# Patient Record
Sex: FEMALE | Race: WHITE | NOT HISPANIC OR LATINO | Employment: FULL TIME | URBAN - METROPOLITAN AREA
[De-identification: names, ages, dates, MRNs, and addresses within clinical notes are randomized per-mention and may not be internally consistent; named-entity substitution may affect disease eponyms.]

---

## 2017-11-08 ENCOUNTER — ALLSCRIPTS OFFICE VISIT (OUTPATIENT)
Dept: OTHER | Facility: OTHER | Age: 52
End: 2017-11-08

## 2017-11-08 LAB — S PYO AG THROAT QL: NEGATIVE

## 2017-11-10 NOTE — PROGRESS NOTES
Assessment    1  Moderate persistent asthma (493 90) (J45 40)   2  Acute pharyngitis (462) (J02 9)   3  Genital herpes simplex (054 10) (A60 00)   4  Sleep apnea (780 57) (G47 30)   5  Never a smoker   6  BMI 36 0-36 9,adult (V85 36) (Z68 36)    Plan  Acute pharyngitis    · Azithromycin 250 MG Oral Tablet; TAKE 2 TABLETS ON DAY 1 THEN TAKE 1TABLET A DAY FOR 4 DAYS   · Follow Up if Not Better Evaluation and Treatment  Follow-up  Status: Complete  Done:08Nov2017   · Drink plenty of fluids ; Status:Complete;   Done: 37IVJ4494   · Gargle with warm salt water for 5 minutes every 4 hours ; Status:Complete;   Done:08Nov2017   · Irrigate your nose twice a day ; Status:Complete;   Done: 98NPD4349   · Rapid StrepA- POC; Source:Throat; Status:Complete;   Done: 35FEK5015 04:30PM  BMI 36 0-36 9,adult    · Begin a limited exercise program ; Status:Complete;   Done: 51NPR7121    Discussion/Summary  Discussion Summary:   Strep negative  Will treated with Zithromax and advised on supportive care  Advised recheck for worsening symptoms, difficulty breathing, SOB, or wheezing  will request records be sent to our office and schedule CPE  Due for pap and mammogram, states she has typically has breast US d/t cysts  Medication SE Review and Pt Understands Tx: Possible side effects of new medications were reviewed with the patient/guardian today  The treatment plan was reviewed with the patient/guardian  The patient/guardian understands and agrees with the treatment plan      Chief Complaint  Chief Complaint Free Text Note Form: pt c/o sore throat, cough, sneezing, and on and off again fevers for about 3 days  af/rma      History of Present Illness  HPI: C/o severe sore throat for the past 2 days  It hurts to swallow  Has had a low grade fever, productive cough, and her asthma has been acting up  Uses Advair daily and used Ventolin twice today  has mild sinus congestion, runny nose, and itchy ears  of seasonal allergies, does not take OTC antihistamine d/t excessive fatigue  moved to the area, plans on transferring care to our office  Review of Systems  Complete-Female:  Constitutional: fever-- and-- feeling poorly, but-- no chills  ENT: sore throat, but-- as noted in HPI  Respiratory: shortness of breath,-- cough-- and-- wheezing  Neurological: headache  Past Medical History  Active Problems And Past Medical History Reviewed: The active problems and past medical history were reviewed and updated today  Surgical History  1  History of Wrist Surgery  Surgical History Reviewed: The surgical history was reviewed and updated today  Family History  Mother    1  Family history of osteoporosis (V17 81) (Z82 62)   2  Family history of thyroid disease (V18 19) (Z83 49)  Father    3  Family history of Alzheimer's disease (V17 2) (Z82 0)   4  Family history of hypertension (V17 49) (Z82 49)  Family History Reviewed: The family history was reviewed and updated today  Social History     · Never a smoker  Social History Reviewed: The social history was reviewed and is unchanged  Current Meds   1  Acyclovir 200 MG Oral Capsule; take 1 capsule daily; Therapy: 57HSA0552 to Recorded   2  Advair Diskus 500-50 MCG/DOSE Inhalation Aerosol Powder Breath Activated; INHALE 1 PUFF TWICE DAILY; Therapy: 40XAS2636 to Recorded   3  Ventolin  (90 Base) MCG/ACT Inhalation Aerosol Solution; INHALE 1 TO 2 PUFFS EVERY 6 HOURS AS NEEDED; Therapy: 07LLN1294 to Recorded    Allergies  1  No Known Drug Allergies    Vitals  Vital Signs    Recorded: 98CLN5514 04:10PM   Temperature 98 4 F   Heart Rate 78   Respiration 18   Systolic 080   Diastolic 76   Height 5 ft 4 in   Weight 212 lb    BMI Calculated 36 39   BSA Calculated 2 01       Physical Exam   Constitutional  General appearance: No acute distress, well appearing and well nourished  Eyes  Conjunctiva and lids: No swelling, erythema or discharge     Ears, Nose, Mouth, and Throat Otoscopic examination: Tympanic membranes translucent with normal light reflex  Canals patent without erythema  Nasal mucosa, septum, and turbinates: Normal without edema or erythema  Oropharynx: Abnormal   The posterior pharynx was erythematous, but-- did not have an exudate  There was 2+ enlargement and erythema of both tonsils no exudate  Pulmonary  Respiratory effort: No increased work of breathing or signs of respiratory distress  Auscultation of lungs: Clear to auscultation  Cardiovascular  Auscultation of heart: Normal rate and rhythm, normal S1 and S2, without murmurs  Examination of extremities for edema and/or varicosities: Normal    Lymphatic  Palpation of lymph nodes in neck: Abnormal   bilateral anterior cervical node enlargement  Skin  Skin and subcutaneous tissue: Normal without rashes or lesions  Psychiatric  Mood and affect: Normal          Results/Data  Rapid StrepA- POC [id number] 04:30PM Helyn Pinch     Test Name Result Flag Reference   Rapid Strep Negative       PHQ-2 Adult Depression Screening 60BZU9456 04:09PM User, Ahs     Test Name Result Flag Reference   PHQ-2 Adult Depression Score 0       Over the last two weeks, how often have you been bothered by any of the following problems? Little interest or pleasure in doing things: Not at all - 0 Feeling down, depressed, or hopeless: Not at all - 0   PHQ-2 Adult Depression Screening Negative           Message  Return to work or school:   Manjit Starks is under my professional care  She was seen in my office on 11/8/17       Jeanne Lundborg, [address] [place] [address]  Attending Note  Collaborating Physician Note: Collaborating Physician: I agree with the Advanced Practitioner note        Signatures   Electronically signed by : Glenn Rodriguez; Nov 8 2017  4:39PM EST                       (Author)    Electronically signed by : Stephany Longoria DO; Nov 8 2017  5:13PM EST                       (Review)

## 2017-12-13 ENCOUNTER — GENERIC CONVERSION - ENCOUNTER (OUTPATIENT)
Dept: OTHER | Facility: OTHER | Age: 52
End: 2017-12-13

## 2018-01-13 VITALS
SYSTOLIC BLOOD PRESSURE: 134 MMHG | RESPIRATION RATE: 18 BRPM | HEART RATE: 78 BPM | WEIGHT: 212 LBS | HEIGHT: 64 IN | DIASTOLIC BLOOD PRESSURE: 76 MMHG | TEMPERATURE: 98.4 F | BODY MASS INDEX: 36.19 KG/M2

## 2018-01-14 NOTE — MISCELLANEOUS
Message  Return to work or school:   Hossein Burkett is under my professional care  She was seen in my office on 11/8/17        Neetu Novak, [address]        Signatures   Electronically signed by : Eloina Ricketts; Nov 8 2017  4:39PM EST                       (Author)    Electronically signed by : Aileen Gerber DO; Nov 8 2017  5:13PM EST                       (Review)

## 2018-01-15 ENCOUNTER — ALLSCRIPTS OFFICE VISIT (OUTPATIENT)
Dept: OTHER | Facility: OTHER | Age: 53
End: 2018-01-15

## 2018-01-15 DIAGNOSIS — Z12.31 ENCOUNTER FOR SCREENING MAMMOGRAM FOR MALIGNANT NEOPLASM OF BREAST: ICD-10-CM

## 2018-01-23 VITALS
DIASTOLIC BLOOD PRESSURE: 82 MMHG | SYSTOLIC BLOOD PRESSURE: 122 MMHG | RESPIRATION RATE: 16 BRPM | HEART RATE: 84 BPM | TEMPERATURE: 99 F | HEIGHT: 64 IN | BODY MASS INDEX: 37.39 KG/M2 | WEIGHT: 219 LBS
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
## 2018-01-23 NOTE — MISCELLANEOUS
Message  Return to work or school:   Raphael Velazquez is under my professional care  She was seen in my office on 1/15/18  Excuse from work today               Signatures   Electronically signed by : Mauri Brice DO; Gerald 15 2018 11:59PM EST                       (Author)

## 2018-02-13 ENCOUNTER — OFFICE VISIT (OUTPATIENT)
Dept: FAMILY MEDICINE CLINIC | Facility: CLINIC | Age: 53
End: 2018-02-13
Payer: COMMERCIAL

## 2018-02-13 VITALS
HEIGHT: 64 IN | TEMPERATURE: 99.7 F | BODY MASS INDEX: 37.73 KG/M2 | WEIGHT: 221 LBS | HEART RATE: 88 BPM | DIASTOLIC BLOOD PRESSURE: 84 MMHG | SYSTOLIC BLOOD PRESSURE: 124 MMHG | RESPIRATION RATE: 20 BRPM

## 2018-02-13 DIAGNOSIS — J11.1 INFLUENZA: Primary | ICD-10-CM

## 2018-02-13 PROBLEM — M23.92 INTERNAL DERANGEMENT OF LEFT KNEE: Status: ACTIVE | Noted: 2018-01-15

## 2018-02-13 PROBLEM — G47.30 SLEEP APNEA: Status: ACTIVE | Noted: 2017-11-08

## 2018-02-13 PROBLEM — A60.00 GENITAL HERPES SIMPLEX: Status: ACTIVE | Noted: 2017-11-08

## 2018-02-13 PROBLEM — J45.40 MODERATE PERSISTENT ASTHMA: Status: ACTIVE | Noted: 2017-11-08

## 2018-02-13 PROCEDURE — 99213 OFFICE O/P EST LOW 20 MIN: CPT | Performed by: NURSE PRACTITIONER

## 2018-02-13 RX ORDER — IBUPROFEN 600 MG/1
TABLET ORAL
COMMUNITY
Start: 2018-01-23 | End: 2018-07-09 | Stop reason: HOSPADM

## 2018-02-13 RX ORDER — ACYCLOVIR 200 MG/1
200 CAPSULE ORAL AS NEEDED
COMMUNITY
Start: 2018-01-15 | End: 2019-04-16 | Stop reason: SDUPTHER

## 2018-02-13 RX ORDER — OSELTAMIVIR PHOSPHATE 75 MG/1
75 CAPSULE ORAL EVERY 12 HOURS SCHEDULED
Qty: 10 CAPSULE | Refills: 0 | Status: SHIPPED | OUTPATIENT
Start: 2018-02-13 | End: 2018-02-18

## 2018-02-13 RX ORDER — ALBUTEROL SULFATE 90 UG/1
2 AEROSOL, METERED RESPIRATORY (INHALATION) EVERY 4 HOURS PRN
COMMUNITY
Start: 2017-11-08 | End: 2022-02-09

## 2018-02-13 NOTE — PATIENT INSTRUCTIONS
Influenza   WHAT YOU NEED TO KNOW:   Influenza (the flu) is an infection caused by the influenza virus  The flu is easily spread when an infected person coughs, sneezes, or has close contact with others  You may be able to spread the flu to others for 1 week or longer after signs or symptoms appear  DISCHARGE INSTRUCTIONS:   Call 911 for any of the following:   · You have trouble breathing, and your lips look purple or blue  · You have a seizure  Return to the emergency department if:   · You are dizzy, or you are urinating less or not at all  · You have a headache with a stiff neck, and you feel tired or confused  · You have new pain or pressure in your chest     · Your symptoms, such as shortness of breath, vomiting, or diarrhea, get worse  · Your symptoms, such as fever and coughing, seem to get better, but then get worse  Contact your healthcare provider if:   · You have new muscle pain or weakness  · You have questions or concerns about your condition or care  Medicines: You may need any of the following:  · Acetaminophen  decreases pain and fever  It is available without a doctor's order  Ask how much to take and how often to take it  Follow directions  Acetaminophen can cause liver damage if not taken correctly  · NSAIDs , such as ibuprofen, help decrease swelling, pain, and fever  This medicine is available with or without a doctor's order  NSAIDs can cause stomach bleeding or kidney problems in certain people  If you take blood thinner medicine, always ask your healthcare provider if NSAIDs are safe for you  Always read the medicine label and follow directions  · Antivirals  help fight a viral infection  · Take your medicine as directed  Contact your healthcare provider if you think your medicine is not helping or if you have side effects  Tell him or her if you are allergic to any medicine  Keep a list of the medicines, vitamins, and herbs you take   Include the amounts, and when and why you take them  Bring the list or the pill bottles to follow-up visits  Carry your medicine list with you in case of an emergency  Rest  as much as you can to help you recover  Drink liquids as directed  to help prevent dehydration  Ask how much liquid to drink each day and which liquids are best for you  Prevent the spread of influenza:   · Wash your hands often  Use soap and water  Wash your hands after you use the bathroom, change a child's diapers, or sneeze  Wash your hands before you prepare or eat food  Use gel hand cleanser when soap and water are not available  Do not touch your eyes, nose, or mouth unless you have washed your hands first            · Cover your mouth when you sneeze or cough  Cough into a tissue or the bend of your arm  · Clean shared items with a germ-killing   Clean table surfaces, doorknobs, and light switches  Do not share towels, silverware, and dishes with people who are sick  Wash bed sheets, towels, silverware, and dishes with soap and water  · Wear a mask  over your mouth and nose if you are sick or are near anyone who is sick  · Stay away from others  if you are sick  · Influenza vaccine  helps prevent influenza (flu)  Everyone older than 6 months should get a yearly influenza vaccine  Get the vaccine as soon as it is available, usually in September or October each year  Follow up with your healthcare provider as directed:  Write down your questions so you remember to ask them during your visits  © 2017 [employer] Information is for End User's use only and may not be sold, redistributed or otherwise used for commercial purposes  All illustrations and images included in CareNotes® are the copyrighted property of [employer]  or [address]  The above information is an  only  It is not intended as medical advice for individual conditions or treatments   Talk to your doctor, nurse or pharmacist before following any medical regimen to see if it is safe and effective for you

## 2018-02-13 NOTE — PROGRESS NOTES
Assessment/Plan:       Diagnoses and all orders for this visit:    Influenza  -     oseltamivir (TAMIFLU) 75 mg capsule; Take 1 capsule (75 mg total) by mouth every 12 (twelve) hours for 5 days    BMI 38 0-38 9,adult    Other orders  -     acyclovir (ZOVIRAX) 200 mg capsule;   -     fluticasone-salmeterol (ADVAIR DISKUS) 500-50 mcg/dose; Inhale 1 puff 2 (two) times a day  -     ibuprofen (MOTRIN) 600 mg tablet;   -     albuterol (VENTOLIN HFA) 90 mcg/act inhaler; Inhale          Patient Instructions     Influenza   WHAT YOU NEED TO KNOW:   Influenza (the flu) is an infection caused by the influenza virus  The flu is easily spread when an infected person coughs, sneezes, or has close contact with others  You may be able to spread the flu to others for 1 week or longer after signs or symptoms appear  DISCHARGE INSTRUCTIONS:   Call 911 for any of the following:   · You have trouble breathing, and your lips look purple or blue  · You have a seizure  Return to the emergency department if:   · You are dizzy, or you are urinating less or not at all  · You have a headache with a stiff neck, and you feel tired or confused  · You have new pain or pressure in your chest     · Your symptoms, such as shortness of breath, vomiting, or diarrhea, get worse  · Your symptoms, such as fever and coughing, seem to get better, but then get worse  Contact your healthcare provider if:   · You have new muscle pain or weakness  · You have questions or concerns about your condition or care  Medicines: You may need any of the following:  · Acetaminophen  decreases pain and fever  It is available without a doctor's order  Ask how much to take and how often to take it  Follow directions  Acetaminophen can cause liver damage if not taken correctly  · NSAIDs , such as ibuprofen, help decrease swelling, pain, and fever  This medicine is available with or without a doctor's order   NSAIDs can cause stomach bleeding or kidney problems in certain people  If you take blood thinner medicine, always ask your healthcare provider if NSAIDs are safe for you  Always read the medicine label and follow directions  · Antivirals  help fight a viral infection  · Take your medicine as directed  Contact your healthcare provider if you think your medicine is not helping or if you have side effects  Tell him or her if you are allergic to any medicine  Keep a list of the medicines, vitamins, and herbs you take  Include the amounts, and when and why you take them  Bring the list or the pill bottles to follow-up visits  Carry your medicine list with you in case of an emergency  Rest  as much as you can to help you recover  Drink liquids as directed  to help prevent dehydration  Ask how much liquid to drink each day and which liquids are best for you  Prevent the spread of influenza:   · Wash your hands often  Use soap and water  Wash your hands after you use the bathroom, change a child's diapers, or sneeze  Wash your hands before you prepare or eat food  Use gel hand cleanser when soap and water are not available  Do not touch your eyes, nose, or mouth unless you have washed your hands first            · Cover your mouth when you sneeze or cough  Cough into a tissue or the bend of your arm  · Clean shared items with a germ-killing   Clean table surfaces, doorknobs, and light switches  Do not share towels, silverware, and dishes with people who are sick  Wash bed sheets, towels, silverware, and dishes with soap and water  · Wear a mask  over your mouth and nose if you are sick or are near anyone who is sick  · Stay away from others  if you are sick  · Influenza vaccine  helps prevent influenza (flu)  Everyone older than 6 months should get a yearly influenza vaccine  Get the vaccine as soon as it is available, usually in September or October each year    Follow up with your healthcare provider as directed:  Write down your questions so you remember to ask them during your visits  © 2017 [employer] Information is for End User's use only and may not be sold, redistributed or otherwise used for commercial purposes  All illustrations and images included in CareNotes® are the copyrighted property of [employer]  or [employer]  The above information is an  only  It is not intended as medical advice for individual conditions or treatments  Talk to your doctor, nurse or pharmacist before following any medical regimen to see if it is safe and effective for you  Return if symptoms worsen or fail to improve  Subjective:      Patient ID: Carlotta Sood is a 47 y o  female  Chief Complaint   Patient presents with    Cough     S/S started yesterday     Sore Throat    Fever       Yesterday she developed sudden onset of body aches, runny nose, sore throat, dry cough, and a headache  She had a fever of 102 last night  She taking is taking Ibuprofen OTC which helps with the fever  She did have a flu shot this season  The following portions of the patient's history were reviewed and updated as appropriate: allergies, current medications, past family history, past medical history, past social history, past surgical history and problem list     Review of Systems   Constitutional: Positive for chills, fatigue and fever  HENT: Positive for congestion and sore throat  Negative for ear pain, postnasal drip, rhinorrhea and sinus pressure  Respiratory: Positive for cough  Negative for shortness of breath and wheezing  Cardiovascular: Negative for chest pain  Gastrointestinal: Positive for nausea  Negative for abdominal pain, diarrhea and vomiting  Musculoskeletal: Positive for arthralgias  Skin: Negative for rash  Neurological: Positive for headaches           Current Outpatient Prescriptions   Medication Sig Dispense Refill    acyclovir (ZOVIRAX) 200 mg capsule  albuterol (VENTOLIN HFA) 90 mcg/act inhaler Inhale      fluticasone-salmeterol (ADVAIR DISKUS) 500-50 mcg/dose Inhale 1 puff 2 (two) times a day      ibuprofen (MOTRIN) 600 mg tablet       oseltamivir (TAMIFLU) 75 mg capsule Take 1 capsule (75 mg total) by mouth every 12 (twelve) hours for 5 days 10 capsule 0     No current facility-administered medications for this visit  Objective:    /84   Pulse 88   Temp 99 7 °F (37 6 °C)   Resp 20   Ht 5' 3 5" (1 613 m)   Wt 100 kg (221 lb)   LMP 01/28/2018   BMI 38 53 kg/m²        Physical Exam   Constitutional: She appears well-developed and well-nourished  HENT:   Right Ear: Tympanic membrane, external ear and ear canal normal    Left Ear: Tympanic membrane, external ear and ear canal normal    Nose: Mucosal edema and rhinorrhea (clear) present  Mouth/Throat: Oropharynx is clear and moist and mucous membranes are normal    Eyes: Conjunctivae are normal    Cardiovascular: Normal rate, regular rhythm and normal heart sounds  Pulmonary/Chest: Effort normal and breath sounds normal    Abdominal: Bowel sounds are normal  She exhibits no distension  There is no splenomegaly or hepatomegaly  There is no tenderness  Lymphadenopathy:        Right cervical: No superficial cervical adenopathy present  Left cervical: No superficial cervical adenopathy present  Skin: No rash noted  Psychiatric: She has a normal mood and affect                Elenore Model

## 2018-07-06 ENCOUNTER — HOSPITAL ENCOUNTER (INPATIENT)
Facility: HOSPITAL | Age: 53
LOS: 3 days | Discharge: HOME | DRG: 392 | End: 2018-07-09
Attending: EMERGENCY MEDICINE | Admitting: FAMILY MEDICINE
Payer: COMMERCIAL

## 2018-07-06 ENCOUNTER — APPOINTMENT (EMERGENCY)
Dept: RADIOLOGY | Facility: HOSPITAL | Age: 53
DRG: 392 | End: 2018-07-06
Payer: COMMERCIAL

## 2018-07-06 DIAGNOSIS — K57.92 DIVERTICULITIS: Primary | ICD-10-CM

## 2018-07-06 DIAGNOSIS — N70.11 HYDROSALPINX: ICD-10-CM

## 2018-07-06 DIAGNOSIS — K57.20 DIVERTICULITIS OF LARGE INTESTINE WITH ABSCESS WITHOUT BLEEDING: ICD-10-CM

## 2018-07-06 PROBLEM — R19.7 DIARRHEA: Status: ACTIVE | Noted: 2018-07-06

## 2018-07-06 PROBLEM — K57.32 DIVERTICULITIS OF SIGMOID COLON: Status: ACTIVE | Noted: 2018-07-06

## 2018-07-06 PROBLEM — J45.909 ASTHMA: Status: ACTIVE | Noted: 2017-11-08

## 2018-07-06 PROBLEM — A60.00 GENITAL HERPES SIMPLEX: Status: RESOLVED | Noted: 2017-11-08 | Resolved: 2018-07-06

## 2018-07-06 LAB
ALBUMIN SERPL BCP-MCNC: 3.3 G/DL (ref 3.5–5)
ALP SERPL-CCNC: 89 U/L (ref 46–116)
ALT SERPL W P-5'-P-CCNC: 27 U/L (ref 12–78)
ANION GAP SERPL CALCULATED.3IONS-SCNC: 10 MMOL/L (ref 4–13)
AST SERPL W P-5'-P-CCNC: 9 U/L (ref 5–45)
BACTERIA UR QL AUTO: NORMAL /HPF
BASOPHILS # BLD AUTO: 0.02 THOUSANDS/ΜL (ref 0–0.1)
BASOPHILS NFR BLD AUTO: 0 % (ref 0–1)
BILIRUB SERPL-MCNC: 0.3 MG/DL (ref 0.2–1)
BILIRUB UR QL STRIP: NEGATIVE
BUN SERPL-MCNC: 12 MG/DL (ref 5–25)
CALCIUM SERPL-MCNC: 9 MG/DL (ref 8.3–10.1)
CHLORIDE SERPL-SCNC: 102 MMOL/L (ref 100–108)
CLARITY UR: CLEAR
CO2 SERPL-SCNC: 24 MMOL/L (ref 21–32)
COLOR UR: YELLOW
CREAT SERPL-MCNC: 0.89 MG/DL (ref 0.6–1.3)
EOSINOPHIL # BLD AUTO: 0.02 THOUSAND/ΜL (ref 0–0.61)
EOSINOPHIL NFR BLD AUTO: 0 % (ref 0–6)
ERYTHROCYTE [DISTWIDTH] IN BLOOD BY AUTOMATED COUNT: 12.6 % (ref 11.6–15.1)
EXT PREG TEST URINE: NORMAL
GFR SERPL CREATININE-BSD FRML MDRD: 75 ML/MIN/1.73SQ M
GLUCOSE SERPL-MCNC: 117 MG/DL (ref 65–140)
GLUCOSE UR STRIP-MCNC: NEGATIVE MG/DL
HCT VFR BLD AUTO: 41.8 % (ref 34.8–46.1)
HGB BLD-MCNC: 13.8 G/DL (ref 11.5–15.4)
HGB UR QL STRIP.AUTO: ABNORMAL
HOLD SPECIMEN: NORMAL
IMM GRANULOCYTES # BLD AUTO: 0.03 THOUSAND/UL (ref 0–0.2)
IMM GRANULOCYTES NFR BLD AUTO: 0 % (ref 0–2)
KETONES UR STRIP-MCNC: NEGATIVE MG/DL
LEUKOCYTE ESTERASE UR QL STRIP: NEGATIVE
LIPASE SERPL-CCNC: 122 U/L (ref 73–393)
LYMPHOCYTES # BLD AUTO: 1.68 THOUSANDS/ΜL (ref 0.6–4.47)
LYMPHOCYTES NFR BLD AUTO: 15 % (ref 14–44)
MCH RBC QN AUTO: 29.3 PG (ref 26.8–34.3)
MCHC RBC AUTO-ENTMCNC: 33 G/DL (ref 31.4–37.4)
MCV RBC AUTO: 89 FL (ref 82–98)
MONOCYTES # BLD AUTO: 0.58 THOUSAND/ΜL (ref 0.17–1.22)
MONOCYTES NFR BLD AUTO: 5 % (ref 4–12)
NEUTROPHILS # BLD AUTO: 8.56 THOUSANDS/ΜL (ref 1.85–7.62)
NEUTS SEG NFR BLD AUTO: 80 % (ref 43–75)
NITRITE UR QL STRIP: NEGATIVE
NON-SQ EPI CELLS URNS QL MICRO: NORMAL /HPF
NRBC BLD AUTO-RTO: 0 /100 WBCS
PH UR STRIP.AUTO: 7 [PH] (ref 5–9)
PLATELET # BLD AUTO: 318 THOUSANDS/UL (ref 149–390)
PLATELET # BLD AUTO: 335 THOUSANDS/UL (ref 149–390)
PMV BLD AUTO: 8.9 FL (ref 8.9–12.7)
PMV BLD AUTO: 9.8 FL (ref 8.9–12.7)
POTASSIUM SERPL-SCNC: 4 MMOL/L (ref 3.5–5.3)
PROT SERPL-MCNC: 8.1 G/DL (ref 6.4–8.2)
PROT UR STRIP-MCNC: NEGATIVE MG/DL
RBC # BLD AUTO: 4.71 MILLION/UL (ref 3.81–5.12)
RBC #/AREA URNS AUTO: NORMAL /HPF
SODIUM SERPL-SCNC: 136 MMOL/L (ref 136–145)
SP GR UR STRIP.AUTO: <=1.005 (ref 1–1.03)
UROBILINOGEN UR QL STRIP.AUTO: 0.2 E.U./DL
WBC # BLD AUTO: 10.89 THOUSAND/UL (ref 4.31–10.16)
WBC #/AREA URNS AUTO: NORMAL /HPF

## 2018-07-06 PROCEDURE — 99222 1ST HOSP IP/OBS MODERATE 55: CPT | Performed by: FAMILY MEDICINE

## 2018-07-06 PROCEDURE — 96361 HYDRATE IV INFUSION ADD-ON: CPT

## 2018-07-06 PROCEDURE — 87081 CULTURE SCREEN ONLY: CPT | Performed by: NURSE PRACTITIONER

## 2018-07-06 PROCEDURE — 83690 ASSAY OF LIPASE: CPT | Performed by: EMERGENCY MEDICINE

## 2018-07-06 PROCEDURE — 80053 COMPREHEN METABOLIC PANEL: CPT | Performed by: EMERGENCY MEDICINE

## 2018-07-06 PROCEDURE — 81001 URINALYSIS AUTO W/SCOPE: CPT | Performed by: EMERGENCY MEDICINE

## 2018-07-06 PROCEDURE — 81025 URINE PREGNANCY TEST: CPT | Performed by: EMERGENCY MEDICINE

## 2018-07-06 PROCEDURE — 99254 IP/OBS CNSLTJ NEW/EST MOD 60: CPT | Performed by: INTERNAL MEDICINE

## 2018-07-06 PROCEDURE — 36415 COLL VENOUS BLD VENIPUNCTURE: CPT | Performed by: EMERGENCY MEDICINE

## 2018-07-06 PROCEDURE — C9113 INJ PANTOPRAZOLE SODIUM, VIA: HCPCS | Performed by: NURSE PRACTITIONER

## 2018-07-06 PROCEDURE — 96375 TX/PRO/DX INJ NEW DRUG ADDON: CPT

## 2018-07-06 PROCEDURE — 99285 EMERGENCY DEPT VISIT HI MDM: CPT

## 2018-07-06 PROCEDURE — 85025 COMPLETE CBC W/AUTO DIFF WBC: CPT | Performed by: EMERGENCY MEDICINE

## 2018-07-06 PROCEDURE — 74177 CT ABD & PELVIS W/CONTRAST: CPT

## 2018-07-06 PROCEDURE — 96374 THER/PROPH/DIAG INJ IV PUSH: CPT

## 2018-07-06 PROCEDURE — 85049 AUTOMATED PLATELET COUNT: CPT | Performed by: NURSE PRACTITIONER

## 2018-07-06 RX ORDER — MORPHINE SULFATE 4 MG/ML
4 INJECTION, SOLUTION INTRAMUSCULAR; INTRAVENOUS ONCE
Status: COMPLETED | OUTPATIENT
Start: 2018-07-06 | End: 2018-07-06

## 2018-07-06 RX ORDER — SODIUM CHLORIDE 9 MG/ML
75 INJECTION, SOLUTION INTRAVENOUS CONTINUOUS
Status: DISCONTINUED | OUTPATIENT
Start: 2018-07-06 | End: 2018-07-09 | Stop reason: HOSPADM

## 2018-07-06 RX ORDER — ALBUTEROL SULFATE 90 UG/1
2 AEROSOL, METERED RESPIRATORY (INHALATION) EVERY 4 HOURS PRN
Status: DISCONTINUED | OUTPATIENT
Start: 2018-07-06 | End: 2018-07-09 | Stop reason: HOSPADM

## 2018-07-06 RX ORDER — PANTOPRAZOLE SODIUM 40 MG/1
40 INJECTION, POWDER, FOR SOLUTION INTRAVENOUS
Status: DISCONTINUED | OUTPATIENT
Start: 2018-07-06 | End: 2018-07-09 | Stop reason: HOSPADM

## 2018-07-06 RX ORDER — DIPHENOXYLATE HYDROCHLORIDE AND ATROPINE SULFATE 2.5; .025 MG/1; MG/1
1 TABLET ORAL DAILY
COMMUNITY

## 2018-07-06 RX ORDER — FLUTICASONE FUROATE AND VILANTEROL 100; 25 UG/1; UG/1
1 POWDER RESPIRATORY (INHALATION)
Status: DISCONTINUED | OUTPATIENT
Start: 2018-07-06 | End: 2018-07-09 | Stop reason: HOSPADM

## 2018-07-06 RX ORDER — MORPHINE SULFATE 2 MG/ML
1 INJECTION, SOLUTION INTRAMUSCULAR; INTRAVENOUS EVERY 4 HOURS PRN
Status: DISCONTINUED | OUTPATIENT
Start: 2018-07-06 | End: 2018-07-09 | Stop reason: HOSPADM

## 2018-07-06 RX ORDER — LEVOFLOXACIN 5 MG/ML
750 INJECTION, SOLUTION INTRAVENOUS EVERY 24 HOURS
Status: DISCONTINUED | OUTPATIENT
Start: 2018-07-07 | End: 2018-07-09 | Stop reason: HOSPADM

## 2018-07-06 RX ORDER — MORPHINE SULFATE 2 MG/ML
2 INJECTION, SOLUTION INTRAMUSCULAR; INTRAVENOUS EVERY 4 HOURS PRN
Status: DISCONTINUED | OUTPATIENT
Start: 2018-07-06 | End: 2018-07-09 | Stop reason: HOSPADM

## 2018-07-06 RX ORDER — ONDANSETRON 2 MG/ML
4 INJECTION INTRAMUSCULAR; INTRAVENOUS EVERY 6 HOURS PRN
Status: DISCONTINUED | OUTPATIENT
Start: 2018-07-06 | End: 2018-07-09 | Stop reason: HOSPADM

## 2018-07-06 RX ORDER — ACETAMINOPHEN 325 MG/1
650 TABLET ORAL EVERY 6 HOURS PRN
Status: DISCONTINUED | OUTPATIENT
Start: 2018-07-06 | End: 2018-07-09 | Stop reason: HOSPADM

## 2018-07-06 RX ORDER — ONDANSETRON 2 MG/ML
4 INJECTION INTRAMUSCULAR; INTRAVENOUS ONCE
Status: COMPLETED | OUTPATIENT
Start: 2018-07-06 | End: 2018-07-06

## 2018-07-06 RX ORDER — LEVOFLOXACIN 5 MG/ML
750 INJECTION, SOLUTION INTRAVENOUS ONCE
Status: COMPLETED | OUTPATIENT
Start: 2018-07-06 | End: 2018-07-06

## 2018-07-06 RX ADMIN — MORPHINE SULFATE 4 MG: 4 INJECTION INTRAVENOUS at 08:00

## 2018-07-06 RX ADMIN — SODIUM CHLORIDE 1000 ML: 0.9 INJECTION, SOLUTION INTRAVENOUS at 09:53

## 2018-07-06 RX ADMIN — SODIUM CHLORIDE 125 ML/HR: 0.9 INJECTION, SOLUTION INTRAVENOUS at 11:58

## 2018-07-06 RX ADMIN — METRONIDAZOLE 500 MG: 500 INJECTION, SOLUTION INTRAVENOUS at 07:51

## 2018-07-06 RX ADMIN — LEVOFLOXACIN 750 MG: 5 INJECTION, SOLUTION INTRAVENOUS at 08:25

## 2018-07-06 RX ADMIN — MORPHINE SULFATE 1 MG: 2 INJECTION, SOLUTION INTRAMUSCULAR; INTRAVENOUS at 12:12

## 2018-07-06 RX ADMIN — MORPHINE SULFATE 2 MG: 2 INJECTION, SOLUTION INTRAMUSCULAR; INTRAVENOUS at 22:05

## 2018-07-06 RX ADMIN — SODIUM CHLORIDE 125 ML/HR: 0.9 INJECTION, SOLUTION INTRAVENOUS at 22:03

## 2018-07-06 RX ADMIN — SODIUM CHLORIDE 1000 ML: 0.9 INJECTION, SOLUTION INTRAVENOUS at 05:17

## 2018-07-06 RX ADMIN — IOHEXOL 100 ML: 350 INJECTION, SOLUTION INTRAVENOUS at 06:57

## 2018-07-06 RX ADMIN — ENOXAPARIN SODIUM 40 MG: 100 INJECTION SUBCUTANEOUS at 10:01

## 2018-07-06 RX ADMIN — PANTOPRAZOLE SODIUM 40 MG: 40 INJECTION, POWDER, FOR SOLUTION INTRAVENOUS at 12:19

## 2018-07-06 RX ADMIN — ONDANSETRON 4 MG: 2 INJECTION INTRAMUSCULAR; INTRAVENOUS at 05:16

## 2018-07-06 RX ADMIN — MORPHINE SULFATE 4 MG: 4 INJECTION INTRAVENOUS at 05:25

## 2018-07-06 RX ADMIN — METRONIDAZOLE 500 MG: 500 INJECTION, SOLUTION INTRAVENOUS at 23:04

## 2018-07-06 RX ADMIN — FLUTICASONE FUROATE AND VILANTEROL TRIFENATATE 1 PUFF: 100; 25 POWDER RESPIRATORY (INHALATION) at 10:10

## 2018-07-06 RX ADMIN — METRONIDAZOLE 500 MG: 500 INJECTION, SOLUTION INTRAVENOUS at 16:27

## 2018-07-06 RX ADMIN — MORPHINE SULFATE 2 MG: 2 INJECTION, SOLUTION INTRAMUSCULAR; INTRAVENOUS at 16:22

## 2018-07-06 NOTE — PROGRESS NOTES
Vascular and Interventional Radiology brief note  Received consult for possible drainage  Imaging reviewed  Intramural abscess along the mid sigmoid colon, not amenable to drainage at this time  Can consider repeat CT scan in several days to evaluate for interval change  However, given the location and the size of the uterus, this may always be difficult if not impossible to access  Consider MRI for evaluation uterus and possible uterine artery embolization

## 2018-07-06 NOTE — PLAN OF CARE
Problem: DISCHARGE PLANNING - CARE MANAGEMENT  Goal: Discharge to post-acute care or home with appropriate resources  INTERVENTIONS:  - Conduct assessment to determine patient/family and health care team treatment goals, and need for post-acute services based on payer coverage, community resources, and patient preferences, and barriers to discharge  - Address psychosocial, clinical, and financial barriers to discharge as identified in assessment in conjunction with the patient/family and health care team  - Arrange appropriate level of post-acute services according to patient's   needs and preference and payer coverage in collaboration with the physician and health care team  - Communicate with and update the patient/family, physician, and health care team regarding progress on the discharge plan  - Arrange appropriate transportation to post-acute venues  Return to home independently  Outcome: Adequate for Discharge

## 2018-07-06 NOTE — ASSESSMENT & PLAN NOTE
As evidenced by left sided andominal pain  CTScan of the abdomen and pelvis affecting mid-sigmoid colon with probability of intramural abscess  Levaquin and Flagyl started in the ED  WBC slightly elevated 10 89 with 80%neutrophils  Morphine for pain control    · Cotinue IV Levaquin and Flagyl  · IV hydration  · NPO  · Antiemetics, PPI, Morphine for pain control  · IR consulted, no drainage necessary  · GI consult  · CBC and BMP in am

## 2018-07-06 NOTE — CONSULTS
Consultation - [facility]  Nicolas Lopez 47 y o  female MRN: [id number]  Unit/Bed#: [address] [place] [address] Encounter: [id number]        Inpatient consult to gastroenterology  Consult performed by: Mari Broussard ordered by: Fernando Ellington        Reason for Consult / Principal Problem: Diverticulitis of sigmoid colon with abscess    HPI: Nicolas Lopez is a 47y o  year old obese female with history of asthma and uterine fibroids who presented to the emergency room early this morning with complaint of 5 days of worsening left lower quadrant pain which is nonradiating and worsened with palpation  White count is found mildly elevated at 11 with a mild left shift, and her CT scan shows evidence of diverticulitis in the mid sigmoid colon with likely development of an intramural abscess anteriorly, though without evidence of shruthi perforation or obstruction  There is also a very large bulky myomatous uterus; according to note in the chart, Dr Mag Moore with interventional radiology reviewed the imaging and it was felt that the intramural abscess along the mid sigmoid colon was not amenable to drainage at this time, and because of the bulky uterus it would likely be difficult to access if there is found to be interval change in the coming days  Patient has been ordered for NPO status and is getting IV Levaquin and Flagyl  At this time, the patient says she is still having some discomfort in her left lower quadrant which does not radiate  She said she had some nausea home although did not have any vomiting episodes  She says that for the last couple of days she has been passing mucoid material per rectum, but no blood  She generally has regular bowel movements up until this current episode  She says she has never had diverticulitis or symptoms like this before, although in the beginning she thought her symptoms were from menstrual cramps    She denies any history of problems with acid reflux, heartburn or difficulty swallowing  She says her last colonoscopy was done 2 years ago by a doctor in [facility], [address] [place] [address]; this showed polyps and the patient also relates family history of colon cancer; her mother was approximately age 61 when she was diagnosed  REVIEW OF SYSTEMS:    CONSTITUTIONAL: Denies any fever, chills, or rigors  Good appetite, and no recent weight loss  HEENT: No earache or tinnitus  Denies hearing loss or visual disturbances  CARDIOVASCULAR: No chest pain or palpitations  RESPIRATORY: Denies any cough, hemoptysis, shortness of breath or dyspnea on exertion  GASTROINTESTINAL: As noted in the History of Present Illness  GENITOURINARY: No problems with urination  Denies any hematuria or dysuria  NEUROLOGIC: No dizziness or vertigo, denies headaches  MUSCULOSKELETAL: Denies any muscle or joint pain  SKIN: Denies skin rashes or itching  ENDOCRINE: Denies excessive thirst  Denies intolerance to heat or cold  PSYCHOSOCIAL: Denies depression or anxiety  Denies any recent memory loss         Historical Information   Past Medical History:   Diagnosis Date    Asthma     Fibroids     Physiological ovarian cysts      Past Surgical History:   Procedure Laterality Date    WRIST SURGERY       Social History   History   Alcohol Use    Yes     History   Drug Use No     History   Smoking Status    Never Smoker   Smokeless Tobacco    Never Used     Family History   Problem Relation Age of Onset    Osteoporosis Mother     Thyroid disease Mother     Alzheimer's disease Father     Hypertension Father        Meds/Allergies     Prescriptions Prior to Admission   Medication    fluticasone-salmeterol (ADVAIR DISKUS) 500-50 mcg/dose    multivitamin (THERAGRAN) TABS    acyclovir (ZOVIRAX) 200 mg capsule    albuterol (VENTOLIN HFA) 90 mcg/act inhaler    ibuprofen (MOTRIN) 600 mg tablet     Current Facility-Administered Medications   Medication Dose Route Frequency  acetaminophen (TYLENOL) tablet 650 mg  650 mg Oral Q6H PRN    albuterol (PROVENTIL HFA,VENTOLIN HFA) inhaler 2 puff  2 puff Inhalation Q4H PRN    enoxaparin (LOVENOX) subcutaneous injection 40 mg  40 mg Subcutaneous Daily    fluticasone-vilanterol (BREO ELLIPTA) 100-25 mcg/inh inhaler 1 puff  1 puff Inhalation Daily    [START ON 7/7/2018] levofloxacin (LEVAQUIN) IVPB (premix) 750 mg  750 mg Intravenous Q24H    metroNIDAZOLE (FLAGYL) IVPB (premix) 500 mg  500 mg Intravenous Q8H    morphine injection 1 mg  1 mg Intravenous Q4H PRN    morphine injection 2 mg  2 mg Intravenous Q4H PRN    ondansetron (ZOFRAN) injection 4 mg  4 mg Intravenous Q6H PRN    pantoprazole (PROTONIX) injection 40 mg  40 mg Intravenous Q24H JESSICA    sodium chloride 0 9 % infusion  125 mL/hr Intravenous Continuous       No Known Allergies        Objective     Blood pressure 126/88, pulse 76, temperature 98 3 °F (36 8 °C), temperature source Oral, resp  rate 18, height 5' 3" (1 6 m), weight 99 7 kg (219 lb 14 4 oz), last menstrual period 07/06/2018, SpO2 98 %  Intake/Output Summary (Last 24 hours) at 07/06/18 1250  Last data filed at 07/06/18 0824   Gross per 24 hour   Intake             1100 ml   Output                0 ml   Net             1100 ml         PHYSICAL EXAM     General Appearance:   Alert, cooperative, no distress, appears stated age    HEENT:   Normocephalic, atraumatic, anicteric      Neck:  Supple, symmetrical, trachea midline, no adenopathy;    thyroid: no enlargement/tenderness/nodules; no carotid  bruit or JVD    Lungs:   Clear to auscultation bilaterally; no rales, rhonchi or wheezing; respirations unlabored    Heart[de-identified]   S1 and S2 normal; regular rate and rhythm; no murmur, rub, or gallop     Abdomen:   Soft, tender in the left lower quadrant and suprapubic regions, non-distended; normal bowel sounds; no masses, no organomegaly    Genitalia:   Deferred    Rectal:   Deferred    Extremities:  No cyanosis, clubbing or edema    Pulses:  2+ and symmetric all extremities    Skin:  Skin color, texture, turgor normal, no rashes or lesions    Lymph nodes:  No palpable cervical, axillary or inguinal lymphadenopathy        Lab Results:   Admission on 07/06/2018   Component Date Value    WBC 07/06/2018 10 89*    RBC 07/06/2018 4 71     Hemoglobin 07/06/2018 13 8     Hematocrit 07/06/2018 41 8     MCV 07/06/2018 89     MCH 07/06/2018 29 3     MCHC 07/06/2018 33 0     RDW 07/06/2018 12 6     MPV 07/06/2018 8 9     Platelets 94/36/1827 335     nRBC 07/06/2018 0     Neutrophils Relative 07/06/2018 80*    Immat GRANS % 07/06/2018 0     Lymphocytes Relative 07/06/2018 15     Monocytes Relative 07/06/2018 5     Eosinophils Relative 07/06/2018 0     Basophils Relative 07/06/2018 0     Neutrophils Absolute 07/06/2018 8 56*    Immature Grans Absolute 07/06/2018 0 03     Lymphocytes Absolute 07/06/2018 1 68     Monocytes Absolute 07/06/2018 0 58     Eosinophils Absolute 07/06/2018 0 02     Basophils Absolute 07/06/2018 0 02     Sodium 07/06/2018 136     Potassium 07/06/2018 4 0     Chloride 07/06/2018 102     CO2 07/06/2018 24     Anion Gap 07/06/2018 10     BUN 07/06/2018 12     Creatinine 07/06/2018 0 89     Glucose 07/06/2018 117     Calcium 07/06/2018 9 0     AST 07/06/2018 9     ALT 07/06/2018 27     Alkaline Phosphatase 07/06/2018 89     Total Protein 07/06/2018 8 1     Albumin 07/06/2018 3 3*    Total Bilirubin 07/06/2018 0 30     eGFR 07/06/2018 75     Lipase 07/06/2018 122     Extra Tube 07/06/2018 Hold for add-ons       EXT PREG TEST UR (Ref: N* 07/06/2018 neg (-)     Color, UA 07/06/2018 Yellow     Clarity, UA 07/06/2018 Clear     Specific Gravity, UA 07/06/2018 <=1 005     pH, UA 07/06/2018 7 0     Leukocytes, UA 07/06/2018 Negative     Nitrite, UA 07/06/2018 Negative     Protein, UA 07/06/2018 Negative     Glucose, UA 07/06/2018 Negative     Ketones, UA 07/06/2018 Negative     Urobilinogen, UA 07/06/2018 0 2     Bilirubin, UA 07/06/2018 Negative     Blood, UA 07/06/2018 Trace-Intact*    RBC, UA 07/06/2018 None Seen     WBC, UA 07/06/2018 None Seen     Epithelial Cells 07/06/2018 Occasional     Bacteria, UA 07/06/2018 None Seen        Imaging Studies: I have personally reviewed pertinent reports  CT ABDOMEN AND PELVIS WITH IV CONTRAST     INDICATION:   Abd pain, gastroenteritis or colitis suspected      COMPARISON: None      TECHNIQUE:  CT examination of the abdomen and pelvis was performed  Axial, sagittal, and coronal 2D reformatted images were created from the source data and submitted for interpretation      Radiation dose length product (DLP) for this visit:  782 72 mGy-cm   This examination, like all CT scans performed in the [facility], was performed utilizing techniques to minimize radiation dose exposure, including the use of iterative   reconstruction and automated exposure control      IV Contrast:  100 mL of iohexol (OMNIPAQUE)  350  Enteric Contrast:  Enteric contrast was not administered      FINDINGS:     ABDOMEN     LOWER CHEST:  Atelectatic changes and scarring in the lung bases  Cardiac size within normal limits  No pericardial effusion      LIVER/BILIARY TREE:  Mildly enlarged measuring more than 19 cm craniocaudally  No significant intrahepatic or extrahepatic biliary ductal dilatation  No obvious mass lesion      GALLBLADDER:  No calcified gallstones  No pericholecystic inflammatory change      SPLEEN:  Mildly enlarged measuring 13 6 cm AP     PANCREAS:  Unremarkable      ADRENAL GLANDS:  Mild hypertrophic changes      KIDNEYS/URETERS:  Unremarkable  No hydronephrosis      STOMACH AND BOWEL: Limited evaluation of GI tract without oral contrast   Small sliding hiatal hernia  Stomach is collapsed, limiting evaluation but appears grossly unremarkable  The small bowel is average caliber without obstruction    Small amounts of   retained stool the colon  Colonic diverticulosis noted  Descending colon mostly collapsed  Proximal sigmoid colon mostly collapsed  There is wall thickening and pericolonic inflammatory change affecting the mid sigmoid colon in the central pelvis,   just posterior to the uterus  This appearance is most likely the result of diverticulitis  An eccentric and located gas and fluid collection is observed anteriorly in this segment measuring 2 9 x 2 4 x 2 2 cm  The location and appearance is concerning   for a potential intramural abscess  Distal sigmoid and rectum are unremarkable      APPENDIX:  No findings to suggest appendicitis      ABDOMINOPELVIC CAVITY:  Small amounts of fluid in the pelvis surrounding the inflamed sigmoid colon  No extraluminal abscess identified  No free air  Prominent but not pathologically enlarged retroperitoneal and mesenteric nodes  No lymphadenopathy      VESSELS:  Unremarkable for patient's age      PELVIS     REPRODUCTIVE ORGANS:  Uterus is enlarged measuring approximately 17 cm x 11 2 cm x 14 1 cm, apparently due to multiple uterine fibroids, the largest of which at the fundus measures at least 10 cm  The endometrium is partially effaced measuring   approximately 4 mm  Possible left-sided hydrosalpinx  Neither ovary is definitively identified      URINARY BLADDER:  Unremarkable      ABDOMINAL WALL/INGUINAL REGIONS:  Small fat-containing umbilical hernia  Small fat-containing inguinal hernias      OSSEOUS STRUCTURES:  No acute fracture or destructive osseous lesion      IMPRESSION:  Findings are most compatible with diverticulitis affecting the mid sigmoid colon, probably with development of a intramural abscess anteriorly  No shruthi perforation or obstruction  Follow-up colonoscopy or CT is routinely recommended following treatment to ensure resolution since inflammatory carcinoma can have a similar CT appearance      Very large bulky myomatous uterus    Possible left-sided hydrosalpinx  This could be confirmed with nonemergent ultrasound      Enlarged liver      Enlarged spleen  ASSESSMENT/PLAN:     1  Acute sigmoid diverticulitis with appearance of intramural abscess, no evidence of perforation at this time  This is patient's 1st known episode of diverticulitis  Rule out underlying inflammatory malignancy; the patient reports history of colon polyps, family history of colon cancer, and she has not had colonoscopy for 2 years  - continue IV antibiotics  - monitor temperature, white blood cell count, serial abdominal exams; if any concern, consider repeating CT scan in a couple of days to rule out worsening of abscess or developing perforation  - IV fluids  - continue NPO status, sips of clear liquids as needed  - consider General surgery consult, particularly if patient appears clinically worsening or slow to improve  - IR note appreciated; appears it would not be possible to drain intramural abscess at this point, and would likely remain difficult if this were to enlarge      2  History of colon polyps    3  Maternal history of colon cancer      The patient was seen and examined by Dr Rodri Hernandez, all key medical decisions were made with Dr Rodri Hernandez  Thank you for allowing us to participate in the care of this pleasant patient  We will follow up with you closely

## 2018-07-06 NOTE — ED PROVIDER NOTES
History  Chief Complaint   Patient presents with    Abdominal Pain     Pt c/o lower abdominal pain/crampin since Saturday  Pt with c/o nausea and diarrhea  52-year-old white female complains of left lower quadrant suprapubic abdominal pain associated with nausea and diarrhea  Patient has had symptoms for the last 5 days  No fever, no new foods, no sick contacts, no recent travel outside the [place]  No radiation of pain and symptoms are worse with palpation  No dysuria, no urgency, no frequency  Patient states she gets hot flashes and she is having her menses now  History provided by:  Patient  Abdominal Pain   Pain location:  LLQ and suprapubic  Pain quality: aching and gnawing    Pain radiates to:  Does not radiate  Pain severity:  Moderate  Onset quality:  Gradual  Duration:  5 days  Timing:  Intermittent  Progression:  Worsening  Chronicity:  New  Context: previous surgery    Context: not diet changes, not recent travel, not suspicious food intake and not trauma    Relieved by:  Nothing  Worsened by:  Palpation  Ineffective treatments:  None tried  Associated symptoms: diarrhea and nausea    Associated symptoms: no anorexia, no belching, no chest pain, no chills, no constipation, no cough, no dysuria, no fatigue, no fever, no flatus, no hematemesis, no hematochezia, no hematuria, no shortness of breath, no sore throat and no vomiting    Diarrhea:     Quality:  Semi-solid  Risk factors: obesity    Risk factors: no alcohol abuse and has not had multiple surgeries        Prior to Admission Medications   Prescriptions Last Dose Informant Patient Reported?  Taking?   acyclovir (ZOVIRAX) 200 mg capsule   Yes Yes   albuterol (VENTOLIN HFA) 90 mcg/act inhaler   Yes Yes   Sig: Inhale   fluticasone-salmeterol (ADVAIR DISKUS) 500-50 mcg/dose   Yes Yes   Sig: Inhale 1 puff 2 (two) times a day   ibuprofen (MOTRIN) 600 mg tablet   Yes Yes      Facility-Administered Medications: None       Past Medical History:   Diagnosis Date    Asthma     Fibroids     Physiological ovarian cysts        Past Surgical History:   Procedure Laterality Date    WRIST SURGERY         Family History   Problem Relation Age of Onset    Osteoporosis Mother     Thyroid disease Mother     Alzheimer's disease Father     Hypertension Father      I have reviewed and agree with the history as documented  Social History   Substance Use Topics    Smoking status: Never Smoker    Smokeless tobacco: Never Used    Alcohol use Yes        Review of Systems   Constitutional: Negative for chills, fatigue and fever  HENT: Negative  Negative for sore throat  Respiratory: Negative for cough, shortness of breath, wheezing and stridor  Cardiovascular: Negative for chest pain  Gastrointestinal: Positive for abdominal pain, diarrhea and nausea  Negative for anorexia, constipation, flatus, hematemesis, hematochezia and vomiting  Genitourinary: Negative for dysuria, hematuria and urgency  Musculoskeletal: Negative  Skin: Negative  Neurological: Negative  Hematological: Negative  Psychiatric/Behavioral: Negative  All other systems reviewed and are negative  Physical Exam  Physical Exam   Constitutional: She is oriented to person, place, and time  She appears well-developed and well-nourished  HENT:   Head: Normocephalic and atraumatic  Right Ear: External ear normal    Left Ear: External ear normal    Nose: Nose normal    Mouth/Throat: Oropharynx is clear and moist    Eyes: Conjunctivae and EOM are normal    Neck: Normal range of motion  Neck supple  Cardiovascular: Normal rate, regular rhythm and intact distal pulses  Murmur heard  Pulmonary/Chest: Effort normal and breath sounds normal    Abdominal: She exhibits no mass  There is tenderness in the suprapubic area and left lower quadrant  No hernia  Musculoskeletal: Normal range of motion     Neurological: She is alert and oriented to person, place, and time    Skin: Skin is warm and dry  Capillary refill takes less than 2 seconds  Nursing note and vitals reviewed  Vital Signs  ED Triage Vitals [07/06/18 0505]   Temperature Pulse Respirations Blood Pressure SpO2   98 9 °F (37 2 °C) 103 20 (!) 170/110 93 %      Temp Source Heart Rate Source Patient Position - Orthostatic VS BP Location FiO2 (%)   Tympanic Monitor Sitting Right arm --      Pain Score       Worst Possible Pain           Vitals:    07/06/18 0505 07/06/18 0557   BP: (!) 170/110 148/97   Pulse: 103 84   Patient Position - Orthostatic VS: Sitting Lying       Visual Acuity      ED Medications  Medications   ondansetron (ZOFRAN) injection 4 mg (4 mg Intravenous Given 7/6/18 0516)   sodium chloride 0 9 % bolus 1,000 mL (0 mL Intravenous Stopped 7/6/18 0644)   morphine (PF) 4 mg/mL injection 4 mg (4 mg Intravenous Given 7/6/18 0525)   iohexol (OMNIPAQUE) 350 MG/ML injection (SINGLE-DOSE) 100 mL (100 mL Intravenous Given 7/6/18 0657)       Diagnostic Studies  Results Reviewed     Procedure Component Value Units Date/Time    Comprehensive metabolic panel [[id number]]  (Abnormal) Collected:  07/06/18 0512    Lab Status:  Final result Specimen:  Blood from Arm, Left Updated:  07/06/18 0534     Sodium 136 mmol/L      Potassium 4 0 mmol/L      Chloride 102 mmol/L      CO2 24 mmol/L      Anion Gap 10 mmol/L      BUN 12 mg/dL      Creatinine 0 89 mg/dL      Glucose 117 mg/dL      Calcium 9 0 mg/dL      AST 9 U/L      ALT 27 U/L      Alkaline Phosphatase 89 U/L      Total Protein 8 1 g/dL      Albumin 3 3 (L) g/dL      Total Bilirubin 0 30 mg/dL      eGFR 75 ml/min/1 73sq m     Narrative:         National Kidney Disease Education Program recommendations are as follows:  GFR calculation is accurate only with a steady state creatinine  Chronic Kidney disease less than 60 ml/min/1 73 sq  meters  Kidney failure less than 15 ml/min/1 73 sq  meters      Lipase [[id number]]  (Normal) Collected:  07/06/18 0512    Lab Status:  Final result Specimen:  Blood from Arm, Left Updated:  07/06/18 0534     Lipase 122 u/L     CBC and differential [[id number]]  (Abnormal) Collected:  07/06/18 0512    Lab Status:  Final result Specimen:  Blood from Arm, Left Updated:  07/06/18 0518     WBC 10 89 (H) Thousand/uL      RBC 4 71 Million/uL      Hemoglobin 13 8 g/dL      Hematocrit 41 8 %      MCV 89 fL      MCH 29 3 pg      MCHC 33 0 g/dL      RDW 12 6 %      MPV 8 9 fL      Platelets 297 Thousands/uL      nRBC 0 /100 WBCs      Neutrophils Relative 80 (H) %      Immat GRANS % 0 %      Lymphocytes Relative 15 %      Monocytes Relative 5 %      Eosinophils Relative 0 %      Basophils Relative 0 %      Neutrophils Absolute 8 56 (H) Thousands/µL      Immature Grans Absolute 0 03 Thousand/uL      Lymphocytes Absolute 1 68 Thousands/µL      Monocytes Absolute 0 58 Thousand/µL      Eosinophils Absolute 0 02 Thousand/µL      Basophils Absolute 0 02 Thousands/µL     POCT pregnancy, urine [[id number]]     Lab Status:  No result Specimen:  Urine     UA w Reflex to Microscopic [[id number]]     Lab Status:  No result Specimen:  Urine                  CT abdomen pelvis with contrast    by Lala Meyer DO (07/06 7809)                 Procedures  Procedures       Phone Contacts  ED Phone Contact    ED Course                               MDM  CritCare Time    Disposition  Final diagnoses:   None     ED Disposition     None      Follow-up Information    None         Patient's Medications   Discharge Prescriptions    No medications on file     No discharge procedures on file      ED Provider  Electronically Signed by           Juli Landers MD  07/06/18 0645

## 2018-07-06 NOTE — SOCIAL WORK
DASH discussion completed  Discussed goals of making sure pt's needs are met upon discharge, pt's preferences are taken into account, pt understands her health condition, medications and symptoms to watch for after returning home and pt is aware of any follow up appointments recommended by hospital physician  JEF spoke with the pt at the bedside  Pt lives with his fiance mostly, she has no DME or HHC and noted that she is independent with her own care  Pt does drive and noted that she uses the [address] [place] [address] Drive

## 2018-07-06 NOTE — H&P
H&P- Arianna Jose 1965, 47 y o  female MRN: [id number]    Unit/Bed#: [address] [place] [address] Encounter: [id number]    Primary Care Provider: Ben Dorsey DO   Date and time admitted to hospital: 7/6/2018  5:04 AM        * Diverticulitis of sigmoid colon   Assessment & Plan    As evidenced by left sided andominal pain  CTScan of the abdomen and pelvis affecting mid-sigmoid colon with probability of intramural abscess  Levaquin and Flagyl started in the ED  WBC slightly elevated 10 89 with 80%neutrophils  Morphine for pain control  · Cotinue IV Levaquin and Flagyl  · IV hydration  · NPO  · Antiemetics, PPI, Morphine for pain control  · IR consulted, no drainage necessary  · GI consult  · CBC and BMP in am        Diarrhea   Assessment & Plan    Diarrhea on and off since Saturday  · IV hydration  · NPO  · Stool for cdiff        Asthma   Assessment & Plan    Stable, pt on Advair and Albuterol PRN  · Will continue Advair and Albuterol PRN        Hydrosalpinx   Assessment & Plan    Incidental findings in CT abdomen and pelvis- possible left sided hydrosalpinx, myomatous uterus, and multiple uterine fibroids  Pt recently just her heavy periods few days ago  · Can be followed up as an out patient for non-emergent ultrasound            VTE Prophylaxis: Enoxaparin (Lovenox)  / sequential compression device   Code Status: Level 1 - Full Code     Anticipated Length of Stay:  Patient will be admitted on an Inpatient basis with an anticipated length of stay of  > 2 midnights  Justification for Hospital Stay: IV antibiotics, IV hydration, GI consult      Total Time for Visit, including Counseling / Coordination of Care: 45 minutes    Greater than 50% of this total time spent on direct patient counseling and coordination of care      Chief Complaint:   Abdominal pain since Saturday associated with nausea, no vomiting, and diarrhea     History of Present Illness:     Arianna Jose is a 47 y o  female with a PMH of Asthma which is controlled, left ovarian cyst surgery, right hand surgery, fibroids, who presents with left lower quadrant pain since Saturday  Patient said she was at the beach and felt this abdominal pain on the left side described it like menstrual cramps (she is supposed to get her period that week)  Her first period came Monday but her  level of pain got worse  She did not take anything for pain  She was nauseous, no vomiting  Diarrhea started describing it as loose and watery, denies any blood  It was clear and mucousy yesterday  Pain was unbearable radiated to her left flank described it as she was in labor  In the ED, she was given pain medicine, CT abdomen was done and showed diverticulitis of the midsigmoid colon  IV hydrationgiven and  Levaquin/ Flagyl initiated      Review of Systems:     Review of Systems   Constitutional: Negative for activity change, appetite change, chills, diaphoresis, fatigue and fever  HENT: Negative for ear pain, sinus pain, sinus pressure and sore throat  Respiratory: Negative for cough, chest tightness, shortness of breath and wheezing  Cardiovascular: Negative for chest pain and palpitations  Gastrointestinal: Positive for abdominal pain and diarrhea  Negative for abdominal distention and blood in stool  Loose and watery   Genitourinary: Positive for flank pain  Negative for decreased urine volume, dysuria, frequency, hematuria and urgency  Left side   Musculoskeletal: Positive for back pain  Negative for arthralgias, myalgias and neck pain  Positive Left flank pain   Neurological: Negative for dizziness, syncope, weakness, light-headedness and headaches  Psychiatric/Behavioral: Negative for agitation, behavioral problems and confusion   The patient is not nervous/anxious           Past Medical and Surgical History:      Medical History        Past Medical History:   Diagnosis Date    Asthma      Fibroids      Physiological ovarian cysts              Surgical History         Past Surgical History:   Procedure Laterality Date    WRIST SURGERY                Meds/Allergies:             Prior to Admission medications    Medication Sig Start Date End Date Taking? Authorizing Provider   fluticasone-salmeterol (ADVAIR DISKUS) 500-50 mcg/dose Inhale 1 puff 2 (two) times a day 11/8/17   Yes Historical Provider, MD   multivitamin (THERAGRAN) TABS Take 1 tablet by mouth daily     Yes Historical Provider, MD   acyclovir (ZOVIRAX) 200 mg capsule Only as needed for outbreak  1/15/18     Historical Provider, MD   albuterol (VENTOLIN HFA) 90 mcg/act inhaler Inhale 2 puffs every 4 (four) hours as needed   11/8/17     Historical Provider, MD   ibuprofen (MOTRIN) 600 mg tablet   1/23/18     Historical Provider, MD         Allergies: No Known Allergies     Social History:     Marital Status:    Substance Use History:       History   Alcohol Use    Yes          History   Smoking Status    Never Smoker   Smokeless Tobacco    Never Used          History   Drug Use No         Family History:           Family History   Problem Relation Age of Onset    Osteoporosis Mother      Thyroid disease Mother      Alzheimer's disease Father      Hypertension Father           Physical Exam:      Vitals:   Blood Pressure: 126/88 (07/06/18 0853)  Pulse: 76 (07/06/18 0853)  Temperature: 98 3 °F (36 8 °C) (07/06/18 0853)  Temp Source: Oral (07/06/18 0853)  Respirations: 18 (07/06/18 0853)  Height: 5' 3" (160 cm) (07/06/18 0853)  Weight - Scale: 99 7 kg (219 lb 14 4 oz) (07/06/18 0853)  SpO2: 98 % (07/06/18 0853)     Physical Exam   Constitutional: She is oriented to person, place, and time  She appears well-developed and well-nourished  No distress  HENT:   Head: Normocephalic and atraumatic  Neck: Normal range of motion  Cardiovascular: Normal rate and regular rhythm  No murmur heard    S1 S2 noted   Pulmonary/Chest: Effort normal and breath sounds normal  No respiratory distress  She has no rales  Abdominal: Soft  Bowel sounds are normal  She exhibits no distension  There is no tenderness  There is no rebound  LLQ tenderness upon palpation  Positive BS on 4 quads  Genitourinary:   Genitourinary Comments: Negative CVA tenderness   Musculoskeletal: Normal range of motion  She exhibits no edema or deformity  Negative lumbar pain   Neurological: She is alert and oriented to person, place, and time  Skin: Skin is warm and dry  Psychiatric: She has a normal mood and affect  Her behavior is normal  Judgment and thought content normal             Additional Data:      Lab Results: I have personally reviewed pertinent reports           Results from last 7 days  Lab Units 07/06/18  0512   WBC Thousand/uL 10 89*   HEMOGLOBIN g/dL 13 8   HEMATOCRIT % 41 8   PLATELETS Thousands/uL 335   NEUTROS PCT % 80*   LYMPHS PCT % 15   MONOS PCT % 5   EOS PCT % 0         Results from last 7 days  Lab Units 07/06/18  0512   SODIUM mmol/L 136   POTASSIUM mmol/L 4 0   CHLORIDE mmol/L 102   CO2 mmol/L 24   BUN mg/dL 12   CREATININE mg/dL 0 89   CALCIUM mg/dL 9 0   TOTAL PROTEIN g/dL 8 1   BILIRUBIN TOTAL mg/dL 0 30   ALK PHOS U/L 89   ALT U/L 27   AST U/L 9   GLUCOSE RANDOM mg/dL 117             Imaging: I have personally reviewed pertinent reports        CT abdomen pelvis with contrast   Final Result by Lala Meyre DO (07/06 3074)   Addendum 1 of 1 by Lala Meyer DO (07/06 9973)   ADDENDUM:   I personally confirmed receipt of the finalized report by John Zhang on    7/6/2018 at 7:33 AM                        Final   Findings are most compatible with diverticulitis affecting the mid sigmoid colon, probably with development of a intramural abscess anteriorly  No shruthi perforation or obstruction    Follow-up colonoscopy or CT is routinely recommended following    treatment to ensure resolution since inflammatory carcinoma can have a similar CT appearance        Very large bulky myomatous uterus  Possible left-sided hydrosalpinx  This could be confirmed with nonemergent ultrasound        Enlarged liver        Enlarged spleen            Workstation performed: [id number]                 CT abdomen pelvis with contrast   Final Result   Addendum 1 of 1   ADDENDUM:   I personally confirmed receipt of the finalized report by Overton Dakin on    7/6/2018 at 7:33 AM                        Final   Findings are most compatible with diverticulitis affecting the mid sigmoid colon, probably with development of a intramural abscess anteriorly  No shruthi perforation or obstruction  Follow-up colonoscopy or CT is routinely recommended following    treatment to ensure resolution since inflammatory carcinoma can have a similar CT appearance        Very large bulky myomatous uterus  Possible left-sided hydrosalpinx  This could be confirmed with nonemergent ultrasound        Enlarged liver        Enlarged spleen            Workstation performed: [id number]                 EKG, Pathology, and Other Studies Reviewed on Admission:   · EKG: None     Allscripts / Epic Records Reviewed: Yes      ** Please Note: This note has been constructed using a voice recognition system   **

## 2018-07-06 NOTE — ASSESSMENT & PLAN NOTE
Incidental findings in CT abdomen and pelvis- possible left sided hydrosalpinx, myomatous uterus, and multiple uterine fibroids  Pt recently just her heavy periods few days ago    · Can be followed up as an out patient for non-emergent ultrasound

## 2018-07-07 LAB
ANION GAP SERPL CALCULATED.3IONS-SCNC: 9 MMOL/L (ref 4–13)
BUN SERPL-MCNC: 6 MG/DL (ref 5–25)
CALCIUM SERPL-MCNC: 8.4 MG/DL (ref 8.3–10.1)
CHLORIDE SERPL-SCNC: 103 MMOL/L (ref 100–108)
CO2 SERPL-SCNC: 24 MMOL/L (ref 21–32)
CREAT SERPL-MCNC: 0.78 MG/DL (ref 0.6–1.3)
ERYTHROCYTE [DISTWIDTH] IN BLOOD BY AUTOMATED COUNT: 12.5 % (ref 11.6–15.1)
GFR SERPL CREATININE-BSD FRML MDRD: 88 ML/MIN/1.73SQ M
GLUCOSE SERPL-MCNC: 102 MG/DL (ref 65–140)
HCT VFR BLD AUTO: 35.7 % (ref 34.8–46.1)
HGB BLD-MCNC: 11.8 G/DL (ref 11.5–15.4)
MCH RBC QN AUTO: 29.8 PG (ref 26.8–34.3)
MCHC RBC AUTO-ENTMCNC: 33.1 G/DL (ref 31.4–37.4)
MCV RBC AUTO: 90 FL (ref 82–98)
PLATELET # BLD AUTO: 305 THOUSANDS/UL (ref 149–390)
PMV BLD AUTO: 9 FL (ref 8.9–12.7)
POTASSIUM SERPL-SCNC: 3.7 MMOL/L (ref 3.5–5.3)
RBC # BLD AUTO: 3.96 MILLION/UL (ref 3.81–5.12)
SODIUM SERPL-SCNC: 136 MMOL/L (ref 136–145)
WBC # BLD AUTO: 9.18 THOUSAND/UL (ref 4.31–10.16)

## 2018-07-07 PROCEDURE — 80048 BASIC METABOLIC PNL TOTAL CA: CPT | Performed by: NURSE PRACTITIONER

## 2018-07-07 PROCEDURE — 99232 SBSQ HOSP IP/OBS MODERATE 35: CPT | Performed by: FAMILY MEDICINE

## 2018-07-07 PROCEDURE — C9113 INJ PANTOPRAZOLE SODIUM, VIA: HCPCS | Performed by: NURSE PRACTITIONER

## 2018-07-07 PROCEDURE — 85027 COMPLETE CBC AUTOMATED: CPT | Performed by: NURSE PRACTITIONER

## 2018-07-07 PROCEDURE — 99254 IP/OBS CNSLTJ NEW/EST MOD 60: CPT | Performed by: SURGERY

## 2018-07-07 RX ADMIN — METRONIDAZOLE 500 MG: 500 INJECTION, SOLUTION INTRAVENOUS at 15:43

## 2018-07-07 RX ADMIN — SODIUM CHLORIDE 125 ML/HR: 0.9 INJECTION, SOLUTION INTRAVENOUS at 17:15

## 2018-07-07 RX ADMIN — MORPHINE SULFATE 2 MG: 2 INJECTION, SOLUTION INTRAMUSCULAR; INTRAVENOUS at 06:06

## 2018-07-07 RX ADMIN — FLUTICASONE FUROATE AND VILANTEROL TRIFENATATE 1 PUFF: 100; 25 POWDER RESPIRATORY (INHALATION) at 08:05

## 2018-07-07 RX ADMIN — SODIUM CHLORIDE 125 ML/HR: 0.9 INJECTION, SOLUTION INTRAVENOUS at 06:13

## 2018-07-07 RX ADMIN — ENOXAPARIN SODIUM 40 MG: 100 INJECTION SUBCUTANEOUS at 08:06

## 2018-07-07 RX ADMIN — ACETAMINOPHEN 650 MG: 325 TABLET ORAL at 09:44

## 2018-07-07 RX ADMIN — LEVOFLOXACIN 750 MG: 5 INJECTION, SOLUTION INTRAVENOUS at 10:44

## 2018-07-07 RX ADMIN — PANTOPRAZOLE SODIUM 40 MG: 40 INJECTION, POWDER, FOR SOLUTION INTRAVENOUS at 08:06

## 2018-07-07 RX ADMIN — METRONIDAZOLE 500 MG: 500 INJECTION, SOLUTION INTRAVENOUS at 08:06

## 2018-07-07 NOTE — CASE MANAGEMENT
Initial Clinical Review  Admission: Date/Time/Statement: 7/6/18 @ [phone]   Orders Placed This Encounter   Procedures    Inpatient Admission (expected length of stay for this patient is greater than two midnights)     Standing Status:   Standing     Number of Occurrences:   1     Order Specific Question:   Admitting Physician     Answer:   Rossi Savage [[id number]]     Order Specific Question:   Level of Care     Answer:   Med Surg [16]     Order Specific Question:   Estimated length of stay     Answer:   More than 2 Midnights     Order Specific Question:   Certification     Answer:   I certify that inpatient services are medically necessary for this patient for a duration of greater than two midnights  See H&P and MD Progress Notes for additional information about the patient's course of treatment  ED: Date/Time/Mode of Arrival:   ED Arrival Information     Expected Arrival Acuity Means of Arrival Escorted By Service Admission Type    - 7/6/2018 04:59 Urgent Walk-In Family Member General Medicine Urgent    Arrival Complaint    addominal pain      Chief Complaint:   Chief Complaint   Patient presents with    Abdominal Pain     Pt c/o lower abdominal pain/crampin since Saturday  Pt with c/o nausea and diarrhea  History of Illness:   56-year-old white female complains of left lower quadrant suprapubic abdominal pain associated with nausea and diarrhea  Patient has had symptoms for the last 5 days  No radiation of pain and symptoms are worse with palpation  ED Vital Signs:   ED Triage Vitals [07/06/18 0505]   Temperature Pulse Respirations Blood Pressure SpO2   98 9 °F (37 2 °C) 103 20 (!) 170/110 93 %      Temp Source Heart Rate Source Patient Position - Orthostatic VS BP Location FiO2 (%)   Tympanic Monitor Sitting Right arm --      Pain Score       Worst Possible Pain        Wt Readings from Last 1 Encounters:   07/06/18 99 7 kg (219 lb 14 4 oz)   Vital Signs (abnormal):    Abnormal Labs/Diagnostic Test Results:   WBC 10 89   U/A+BLOOD  CT A/P=Findings are most compatible with diverticulitis affecting the mid sigmoid colon, probably with development of a intramural abscess anteriorly  No shruthi perforation or obstruction  Follow-up colonoscopy or CT is routinely recommended following   treatment to ensure resolution since inflammatory carcinoma can have a similar CT appearance  Very large bulky myomatous uterus  Possible left-sided hydrosalpinx  This could be confirmed with nonemergent ultrasound  Enlarged liver  Enlarged spleen  ED Treatment:   Medication Administration from 07/06/2018 0459 to 07/06/2018 6599       Date/Time Order Dose Route Action Action by Comments     07/06/2018 0516 ondansetron (ZOFRAN) injection 4 mg 4 mg Intravenous Given Gerardo Moseley RN      07/06/2018 5778 sodium chloride 0 9 % bolus 1,000 mL 0 mL Intravenous Stopped Yael Hemphill RN      07/06/2018 0517 sodium chloride 0 9 % bolus 1,000 mL 1,000 mL Intravenous [address] Gerardo Moseley RN      07/06/2018 0525 morphine (PF) 4 mg/mL injection 4 mg 4 mg Intravenous Given Gerardo Moseley RN      07/06/2018 0657 iohexol (OMNIPAQUE) 350 MG/ML injection (SINGLE-DOSE) 100 mL 100 mL Intravenous Given Malissa Burns      07/06/2018 0825 levofloxacin (LEVAQUIN) IVPB (premix) 750 mg 750 mg Intravenous [address] [facility]      07/06/2018 0548 metroNIDAZOLE (FLAGYL) IVPB (premix) 500 mg 0 mg Intravenous Stopped Marcy John RN      07/06/2018 0751 metroNIDAZOLE (FLAGYL) IVPB (premix) 500 mg 500 mg Intravenous [address] Marcy John RN      07/06/2018 0800 morphine (PF) 4 mg/mL injection 4 mg 4 mg Intravenous Given Marcy John RN       Past Medical/Surgical History:    Active Ambulatory Problems     Diagnosis Date Noted    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    Asthma 11/08/2017    Sleep apnea 11/08/2017     Resolved Ambulatory Problems     Diagnosis Date Noted    Genital herpes simplex 11/08/2017 Past Medical History:   Diagnosis Date    Asthma     Fibroids     Physiological ovarian cysts    Admitting Diagnosis: Diverticulitis [K57 92]  Abdominal pain [R10 9]  Age/Sex: 47 y o  female  Assessment/Plan:   Diverticulitis of sigmoid colon   Assessment & Plan     As evidenced by left sided andominal pain  Levaquin and Flagyl started in the ED  WBC slightly elevated 10 89 with 80%neutrophils  Morphine for pain control  · Cotinue IV Levaquin and Flagyl  · IV hydration  · NPO  · Antiemetics, PPI, Morphine for pain control  · IR consulted, no drainage necessary  · GI consult  · CBC and BMP in am   Diarrhea   Assessment & Plan     Diarrhea on and off since Saturday    · IV hydration  · NPO  · Stool for cdiff   Admission Orders:  MED SURG  CONSULT GI  CONSULT SURGERY  CONTACT ISOLATION   ZO  NPO  Scheduled Meds:   Current Facility-Administered Medications:  acetaminophen 650 mg Oral Q6H PRN MIRANDA EstradaNP    albuterol 2 puff Inhalation Q4H PRN MIRANDA EstradaNP    enoxaparin 40 mg Subcutaneous Daily ARTEM Estrada    fluticasone-vilanterol 1 puff Inhalation Daily Gianna Sawyer, MIRANDANP    levofloxacin 750 mg Intravenous Q24H Gianna Sifuentes, CRNP    metroNIDAZOLE 500 mg Intravenous Q8H ARTEM Ling Last Rate: 500 mg (07/07/18 0806)   morphine injection 1 mg Intravenous Q4H PRN Tanisha Revankar, DO    morphine injection 2 mg Intravenous Q4H PRN GiannaMIRANDA BlakeNP    ondansetron 4 mg Intravenous Q6H PRN GiannaMIRANDA BlakeNP    pantoprazole 40 mg Intravenous Q24H [address] Gianna S Bear, CRNP    sodium chloride 125 mL/hr Intravenous Continuous ARTEM Peterson Last Rate: 125 mL/hr (07/07/18 6956)   Continuous Infusions:   sodium chloride 125 mL/hr Last Rate: 125 mL/hr (07/07/18 7264)   PRN Meds:   acetaminophen    albuterol    morphine injection, 1MG;USED X1    morphine injection 2MG; USED X2    ondansetron  Thank you,  Uday Spar [address] [facility] Utilization Review Department  Phone: [phone]; Fax [phone]  ATTENTION: The Network Utilization Review Department is now centralized for our 9 Facilities  Make a note that we have a new phone and fax numbers for our Department  Please call with any questions or concerns to [phone] and carefully follow the prompts so that you are directed to the right person  All voicemails are confidential  Fax any determinations, approvals, denials, and requests for initial or continue stay review clinical to [phone]  Due to HIGH CALL volume, it would be easier if you could please send faxed requests to expedite your requests and in part, help us provide discharge notifications faster

## 2018-07-07 NOTE — PROGRESS NOTES
Progress Note - Jasper Carlos Eduardo 1965, 47 y o  female MRN: [id number]    Unit/Bed#: [address] [place], [place] [address] Encounter: [id number]    Primary Care Provider: Rain Andrews DO   Date and time admitted to hospital: 7/6/2018  5:04 AM        * Diverticulitis of sigmoid colon   Assessment & Plan    As evidenced by left sided abdominal pain  CTScan of the abdomen and pelvis affecting mid-sigmoid colon with probability of intramural abscess  Levaquin and Flagyl started in the ED  WBC slightly elevated 10 89 with 80%neutrophils  Morphine for pain control  · Cotinue IV Levaquin and Flagyl  · Continue IV hydration  · Continue NPO  · Antiemetics, PPI, Morphine for pain control  · IR consulted, no drainage necessary  · GI consulted  Appreciate input  Recom  - continue with the plan, surgery follow-up, outpatient colonoscopy in  6 weeks  · Surgery- Appreciate input  Recom  - continue with the plan  If no improvement, will need a repeat imaging for possible abscess development  · WBC 10 89-->9 18  · CBC and BMP in am        Diarrhea   Assessment & Plan    Diarrhea on and off since Saturday  Stopped since admission  · IV hydration  · NPO  · Stool for cdiff        Asthma   Assessment & Plan    Stable, pt on Advair and Albuterol PRN  · Will continue Advair and Albuterol PRN        Hydrosalpinx   Assessment & Plan    Incidental findings in CT abdomen and pelvis- possible left sided hydrosalpinx, myomatous uterus, and multiple uterine fibroids  Pt recently had her heavy periods few days ago  · Can be followed up as an out patient for non-emergent ultrasound              VTE Pharmacologic Prophylaxis:   Pharmacologic: Enoxaparin (Lovenox)  Mechanical VTE Prophylaxis in Place: Yes    Patient Centered Rounds: I have performed bedside rounds with nursing staff today  Discussions with Specialists or Other Care Team Provider: Yes, staff, GI, surgery (off note)      Education and Discussions with Family / Patient:Yes, I have answered all questions to the best of my knowledge and ability  Time Spent for Care: 30 minutes  More than 50% of total time spent on counseling and coordination of care as described above  Current Length of Stay: 1 day(s)    Current Patient Status: Inpatient     Discharge Plan: Home follow-up with PMD and Gastroenterologist     Code Status: Level 1 - Full Code      Subjective:     Awake with her fiance at bedside, still with left lower quadrant abdominal pain specially when the morphine wears off, she denies being nauseous neither having diarrhea  She has no chest pain or shortness of breath  Patient a little disappointed because she was seen by surgeon today and told her if she is worse, she might need surgery  However, she is positive that with antibiotics and fluids she will get better  Support given to pt  Objective:       Vitals:   Temp (24hrs), Av 8 °F (37 1 °C), Min:98 7 °F (37 1 °C), Max:98 8 °F (37 1 °C)    HR:  [81-85] 85  Resp:  [18] 18  BP: (125-130)/(78-82) 125/82  SpO2:  [94 %-95 %] 95 %  Body mass index is 38 95 kg/m²  Input and Output Summary (last 24 hours): Intake/Output Summary (Last 24 hours) at 18 1207  Last data filed at 18 8482   Gross per 24 hour   Intake             2000 ml   Output                0 ml   Net             2000 ml       Physical Exam:     Physical Exam   Constitutional: She is oriented to person, place, and time  She appears well-developed and well-nourished  No distress  HENT:   Head: Normocephalic and atraumatic  Neck: Normal range of motion  Neck supple  Cardiovascular: Normal rate, regular rhythm and normal heart sounds  No murmur heard  S1 S2 noted   Pulmonary/Chest: Effort normal and breath sounds normal    Abdominal: Soft  Bowel sounds are normal  She exhibits no distension  There is tenderness  There is no rebound and no guarding  Positive LLQP upon palpation, positive BS on 4 quads     Musculoskeletal: Normal range of motion  She exhibits no edema or deformity  Neurological: She is alert and oriented to person, place, and time  Skin: Skin is warm and dry  No rash noted  No pallor  Psychiatric: She has a normal mood and affect  Her behavior is normal  Judgment and thought content normal        Additional Data:     Labs:      Results from last 7 days  Lab Units 07/07/18  0659  07/06/18  0512   WBC Thousand/uL 9 18  --  10 89*   HEMOGLOBIN g/dL 11 8  --  13 8   HEMATOCRIT % 35 7  --  41 8   PLATELETS Thousands/uL 305  < > 335   NEUTROS PCT %  --   --  80*   LYMPHS PCT %  --   --  15   MONOS PCT %  --   --  5   EOS PCT %  --   --  0   < > = values in this interval not displayed  Results from last 7 days  Lab Units 07/07/18  0659 07/06/18  0512   SODIUM mmol/L 136 136   POTASSIUM mmol/L 3 7 4 0   CHLORIDE mmol/L 103 102   CO2 mmol/L 24 24   BUN mg/dL 6 12   CREATININE mg/dL 0 78 0 89   CALCIUM mg/dL 8 4 9 0   TOTAL PROTEIN g/dL  --  8 1   BILIRUBIN TOTAL mg/dL  --  0 30   ALK PHOS U/L  --  89   ALT U/L  --  27   AST U/L  --  9   GLUCOSE RANDOM mg/dL 102 117           * I Have Reviewed All Lab Data Listed Above  * Additional Pertinent Lab Tests Reviewed: Renee 67 Admission Reviewed    Imaging:  Ct Abdomen Pelvis With Contrast    Addendum Date: 7/6/2018 Addendum:   ADDENDUM: I personally confirmed receipt of the finalized report by Karrie Castellano on 7/6/2018 at 7:33 AM      Result Date: 7/6/2018  Narrative: CT ABDOMEN AND PELVIS WITH IV CONTRAST INDICATION:   Abd pain, gastroenteritis or colitis suspected  COMPARISON: None  TECHNIQUE:  CT examination of the abdomen and pelvis was performed  Axial, sagittal, and coronal 2D reformatted images were created from the source data and submitted for interpretation  Radiation dose length product (DLP) for this visit:  782 72 mGy-cm     This examination, like all CT scans performed in the [facility], was performed utilizing techniques to minimize radiation dose exposure, including the use of iterative  reconstruction and automated exposure control  IV Contrast:  100 mL of iohexol (OMNIPAQUE)  350 Enteric Contrast:  Enteric contrast was not administered  FINDINGS: ABDOMEN LOWER CHEST:  Atelectatic changes and scarring in the lung bases  Cardiac size within normal limits  No pericardial effusion  LIVER/BILIARY TREE:  Mildly enlarged measuring more than 19 cm craniocaudally  No significant intrahepatic or extrahepatic biliary ductal dilatation  No obvious mass lesion  GALLBLADDER:  No calcified gallstones  No pericholecystic inflammatory change  SPLEEN:  Mildly enlarged measuring 13 6 cm AP PANCREAS:  Unremarkable  ADRENAL GLANDS:  Mild hypertrophic changes  KIDNEYS/URETERS:  Unremarkable  No hydronephrosis  STOMACH AND BOWEL: Limited evaluation of GI tract without oral contrast   Small sliding hiatal hernia  Stomach is collapsed, limiting evaluation but appears grossly unremarkable  The small bowel is average caliber without obstruction  Small amounts of  retained stool the colon  Colonic diverticulosis noted  Descending colon mostly collapsed  Proximal sigmoid colon mostly collapsed  There is wall thickening and pericolonic inflammatory change affecting the mid sigmoid colon in the central pelvis, just posterior to the uterus  This appearance is most likely the result of diverticulitis  An eccentric and located gas and fluid collection is observed anteriorly in this segment measuring 2 9 x 2 4 x 2 2 cm  The location and appearance is concerning  for a potential intramural abscess  Distal sigmoid and rectum are unremarkable  APPENDIX:  No findings to suggest appendicitis  ABDOMINOPELVIC CAVITY:  Small amounts of fluid in the pelvis surrounding the inflamed sigmoid colon  No extraluminal abscess identified  No free air  Prominent but not pathologically enlarged retroperitoneal and mesenteric nodes  No lymphadenopathy   VESSELS:  Unremarkable for patient's age  PELVIS REPRODUCTIVE ORGANS:  Uterus is enlarged measuring approximately 17 cm x 11 2 cm x 14 1 cm, apparently due to multiple uterine fibroids, the largest of which at the fundus measures at least 10 cm  The endometrium is partially effaced measuring approximately 4 mm  Possible left-sided hydrosalpinx  Neither ovary is definitively identified  URINARY BLADDER:  Unremarkable  ABDOMINAL WALL/INGUINAL REGIONS:  Small fat-containing umbilical hernia  Small fat-containing inguinal hernias  OSSEOUS STRUCTURES:  No acute fracture or destructive osseous lesion  Impression: Findings are most compatible with diverticulitis affecting the mid sigmoid colon, probably with development of a intramural abscess anteriorly  No shruthi perforation or obstruction  Follow-up colonoscopy or CT is routinely recommended following treatment to ensure resolution since inflammatory carcinoma can have a similar CT appearance  Very large bulky myomatous uterus  Possible left-sided hydrosalpinx  This could be confirmed with nonemergent ultrasound  Enlarged liver  Enlarged spleen   Workstation performed: [id number]     Imaging Reports Reviewed by myself    Cultures:   Blood Culture: No results found for: BLOODCX  Urine Culture: No results found for: URINECX  Sputum Culture: No components found for: SPUTUMCX  Wound Culture: No results found for: WOUNDCULT    Last 24 Hours Medication List:     Current Facility-Administered Medications:  acetaminophen 650 mg Oral Q6H PRN ARTEM Estrada    albuterol 2 puff Inhalation Q4H PRN ARTEM Estrada    enoxaparin 40 mg Subcutaneous Daily ARTEM Ling    fluticasone-vilanterol 1 puff Inhalation Daily GiannaMIRANDA ChauhanNP    levofloxacin 750 mg Intravenous Q24H ARTEM Ling Last Rate: 750 mg (07/07/18 1044)   metroNIDAZOLE 500 mg Intravenous Q8H ARTEM Estrada Last Rate: 500 mg (07/07/18 0806)   morphine injection 1 mg Intravenous Q4H PRN Tanisha Sebastianar,     morphine injection 2 mg Intravenous Q4H PRN ARTEM Estrada    ondansetron 4 mg Intravenous Q6H PRN ARTEM Estrada    pantoprazole 40 mg Intravenous Q24H [facility] ARTEM Estrada    sodium chloride 125 mL/hr Intravenous Continuous 551 ARTEM Garcia Last Rate: 125 mL/hr (07/07/18 6422)        Today, Patient Was Seen By: ARTEM Larry    ** Please Note: Dragon 360 Dictation voice to text software may have been used in the creation of this document   **

## 2018-07-07 NOTE — PROGRESS NOTES
Chart reviewed including labs and CT  Most c/w acute sigmoid diverticulitis  Agree with plan for NPO, IVF, IV ABx and serial exams      Full consult to follow

## 2018-07-07 NOTE — ASSESSMENT & PLAN NOTE
As evidenced by left sided abdominal pain  CTScan of the abdomen and pelvis affecting mid-sigmoid colon with probability of intramural abscess  Levaquin and Flagyl started in the ED  WBC slightly elevated 10 89 with 80%neutrophils  Morphine for pain control  · Cotinue IV Levaquin and Flagyl  · Continue IV hydration  · Continue NPO  · Antiemetics, PPI, Morphine for pain control  · IR consulted, no drainage necessary  · GI consulted  Appreciate input  Recom  - continue with the plan, surgery follow-up, outpatient colonoscopy in  6 weeks  · Surgery- Appreciate input  Recom  - continue with the plan   If no improvement, will need a repeat imaging for possible abscess development  · WBC 10 89-->9 18  · CBC and BMP in am

## 2018-07-07 NOTE — ASSESSMENT & PLAN NOTE
Incidental findings in CT abdomen and pelvis- possible left sided hydrosalpinx, myomatous uterus, and multiple uterine fibroids  Pt recently had her heavy periods few days ago    · Can be followed up as an out patient for non-emergent ultrasound

## 2018-07-07 NOTE — ASSESSMENT & PLAN NOTE
Diarrhea on and off since Saturday  Stopped since admission    · IV hydration  · NPO  · Stool for cdiff

## 2018-07-07 NOTE — CONSULTS
Consultation - General Surgery   Jewel Elizabeth 47 y o  female MRN: [id number]  Unit/Bed#: [address] [place] [address] Encounter: [id number]    Assessment/Plan     Assessment:  Acute sigmoid diverticulitis, [place]  No known previous episodes of diverticulitis  Plan:  1  IV fluids, NPO, IV antibiotics, serial exams  2   If patient improves, continue with planned colonoscopy in September  Elective sigmoid resection not necessary after 1st episode of uncomplicated diverticulitis, but I will be happy to discuss this further with her as an outpatient  3   If patient fails to improve or worsens, will need repeat imaging for possible abscess development  I will continue to follow her this admission    History of Present Illness       HPI:  Jewel Elizabeth is a 47 y o  female who presents with an approximately one-week history of left lower quadrant abdominal pain and nausea  She reports no previous similar episodes, and states that this pain started about a week ago  She thought that this was related to her menses  But pain worsened with nausea which prompted her presentation to the emergency department  White blood cell count was 11 and CT scan performed which revealed acute sigmoid diverticulitis with possible early mural abscess  Patient reports no significant improvement but also no worsening since admission  Her white blood cell count is now normal  She denies any nausea or vomiting  No blood with bowel movements, which have been more watery lately  She has a history of a colonoscopy 2 years ago where polyps" and were found  She is currently scheduled for a repeat colonoscopy in September  Inpatient consult to Acute Care Surgery  Consult performed by: Francisco Pulliam ordered by: Herb Jorge          Review of Systems   Constitutional: Negative  HENT: Negative  Eyes: Negative  Respiratory: Negative  Cardiovascular: Negative      Gastrointestinal:        See HPI   Endocrine: Negative  Genitourinary:        History of uterine fibroids   Musculoskeletal: Negative  Skin: Negative  Allergic/Immunologic: Negative  Neurological: Negative  Hematological: Negative  Psychiatric/Behavioral: Negative  Historical Information   Past Medical History:   Diagnosis Date    Asthma     Fibroids     Physiological ovarian cysts      Past Surgical History:   Procedure Laterality Date    WRIST SURGERY       Social History   History   Alcohol Use    Yes     History   Drug Use No     History   Smoking Status    Never Smoker   Smokeless Tobacco    Never Used     Family History: Mother has a history of colon resection for benign disease    Meds/Allergies   all current active meds have been reviewed, current meds:   Current Facility-Administered Medications   Medication Dose Route Frequency    acetaminophen (TYLENOL) tablet 650 mg  650 mg Oral Q6H PRN    albuterol (PROVENTIL HFA,VENTOLIN HFA) inhaler 2 puff  2 puff Inhalation Q4H PRN    enoxaparin (LOVENOX) subcutaneous injection 40 mg  40 mg Subcutaneous Daily    fluticasone-vilanterol (BREO ELLIPTA) 100-25 mcg/inh inhaler 1 puff  1 puff Inhalation Daily    levofloxacin (LEVAQUIN) IVPB (premix) 750 mg  750 mg Intravenous Q24H    metroNIDAZOLE (FLAGYL) IVPB (premix) 500 mg  500 mg Intravenous Q8H    morphine injection 1 mg  1 mg Intravenous Q4H PRN    morphine injection 2 mg  2 mg Intravenous Q4H PRN    ondansetron (ZOFRAN) injection 4 mg  4 mg Intravenous Q6H PRN    pantoprazole (PROTONIX) injection 40 mg  40 mg Intravenous Q24H JESSICA    sodium chloride 0 9 % infusion  125 mL/hr Intravenous Continuous    and PTA meds:   Prior to Admission Medications   Prescriptions Last Dose Informant Patient Reported?  Taking?   acyclovir (ZOVIRAX) 200 mg capsule Unknown at Unknown time  Yes No   Sig: Only as needed for outbreak    albuterol (VENTOLIN HFA) 90 mcg/act inhaler Unknown at Unknown time  Yes No   Sig: Inhale 2 puffs every 4 (four) hours as needed     fluticasone-salmeterol (ADVAIR DISKUS) 500-50 mcg/dose 7/5/2018 at Unknown time  Yes Yes   Sig: Inhale 1 puff 2 (two) times a day   ibuprofen (MOTRIN) 600 mg tablet Unknown at Unknown time  Yes No   multivitamin (THERAGRAN) TABS 7/5/2018 at Unknown time  Yes Yes   Sig: Take 1 tablet by mouth daily      Facility-Administered Medications: None     No Known Allergies    Objective   First Vitals:   Blood Pressure: (!) 170/110 (07/06/18 0505)  Pulse: 103 (07/06/18 0505)  Temperature: 98 9 °F (37 2 °C) (07/06/18 0505)  Temp Source: Tympanic (07/06/18 0505)  Respirations: 20 (07/06/18 0505)  Height: 5' 3" (160 cm) (07/06/18 0505)  Weight - Scale: 100 kg (220 lb 7 4 oz) (07/06/18 0505)  SpO2: 93 % (07/06/18 0505)    Current Vitals:   Blood Pressure: 125/82 (07/07/18 0730)  Pulse: 85 (07/07/18 0730)  Temperature: 98 8 °F (37 1 °C) (07/07/18 0730)  Temp Source: Oral (07/07/18 0730)  Respirations: 18 (07/07/18 0730)  Height: 5' 3" (160 cm) (07/06/18 0853)  Weight - Scale: 99 7 kg (219 lb 14 4 oz) (07/06/18 0853)  SpO2: 95 % (07/07/18 0730)      Intake/Output Summary (Last 24 hours) at 07/07/18 0852  Last data filed at 07/07/18 8720   Gross per 24 hour   Intake             2000 ml   Output                0 ml   Net             2000 ml       Invasive Devices     Peripheral Intravenous Line            Peripheral IV 07/06/18 Left Forearm 1 day                Physical Exam   Constitutional: She is oriented to person, place, and time  She appears well-developed and well-nourished  HENT:   Head: Normocephalic and atraumatic  Eyes: Conjunctivae are normal  Pupils are equal, round, and reactive to light  Neck: Normal range of motion  Neck supple  Cardiovascular: Normal rate and regular rhythm  No murmur heard  Pulmonary/Chest: Effort normal and breath sounds normal  No respiratory distress  Abdominal:   Abdomen is soft, nondistended    Patient has mild to moderate focal tenderness to palpation left lower quadrant  No peritonitis  No masses   Musculoskeletal: Normal range of motion  Lymphadenopathy:     She has no cervical adenopathy  Neurological: She is alert and oriented to person, place, and time  Skin: Skin is warm and dry  Psychiatric: She has a normal mood and affect  Her behavior is normal        Lab Results:   I have personally reviewed pertinent lab results  , CBC:   Lab Results   Component Value Date    WBC 9 18 07/07/2018    HGB 11 8 07/07/2018    HCT 35 7 07/07/2018    MCV 90 07/07/2018     07/07/2018    MCH 29 8 07/07/2018    MCHC 33 1 07/07/2018    RDW 12 5 07/07/2018    MPV 9 0 07/07/2018   , CMP:   Lab Results   Component Value Date     07/07/2018    K 3 7 07/07/2018     07/07/2018    CO2 24 07/07/2018    ANIONGAP 9 07/07/2018    BUN 6 07/07/2018    CREATININE 0 78 07/07/2018    GLUCOSE 102 07/07/2018    CALCIUM 8 4 07/07/2018    EGFR 88 07/07/2018   , Lipase: No results found for: LIPASE  Imaging: I have personally reviewed pertinent reports  and I have personally reviewed pertinent films in PACS  EKG, Pathology, and Other Studies: I have personally reviewed pertinent reports  and I have personally reviewed pertinent films in PACS    Counseling / Coordination of Care  Total floor / unit time spent today 60 minutes  Greater than 50% of total time was spent with the patient and / or family counseling and / or coordination of care  I performed a thorough history, 14 point review of systems, and physical exam   I reviewed her laboratory data is this admission as well as the CT scan images and CT scan reading    I discussed these at length with the patient as well as the plan at this time to remain NPO with antibiotics and follow her exam   I answered all of her questions and patient voiced understanding

## 2018-07-08 LAB
ANION GAP SERPL CALCULATED.3IONS-SCNC: 12 MMOL/L (ref 4–13)
BUN SERPL-MCNC: 7 MG/DL (ref 5–25)
CALCIUM SERPL-MCNC: 8.5 MG/DL (ref 8.3–10.1)
CHLORIDE SERPL-SCNC: 103 MMOL/L (ref 100–108)
CO2 SERPL-SCNC: 21 MMOL/L (ref 21–32)
CREAT SERPL-MCNC: 0.74 MG/DL (ref 0.6–1.3)
ERYTHROCYTE [DISTWIDTH] IN BLOOD BY AUTOMATED COUNT: 12.2 % (ref 11.6–15.1)
GFR SERPL CREATININE-BSD FRML MDRD: 93 ML/MIN/1.73SQ M
GLUCOSE SERPL-MCNC: 78 MG/DL (ref 65–140)
HCT VFR BLD AUTO: 37 % (ref 34.8–46.1)
HGB BLD-MCNC: 12.3 G/DL (ref 11.5–15.4)
MCH RBC QN AUTO: 29.8 PG (ref 26.8–34.3)
MCHC RBC AUTO-ENTMCNC: 33.2 G/DL (ref 31.4–37.4)
MCV RBC AUTO: 90 FL (ref 82–98)
MRSA NOSE QL CULT: NORMAL
PLATELET # BLD AUTO: 305 THOUSANDS/UL (ref 149–390)
PMV BLD AUTO: 9.2 FL (ref 8.9–12.7)
POTASSIUM SERPL-SCNC: 3.9 MMOL/L (ref 3.5–5.3)
RBC # BLD AUTO: 4.13 MILLION/UL (ref 3.81–5.12)
SODIUM SERPL-SCNC: 136 MMOL/L (ref 136–145)
WBC # BLD AUTO: 6.22 THOUSAND/UL (ref 4.31–10.16)

## 2018-07-08 PROCEDURE — 85027 COMPLETE CBC AUTOMATED: CPT | Performed by: NURSE PRACTITIONER

## 2018-07-08 PROCEDURE — 99232 SBSQ HOSP IP/OBS MODERATE 35: CPT | Performed by: FAMILY MEDICINE

## 2018-07-08 PROCEDURE — 80048 BASIC METABOLIC PNL TOTAL CA: CPT | Performed by: NURSE PRACTITIONER

## 2018-07-08 PROCEDURE — C9113 INJ PANTOPRAZOLE SODIUM, VIA: HCPCS | Performed by: NURSE PRACTITIONER

## 2018-07-08 RX ADMIN — FLUTICASONE FUROATE AND VILANTEROL TRIFENATATE 1 PUFF: 100; 25 POWDER RESPIRATORY (INHALATION) at 07:31

## 2018-07-08 RX ADMIN — PANTOPRAZOLE SODIUM 40 MG: 40 INJECTION, POWDER, FOR SOLUTION INTRAVENOUS at 08:20

## 2018-07-08 RX ADMIN — SODIUM CHLORIDE 125 ML/HR: 0.9 INJECTION, SOLUTION INTRAVENOUS at 01:58

## 2018-07-08 RX ADMIN — MORPHINE SULFATE 2 MG: 2 INJECTION, SOLUTION INTRAMUSCULAR; INTRAVENOUS at 07:22

## 2018-07-08 RX ADMIN — ALBUTEROL SULFATE 2 PUFF: 90 AEROSOL, METERED RESPIRATORY (INHALATION) at 07:30

## 2018-07-08 RX ADMIN — ENOXAPARIN SODIUM 40 MG: 100 INJECTION SUBCUTANEOUS at 08:21

## 2018-07-08 RX ADMIN — METRONIDAZOLE 500 MG: 500 INJECTION, SOLUTION INTRAVENOUS at 14:30

## 2018-07-08 RX ADMIN — METRONIDAZOLE 500 MG: 500 INJECTION, SOLUTION INTRAVENOUS at 07:25

## 2018-07-08 RX ADMIN — METRONIDAZOLE 500 MG: 500 INJECTION, SOLUTION INTRAVENOUS at 00:11

## 2018-07-08 RX ADMIN — LEVOFLOXACIN 750 MG: 5 INJECTION, SOLUTION INTRAVENOUS at 08:21

## 2018-07-08 RX ADMIN — MORPHINE SULFATE 2 MG: 2 INJECTION, SOLUTION INTRAMUSCULAR; INTRAVENOUS at 02:54

## 2018-07-08 RX ADMIN — METRONIDAZOLE 500 MG: 500 INJECTION, SOLUTION INTRAVENOUS at 23:50

## 2018-07-08 NOTE — ASSESSMENT & PLAN NOTE
Patient seen by surgery, GI  She was on IV Levaquin and Flagyl and transitioned to p o  on discharge  Tolerating regular diet by day of discharge   Patient met with nutritionist while here  IR was consulted but the abscess is not amenable to drainage  She will need outpatient colonoscopy in  6 weeks and will follow up with her own GI doctor

## 2018-07-08 NOTE — PROGRESS NOTES
[facility] Internal Medicine Progress Note  Patient: Charity Browne 47 y o  female   MRN: [id number]  PCP: Sachi Voss DO  Unit/Bed#: [address] [place] [address] Encounter: [id number]  Date Of Visit: 07/08/18    Problem List:    Principal Problem:    Diverticulitis of large intestine with abscess without bleeding  Active Problems:    Hydrosalpinx    Asthma    Diarrhea      Assessment & Plan:    * Diverticulitis of large intestine with abscess without bleeding   Assessment & Plan    Cotinue IV Levaquin and Flagyl  Patient afebrile with no leukocytosis  Continue IV hydration  Continue NPO  Antiemetics, Protonix, Morphine for pain control  IR was consulted but the abscess is not amenable to drainage  GI consulted  Appreciate input  outpatient colonoscopy in  6 weeks  Surgery input Appreciated  If no improvement, will need a repeat imaging for possible abscess development        Hydrosalpinx   Assessment & Plan    Incidental finding of Myomatous uterus with possible left-sided hydrosalpinx as noted on CT scan  Outpatient ultrasound for further evaluation        Diarrhea   Assessment & Plan    Diarrhea on and off since Saturday  None since admission  Continue IV hydration        Asthma   Assessment & Plan    Stable, pt on Breo and Albuterol PRN  VTE Pharmacologic Prophylaxis:   Pharmacologic: Enoxaparin (Lovenox)  Mechanical VTE Prophylaxis in Place: Yes    Patient Centered Rounds: I have performed bedside rounds with nursing staff today  Discussions with Specialists or Other Care Team Provider: Yes    Education and Discussions with Family / Patient:Yes    Time Spent for Care: 30 minutes  More than 50% of total time spent on counseling and coordination of care as described above      Current Length of Stay: 2 day(s)    Current Patient Status: Inpatient     Discharge Plan: home    Code Status: Level 1 - Full Code    Certification Statement: The patient will continue to require additional inpatient hospital stay due to abdominal pain, diverticulitis requiring further management      Subjective:     States abdominal pain feels worse today but does improve with pain meds    Objective:     Vitals:   Temp (24hrs), Av 5 °F (36 9 °C), Min:98 3 °F (36 8 °C), Max:98 6 °F (37 °C)    HR:  [72-83] 72  Resp:  [18-20] 18  BP: (124-138)/(78-81) 124/78  SpO2:  [94 %-95 %] 94 %  Body mass index is 38 95 kg/m²  Input and Output Summary (last 24 hours): Intake/Output Summary (Last 24 hours) at 18 1230  Last data filed at 18 0930   Gross per 24 hour   Intake          3410 42 ml   Output              300 ml   Net          3110 42 ml       Physical Exam:     Physical Exam   Constitutional: She is oriented to person, place, and time  She appears well-developed and well-nourished  No distress  HENT:   Head: Normocephalic and atraumatic  Mouth/Throat: Oropharynx is clear and moist    Eyes: EOM are normal  Right eye exhibits no discharge  Left eye exhibits no discharge  No scleral icterus  Neck: Neck supple  No tracheal deviation present  Cardiovascular: Normal rate and regular rhythm  Pulmonary/Chest: Effort normal and breath sounds normal  No respiratory distress  She has no wheezes  She has no rales  Abdominal: Soft  Bowel sounds are normal  She exhibits no distension  There is tenderness (LLQ)  There is no guarding  Musculoskeletal: She exhibits no edema  Neurological: She is alert and oriented to person, place, and time  No cranial nerve deficit  Skin: Skin is dry  She is not diaphoretic  Psychiatric: She has a normal mood and affect         Additional Data:     Labs:      Results from last 7 days  Lab Units 18  0552  18  0512   WBC Thousand/uL 6 22  < > 10 89*   HEMOGLOBIN g/dL 12 3  < > 13 8   HEMATOCRIT % 37 0  < > 41 8   PLATELETS Thousands/uL 305  < > 335   NEUTROS PCT %  --   --  80*   LYMPHS PCT %  --   --  15   MONOS PCT %  --   --  5   EOS PCT %  --   --  0   < > = values in this interval not displayed  Results from last 7 days  Lab Units 07/08/18  0552  07/06/18  0512   SODIUM mmol/L 136  < > 136   POTASSIUM mmol/L 3 9  < > 4 0   CHLORIDE mmol/L 103  < > 102   CO2 mmol/L 21  < > 24   BUN mg/dL 7  < > 12   CREATININE mg/dL 0 74  < > 0 89   CALCIUM mg/dL 8 5  < > 9 0   TOTAL PROTEIN g/dL  --   --  8 1   BILIRUBIN TOTAL mg/dL  --   --  0 30   ALK PHOS U/L  --   --  89   ALT U/L  --   --  27   AST U/L  --   --  9   GLUCOSE RANDOM mg/dL 78  < > 117   < > = values in this interval not displayed  * I Have Reviewed All Lab Data Listed Above  * Additional Pertinent Lab Tests Reviewed: [facility] Admission Reviewed    Imaging:  Ct Abdomen Pelvis With Contrast    Addendum Date: 7/6/2018 Addendum:   ADDENDUM: I personally confirmed receipt of the finalized report by Melissa Del Valle on 7/6/2018 at 7:33 AM      Result Date: 7/6/2018  Narrative: CT ABDOMEN AND PELVIS WITH IV CONTRAST INDICATION:   Abd pain, gastroenteritis or colitis suspected  COMPARISON: None  TECHNIQUE:  CT examination of the abdomen and pelvis was performed  Axial, sagittal, and coronal 2D reformatted images were created from the source data and submitted for interpretation  Radiation dose length product (DLP) for this visit:  782 72 mGy-cm   This examination, like all CT scans performed in the [facility], was performed utilizing techniques to minimize radiation dose exposure, including the use of iterative  reconstruction and automated exposure control  IV Contrast:  100 mL of iohexol (OMNIPAQUE)  350 Enteric Contrast:  Enteric contrast was not administered  FINDINGS: ABDOMEN LOWER CHEST:  Atelectatic changes and scarring in the lung bases  Cardiac size within normal limits  No pericardial effusion  LIVER/BILIARY TREE:  Mildly enlarged measuring more than 19 cm craniocaudally  No significant intrahepatic or extrahepatic biliary ductal dilatation  No obvious mass lesion  GALLBLADDER:  No calcified gallstones  No pericholecystic inflammatory change  SPLEEN:  Mildly enlarged measuring 13 6 cm AP PANCREAS:  Unremarkable  ADRENAL GLANDS:  Mild hypertrophic changes  KIDNEYS/URETERS:  Unremarkable  No hydronephrosis  STOMACH AND BOWEL: Limited evaluation of GI tract without oral contrast   Small sliding hiatal hernia  Stomach is collapsed, limiting evaluation but appears grossly unremarkable  The small bowel is average caliber without obstruction  Small amounts of  retained stool the colon  Colonic diverticulosis noted  Descending colon mostly collapsed  Proximal sigmoid colon mostly collapsed  There is wall thickening and pericolonic inflammatory change affecting the mid sigmoid colon in the central pelvis, just posterior to the uterus  This appearance is most likely the result of diverticulitis  An eccentric and located gas and fluid collection is observed anteriorly in this segment measuring 2 9 x 2 4 x 2 2 cm  The location and appearance is concerning  for a potential intramural abscess  Distal sigmoid and rectum are unremarkable  APPENDIX:  No findings to suggest appendicitis  ABDOMINOPELVIC CAVITY:  Small amounts of fluid in the pelvis surrounding the inflamed sigmoid colon  No extraluminal abscess identified  No free air  Prominent but not pathologically enlarged retroperitoneal and mesenteric nodes  No lymphadenopathy  VESSELS:  Unremarkable for patient's age  PELVIS REPRODUCTIVE ORGANS:  Uterus is enlarged measuring approximately 17 cm x 11 2 cm x 14 1 cm, apparently due to multiple uterine fibroids, the largest of which at the fundus measures at least 10 cm  The endometrium is partially effaced measuring approximately 4 mm  Possible left-sided hydrosalpinx  Neither ovary is definitively identified  URINARY BLADDER:  Unremarkable  ABDOMINAL WALL/INGUINAL REGIONS:  Small fat-containing umbilical hernia  Small fat-containing inguinal hernias   OSSEOUS STRUCTURES: No acute fracture or destructive osseous lesion  Impression: Findings are most compatible with diverticulitis affecting the mid sigmoid colon, probably with development of a intramural abscess anteriorly  No shruthi perforation or obstruction  Follow-up colonoscopy or CT is routinely recommended following treatment to ensure resolution since inflammatory carcinoma can have a similar CT appearance  Very large bulky myomatous uterus  Possible left-sided hydrosalpinx  This could be confirmed with nonemergent ultrasound  Enlarged liver  Enlarged spleen  Workstation performed: [id number]     Imaging Reports Reviewed by myself    Cultures:   Blood Culture: No results found for: BLOODCX  Urine Culture: No results found for: URINECX  Sputum Culture: No components found for: SPUTUMCX  Wound Culture: No results found for: WOUNDCULT    Last 24 Hours Medication List:     Current Facility-Administered Medications:  acetaminophen 650 mg Oral Q6H PRN Gianna S Sifuentes, CRNP    albuterol 2 puff Inhalation Q4H PRN Gianna S Sifuentes, CRNP    enoxaparin 40 mg Subcutaneous Daily Gianna S Sifuentes, CRNP    fluticasone-vilanterol 1 puff Inhalation Daily Gianna S Digna, MIRANDANP    levofloxacin 750 mg Intravenous Q24H ARTEM Castillo Last Rate: 750 mg (07/08/18 0821)   metroNIDAZOLE 500 mg Intravenous Q8H Gianna S Sifuentes, CRNP Last Rate: 500 mg (07/08/18 0725)   morphine injection 1 mg Intravenous Q4H PRN Tanisha Cunningham DO    morphine injection 2 mg Intravenous Q4H PRN Gianna S Sifuentes, CRNP    ondansetron 4 mg Intravenous Q6H PRN Gianna S Sifuentes, CRNP    pantoprazole 40 mg Intravenous Q24H [facility] Gianna S Sifuentes, CRNP    sodium chloride 125 mL/hr Intravenous Continuous Gianna S Sifuentes, CRNP Last Rate: 125 mL/hr (07/08/18 0158)        Today, Patient Was Seen By: Aide Sood DO    ** Please Note: Dragon 360 Dictation voice to text software may have been used in the creation of this document  **

## 2018-07-08 NOTE — ASSESSMENT & PLAN NOTE
Incidental finding of Myomatous uterus with possible left-sided hydrosalpinx as noted on CT scan     Patient reports having knowledge of this and was getting transvaginal ultrasounds with her gyn doctor    Outpatient ultrasound for further evaluation  Patient to follow up with PCP a gyn after discharge

## 2018-07-09 VITALS
WEIGHT: 219.9 LBS | OXYGEN SATURATION: 95 % | SYSTOLIC BLOOD PRESSURE: 129 MMHG | BODY MASS INDEX: 38.96 KG/M2 | HEART RATE: 88 BPM | RESPIRATION RATE: 18 BRPM | HEIGHT: 63 IN | TEMPERATURE: 97.7 F | DIASTOLIC BLOOD PRESSURE: 81 MMHG

## 2018-07-09 PROBLEM — R19.7 DIARRHEA: Status: RESOLVED | Noted: 2018-07-06 | Resolved: 2018-07-09

## 2018-07-09 LAB
ERYTHROCYTE [DISTWIDTH] IN BLOOD BY AUTOMATED COUNT: 12.3 % (ref 11.6–15.1)
HCT VFR BLD AUTO: 38.4 % (ref 34.8–46.1)
HGB BLD-MCNC: 12.6 G/DL (ref 11.5–15.4)
MCH RBC QN AUTO: 29.5 PG (ref 26.8–34.3)
MCHC RBC AUTO-ENTMCNC: 32.8 G/DL (ref 31.4–37.4)
MCV RBC AUTO: 90 FL (ref 82–98)
PLATELET # BLD AUTO: 330 THOUSANDS/UL (ref 149–390)
PMV BLD AUTO: 9 FL (ref 8.9–12.7)
RBC # BLD AUTO: 4.27 MILLION/UL (ref 3.81–5.12)
WBC # BLD AUTO: 6.05 THOUSAND/UL (ref 4.31–10.16)

## 2018-07-09 PROCEDURE — 99232 SBSQ HOSP IP/OBS MODERATE 35: CPT | Performed by: SURGERY

## 2018-07-09 PROCEDURE — 99239 HOSP IP/OBS DSCHRG MGMT >30: CPT | Performed by: FAMILY MEDICINE

## 2018-07-09 PROCEDURE — C9113 INJ PANTOPRAZOLE SODIUM, VIA: HCPCS | Performed by: NURSE PRACTITIONER

## 2018-07-09 PROCEDURE — 85027 COMPLETE CBC AUTOMATED: CPT | Performed by: FAMILY MEDICINE

## 2018-07-09 RX ORDER — ONDANSETRON 4 MG/1
4 TABLET, ORALLY DISINTEGRATING ORAL EVERY 6 HOURS PRN
Qty: 20 TABLET | Refills: 0 | Status: SHIPPED | OUTPATIENT
Start: 2018-07-09 | End: 2018-09-11

## 2018-07-09 RX ORDER — FAMOTIDINE 20 MG/1
20 TABLET, FILM COATED ORAL DAILY
Qty: 30 TABLET | Refills: 0 | Status: SHIPPED | OUTPATIENT
Start: 2018-07-09 | End: 2018-09-11

## 2018-07-09 RX ORDER — METRONIDAZOLE 500 MG/1
500 TABLET ORAL EVERY 8 HOURS SCHEDULED
Qty: 20 TABLET | Refills: 0 | Status: SHIPPED | OUTPATIENT
Start: 2018-07-09 | End: 2018-07-17

## 2018-07-09 RX ORDER — LEVOFLOXACIN 500 MG/1
500 TABLET, FILM COATED ORAL DAILY
Qty: 10 TABLET | Refills: 0 | Status: SHIPPED | OUTPATIENT
Start: 2018-07-10 | End: 2018-07-20

## 2018-07-09 RX ADMIN — SODIUM CHLORIDE 125 ML/HR: 0.9 INJECTION, SOLUTION INTRAVENOUS at 03:30

## 2018-07-09 RX ADMIN — FLUTICASONE FUROATE AND VILANTEROL TRIFENATATE 1 PUFF: 100; 25 POWDER RESPIRATORY (INHALATION) at 08:53

## 2018-07-09 RX ADMIN — METRONIDAZOLE 500 MG: 500 INJECTION, SOLUTION INTRAVENOUS at 07:30

## 2018-07-09 RX ADMIN — ENOXAPARIN SODIUM 40 MG: 100 INJECTION SUBCUTANEOUS at 08:52

## 2018-07-09 RX ADMIN — PANTOPRAZOLE SODIUM 40 MG: 40 INJECTION, POWDER, FOR SOLUTION INTRAVENOUS at 08:52

## 2018-07-09 RX ADMIN — LEVOFLOXACIN 750 MG: 5 INJECTION, SOLUTION INTRAVENOUS at 08:52

## 2018-07-09 NOTE — DISCHARGE SUMMARY
Discharge Summary - [address] Internal Medicine    Patient Information: Wendy Tiwari 47 y o  female MRN: [id number]  Unit/Bed#: [address] [place] [address] Encounter: [id number]    Discharging Physician / Practitioner: Danita Ackerman DO  PCP: Brad Willett DO  Admission Date: 7/6/2018  Discharge Date: 07/09/18    Reason for Admission: Abdominal Pain (Pt c/o lower abdominal pain/crampin since Saturday  Pt with c/o nausea and diarrhea )      Discharge Diagnoses:     Principal Problem:    Diverticulitis of large intestine with abscess without bleeding  Active Problems:    Hydrosalpinx    Asthma  Resolved Problems:    Diarrhea        * Diverticulitis of large intestine with abscess without bleeding   Assessment & Plan    Patient seen by surgery, GI  She was on IV Levaquin and Flagyl and transitioned to p o  on discharge  Tolerating regular diet by day of discharge  Patient met with nutritionist while here  IR was consulted but the abscess is not amenable to drainage  She will need outpatient colonoscopy in  6 weeks and will follow up with her own GI doctor        Hydrosalpinx   Assessment & Plan    Incidental finding of Myomatous uterus with possible left-sided hydrosalpinx as noted on CT scan  Patient reports having knowledge of this and was getting transvaginal ultrasounds with her gyn doctor    Outpatient ultrasound for further evaluation  Patient to follow up with PCP a gyn after discharge        Asthma   Assessment & Plan    Stable    Continue home medications        Diarrhearesolved as of 7/9/2018   Assessment & Plan    Resolved            Consultations During Hospital Stay:  [address] TO ACUTE CARE SURGERY    Procedures Performed:     · None    Significant Findings:     · See hospital course and above    Imaging while in hospital:    Ct Abdomen Pelvis With Contrast    Addendum Date: 7/6/2018 Addendum:   ADDENDUM: I personally confirmed receipt of the finalized report by Lilian Potter SANTIAGO on 7/6/2018 at 7:33 AM      Result Date: 7/6/2018  Narrative: CT ABDOMEN AND PELVIS WITH IV CONTRAST INDICATION:   Abd pain, gastroenteritis or colitis suspected  COMPARISON: None  TECHNIQUE:  CT examination of the abdomen and pelvis was performed  Axial, sagittal, and coronal 2D reformatted images were created from the source data and submitted for interpretation  Radiation dose length product (DLP) for this visit:  782 72 mGy-cm   This examination, like all CT scans performed in the [facility], was performed utilizing techniques to minimize radiation dose exposure, including the use of iterative  reconstruction and automated exposure control  IV Contrast:  100 mL of iohexol (OMNIPAQUE)  350 Enteric Contrast:  Enteric contrast was not administered  FINDINGS: ABDOMEN LOWER CHEST:  Atelectatic changes and scarring in the lung bases  Cardiac size within normal limits  No pericardial effusion  LIVER/BILIARY TREE:  Mildly enlarged measuring more than 19 cm craniocaudally  No significant intrahepatic or extrahepatic biliary ductal dilatation  No obvious mass lesion  GALLBLADDER:  No calcified gallstones  No pericholecystic inflammatory change  SPLEEN:  Mildly enlarged measuring 13 6 cm AP PANCREAS:  Unremarkable  ADRENAL GLANDS:  Mild hypertrophic changes  KIDNEYS/URETERS:  Unremarkable  No hydronephrosis  STOMACH AND BOWEL: Limited evaluation of GI tract without oral contrast   Small sliding hiatal hernia  Stomach is collapsed, limiting evaluation but appears grossly unremarkable  The small bowel is average caliber without obstruction  Small amounts of  retained stool the colon  Colonic diverticulosis noted  Descending colon mostly collapsed  Proximal sigmoid colon mostly collapsed  There is wall thickening and pericolonic inflammatory change affecting the mid sigmoid colon in the central pelvis, just posterior to the uterus    This appearance is most likely the result of diverticulitis  An eccentric and located gas and fluid collection is observed anteriorly in this segment measuring 2 9 x 2 4 x 2 2 cm  The location and appearance is concerning  for a potential intramural abscess  Distal sigmoid and rectum are unremarkable  APPENDIX:  No findings to suggest appendicitis  ABDOMINOPELVIC CAVITY:  Small amounts of fluid in the pelvis surrounding the inflamed sigmoid colon  No extraluminal abscess identified  No free air  Prominent but not pathologically enlarged retroperitoneal and mesenteric nodes  No lymphadenopathy  VESSELS:  Unremarkable for patient's age  PELVIS REPRODUCTIVE ORGANS:  Uterus is enlarged measuring approximately 17 cm x 11 2 cm x 14 1 cm, apparently due to multiple uterine fibroids, the largest of which at the fundus measures at least 10 cm  The endometrium is partially effaced measuring approximately 4 mm  Possible left-sided hydrosalpinx  Neither ovary is definitively identified  URINARY BLADDER:  Unremarkable  ABDOMINAL WALL/INGUINAL REGIONS:  Small fat-containing umbilical hernia  Small fat-containing inguinal hernias  OSSEOUS STRUCTURES:  No acute fracture or destructive osseous lesion  Impression: Findings are most compatible with diverticulitis affecting the mid sigmoid colon, probably with development of a intramural abscess anteriorly  No shruthi perforation or obstruction  Follow-up colonoscopy or CT is routinely recommended following treatment to ensure resolution since inflammatory carcinoma can have a similar CT appearance  Very large bulky myomatous uterus  Possible left-sided hydrosalpinx  This could be confirmed with nonemergent ultrasound  Enlarged liver  Enlarged spleen   Workstation performed: [id number]       Incidental Findings:   · Myomatous uterus, left-sided hydrosalpinx  · Enlarged liver    Test Results Pending at Discharge (will require follow up):   · As per After Visit Summary     Outpatient Tests Requested:  · Colonoscopy  · Transvaginal ultrasound    Complications:  See hospital course and above    Hospital Course:     Nicolas Lopez is a 47 y o  female patient who originally presented to the hospital on 7/6/2018 due to worsening left lower quadrant pain along with nausea and diarrhea  CT scan done in the ER showed diverticulitis of the midsigmoid colon with intramural abscess anteriorly  Patient was seen by GI and surgery while here  She was started on IV antibiotics and IV fluids  She was initially kept NPO but once her pain started to improve, she was started on a diet  She was tolerating regular diet by day of discharge  She was cleared by all consultants involved in her care prior to discharge home    Please see above list of diagnoses and related plan for additional information  Condition at Discharge: stable     Discharge Day Visit / Exam:     Subjective:  Reports only mild abdominal pain  Tolerating regular diet    Vitals: Blood Pressure: 129/81 (07/09/18 1502)  Pulse: 88 (07/09/18 1502)  Temperature: 97 7 °F (36 5 °C) (07/09/18 1502)  Temp Source: Oral (07/09/18 1502)  Respirations: 18 (07/09/18 1502)  Height: 5' 3" (160 cm) (07/06/18 0853)  Weight - Scale: 99 7 kg (219 lb 14 4 oz) (07/06/18 0853)  SpO2: 95 % (07/09/18 1502)  Exam:   Physical Exam   Constitutional: She is oriented to person, place, and time  She appears well-developed and well-nourished  No distress  HENT:   Head: Normocephalic and atraumatic  Mouth/Throat: Oropharynx is clear and moist    Eyes: EOM are normal  Right eye exhibits no discharge  Left eye exhibits no discharge  No scleral icterus  Neck: Neck supple  No tracheal deviation present  Cardiovascular: Normal rate and regular rhythm  Pulmonary/Chest: Effort normal and breath sounds normal  No respiratory distress  She has no wheezes  She has no rales  Abdominal: Soft  Bowel sounds are normal  She exhibits no distension   There is tenderness (mild LLQ)  There is no guarding  Musculoskeletal: She exhibits no edema  Neurological: She is alert and oriented to person, place, and time  No cranial nerve deficit  Skin: Skin is dry  She is not diaphoretic  Psychiatric: She has a normal mood and affect  Discharge instructions/Information to patient and family:(Discharge Medications and Follow up):   See after visit summary for information provided to patient and family  Provisions for Follow-Up Care:  See after visit summary for information related to follow-up care and any pertinent home health orders  Disposition: Home    Planned Readmission:  No     Discharge Statement:  I spent > 30 minutes discharging the patient  This time was spent on the day of discharge  I had direct contact with the patient on the day of discharge  Greater than 50% of the total time was spent examining patient, answering all patient questions, arranging and discussing plan of care with patient as well as directly providing post-discharge instructions  Additional time then spent on discharge activities  Discharge Medications:  See after visit summary for reconciled discharge medications provided to patient and family  ** Please Note:  Dictation voice to text software may have been used in the creation of this document   **

## 2018-07-09 NOTE — DISCHARGE INSTRUCTIONS
You will need a colonoscopy in 6 weeks    Follow up with your own gastroenterologist for this    Continue diet as advised by dietitian

## 2018-07-09 NOTE — NURSING NOTE
Patient in stable condition when ambulating off unit  Patient verbalized understanding of discharge instructions  Patient was handed scripts and discharge paperwork  Patient verbalized all belongings were present upon discharge

## 2018-07-09 NOTE — PLAN OF CARE
DISCHARGE PLANNING - CARE MANAGEMENT     Discharge to post-acute care or home with appropriate resources Progressing        GASTROINTESTINAL - ADULT     Maintains or returns to baseline bowel function Progressing     Maintains adequate nutritional intake Progressing        METABOLIC, FLUID AND ELECTROLYTES - ADULT     Electrolytes maintained within normal limits Progressing     Fluid balance maintained Progressing        Nutrition/Hydration-ADULT     Nutrient/Hydration intake appropriate for improving, restoring or maintaining nutritional needs Progressing

## 2018-07-09 NOTE — CASE MANAGEMENT
Unable to attach clinical via the Portal  Authorization # [id number]   Tram Gonzalez RN Registered Nurse Signed Case Management Date of Service: 7/6/2018 10:29 AM      Initial Clinical Review  Admission: Date/Time/Statement: 7/6/18 @ [phone]         Orders Placed This Encounter   Procedures    Inpatient Admission (expected length of stay for this patient is greater than two midnights)       Standing Status:   Standing       Number of Occurrences:   1       Order Specific Question:   Admitting Physician       Answer:   Jonathan Leon [[id number]]       Order Specific Question:   Level of Care       Answer:   Med Surg [16]       Order Specific Question:   Estimated length of stay       Answer:   More than 2 Midnights       Order Specific Question:   Certification       Answer:   I certify that inpatient services are medically necessary for this patient for a duration of greater than two midnights  See H&P and MD Progress Notes for additional information about the patient's course of treatment  ED: Date/Time/Mode of Arrival:             ED Arrival Information      Expected Arrival Acuity Means of Arrival Escorted By Service Admission Type     - 7/6/2018 04:59 Urgent Walk-In Family Member General Medicine Urgent     Arrival Complaint     addominal pain       Chief Complaint:        Chief Complaint   Patient presents with    Abdominal Pain       Pt c/o lower abdominal pain/crampin since Saturday  Pt with c/o nausea and diarrhea  History of Illness:   46-year-old white female complains of left lower quadrant suprapubic abdominal pain associated with nausea and diarrhea   Patient has had symptoms for the last 5 days  No radiation of pain and symptoms are worse with palpation     ED Vital Signs:            ED Triage Vitals [07/06/18 0505]   Temperature Pulse Respirations Blood Pressure SpO2   98 9 °F (37 2 °C) 103 20 (!) 170/110 93 %       Temp Source Heart Rate Source Patient Position - Orthostatic VS BP Location FiO2 (%)   Tympanic Monitor Sitting Right arm --       Pain Score           Worst Possible Pain                Wt Readings from Last 1 Encounters:   07/06/18 99 7 kg (219 lb 14 4 oz)   Vital Signs (abnormal): Abnormal Labs/Diagnostic Test Results:   WBC 10 89   U/A+BLOOD  CT A/P=Findings are most compatible with diverticulitis affecting the mid sigmoid colon, probably with development of a intramural abscess anteriorly   No shruthi perforation or obstruction   Follow-up colonoscopy or CT is routinely recommended following   treatment to ensure resolution since inflammatory carcinoma can have a similar CT appearance  Very large bulky myomatous uterus   Possible left-sided hydrosalpinx   This could be confirmed with nonemergent ultrasound  Enlarged liver  Enlarged spleen    ED Treatment:              Medication Administration from 07/06/2018 0459 to 07/06/2018 5087        Date/Time Order Dose Route Action Action by Comments       07/06/2018 0516 ondansetron (ZOFRAN) injection 4 mg 4 mg Intravenous Given Yaniv Henriquez RN         07/06/2018 0644 sodium chloride 0 9 % bolus 1,000 mL 0 mL Intravenous Stopped Missy Perez RN         07/06/2018 0517 sodium chloride 0 9 % bolus 1,000 mL 1,000 mL Intravenous [address] Yaniv Henriquez RN         07/06/2018 0525 morphine (PF) 4 mg/mL injection 4 mg 4 mg Intravenous Given Yaniv Henriquez RN         07/06/2018 0657 iohexol (OMNIPAQUE) 350 MG/ML injection (SINGLE-DOSE) 100 mL 100 mL Intravenous Given Prabha Roth         07/06/2018 0825 levofloxacin (LEVAQUIN) IVPB (premix) 750 mg 750 mg Intravenous [address] Vicky Bar RN         07/06/2018 0824 metroNIDAZOLE (FLAGYL) IVPB (premix) 500 mg 0 mg Intravenous Stopped Vicky Bar RN         07/06/2018 0751 metroNIDAZOLE (FLAGYL) IVPB (premix) 500 mg 500 mg Intravenous New Bag Vicky Bar RN         07/06/2018 0800 morphine (PF) 4 mg/mL injection 4 mg 4 mg Intravenous Given Vicky Bar RN         Past Medical/Surgical History: Active Ambulatory Problems     Diagnosis Date Noted    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    Asthma 11/08/2017    Sleep apnea 11/08/2017           Resolved Ambulatory Problems     Diagnosis Date Noted    Genital herpes simplex 11/08/2017           Past Medical History:   Diagnosis Date    Asthma      Fibroids      Physiological ovarian cysts     Admitting Diagnosis: Diverticulitis [K57 92]  Abdominal pain [R10 9]  Age/Sex: 47 y o  female  Assessment/Plan:       Diverticulitis of sigmoid colon   Assessment & Plan     As evidenced by left sided andominal pain  Levaquin and Flagyl started in the ED  WBC slightly elevated 10 89 with 80%neutrophils  Morphine for pain control  · Cotinue IV Levaquin and Flagyl  · IV hydration  · NPO  · Antiemetics, PPI, Morphine for pain control  · IR consulted, no drainage necessary  · GI consult  · CBC and BMP in am   Diarrhea   Assessment & Plan     Diarrhea on and off since Saturday    · IV hydration  · NPO  · Stool for cdiff   Admission Orders:  MED SURG  CONSULT GI  CONSULT SURGERY  CONTACT COURTNEY PATHAK  NPO  Scheduled Meds:   Current Facility-Administered Medications:  acetaminophen 650 mg Oral Q6H PRN MIRANDA EstradaNP     albuterol 2 puff Inhalation Q4H PRN ARTEM Estrada     enoxaparin 40 mg Subcutaneous Daily ARTEM Estrada     fluticasone-vilanterol 1 puff Inhalation Daily ARTEM Ling     levofloxacin 750 mg Intravenous Q24H MIRANDA LingNP     metroNIDAZOLE 500 mg Intravenous Q8H ARTEM Estrada Last Rate: 500 mg (07/07/18 0806)   morphine injection 1 mg Intravenous Q4H PRN Tanisha Revankar, DO     morphine injection 2 mg Intravenous Q4H PRN MIRANDA EstradaNP     ondansetron 4 mg Intravenous Q6H PRN Gianna S eBar, CRNP     pantoprazole 40 mg Intravenous Q24H [address] Gianna Sifuentes, MIRANDANP     sodium chloride 125 mL/hr Intravenous Continuous ARTEM Smith Last Rate: 125 mL/hr (07/07/18 9871)   Continuous Infusions:   sodium chloride 125 mL/hr Last Rate: 125 mL/hr (07/07/18 3061)   PRN Meds:   acetaminophen    albuterol    morphine injection, 1MG;USED X1    morphine injection 2MG; USED X2    ondansetron  Thank you,  Kim Aqq  [facility] Utilization Review Department  Phone: [phone]; Fax [phone]  ATTENTION: The Network Utilization Review Department is now centralized for our 9 Facilities  Make a note that we have a new phone and fax numbers for our Department  Please call with any questions or concerns to [phone] and carefully follow the prompts so that you are directed to the right person  All voicemails are confidential  Fax any determinations, approvals, denials, and requests for initial or continue stay review clinical to [phone]   Due to HIGH CALL volume, it would be easier if you could please send faxed requests to expedite your requests and in part, help us provide discharge notifications faster

## 2018-07-09 NOTE — PROGRESS NOTES
General Surgery Progress Note    Chief Complaint: "I am actually pretty good  I am hungry "    Subjective/24 hour/interim events:  Heriberto Don presents this with some good news today with some hunger, flatus, and a bowel movement  She states that she feels much better compared to when she came in and is functionally better able to perform her activities of daily living  She denies any fevers or chills  Objective:  /81 (BP Location: Right arm)   Pulse 88   Temp 97 7 °F (36 5 °C) (Oral)   Resp 18   Ht 5' 3" (1 6 m)   Wt 99 7 kg (219 lb 14 4 oz)   LMP 07/06/2018   SpO2 95%   BMI 38 95 kg/m²   General: no acute distress  Abd: soft, obese, light shade of hair noted, devoid of any scars, tender to palpation somewhat in the left lower quadrant and in the suprapubic regions mildly  No guarding or rebound  Ext:  No cyanosis, clubbing, or edema    Pertinent labs reviewed  Normal white blood cell count  Pertinent images and available reads personally reviewed  I did look at the CT scan of the abdomen and pelvis and saw that there was the intramural abscess in the lower sigmoid region of the colon  This is a size of abscess that can be treated through with antibiotics per the literature and as far as I can see, is not something that can be easily drained via IR as there is no good trajectory  Pertinent notes reviewed    Assessment and Plan:  From a clinical standpoint, Heriberto Don is doing very well and has progressed to a point where I think we can advance her to a clear liquid diet this morning and perhaps to a regular diet by a lunchtime  I think that the antibiotics are doing their job and the abscess, per the literature with this size, she should be able to be resolved  She will definitely need a colonoscopy 6 weeks out, placing her in early September, to rule out any kind of malignancy  No acute surgical intervention is needed at this time, for which the patient is thankful    We will continue to follow  Please call with any interim queries  I did call Dr Genia Mitchell in update  [facility]  [phone]

## 2018-07-10 ENCOUNTER — TRANSITIONAL CARE MANAGEMENT (OUTPATIENT)
Dept: FAMILY MEDICINE CLINIC | Facility: CLINIC | Age: 53
End: 2018-07-10

## 2018-07-13 ENCOUNTER — TRANSCRIBE ORDERS (OUTPATIENT)
Dept: ADMINISTRATIVE | Facility: HOSPITAL | Age: 53
End: 2018-07-13

## 2018-07-13 ENCOUNTER — HOSPITAL ENCOUNTER (OUTPATIENT)
Dept: RADIOLOGY | Facility: HOSPITAL | Age: 53
Discharge: HOME | End: 2018-07-13
Attending: FAMILY MEDICINE
Payer: COMMERCIAL

## 2018-07-13 DIAGNOSIS — N70.11 HYDROSALPINX: ICD-10-CM

## 2018-07-13 PROCEDURE — 76856 US EXAM PELVIC COMPLETE: CPT

## 2018-07-13 PROCEDURE — 76830 TRANSVAGINAL US NON-OB: CPT

## 2018-07-16 ENCOUNTER — OFFICE VISIT (OUTPATIENT)
Dept: FAMILY MEDICINE CLINIC | Facility: CLINIC | Age: 53
End: 2018-07-16
Payer: COMMERCIAL

## 2018-07-16 VITALS
WEIGHT: 219 LBS | HEIGHT: 63 IN | HEART RATE: 82 BPM | SYSTOLIC BLOOD PRESSURE: 136 MMHG | RESPIRATION RATE: 16 BRPM | BODY MASS INDEX: 38.8 KG/M2 | TEMPERATURE: 99.6 F | DIASTOLIC BLOOD PRESSURE: 88 MMHG

## 2018-07-16 DIAGNOSIS — K57.20 DIVERTICULITIS OF LARGE INTESTINE WITH ABSCESS WITHOUT BLEEDING: Primary | ICD-10-CM

## 2018-07-16 DIAGNOSIS — K57.30 DIVERTICULOSIS OF COLON: ICD-10-CM

## 2018-07-16 DIAGNOSIS — N83.299 OVARIAN CYST, COMPLEX: ICD-10-CM

## 2018-07-16 PROCEDURE — 99496 TRANSJ CARE MGMT HIGH F2F 7D: CPT | Performed by: FAMILY MEDICINE

## 2018-07-16 NOTE — LETTER
July 16, 2018     Patient: Nancy Morrison   YOB: 1965   Date of Visit: 7/16/2018       To Whom it May Concern:    Nancy Morrison is under my professional care  She was seen in my office on 7/16/2018  She may return to full duty on 7/18/18  If you have any questions or concerns, please don't hesitate to call           Sincerely,            Janice King DO        CC: No Recipients

## 2018-07-16 NOTE — PROGRESS NOTES
Assessment/Plan:    No problem-specific Assessment & Plan notes found for this encounter  Feeling better  Diet advised  F/u colonoscopy was recommended, local option given  Fibroid uterus aware  US f/u for complex left ovarian cyst  Diverticulitis improving  Work paperwork done  The patient was advised about the office disability policy and that no forms of this nature would be promised or completed  This was explained clearly to the patient's understanding  Diagnoses and all orders for this visit:    Diverticulitis of large intestine with abscess without bleeding  -     Ambulatory referral to Gastroenterology; Future    Ovarian cyst, complex  -     US pelvis complete w transvaginal; Future  -     Ambulatory referral to Gynecology; Future    Diverticulosis of colon              Return if symptoms worsen or fail to improve  Subjective:      Patient ID: Suzanna Mares is a 47 y o  female      Chief Complaint   Patient presents with    Transition of Care Management    Paperwork     fmla forms       HPI  7d  Periods intermittent lately  Had colonoscopy 2y ago, Dr Marsha Sánchez in [address] in past, hx of colon CA  Done with flagyl  Still finishing levaquin  Having bms  Loose  No fever  No blood in stool  appt with Dr Marsha Sánchez in Sept  Mom with colon CA at 71  Some zofran    Date and time hospital follow up call was made:  7/10/2018 11:04 AM  Hospital care reviewed:  Records reviewed  Patient was hopsitalized at:  [facility]  Date of admission:  7/6/18  Date of discharge:  7/9/18  Diagnosis:  Diverticulitis of large intestine with abscess without bleeding  Disposition:  Home  Were the patients medicaitons reviewed and updated:  Yes  Current symptoms:  Fatigue, Neausea  Neausea severity:  Mild  Fatigue severity:  Mild  Post hospital issues:  None  Should patient be enrolled in anticoag monitoring?:  No  Scheduled for follow up?:  Yes  Referrals needed:  Dr Joby Sethi  Did you obtain your prescribed medications:  Yes  Do you need help managing your perscriptions or medications:  No  Is transportation to your appointments needed:  No  I have advised the patient to call PCP with any new or worsening symptoms (please type in name along with any credentials):  Michelle Ca LPN  Living Arrangements:  Spouse or Significiant other  Support System:  Family, Friends  The type of support provided:  Emotional, Physical  Do you have social support:  Yes, as much as I need  Are you recieving outpatient services:  No  Are you recieving home care services:  No  Have you fallen in the last 12 months:  Yes  How many times:  once  Interperter language line required?:  No  Counseling:  Patient  Counseling topics:  Activities of daily living, Importance of RX compliance, patient and family education, Risk factor reduction, instructions for management  Comments:  Jelena Teran is feeling overall better  She denies fever or diarrhea but has mild nausea and bloating  She is taking her anti emetic PRN  She denies vomiting  She does have mild fatigue and is resting  She is following a soft diet  She knows to call the office at any time with any questions or concerns and she knows to go to the ER if any vomiting, severe pain, fevers, worsening weakness, chest pain, dypsnea, etc Celeste GUERRERO         The following portions of the patient's history were reviewed and updated as appropriate: allergies, current medications, past family history, past medical history, past social history, past surgical history and problem list     Review of Systems   Constitutional: Negative for fever  Respiratory: Negative for shortness of breath  Cardiovascular: Negative for chest pain  Gastrointestinal: Negative for abdominal distention           Current Outpatient Prescriptions   Medication Sig Dispense Refill    acyclovir (ZOVIRAX) 200 mg capsule Only as needed for outbreak       albuterol (VENTOLIN HFA) 90 mcg/act inhaler Inhale 2 puffs every 4 (four) hours as needed        famotidine (PEPCID) 20 mg tablet Take 1 tablet (20 mg total) by mouth daily 30 tablet 0    fluticasone-salmeterol (ADVAIR DISKUS) 500-50 mcg/dose Inhale 1 puff 2 (two) times a day      levofloxacin (LEVAQUIN) 500 mg tablet Take 1 tablet (500 mg total) by mouth daily for 10 days 10 tablet 0    metroNIDAZOLE (FLAGYL) 500 mg tablet Take 1 tablet (500 mg total) by mouth every 8 (eight) hours for 20 doses 20 tablet 0    multivitamin (THERAGRAN) TABS Take 1 tablet by mouth daily      ondansetron (ZOFRAN-ODT) 4 mg disintegrating tablet Take 1 tablet (4 mg total) by mouth every 6 (six) hours as needed for nausea or vomiting 20 tablet 0     No current facility-administered medications for this visit  Objective:    /88   Pulse 82   Temp 99 6 °F (37 6 °C)   Resp 16   Ht 5' 3" (1 6 m)   Wt 99 3 kg (219 lb)   LMP 07/06/2018   BMI 38 79 kg/m²        Physical Exam   Constitutional: She appears well-developed  HENT:   Head: Normocephalic  Mouth/Throat: No oropharyngeal exudate  Eyes: Conjunctivae are normal  No scleral icterus  Neck: Neck supple  Cardiovascular: Normal rate and intact distal pulses  No murmur heard  Pulmonary/Chest: Effort normal  No respiratory distress  Abdominal: Soft  There is tenderness  There is no rebound and no guarding  Mild LLQ tenderness w/o rgr   Musculoskeletal: She exhibits no edema or deformity  Neurological: She is alert  Skin: Skin is warm and dry  No rash noted  No erythema  No pallor  Psychiatric: Her behavior is normal  Thought content normal    Nursing note and vitals reviewed               Eric Fan DO

## 2018-07-17 ENCOUNTER — OFFICE VISIT (OUTPATIENT)
Dept: FAMILY MEDICINE CLINIC | Facility: CLINIC | Age: 53
End: 2018-07-17
Payer: COMMERCIAL

## 2018-07-17 VITALS
DIASTOLIC BLOOD PRESSURE: 82 MMHG | HEART RATE: 78 BPM | RESPIRATION RATE: 18 BRPM | SYSTOLIC BLOOD PRESSURE: 128 MMHG | TEMPERATURE: 97.2 F | BODY MASS INDEX: 38.45 KG/M2 | HEIGHT: 63 IN | WEIGHT: 217 LBS

## 2018-07-17 DIAGNOSIS — H10.33 ACUTE CONJUNCTIVITIS OF BOTH EYES, UNSPECIFIED ACUTE CONJUNCTIVITIS TYPE: Primary | ICD-10-CM

## 2018-07-17 PROCEDURE — 99213 OFFICE O/P EST LOW 20 MIN: CPT | Performed by: NURSE PRACTITIONER

## 2018-07-17 RX ORDER — TOBRAMYCIN AND DEXAMETHASONE 3; 1 MG/ML; MG/ML
1 SUSPENSION/ DROPS OPHTHALMIC
Qty: 10 ML | Refills: 0 | Status: SHIPPED | OUTPATIENT
Start: 2018-07-17 | End: 2018-09-11

## 2018-07-17 NOTE — PATIENT INSTRUCTIONS
Supportive care discussed and advised  Follow up for no improvement and worsening of conditions  Patient advised and educated when to see immediate medical care  Conjunctivitis   AMBULATORY CARE:   Conjunctivitis,  or pink eye, is inflammation of your conjunctiva  The conjunctiva is a thin tissue that covers the front of your eye and the back of your eyelids  The conjunctiva helps protect your eye and keep it moist  Conjunctivitis may be caused by bacteria, allergies, or a virus  If your conjunctivitis is caused by bacteria, it may get better on its own in about 7 days  Viral conjunctivitis can last up to 3 weeks  Common symptoms may include any of the following: You will usually have symptoms in both eyes if your conjunctivitis is caused by allergies  You may also have other allergic symptoms, such as a rash or runny nose  Symptoms will usually start in 1 eye if your conjunctivitis is caused by a virus or bacteria  · Redness in the whites of your eye    · Itching in your eye or around your eye    · Feeling like there is something in your eye    · Watery or thick, sticky discharge    · Crusty eyelids when you wake up in the morning    · Burning, stinging, or swelling in your eye    · Pain when you see bright light  Seek care immediately if:   · You have worsening eye pain  · The swelling in your eye gets worse, even after treatment  · Your vision suddenly becomes worse or you cannot see at all  Contact your healthcare provider if:   · You develop a fever and ear pain  · You have tiny bumps or spots of blood on your eye  · You have questions or concerns about your condition or care  Treatment  will depend on the cause of your conjunctivitis  You may need antibiotics or allergy medicine as a pill, eye drop, or eye ointment  Manage your symptoms:   · Apply a cool compress  Wet a washcloth with cold water and place it on your eye  This will help decrease itching and irritation      · Do not wear contact lenses  They can irritate your eye  Throw away the pair you are using and ask when you can wear them again  Use a new pair of lenses when your healthcare provider says it is okay  · Avoid irritants  Stay away from smoke filled areas  Shield your eyes from wind and sun  · Flush your eye  You may need to flush your eye with saline to help decrease your symptoms  Ask for more information on how to flush your eye  Medicines:  Treatment depends on what is causing your conjunctivitis  You may be given any of the following:  · Allergy medicine  helps decrease itchy, red, swollen eyes caused by allergies  It may be given as a pill, eye drops, or nasal spray  · Antibiotics  may be needed if your conjunctivitis is caused by bacteria  This medicine may be given as a pill, eye drops, or eye ointment  · Take your medicine as directed  Contact your healthcare provider if you think your medicine is not helping or if you have side effects  Tell him or her if you are allergic to any medicine  Keep a list of the medicines, vitamins, and herbs you take  Include the amounts, and when and why you take them  Bring the list or the pill bottles to follow-up visits  Carry your medicine list with you in case of an emergency  Prevent the spread of conjunctivitis:   · Wash your hands with soap and water often  Wash your hands before and after you touch your eyes  Also wash your hands before you prepare or eat food and after you use the bathroom or change a diaper  · Avoid allergens  Try to avoid the things that cause your allergies, such as pets, dust, or grass  · Avoid contact with others  Do not share towels or washcloths  Try to stay away from others as much as possible  Ask when you can return to work or school  · Throw away eye makeup  The bacteria that caused your conjunctivitis can stay in eye makeup  Throw away mascara and other eye makeup    © 2017 [facility] is for End User's use only and may not be sold, redistributed or otherwise used for commercial purposes  All illustrations and images included in CareNotes® are the copyrighted property of A D A M , Inc  or Nestor Wilhelm  The above information is an  only  It is not intended as medical advice for individual conditions or treatments  Talk to your doctor, nurse or pharmacist before following any medical regimen to see if it is safe and effective for you

## 2018-07-17 NOTE — PROGRESS NOTES
Assessment/Plan:  Supportive care discussed and advised  Follow up for no improvement and worsening of conditions  Patient advised and educated when to see immediate medical care  Diagnoses and all orders for this visit:    Acute conjunctivitis of both eyes, unspecified acute conjunctivitis type  -     tobramycin-dexamethasone (TOBRADEX) ophthalmic suspension; Administer 1 drop to both eyes every 4 (four) hours while awake    BMI 38 0-38 9,adult          Return if symptoms worsen or fail to improve  Subjective:      Patient ID: Lisa Frye is a 47 y o  female  Chief Complaint   Patient presents with    Conjunctivitis     both eyes  [id number]       HPI   Patient woke up with both eyes glued together with yellowish discharge and having eye irritation and teary  Denies fever, chills, and other ENT symptoms  Currently not using any OTC  The following portions of the patient's history were reviewed and updated as appropriate: allergies, current medications, past family history, past medical history, past social history, past surgical history and problem list       Review of Systems   Constitutional: Negative  HENT: Negative  Eyes: Positive for discharge and itching  Negative for photophobia, pain, redness and visual disturbance  Respiratory: Negative  Cardiovascular: Negative  Neurological: Negative  Psychiatric/Behavioral: Negative            Objective:    History   Smoking Status    Never Smoker   Smokeless Tobacco    Never Used       Allergies: No Known Allergies    Vitals:  /82   Pulse 78   Temp (!) 97 2 °F (36 2 °C)   Resp 18   Ht 5' 3" (1 6 m)   Wt 98 4 kg (217 lb)   LMP 07/06/2018   BMI 38 44 kg/m²     Current Outpatient Prescriptions   Medication Sig Dispense Refill    acyclovir (ZOVIRAX) 200 mg capsule Only as needed for outbreak       albuterol (VENTOLIN HFA) 90 mcg/act inhaler Inhale 2 puffs every 4 (four) hours as needed        famotidine (PEPCID) 20 mg tablet Take 1 tablet (20 mg total) by mouth daily 30 tablet 0    fluticasone-salmeterol (ADVAIR DISKUS) 500-50 mcg/dose Inhale 1 puff 2 (two) times a day      levofloxacin (LEVAQUIN) 500 mg tablet Take 1 tablet (500 mg total) by mouth daily for 10 days 10 tablet 0    multivitamin (THERAGRAN) TABS Take 1 tablet by mouth daily      ondansetron (ZOFRAN-ODT) 4 mg disintegrating tablet Take 1 tablet (4 mg total) by mouth every 6 (six) hours as needed for nausea or vomiting 20 tablet 0    tobramycin-dexamethasone (TOBRADEX) ophthalmic suspension Administer 1 drop to both eyes every 4 (four) hours while awake 10 mL 0     No current facility-administered medications for this visit  Physical Exam   Constitutional: She is oriented to person, place, and time  She appears well-developed and well-nourished  HENT:   Head: Normocephalic  Right Ear: Tympanic membrane, external ear and ear canal normal    Left Ear: Tympanic membrane, external ear and ear canal normal    Nose: Nose normal  Right sinus exhibits no maxillary sinus tenderness and no frontal sinus tenderness  Left sinus exhibits no maxillary sinus tenderness and no frontal sinus tenderness  Mouth/Throat: Oropharynx is clear and moist and mucous membranes are normal    Eyes: Pupils are equal, round, and reactive to light  Right eye exhibits discharge  Left eye exhibits discharge  Right conjunctiva is not injected  Left conjunctiva is injected  Neck: Neck supple  Cardiovascular: Normal rate, regular rhythm and normal heart sounds  Pulmonary/Chest: Effort normal and breath sounds normal    Abdominal: Normal appearance and bowel sounds are normal  There is no hepatosplenomegaly  There is no tenderness  There is no rebound  Musculoskeletal: Normal range of motion  Lymphadenopathy:        Right cervical: No superficial cervical and no posterior cervical adenopathy present         Left cervical: No superficial cervical and no posterior cervical adenopathy present  Neurological: She is alert and oriented to person, place, and time  Skin: Skin is warm and dry  Psychiatric: She has a normal mood and affect  Her behavior is normal  Judgment and thought content normal    Vitals reviewed          ARTEM Martin

## 2018-07-18 NOTE — TREATMENT PLAN
Called patient to discuss transvaginal ultrasound report  Patient states that she had a follow-up appointment with her PMD after getting the ultrasound done and has a repeat ultrasound scheduled    She also plans on setting up an appointment with a gyn doctor for further evaluation

## 2018-08-16 ENCOUNTER — OFFICE VISIT (OUTPATIENT)
Dept: GASTROENTEROLOGY | Facility: CLINIC | Age: 53
End: 2018-08-16
Payer: COMMERCIAL

## 2018-08-16 VITALS
HEIGHT: 63 IN | BODY MASS INDEX: 39.16 KG/M2 | HEART RATE: 93 BPM | WEIGHT: 221 LBS | DIASTOLIC BLOOD PRESSURE: 72 MMHG | SYSTOLIC BLOOD PRESSURE: 118 MMHG | TEMPERATURE: 100 F

## 2018-08-16 DIAGNOSIS — K57.20 DIVERTICULITIS OF LARGE INTESTINE WITH ABSCESS WITHOUT BLEEDING: Primary | ICD-10-CM

## 2018-08-16 DIAGNOSIS — Z80.0 FAMILY HISTORY OF COLON CANCER: ICD-10-CM

## 2018-08-16 PROCEDURE — 99213 OFFICE O/P EST LOW 20 MIN: CPT | Performed by: INTERNAL MEDICINE

## 2018-08-16 NOTE — PROGRESS NOTES
Follow-up Note -  [facility]  Adia Melton 1965 female         Reason:  Hospital follow-up    HPI:  Ms Liam Pozo was hospitalized in the beginning of July with an episode of acute sigmoid diverticulitis and probable intramural abscess  She did well on antibiotic therapy  She reports doing well a  Denies abdominal pain, nausea or vomiting  Regular bowel movements and denies any blood or mucus in the stool  Good appetite, no recent weight loss  Denies any heartburn acid reflux  Denies any difficulty swallowing  Patient's mother had colon cancer  Patient had a colonoscopy couple of years ago  REVIEW OF SYSTEMS: Review of Systems   Constitutional: Negative for activity change, appetite change, chills, diaphoresis, fatigue, fever and unexpected weight change  HENT: Negative for ear discharge, ear pain, facial swelling, hearing loss, nosebleeds, sore throat, tinnitus and voice change  Eyes: Negative for pain, discharge, redness, itching and visual disturbance  Respiratory: Negative for apnea, cough, chest tightness, shortness of breath and wheezing  Cardiovascular: Negative for chest pain and palpitations  Gastrointestinal:        As noted in HPI   Endocrine: Negative for cold intolerance, heat intolerance and polyuria  Genitourinary: Negative for difficulty urinating, dysuria, flank pain, hematuria and urgency  Musculoskeletal: Negative for arthralgias, back pain, gait problem, joint swelling and myalgias  Skin: Negative for rash and wound  Neurological: Negative for dizziness, tremors, seizures, speech difficulty, light-headedness, numbness and headaches  Hematological: Negative for adenopathy  Does not bruise/bleed easily  Psychiatric/Behavioral: Negative for agitation, behavioral problems and confusion  The patient is not nervous/anxious           Past Medical History:   Diagnosis Date    Asthma     Fibroids     Physiological ovarian cysts     Sleep apnea       Past Surgical History:   Procedure Laterality Date    COLONOSCOPY  2016    dr Benton Lobe WRIST SURGERY       Social History     Social History    Marital status:      Spouse name: N/A    Number of children: N/A    Years of education: N/A     Occupational History    Not on file  Social History Main Topics    Smoking status: Never Smoker    Smokeless tobacco: Never Used    Alcohol use Yes      Comment: social    Drug use: No    Sexual activity: Not on file     Other Topics Concern    Not on file     Social History Narrative    No narrative on file     Family History   Problem Relation Age of Onset    Osteoporosis Mother     Thyroid disease Mother     Colon cancer Mother     Colon polyps Mother     Alzheimer's disease Father     Hypertension Father      Patient has no known allergies  Current Outpatient Prescriptions   Medication Sig Dispense Refill    acyclovir (ZOVIRAX) 200 mg capsule Only as needed for outbreak       albuterol (VENTOLIN HFA) 90 mcg/act inhaler Inhale 2 puffs every 4 (four) hours as needed        fluticasone-salmeterol (ADVAIR DISKUS) 500-50 mcg/dose Inhale 1 puff 2 (two) times a day      multivitamin (THERAGRAN) TABS Take 1 tablet by mouth daily      famotidine (PEPCID) 20 mg tablet Take 1 tablet (20 mg total) by mouth daily (Patient not taking: Reported on 8/16/2018 ) 30 tablet 0    ondansetron (ZOFRAN-ODT) 4 mg disintegrating tablet Take 1 tablet (4 mg total) by mouth every 6 (six) hours as needed for nausea or vomiting (Patient not taking: Reported on 8/16/2018 ) 20 tablet 0    tobramycin-dexamethasone (TOBRADEX) ophthalmic suspension Administer 1 drop to both eyes every 4 (four) hours while awake (Patient not taking: Reported on 8/16/2018 ) 10 mL 0     No current facility-administered medications for this visit          Blood pressure 118/72, pulse 93, temperature 100 °F (37 8 °C), temperature source Tympanic, height 5' 3" (1 6 m), weight 100 kg (221 lb)  PHYSICAL EXAM: Physical Exam   Constitutional: She is oriented to person, place, and time  She appears well-developed and well-nourished  HENT:   Head: Normocephalic and atraumatic  Nose: Nose normal    Mouth/Throat: Oropharynx is clear and moist    Eyes: Conjunctivae are normal  Right eye exhibits no discharge  Left eye exhibits no discharge  No scleral icterus  Neck: Neck supple  No JVD present  No tracheal deviation present  No thyromegaly present  Cardiovascular: Normal rate, regular rhythm and normal heart sounds  Exam reveals no gallop and no friction rub  No murmur heard  Pulmonary/Chest: Effort normal and breath sounds normal  No respiratory distress  She has no wheezes  She has no rales  She exhibits no tenderness  Abdominal: Soft  Bowel sounds are normal  She exhibits no distension and no mass  There is no tenderness  There is no rebound and no guarding  No hernia  Musculoskeletal: She exhibits no edema, tenderness or deformity  Lymphadenopathy:     She has no cervical adenopathy  Neurological: She is alert and oriented to person, place, and time  Skin: Skin is warm and dry  No rash noted  No erythema  Psychiatric: She has a normal mood and affect  Her behavior is normal  Thought content normal         Lab Results   Component Value Date    WBC 6 05 07/09/2018    HGB 12 6 07/09/2018    HCT 38 4 07/09/2018    MCV 90 07/09/2018     07/09/2018     Lab Results   Component Value Date    GLUCOSE 78 07/08/2018    CALCIUM 8 5 07/08/2018     07/08/2018    K 3 9 07/08/2018    CO2 21 07/08/2018     07/08/2018    BUN 7 07/08/2018    CREATININE 0 74 07/08/2018     Lab Results   Component Value Date    ALT 27 07/06/2018    AST 9 07/06/2018    ALKPHOS 89 07/06/2018    BILITOT 0 30 07/06/2018     No results found for: INR, PROTIME    Us Pelvis Complete W Transvaginal    Result Date: 7/16/2018  Impression:  1  Enlarged fibroid uterus  2   Normal right ovary   3  Complex cyst in the left ovary  Recommend follow-up ultrasound in 6-12 weeks  4   Prominent vessels in the left adnexal region, correlate for pelvic congestion syndrome  Workstation performed: [id number]       ASSESSMENT & PLAN:    Diverticulitis of large intestine with abscess without bleeding  Acute diverticulitis with probable intramural abscess status post treatment with improvement of symptoms  Should rule out infiltrating carcinoma even though it seems to be less likely     -Schedule for colonoscopy  -High-fiber diet     -Patient was given instructions about the colonoscopy prep     -Patient was explained about  the risks and benefits of the procedure  Risks including but not limited to bleeding, infection, perforation were explained in detail  Also explained about less than 100% sensitivity with the exam and other alternatives            Family history of colon cancer  Patient is at increased risk for colon cancer screening because of family history in her mother    -schedule for colonoscopy

## 2018-08-16 NOTE — ASSESSMENT & PLAN NOTE
Acute diverticulitis with probable intramural abscess status post treatment with improvement of symptoms  Should rule out infiltrating carcinoma even though it seems to be less likely     -Schedule for colonoscopy  -High-fiber diet     -Patient was given instructions about the colonoscopy prep     -Patient was explained about  the risks and benefits of the procedure  Risks including but not limited to bleeding, infection, perforation were explained in detail  Also explained about less than 100% sensitivity with the exam and other alternatives

## 2018-08-16 NOTE — ASSESSMENT & PLAN NOTE
Patient is at increased risk for colon cancer screening because of family history in her mother    -schedule for colonoscopy

## 2018-09-11 NOTE — PRE-PROCEDURE INSTRUCTIONS
Pre-Surgery Instructions:   Medication Instructions    acyclovir (ZOVIRAX) 200 mg capsule Patient was instructed by Physician and understands   albuterol (VENTOLIN HFA) 90 mcg/act inhaler Patient was instructed by Physician and understands   fluticasone-salmeterol (ADVAIR DISKUS) 500-50 mcg/dose Patient was instructed by Physician and understands   multivitamin [facility]) TABS Patient was instructed by Physician and understands

## 2018-09-12 ENCOUNTER — ANESTHESIA EVENT (OUTPATIENT)
Dept: GASTROENTEROLOGY | Facility: AMBULARY SURGERY CENTER | Age: 53
End: 2018-09-12
Payer: COMMERCIAL

## 2018-09-13 ENCOUNTER — HOSPITAL ENCOUNTER (OUTPATIENT)
Facility: AMBULARY SURGERY CENTER | Age: 53
Setting detail: OUTPATIENT SURGERY
Discharge: HOME | End: 2018-09-13
Attending: INTERNAL MEDICINE | Admitting: INTERNAL MEDICINE
Payer: COMMERCIAL

## 2018-09-13 ENCOUNTER — ANESTHESIA (OUTPATIENT)
Dept: GASTROENTEROLOGY | Facility: AMBULARY SURGERY CENTER | Age: 53
End: 2018-09-13
Payer: COMMERCIAL

## 2018-09-13 VITALS
OXYGEN SATURATION: 96 % | DIASTOLIC BLOOD PRESSURE: 109 MMHG | HEART RATE: 77 BPM | TEMPERATURE: 98.5 F | RESPIRATION RATE: 16 BRPM | SYSTOLIC BLOOD PRESSURE: 159 MMHG

## 2018-09-13 DIAGNOSIS — K57.20 DIVERTICULITIS OF LARGE INTESTINE WITH ABSCESS WITHOUT BLEEDING: ICD-10-CM

## 2018-09-13 LAB — EXT PREGNANCY TEST URINE: NEGATIVE

## 2018-09-13 PROCEDURE — 81025 URINE PREGNANCY TEST: CPT | Performed by: INTERNAL MEDICINE

## 2018-09-13 PROCEDURE — 88305 TISSUE EXAM BY PATHOLOGIST: CPT | Performed by: PATHOLOGY

## 2018-09-13 PROCEDURE — 45385 COLONOSCOPY W/LESION REMOVAL: CPT | Performed by: INTERNAL MEDICINE

## 2018-09-13 RX ORDER — PROPOFOL 10 MG/ML
INJECTION, EMULSION INTRAVENOUS AS NEEDED
Status: DISCONTINUED | OUTPATIENT
Start: 2018-09-13 | End: 2018-09-13 | Stop reason: SURG

## 2018-09-13 RX ORDER — PROPOFOL 10 MG/ML
INJECTION, EMULSION INTRAVENOUS CONTINUOUS PRN
Status: DISCONTINUED | OUTPATIENT
Start: 2018-09-13 | End: 2018-09-13 | Stop reason: SURG

## 2018-09-13 RX ORDER — SODIUM CHLORIDE, SODIUM LACTATE, POTASSIUM CHLORIDE, CALCIUM CHLORIDE 600; 310; 30; 20 MG/100ML; MG/100ML; MG/100ML; MG/100ML
125 INJECTION, SOLUTION INTRAVENOUS CONTINUOUS
Status: DISCONTINUED | OUTPATIENT
Start: 2018-09-13 | End: 2018-09-13 | Stop reason: HOSPADM

## 2018-09-13 RX ORDER — LIDOCAINE HYDROCHLORIDE 10 MG/ML
INJECTION, SOLUTION INFILTRATION; PERINEURAL AS NEEDED
Status: DISCONTINUED | OUTPATIENT
Start: 2018-09-13 | End: 2018-09-13 | Stop reason: SURG

## 2018-09-13 RX ADMIN — PROPOFOL 75 MG: 10 INJECTION, EMULSION INTRAVENOUS at 10:05

## 2018-09-13 RX ADMIN — LIDOCAINE HYDROCHLORIDE 50 MG: 10 INJECTION, SOLUTION INFILTRATION; PERINEURAL at 10:06

## 2018-09-13 RX ADMIN — PROPOFOL 130 MCG/KG/MIN: 10 INJECTION, EMULSION INTRAVENOUS at 10:05

## 2018-09-13 NOTE — H&P (VIEW-ONLY)
Follow-up Note -  [facility]  Jewel Elizabeth 1965 female         Reason:  Hospital follow-up    HPI:  Ms Emerson Escalante was hospitalized in the beginning of July with an episode of acute sigmoid diverticulitis and probable intramural abscess  She did well on antibiotic therapy  She reports doing well a  Denies abdominal pain, nausea or vomiting  Regular bowel movements and denies any blood or mucus in the stool  Good appetite, no recent weight loss  Denies any heartburn acid reflux  Denies any difficulty swallowing  Patient's mother had colon cancer  Patient had a colonoscopy couple of years ago  REVIEW OF SYSTEMS: Review of Systems   Constitutional: Negative for activity change, appetite change, chills, diaphoresis, fatigue, fever and unexpected weight change  HENT: Negative for ear discharge, ear pain, facial swelling, hearing loss, nosebleeds, sore throat, tinnitus and voice change  Eyes: Negative for pain, discharge, redness, itching and visual disturbance  Respiratory: Negative for apnea, cough, chest tightness, shortness of breath and wheezing  Cardiovascular: Negative for chest pain and palpitations  Gastrointestinal:        As noted in HPI   Endocrine: Negative for cold intolerance, heat intolerance and polyuria  Genitourinary: Negative for difficulty urinating, dysuria, flank pain, hematuria and urgency  Musculoskeletal: Negative for arthralgias, back pain, gait problem, joint swelling and myalgias  Skin: Negative for rash and wound  Neurological: Negative for dizziness, tremors, seizures, speech difficulty, light-headedness, numbness and headaches  Hematological: Negative for adenopathy  Does not bruise/bleed easily  Psychiatric/Behavioral: Negative for agitation, behavioral problems and confusion  The patient is not nervous/anxious           Past Medical History:   Diagnosis Date    Asthma     Fibroids     Physiological ovarian cysts     Sleep apnea       Past Surgical History:   Procedure Laterality Date    COLONOSCOPY  2016    dr Vincenzo Murrell WRIST SURGERY       Social History     Social History    Marital status:      Spouse name: N/A    Number of children: N/A    Years of education: N/A     Occupational History    Not on file  Social History Main Topics    Smoking status: Never Smoker    Smokeless tobacco: Never Used    Alcohol use Yes      Comment: social    Drug use: No    Sexual activity: Not on file     Other Topics Concern    Not on file     Social History Narrative    No narrative on file     Family History   Problem Relation Age of Onset    Osteoporosis Mother     Thyroid disease Mother     Colon cancer Mother     Colon polyps Mother     Alzheimer's disease Father     Hypertension Father      Patient has no known allergies  Current Outpatient Prescriptions   Medication Sig Dispense Refill    acyclovir (ZOVIRAX) 200 mg capsule Only as needed for outbreak       albuterol (VENTOLIN HFA) 90 mcg/act inhaler Inhale 2 puffs every 4 (four) hours as needed        fluticasone-salmeterol (ADVAIR DISKUS) 500-50 mcg/dose Inhale 1 puff 2 (two) times a day      multivitamin (THERAGRAN) TABS Take 1 tablet by mouth daily      famotidine (PEPCID) 20 mg tablet Take 1 tablet (20 mg total) by mouth daily (Patient not taking: Reported on 8/16/2018 ) 30 tablet 0    ondansetron (ZOFRAN-ODT) 4 mg disintegrating tablet Take 1 tablet (4 mg total) by mouth every 6 (six) hours as needed for nausea or vomiting (Patient not taking: Reported on 8/16/2018 ) 20 tablet 0    tobramycin-dexamethasone (TOBRADEX) ophthalmic suspension Administer 1 drop to both eyes every 4 (four) hours while awake (Patient not taking: Reported on 8/16/2018 ) 10 mL 0     No current facility-administered medications for this visit          Blood pressure 118/72, pulse 93, temperature 100 °F (37 8 °C), temperature source Tympanic, height 5' 3" (1 6 m), weight 100 kg (221 lb)  PHYSICAL EXAM: Physical Exam   Constitutional: She is oriented to person, place, and time  She appears well-developed and well-nourished  HENT:   Head: Normocephalic and atraumatic  Nose: Nose normal    Mouth/Throat: Oropharynx is clear and moist    Eyes: Conjunctivae are normal  Right eye exhibits no discharge  Left eye exhibits no discharge  No scleral icterus  Neck: Neck supple  No JVD present  No tracheal deviation present  No thyromegaly present  Cardiovascular: Normal rate, regular rhythm and normal heart sounds  Exam reveals no gallop and no friction rub  No murmur heard  Pulmonary/Chest: Effort normal and breath sounds normal  No respiratory distress  She has no wheezes  She has no rales  She exhibits no tenderness  Abdominal: Soft  Bowel sounds are normal  She exhibits no distension and no mass  There is no tenderness  There is no rebound and no guarding  No hernia  Musculoskeletal: She exhibits no edema, tenderness or deformity  Lymphadenopathy:     She has no cervical adenopathy  Neurological: She is alert and oriented to person, place, and time  Skin: Skin is warm and dry  No rash noted  No erythema  Psychiatric: She has a normal mood and affect  Her behavior is normal  Thought content normal         Lab Results   Component Value Date    WBC 6 05 07/09/2018    HGB 12 6 07/09/2018    HCT 38 4 07/09/2018    MCV 90 07/09/2018     07/09/2018     Lab Results   Component Value Date    GLUCOSE 78 07/08/2018    CALCIUM 8 5 07/08/2018     07/08/2018    K 3 9 07/08/2018    CO2 21 07/08/2018     07/08/2018    BUN 7 07/08/2018    CREATININE 0 74 07/08/2018     Lab Results   Component Value Date    ALT 27 07/06/2018    AST 9 07/06/2018    ALKPHOS 89 07/06/2018    BILITOT 0 30 07/06/2018     No results found for: INR, PROTIME    Us Pelvis Complete W Transvaginal    Result Date: 7/16/2018  Impression:  1  Enlarged fibroid uterus  2   Normal right ovary   3  Complex cyst in the left ovary  Recommend follow-up ultrasound in 6-12 weeks  4   Prominent vessels in the left adnexal region, correlate for pelvic congestion syndrome  Workstation performed: [id number]       ASSESSMENT & PLAN:    Diverticulitis of large intestine with abscess without bleeding  Acute diverticulitis with probable intramural abscess status post treatment with improvement of symptoms  Should rule out infiltrating carcinoma even though it seems to be less likely     -Schedule for colonoscopy  -High-fiber diet     -Patient was given instructions about the colonoscopy prep     -Patient was explained about  the risks and benefits of the procedure  Risks including but not limited to bleeding, infection, perforation were explained in detail  Also explained about less than 100% sensitivity with the exam and other alternatives            Family history of colon cancer  Patient is at increased risk for colon cancer screening because of family history in her mother    -schedule for colonoscopy

## 2018-09-13 NOTE — OP NOTE
COLONOSCOPY    PROCEDURE: Colonoscopy/ Polypectomy (Cold Snare)    INDICATIONS: History of Diverticulitis    POST-OP DIAGNOSIS: See the impression below    SEDATION: Monitored anesthesia care, check anesthesia records    PHYSICAL EXAM:    /97   Pulse 85   Temp 98 5 °F (36 9 °C) (Tympanic)   Resp 18   LMP 08/30/2018   SpO2 94%   Breastfeeding? No   There is no height or weight on file to calculate BMI  General: NAD  Heart: S1 & S2 normal, RRR  Lungs: CTA, No rales or rhonchi  Abdomen: Soft, nontender, nondistended, good bowel sounds    CONSENT:  Informed consent was obtained for the procedure, including sedation after explaining the risks and benefits of the procedure  Risks including but not limited to bleeding, perforation, infection, aspiration were discussed in detail  Also explained about less than 100%$ sensitivity with the exam and other alternatives  PREPARATION:   EKG tracing, pulse oximetry, blood pressure were monitored throughout the procedure  Patient was identified by myself both verbally and by visual inspection of ID band  DESCRIPTION:   Patient was placed in the left lateral decubitus position and was sedated with the above medication  Digital rectal examination was performed  The colonoscope was introduced in to the anal canal and advanced up to cecum, which was identified by the appendiceal orifice and IC valve  A careful inspection was made as the colonoscope was withdrawn, including a retroflexed view of the rectum; findings and interventions are described below  Appropriate photodocumentation was obtained  The quality of the colonic preparation was adequate  FINDINGS:    1  Cecum -5 mm polyp was removed with cold snare    2  Multiple diverticuli seen in the sigmoid colon to about 45 cm from the anal verge    3  In the distal sigmoid colon 5 mm polyp was removed with cold snare    4    Internal hemorrhoids         IMPRESSIONS:      As above    RECOMMENDATIONS:    Check pathology    COMPLICATIONS:  None; patient tolerated the procedure well      DISPOSITION: PACU           CONDITION: Stable

## 2018-09-13 NOTE — ANESTHESIA PREPROCEDURE EVALUATION
Review of Systems/Medical History          Cardiovascular   Pulmonary  Asthma , Sleep apnea ,        GI/Hepatic            Endo/Other     GYN       Hematology   Musculoskeletal       Neurology   Psychology           Physical Exam    Airway    Mallampati score: I         Dental       Cardiovascular  Rhythm: regular, Rate: normal,     Pulmonary      Other Findings        Anesthesia Plan  ASA Score- 3     Anesthesia Type- IV sedation with anesthesia with ASA Monitors  Additional Monitors:   Airway Plan:         Plan Factors-    Induction- intravenous  Postoperative Plan-     Informed Consent- Anesthetic plan and risks discussed with patient

## 2018-09-17 ENCOUNTER — TELEPHONE (OUTPATIENT)
Dept: GASTROENTEROLOGY | Facility: AMBULARY SURGERY CENTER | Age: 53
End: 2018-09-17

## 2018-09-17 NOTE — TELEPHONE ENCOUNTER
----- Message from Kathia Noble MD sent at 9/14/2018  6:12 PM EDT -----    Colon polyps removed came back as precancerous tubular adenoma  Next colonoscopy in 3 years  Patient to call our office for any GI symptoms

## 2018-11-07 ENCOUNTER — TELEPHONE (OUTPATIENT)
Dept: GASTROENTEROLOGY | Facility: AMBULARY SURGERY CENTER | Age: 53
End: 2018-11-07

## 2018-11-07 NOTE — TELEPHONE ENCOUNTER
DR Malini Carrera PT    Pt called requesting advise before her appt  Pt is experiencing abd pain at a level 7   Please advise thank you

## 2018-11-07 NOTE — TELEPHONE ENCOUNTER
I spoke to patient with hx of diverticulitis who is reporting LLQ "dull pain "4", sometimes increasing to "6-7" cramping pain after eating  She says this pain usually occurs when she has her menses  She does get relief with tylenol  She also said she has medication for abdominal cramping  I tried to verify if this was bentyl but she was unsure as she was at work,but will take if she has this  We discussed possibly starting a course of abx if the pain is similar as prior but feels this is more mild  She will call me in the AM with update on how she is feeling as she is at work  Appointment is scheduled 11/28

## 2018-11-08 NOTE — TELEPHONE ENCOUNTER
I spoke to patient, she does not have bentyl  I offered to send a prescription to her pharmacy to help with abdominal cramping but she prefers to use tylenol at this time  She will go to ED if symptoms worsen

## 2018-11-08 NOTE — TELEPHONE ENCOUNTER
Agree as this is intermittent and mild she should trial bentyl however if the pain becomes worse, persistent, or she develops any fever or chills she should go to the ED

## 2018-11-08 NOTE — TELEPHONE ENCOUNTER
FYI, please see task thread  Patient left me a voicemail reporting a   "Temporary dull pain after eating", then subsides  She said she will wait to have assessed at her upcoming office visit on 11/28 but will go to ED if worsens in the meantime

## 2018-11-28 ENCOUNTER — OFFICE VISIT (OUTPATIENT)
Dept: GASTROENTEROLOGY | Facility: CLINIC | Age: 53
End: 2018-11-28
Payer: COMMERCIAL

## 2018-11-28 VITALS
HEIGHT: 63 IN | WEIGHT: 229 LBS | TEMPERATURE: 99.4 F | DIASTOLIC BLOOD PRESSURE: 88 MMHG | SYSTOLIC BLOOD PRESSURE: 122 MMHG | BODY MASS INDEX: 40.57 KG/M2 | HEART RATE: 66 BPM

## 2018-11-28 DIAGNOSIS — R10.32 LLQ PAIN: Primary | ICD-10-CM

## 2018-11-28 DIAGNOSIS — R19.4 CHANGE IN BOWEL HABITS: ICD-10-CM

## 2018-11-28 DIAGNOSIS — K57.92 DIVERTICULITIS: Primary | ICD-10-CM

## 2018-11-28 PROCEDURE — 99213 OFFICE O/P EST LOW 20 MIN: CPT | Performed by: PHYSICIAN ASSISTANT

## 2018-11-28 RX ORDER — CIPROFLOXACIN 500 MG/1
500 TABLET, FILM COATED ORAL EVERY 12 HOURS SCHEDULED
Qty: 14 TABLET | Refills: 0 | Status: SHIPPED | OUTPATIENT
Start: 2018-11-28 | End: 2018-12-05

## 2018-11-28 RX ORDER — METRONIDAZOLE 500 MG/1
500 TABLET ORAL EVERY 8 HOURS SCHEDULED
Qty: 21 TABLET | Refills: 0 | Status: SHIPPED | OUTPATIENT
Start: 2018-11-28 | End: 2018-12-05

## 2018-11-28 NOTE — LETTER
November 28, 2018     Deejay Singh, 1972 No  [facility] [address]    Patient: Rush Javier   YOB: 1965   Date of Visit: 11/28/2018       Dear Dr iMkel Sauceda: Thank you for referring Rush Javier to me for evaluation  Below are my notes for this consultation  If you have questions, please do not hesitate to call me  I look forward to following your patient along with you  Sincerely,        Salas Valle PA-C        CC: No Recipients  Salas Valle PA-C  11/28/2018  1:32 PM  Sign at close encounter  Assessment and Plan    #1  LLQ pain with change in bowel habits: concern for possible recurrent mild diverticulitis versus IBS versus gynecological symptoms    -Will empirically treat with 7 days of cipro and flagyl for presumed mild diverticulitis  -Patient was to call if her symptoms do not improve by Monday  -Can consider CT scan if symptoms still present despite abx  -Any worsening of pain, bloody stools, N/V, fever, etc patient is to go to the ED  -Lo fiber diet for now  --------------------------------------------------------------------------------------------------------------------    Chief Complaint:   Follow-up left lower quadrant pain and change in bowel habits    HPI: Rush Javier is a 47 y o  female who presents today for follow up for left lower quadrant pain and change in bowel habits  Patient was previously seen for an episode of diverticulitis earlier this year which resolved  She underwent a colonoscopy in September which was normal aside from diverticuli and a few small polyps removed  She reports that there is a period of time where she was feeling normal   However over the last few weeks she reports that she is having a dull constant left lower quadrant pain with occasional stabbing pain primarily after eating  She also reports a change in her bowel habits in which they are alternating between constipation, normal, and looser stools  She denies any blood in the stool  She denies any nausea or vomiting  She denies any fevers or chills  Patient denies any dysphagia, unexpected weight loss, blood in stool, or black tarry stools  She feels that her symptoms are similar to her symptoms when she had diverticulitis but just are not as severe  She denies any relation of the symptoms with her menses  She denies any urinary symptoms such as frequency, urgency, or burning    Review of Systems:   General: negative for fatigue, fever, night sweats or unexpected weight loss  Psychological: negative for anxiety or depression  Ophthalmic: negative for blurry vision or scleral icterus  ENT: negative for headaches, oral lesions, sore throat, vocal changes or dysphagia  Hematological and Lymphatic: negative for pallor or swollen lymph nodes  Respiratory: negative for cough, shortness of breath or wheezing  Cardiovascular: negative for chest pain, edema or murmur  Gastrointestinal: as mentioned in HPI  Genito-Urinary: negative for dysuria or incontinence  Musculoskeletal: negative for joint pain, joint stiffness or joint swelling  Dermatological: negative for pruritus, rash, or jaundice    Current Medications  Current Outpatient Prescriptions   Medication Sig Dispense Refill    acyclovir (ZOVIRAX) 200 mg capsule 200 mg as needed Only as needed for outbreak       albuterol (VENTOLIN HFA) 90 mcg/act inhaler Inhale 2 puffs every 4 (four) hours as needed        fluticasone-salmeterol (ADVAIR DISKUS) 500-50 mcg/dose Inhale 1 puff 2 (two) times a day      multivitamin (THERAGRAN) TABS Take 1 tablet by mouth daily      ciprofloxacin (CIPRO) 500 mg tablet Take 1 tablet (500 mg total) by mouth every 12 (twelve) hours for 7 days 14 tablet 0    metroNIDAZOLE (FLAGYL) 500 mg tablet Take 1 tablet (500 mg total) by mouth every 8 (eight) hours for 7 days 21 tablet 0     No current facility-administered medications for this visit          Past Medical History  Past Medical History:   Diagnosis Date    Asthma     Fibroids     Physiological ovarian cysts     Sleep apnea     denatl device worn       Past Surgical History  Past Surgical History:   Procedure Laterality Date    COLONOSCOPY  2016    dr Lemons Groron ARTHROSCOPY Left 2018    meniscectomy x2, first done in 2015    [place] COLONOSCOPY FLX DX W/COLLJ SPEC WHEN PFRMD N/A 9/13/2018    Procedure: COLONOSCOPY;  Surgeon: Erlinda Thorpe MD;  Location: [place] [address] [facility];   Service: Gastroenterology    WRIST SURGERY Right        Past Social History   Social History     Social History    Marital status:      Spouse name: N/A    Number of children: N/A    Years of education: N/A     Social History Main Topics    Smoking status: Never Smoker    Smokeless tobacco: Never Used    Alcohol use Yes      Comment: social    Drug use: No    Sexual activity: Not Asked     Other Topics Concern    None     Social History Narrative    None       The following portions of the patient's history were reviewed and updated as appropriate: allergies, current medications, past family history, past medical history, past social history, past surgical history and problem list     Vital Signs  Vitals:    11/28/18 1253   BP: 122/88   BP Location: Right arm   Patient Position: Sitting   Cuff Size: Standard   Pulse: 66   Temp: 99 4 °F (37 4 °C)   TempSrc: Tympanic   Weight: 104 kg (229 lb)   Height: 5' 3" (1 6 m)       Physical Exam:  General appearance: alert, cooperative, no distress  HEENT: normocephalic, anicteric, no eye erythema or discharge, no oropharyngeal thrush  Neck: supple, trachea midline, no adenopathy  Lungs: CTA b/l, no rales, rhonchi, or wheezing, unlabored respirations  Heart: RRR, no murmur, rubs, or gallops  Abdomen: soft, obese, mild LLQ tenderness to palpation, non-distended, normal bowel sounds, no masses or organomegaly  Rectal: deferred  Extremities: no cyanosis, clubbing, or edema  Musculoskeletal: normal gait  Skin: color and texture normal, no jaundice, no rashes or lesions  Psychiatric: alert and oriented, normal affect and behavior

## 2018-11-28 NOTE — PROGRESS NOTES
Assessment and Plan    #1  LLQ pain with change in bowel habits: concern for possible recurrent mild diverticulitis versus IBS versus gynecological symptoms    -Will empirically treat with 7 days of cipro and flagyl for presumed mild diverticulitis  -Patient was to call if her symptoms do not improve by Monday  -Can consider CT scan if symptoms still present despite abx  -Any worsening of pain, bloody stools, N/V, fever, etc patient is to go to the ED  -Lo fiber diet for now  --------------------------------------------------------------------------------------------------------------------    Chief Complaint:   Follow-up left lower quadrant pain and change in bowel habits    HPI: Sean Han is a 47 y o  female who presents today for follow up for left lower quadrant pain and change in bowel habits  Patient was previously seen for an episode of diverticulitis earlier this year which resolved  She underwent a colonoscopy in September which was normal aside from diverticuli and a few small polyps removed  She reports that there is a period of time where she was feeling normal   However over the last few weeks she reports that she is having a dull constant left lower quadrant pain with occasional stabbing pain primarily after eating  She also reports a change in her bowel habits in which they are alternating between constipation, normal, and looser stools  She denies any blood in the stool  She denies any nausea or vomiting  She denies any fevers or chills  Patient denies any dysphagia, unexpected weight loss, blood in stool, or black tarry stools  She feels that her symptoms are similar to her symptoms when she had diverticulitis but just are not as severe  She denies any relation of the symptoms with her menses    She denies any urinary symptoms such as frequency, urgency, or burning    Review of Systems:   General: negative for fatigue, fever, night sweats or unexpected weight loss  Psychological: negative for anxiety or depression  Ophthalmic: negative for blurry vision or scleral icterus  ENT: negative for headaches, oral lesions, sore throat, vocal changes or dysphagia  Hematological and Lymphatic: negative for pallor or swollen lymph nodes  Respiratory: negative for cough, shortness of breath or wheezing  Cardiovascular: negative for chest pain, edema or murmur  Gastrointestinal: as mentioned in HPI  Genito-Urinary: negative for dysuria or incontinence  Musculoskeletal: negative for joint pain, joint stiffness or joint swelling  Dermatological: negative for pruritus, rash, or jaundice    Current Medications  Current Outpatient Prescriptions   Medication Sig Dispense Refill    acyclovir (ZOVIRAX) 200 mg capsule 200 mg as needed Only as needed for outbreak       albuterol (VENTOLIN HFA) 90 mcg/act inhaler Inhale 2 puffs every 4 (four) hours as needed        fluticasone-salmeterol (ADVAIR DISKUS) 500-50 mcg/dose Inhale 1 puff 2 (two) times a day      multivitamin (THERAGRAN) TABS Take 1 tablet by mouth daily      ciprofloxacin (CIPRO) 500 mg tablet Take 1 tablet (500 mg total) by mouth every 12 (twelve) hours for 7 days 14 tablet 0    metroNIDAZOLE (FLAGYL) 500 mg tablet Take 1 tablet (500 mg total) by mouth every 8 (eight) hours for 7 days 21 tablet 0     No current facility-administered medications for this visit  Past Medical History  Past Medical History:   Diagnosis Date    Asthma     Fibroids     Physiological ovarian cysts     Sleep apnea     denatl device worn       Past Surgical History  Past Surgical History:   Procedure Laterality Date    COLONOSCOPY  2016    dr Rivas Erickson ARTHROSCOPY Left 2018    meniscectomy x2, first done in 2015    [place] COLONOSCOPY FLX DX W/COLLJ SPEC WHEN PFRMD N/A 9/13/2018    Procedure: COLONOSCOPY;  Surgeon: Yamil Delgado MD;  Location: [facility];   Service: Gastroenterology    WRIST SURGERY Right        Past Social History Social History     Social History    Marital status:      Spouse name: N/A    Number of children: N/A    Years of education: N/A     Social History Main Topics    Smoking status: Never Smoker    Smokeless tobacco: Never Used    Alcohol use Yes      Comment: social    Drug use: No    Sexual activity: Not Asked     Other Topics Concern    None     Social History Narrative    None       The following portions of the patient's history were reviewed and updated as appropriate: allergies, current medications, past family history, past medical history, past social history, past surgical history and problem list     Vital Signs  Vitals:    11/28/18 1253   BP: 122/88   BP Location: Right arm   Patient Position: Sitting   Cuff Size: Standard   Pulse: 66   Temp: 99 4 °F (37 4 °C)   TempSrc: Tympanic   Weight: 104 kg (229 lb)   Height: 5' 3" (1 6 m)       Physical Exam:  General appearance: alert, cooperative, no distress  HEENT: normocephalic, anicteric, no eye erythema or discharge, no oropharyngeal thrush  Neck: supple, trachea midline, no adenopathy  Lungs: CTA b/l, no rales, rhonchi, or wheezing, unlabored respirations  Heart: RRR, no murmur, rubs, or gallops  Abdomen: soft, obese, mild LLQ tenderness to palpation, non-distended, normal bowel sounds, no masses or organomegaly  Rectal: deferred  Extremities: no cyanosis, clubbing, or edema  Musculoskeletal: normal gait  Skin: color and texture normal, no jaundice, no rashes or lesions  Psychiatric: alert and oriented, normal affect and behavior

## 2018-12-02 ENCOUNTER — APPOINTMENT (EMERGENCY)
Dept: RADIOLOGY | Facility: HOSPITAL | Age: 53
End: 2018-12-02
Payer: COMMERCIAL

## 2018-12-02 ENCOUNTER — HOSPITAL ENCOUNTER (EMERGENCY)
Facility: HOSPITAL | Age: 53
Discharge: HOME | End: 2018-12-02
Attending: EMERGENCY MEDICINE | Admitting: EMERGENCY MEDICINE
Payer: COMMERCIAL

## 2018-12-02 VITALS
WEIGHT: 228 LBS | OXYGEN SATURATION: 98 % | HEART RATE: 78 BPM | BODY MASS INDEX: 40.39 KG/M2 | TEMPERATURE: 98 F | SYSTOLIC BLOOD PRESSURE: 128 MMHG | RESPIRATION RATE: 18 BRPM | DIASTOLIC BLOOD PRESSURE: 83 MMHG

## 2018-12-02 DIAGNOSIS — R10.9 ABDOMINAL PAIN: Primary | ICD-10-CM

## 2018-12-02 DIAGNOSIS — K59.00 CONSTIPATION: ICD-10-CM

## 2018-12-02 LAB
ALBUMIN SERPL BCP-MCNC: 3.4 G/DL (ref 3.5–5)
ALP SERPL-CCNC: 83 U/L (ref 46–116)
ALT SERPL W P-5'-P-CCNC: 43 U/L (ref 12–78)
ANION GAP SERPL CALCULATED.3IONS-SCNC: 10 MMOL/L (ref 4–13)
APTT PPP: 30 SECONDS (ref 26–38)
AST SERPL W P-5'-P-CCNC: 29 U/L (ref 5–45)
BASOPHILS # BLD AUTO: 0.03 THOUSANDS/ΜL (ref 0–0.1)
BASOPHILS NFR BLD AUTO: 0 % (ref 0–1)
BILIRUB SERPL-MCNC: 0.3 MG/DL (ref 0.2–1)
BILIRUB UR QL STRIP: NEGATIVE
BUN SERPL-MCNC: 13 MG/DL (ref 5–25)
CALCIUM SERPL-MCNC: 8.5 MG/DL (ref 8.3–10.1)
CHLORIDE SERPL-SCNC: 102 MMOL/L (ref 100–108)
CLARITY UR: CLEAR
CO2 SERPL-SCNC: 24 MMOL/L (ref 21–32)
COLOR UR: NORMAL
CREAT SERPL-MCNC: 0.82 MG/DL (ref 0.6–1.3)
EOSINOPHIL # BLD AUTO: 0.04 THOUSAND/ΜL (ref 0–0.61)
EOSINOPHIL NFR BLD AUTO: 1 % (ref 0–6)
ERYTHROCYTE [DISTWIDTH] IN BLOOD BY AUTOMATED COUNT: 12.8 % (ref 11.6–15.1)
GFR SERPL CREATININE-BSD FRML MDRD: 83 ML/MIN/1.73SQ M
GLUCOSE SERPL-MCNC: 117 MG/DL (ref 65–140)
GLUCOSE UR STRIP-MCNC: NEGATIVE MG/DL
HCT VFR BLD AUTO: 41.3 % (ref 34.8–46.1)
HGB BLD-MCNC: 13.6 G/DL (ref 11.5–15.4)
HGB UR QL STRIP.AUTO: NEGATIVE
IMM GRANULOCYTES # BLD AUTO: 0.03 THOUSAND/UL (ref 0–0.2)
IMM GRANULOCYTES NFR BLD AUTO: 0 % (ref 0–2)
INR PPP: 1.02 (ref 0.86–1.16)
KETONES UR STRIP-MCNC: NEGATIVE MG/DL
LEUKOCYTE ESTERASE UR QL STRIP: NEGATIVE
LIPASE SERPL-CCNC: 130 U/L (ref 73–393)
LYMPHOCYTES # BLD AUTO: 1.16 THOUSANDS/ΜL (ref 0.6–4.47)
LYMPHOCYTES NFR BLD AUTO: 14 % (ref 14–44)
MCH RBC QN AUTO: 29.6 PG (ref 26.8–34.3)
MCHC RBC AUTO-ENTMCNC: 32.9 G/DL (ref 31.4–37.4)
MCV RBC AUTO: 90 FL (ref 82–98)
MONOCYTES # BLD AUTO: 0.57 THOUSAND/ΜL (ref 0.17–1.22)
MONOCYTES NFR BLD AUTO: 7 % (ref 4–12)
NEUTROPHILS # BLD AUTO: 6.53 THOUSANDS/ΜL (ref 1.85–7.62)
NEUTS SEG NFR BLD AUTO: 78 % (ref 43–75)
NITRITE UR QL STRIP: NEGATIVE
NRBC BLD AUTO-RTO: 0 /100 WBCS
PH UR STRIP.AUTO: 6.5 [PH] (ref 5–9)
PLATELET # BLD AUTO: 291 THOUSANDS/UL (ref 149–390)
PMV BLD AUTO: 9.2 FL (ref 8.9–12.7)
POTASSIUM SERPL-SCNC: 3.7 MMOL/L (ref 3.5–5.3)
PROT SERPL-MCNC: 7.2 G/DL (ref 6.4–8.2)
PROT UR STRIP-MCNC: NEGATIVE MG/DL
PROTHROMBIN TIME: 10.7 SECONDS (ref 9.4–11.7)
RBC # BLD AUTO: 4.6 MILLION/UL (ref 3.81–5.12)
SODIUM SERPL-SCNC: 136 MMOL/L (ref 136–145)
SP GR UR STRIP.AUTO: 1.01 (ref 1–1.03)
UROBILINOGEN UR QL STRIP.AUTO: 0.2 E.U./DL
WBC # BLD AUTO: 8.36 THOUSAND/UL (ref 4.31–10.16)

## 2018-12-02 PROCEDURE — 36415 COLL VENOUS BLD VENIPUNCTURE: CPT | Performed by: EMERGENCY MEDICINE

## 2018-12-02 PROCEDURE — 96375 TX/PRO/DX INJ NEW DRUG ADDON: CPT

## 2018-12-02 PROCEDURE — 85730 THROMBOPLASTIN TIME PARTIAL: CPT | Performed by: EMERGENCY MEDICINE

## 2018-12-02 PROCEDURE — 96374 THER/PROPH/DIAG INJ IV PUSH: CPT

## 2018-12-02 PROCEDURE — 80053 COMPREHEN METABOLIC PANEL: CPT | Performed by: EMERGENCY MEDICINE

## 2018-12-02 PROCEDURE — 96361 HYDRATE IV INFUSION ADD-ON: CPT

## 2018-12-02 PROCEDURE — 85025 COMPLETE CBC W/AUTO DIFF WBC: CPT | Performed by: EMERGENCY MEDICINE

## 2018-12-02 PROCEDURE — 81003 URINALYSIS AUTO W/O SCOPE: CPT | Performed by: EMERGENCY MEDICINE

## 2018-12-02 PROCEDURE — 83690 ASSAY OF LIPASE: CPT | Performed by: EMERGENCY MEDICINE

## 2018-12-02 PROCEDURE — 99284 EMERGENCY DEPT VISIT MOD MDM: CPT

## 2018-12-02 PROCEDURE — 74177 CT ABD & PELVIS W/CONTRAST: CPT

## 2018-12-02 PROCEDURE — 85610 PROTHROMBIN TIME: CPT | Performed by: EMERGENCY MEDICINE

## 2018-12-02 RX ORDER — MAGNESIUM CARB/ALUMINUM HYDROX 105-160MG
296 TABLET,CHEWABLE ORAL ONCE
Status: COMPLETED | OUTPATIENT
Start: 2018-12-02 | End: 2018-12-02

## 2018-12-02 RX ORDER — HYDROMORPHONE HCL/PF 1 MG/ML
1 SYRINGE (ML) INJECTION ONCE
Status: COMPLETED | OUTPATIENT
Start: 2018-12-02 | End: 2018-12-02

## 2018-12-02 RX ADMIN — HYDROMORPHONE HYDROCHLORIDE 1 MG: 1 INJECTION, SOLUTION INTRAMUSCULAR; INTRAVENOUS; SUBCUTANEOUS at 11:30

## 2018-12-02 RX ADMIN — MAGESIUM CITRATE 296 ML: 1.75 LIQUID ORAL at 12:58

## 2018-12-02 RX ADMIN — SODIUM CHLORIDE 1000 ML: 0.9 INJECTION, SOLUTION INTRAVENOUS at 10:05

## 2018-12-02 RX ADMIN — MORPHINE SULFATE 2 MG: 2 INJECTION, SOLUTION INTRAMUSCULAR; INTRAVENOUS at 10:05

## 2018-12-02 RX ADMIN — IOHEXOL 100 ML: 350 INJECTION, SOLUTION INTRAVENOUS at 11:05

## 2018-12-02 NOTE — ED PROVIDER NOTES
History  Chief Complaint   Patient presents with    Abdominal Pain     pt c/o llq abdominal pain on abx for diveticulitis  started abx on Wed cipro and flagyl     Patient is a 67-year-old female who presents with complaint of approximately 5 day history of worsening of left lower quadrant pain  5 days ago the patient went to her primary care doctor because of left lower quadrant discomfort, patient had a history of diverticulitis so the primary care physician start him on p  O  Antibiotics  Patient states that the pain is actually increased and worsened over the last several days  Patient states she has had some nausea but no vomiting  Patient states he has been having chills but no documented fever  Patient has been eating and drinking  Patient states she has been having loose bowels 1st the last several days  Patient denies any mucus or blood in the bowel movement  Patient denies any aggravating or alleviating factors  Prior to Admission Medications   Prescriptions Last Dose Informant Patient Reported?  Taking?   acyclovir (ZOVIRAX) 200 mg capsule   Yes No   Si mg as needed Only as needed for outbreak    albuterol (VENTOLIN HFA) 90 mcg/act inhaler   Yes No   Sig: Inhale 2 puffs every 4 (four) hours as needed     ciprofloxacin (CIPRO) 500 mg tablet   No No   Sig: Take 1 tablet (500 mg total) by mouth every 12 (twelve) hours for 7 days   fluticasone-salmeterol (ADVAIR DISKUS) 500-50 mcg/dose   Yes No   Sig: Inhale 1 puff 2 (two) times a day   metroNIDAZOLE (FLAGYL) 500 mg tablet   No No   Sig: Take 1 tablet (500 mg total) by mouth every 8 (eight) hours for 7 days   multivitamin (THERAGRAN) TABS   Yes No   Sig: Take 1 tablet by mouth daily      Facility-Administered Medications: None       Past Medical History:   Diagnosis Date    Asthma     Fibroids     Physiological ovarian cysts     Sleep apnea     denatl device worn       Past Surgical History:   Procedure Laterality Date    COLONOSCOPY  2016    dr Aiyana Bland ARTHROSCOPY Left 2018    meniscectomy x2, first done in 2015    [place] COLONOSCOPY FLX DX W/COLLJ SPEC WHEN PFRMD N/A 9/13/2018    Procedure: COLONOSCOPY;  Surgeon: Marisela Neff MD;  Location: [facility]; Service: Gastroenterology    WRIST SURGERY Right        Family History   Problem Relation Age of Onset    Osteoporosis Mother     Thyroid disease Mother     Colon cancer Mother     Colon polyps Mother     Cancer Mother         colon    Alzheimer's disease Father     Hypertension Father     Dementia Father     Heart disease Brother     No Known Problems Daughter     No Known Problems Daughter      I have reviewed and agree with the history as documented  Social History   Substance Use Topics    Smoking status: Never Smoker    Smokeless tobacco: Never Used    Alcohol use Yes      Comment: social        Review of Systems   Constitutional: Positive for chills and fatigue  Negative for fever  HENT: Negative for facial swelling and trouble swallowing  Respiratory: Negative for chest tightness and shortness of breath  Cardiovascular: Negative for chest pain and leg swelling  Gastrointestinal: Positive for abdominal pain, diarrhea and nausea  Negative for vomiting  Genitourinary: Negative for dysuria and flank pain  Musculoskeletal: Negative for back pain and neck pain  Skin: Negative  Neurological: Negative for weakness and numbness  Hematological: Negative  Psychiatric/Behavioral: Negative  Physical Exam  Physical Exam   Constitutional: She is oriented to person, place, and time  She appears well-developed and well-nourished  HENT:   Head: Normocephalic and atraumatic  Eyes: Pupils are equal, round, and reactive to light  EOM are normal    Neck: Normal range of motion  Cardiovascular: Normal rate and regular rhythm  Pulmonary/Chest: Effort normal and breath sounds normal  No respiratory distress  Abdominal: Soft   Bowel sounds are normal  She exhibits no distension  There is tenderness in the right lower quadrant, suprapubic area and left lower quadrant  There is guarding  There is no rigidity and no rebound  Musculoskeletal: Normal range of motion  She exhibits no edema  Neurological: She is alert and oriented to person, place, and time  Skin: Skin is warm and dry  Psychiatric: She has a normal mood and affect  Nursing note and vitals reviewed        Vital Signs  ED Triage Vitals   Temperature Pulse Respirations Blood Pressure SpO2   12/02/18 0937 12/02/18 0937 12/02/18 0937 12/02/18 0937 12/02/18 0937   98 °F (36 7 °C) 90 18 (!) 152/101 98 %      Temp Source Heart Rate Source Patient Position - Orthostatic VS BP Location FiO2 (%)   12/02/18 0937 12/02/18 0937 12/02/18 0937 12/02/18 0937 --   Tympanic Monitor Lying Left arm       Pain Score       12/02/18 1005       9           Vitals:    12/02/18 1215 12/02/18 1230 12/02/18 1245 12/02/18 1322   BP:  127/85  128/83   Pulse: 84 80 80 78   Patient Position - Orthostatic VS:    Lying       Visual Acuity      ED Medications  Medications   sodium chloride 0 9 % bolus 1,000 mL (0 mL Intravenous Stopped 12/2/18 1218)   morphine injection 2 mg (2 mg Intravenous Given 12/2/18 1005)   iohexol (OMNIPAQUE) 350 MG/ML injection (MULTI-DOSE) 100 mL (100 mL Intravenous Given 12/2/18 1105)   HYDROmorphone (DILAUDID) injection 1 mg (1 mg Intravenous Given 12/2/18 1130)   magnesium citrate (CITROMA) oral solution 296 mL (296 mL Oral Given 12/2/18 1258)       Diagnostic Studies  Results Reviewed     Procedure Component Value Units Date/Time    UA w Reflex to Microscopic [[id number]] Collected:  12/02/18 1052    Lab Status:  Final result Specimen:  Urine from Urine, Clean Catch Updated:  12/02/18 1058     Color, UA Light Yellow     Clarity, UA Clear     Specific Gravity, UA 1 010     pH, UA 6 5     Leukocytes, UA Negative     Nitrite, UA Negative     Protein, UA Negative mg/dl      Glucose, UA Negative mg/dl      Ketones, UA Negative mg/dl      Urobilinogen, UA 0 2 E U /dl      Bilirubin, UA Negative     Blood, UA Negative    Protime-INR [[id number]]  (Normal) Collected:  12/02/18 1004    Lab Status:  Final result Specimen:  Blood from Arm, Right Updated:  12/02/18 1036     Protime 10 7 seconds      INR 1 02    APTT [[id number]]  (Normal) Collected:  12/02/18 1004    Lab Status:  Final result Specimen:  Blood from Arm, Right Updated:  12/02/18 1036     PTT 30 seconds     Lipase [[id number]]  (Normal) Collected:  12/02/18 1004    Lab Status:  Final result Specimen:  Blood from Arm, Right Updated:  12/02/18 1032     Lipase 130 u/L     Comprehensive metabolic panel [[id number]]  (Abnormal) Collected:  12/02/18 1004    Lab Status:  Final result Specimen:  Blood from Arm, Right Updated:  12/02/18 1032     Sodium 136 mmol/L      Potassium 3 7 mmol/L      Chloride 102 mmol/L      CO2 24 mmol/L      ANION GAP 10 mmol/L      BUN 13 mg/dL      Creatinine 0 82 mg/dL      Glucose 117 mg/dL      Calcium 8 5 mg/dL      AST 29 U/L      ALT 43 U/L      Alkaline Phosphatase 83 U/L      Total Protein 7 2 g/dL      Albumin 3 4 (L) g/dL      Total Bilirubin 0 30 mg/dL      eGFR 83 ml/min/1 73sq m     Narrative:         National Kidney Disease Education Program recommendations are as follows:  GFR calculation is accurate only with a steady state creatinine  Chronic Kidney disease less than 60 ml/min/1 73 sq  meters  Kidney failure less than 15 ml/min/1 73 sq  meters      CBC and differential [[id number]]  (Abnormal) Collected:  12/02/18 1004    Lab Status:  Final result Specimen:  Blood from Arm, Right Updated:  12/02/18 1016     WBC 8 36 Thousand/uL      RBC 4 60 Million/uL      Hemoglobin 13 6 g/dL      Hematocrit 41 3 %      MCV 90 fL      MCH 29 6 pg      MCHC 32 9 g/dL      RDW 12 8 %      MPV 9 2 fL      Platelets 248 Thousands/uL      nRBC 0 /100 WBCs      Neutrophils Relative 78 (H) %      Immat GRANS % 0 %      Lymphocytes Relative 14 %      Monocytes Relative 7 %      Eosinophils Relative 1 %      Basophils Relative 0 %      Neutrophils Absolute 6 53 Thousands/µL      Immature Grans Absolute 0 03 Thousand/uL      Lymphocytes Absolute 1 16 Thousands/µL      Monocytes Absolute 0 57 Thousand/µL      Eosinophils Absolute 0 04 Thousand/µL      Basophils Absolute 0 03 Thousands/µL                  CT abdomen pelvis with contrast   Final Result by Hakeem Rosa DO (12/02 1121)      Markedly enlarged fibroid uterus  Colonic diverticulosis without evidence for acute diverticulitis  Moderate to large volume of stool throughout the large bowel compatible with constipation  Workstation performed: [id number]                    Procedures  Procedures       Phone Contacts  ED Phone Contact    ED Course                               MDM  Number of Diagnoses or Management Options  Abdominal pain:   Constipation:   Diagnosis management comments: Patient has a normal white count, normal electrolytes, CT scan around and pelvis showed diverticular disease but no diverticulitis  There was a definite mentioned and a large amount of stool in the patient's full colon  I spoke to the  and the patient about the findings and the treatment we are going to send her home with a bottle Mag citrate and recommended fiber daily in terms of MiraLax worse similar product  If patient continues have issues she has follow-up with primary GI doctor and possibly get GI consult  Otherwise patient was told she can return to the emergency room any worsening symptoms such as increased pain, nausea vomiting  Patient  state understanding and agreement assessment plan         Amount and/or Complexity of Data Reviewed  Clinical lab tests: ordered and reviewed  Tests in the radiology section of CPT®: ordered and reviewed      CritCare Time    Disposition  Final diagnoses:   Abdominal pain   Constipation     Time reflects when diagnosis was documented in both MDM as applicable and the Disposition within this note     Time User Action Codes Description Comment    12/2/2018 12:51 PM [place] Add [R10 9] Abdominal pain     12/2/2018 12:51 PM Rony Moralesy Add [K59 00] Constipation       ED Disposition     ED Disposition Condition Comment    Discharge  Moira Perry discharge to home/self care  Condition at discharge: Stable        Follow-up Information     Follow up With Specialties Details Why [facility],  Family Medicine Schedule an appointment as soon as possible for a visit in 3 days  [address] [place] [address]  [phone]            Discharge Medication List as of 12/2/2018 12:52 PM      CONTINUE these medications which have NOT CHANGED    Details   acyclovir (ZOVIRAX) 200 mg capsule 200 mg as needed Only as needed for outbreak , Starting Mon 1/15/2018, Historical Med      albuterol (VENTOLIN HFA) 90 mcg/act inhaler Inhale 2 puffs every 4 (four) hours as needed  , Starting Wed 11/8/2017, Historical Med      ciprofloxacin (CIPRO) 500 mg tablet Take 1 tablet (500 mg total) by mouth every 12 (twelve) hours for 7 days, Starting Wed 11/28/2018, Until Wed 12/5/2018, Normal      fluticasone-salmeterol (ADVAIR DISKUS) 500-50 mcg/dose Inhale 1 puff 2 (two) times a day, Starting Wed 11/8/2017, Historical Med      metroNIDAZOLE (FLAGYL) 500 mg tablet Take 1 tablet (500 mg total) by mouth every 8 (eight) hours for 7 days, Starting Wed 11/28/2018, Until Wed 12/5/2018, Normal      multivitamin (THERAGRAN) TABS Take 1 tablet by mouth daily, Historical Med           No discharge procedures on file      ED Provider  Electronically Signed by           Henna Chavarria MD  12/02/18 1383

## 2018-12-02 NOTE — DISCHARGE INSTRUCTIONS
Abdominal Pain   WHAT YOU NEED TO KNOW:   Abdominal pain can be dull, achy, or sharp  You may have pain in one area of your abdomen, or in your entire abdomen  Your pain may be caused by a condition such as constipation, food sensitivity or poisoning, infection, or a blockage  Abdominal pain can also be from a hernia, appendicitis, or an ulcer  Liver, gallbladder, or kidney conditions can also cause abdominal pain  The cause of your abdominal pain may be unknown  DISCHARGE INSTRUCTIONS:   Return to the emergency department if:   · You have new chest pain or shortness of breath  · You have pulsing pain in your upper abdomen or lower back that suddenly becomes constant  · Your pain is in the right lower abdominal area and worsens with movement  · You have a fever over 100 4°F (38°C) or shaking chills  · You are vomiting and cannot keep food or liquids down  · Your pain does not improve or gets worse over the next 8 to 12 hours  · You see blood in your vomit or bowel movements, or they look black and tarry  · Your skin or the whites of your eyes turn yellow  · You are a woman and have a large amount of vaginal bleeding that is not your monthly period  Contact your healthcare provider if:   · You have pain in your lower back  · You are a man and have pain in your testicles  · You have pain when you urinate  · You have questions or concerns about your condition or care  Follow up with your healthcare provider within 24 hours or as directed:  Write down your questions so you remember to ask them during your visits  Medicines:   · Medicines  may be given to calm your stomach and prevent vomiting or to decrease pain  Ask how to take pain medicine safely  · Take your medicine as directed  Contact your healthcare provider if you think your medicine is not helping or if you have side effects  Tell him of her if you are allergic to any medicine   Keep a list of the medicines, vitamins, and herbs you take  Include the amounts, and when and why you take them  Bring the list or the pill bottles to follow-up visits  Carry your medicine list with you in case of an emergency  © 2017 [employer]  Information is for End User's use only and may not be sold, redistributed or otherwise used for commercial purposes  All illustrations and images included in CareNotes® are the copyrighted property of [employer]  or [employer]  The above information is an  only  It is not intended as medical advice for individual conditions or treatments  Talk to your doctor, nurse or pharmacist before following any medical regimen to see if it is safe and effective for you  Constipation   WHAT YOU NEED TO KNOW:   Constipation is when you have hard, dry bowel movements, or you go longer than usual between bowel movements  DISCHARGE INSTRUCTIONS:   Return to the emergency department if:   · You have blood in your bowel movements  · You have a fever and abdominal pain with the constipation  Contact your healthcare provider if:   · Your constipation gets worse  · You start to vomit  · You have questions or concerns about your condition or care  Medicines:   · Medicine or a fiber supplement  may help make your bowel movement softer  A laxative may help relax and loosen your intestines to help you have a bowel movement  You may also be given medicine to increase fluid in your intestines  The fluid may help move bowel movements through your intestines  · Take your medicine as directed  Contact your healthcare provider if you think your medicine is not helping or if you have side effects  Tell him of her if you are allergic to any medicine  Keep a list of the medicines, vitamins, and herbs you take  Include the amounts, and when and why you take them  Bring the list or the pill bottles to follow-up visits   Carry your medicine list with you in case of an emergency  Manage your constipation:   · Drink liquids as directed  You may need to drink extra liquids to help soften and move your bowels  Ask how much liquid to drink each day and which liquids are best for you  · Eat high-fiber foods  This may help decrease constipation by adding bulk to your bowel movements  High-fiber foods include fruit, vegetables, whole-grain breads and cereals, and beans  Your healthcare provider or dietitian can help you create a high-fiber meal plan  · Exercise regularly  Regular physical activity can help stimulate your intestines  Ask which exercises are best for you  · Schedule a time each day to have a bowel movement  This may help train your body to have regular bowel movements  Bend forward while you are on the toilet to help move the bowel movement out  Sit on the toilet for at least 10 minutes, even if you do not have a bowel movement  Follow up with your healthcare provider as directed:  Write down your questions so you remember to ask them during your visits  © 2017 [employer]  Information is for End User's use only and may not be sold, redistributed or otherwise used for commercial purposes  All illustrations and images included in CareNotes® are the copyrighted property of [employer]  or [employer]  The above information is an  only  It is not intended as medical advice for individual conditions or treatments  Talk to your doctor, nurse or pharmacist before following any medical regimen to see if it is safe and effective for you

## 2018-12-03 ENCOUNTER — TELEPHONE (OUTPATIENT)
Dept: ADMINISTRATIVE | Facility: OTHER | Age: 53
End: 2018-12-03

## 2018-12-03 NOTE — TELEPHONE ENCOUNTER
S/W patient who said she took the Magnesium Citrate and it is working she is going  Patient still unsure and unable to distinguish if symptoms are from the diverticulitis or constipation  Patient knows to call [facility] if she needs anything  ED visit documented in ED log

## 2019-03-22 ENCOUNTER — OFFICE VISIT (OUTPATIENT)
Dept: FAMILY MEDICINE CLINIC | Facility: CLINIC | Age: 54
End: 2019-03-22
Payer: COMMERCIAL

## 2019-03-22 VITALS
RESPIRATION RATE: 20 BRPM | HEART RATE: 84 BPM | SYSTOLIC BLOOD PRESSURE: 142 MMHG | TEMPERATURE: 99.3 F | DIASTOLIC BLOOD PRESSURE: 98 MMHG | WEIGHT: 231 LBS | HEIGHT: 63 IN | BODY MASS INDEX: 40.93 KG/M2

## 2019-03-22 DIAGNOSIS — J45.31 MILD PERSISTENT ASTHMA WITH ACUTE EXACERBATION: ICD-10-CM

## 2019-03-22 DIAGNOSIS — J20.9 ACUTE BRONCHITIS, UNSPECIFIED ORGANISM: Primary | ICD-10-CM

## 2019-03-22 PROCEDURE — 99213 OFFICE O/P EST LOW 20 MIN: CPT | Performed by: NURSE PRACTITIONER

## 2019-03-22 PROCEDURE — 3008F BODY MASS INDEX DOCD: CPT | Performed by: NURSE PRACTITIONER

## 2019-03-22 RX ORDER — ALBUTEROL SULFATE 2.5 MG/3ML
2.5 SOLUTION RESPIRATORY (INHALATION) EVERY 6 HOURS PRN
Qty: 100 ML | Refills: 0 | Status: SHIPPED | OUTPATIENT
Start: 2019-03-22

## 2019-03-22 RX ORDER — AZITHROMYCIN 250 MG/1
TABLET, FILM COATED ORAL
Qty: 6 TABLET | Refills: 0 | Status: SHIPPED | OUTPATIENT
Start: 2019-03-22 | End: 2019-03-27

## 2019-03-22 RX ORDER — PREDNISONE 10 MG/1
TABLET ORAL
Qty: 20 TABLET | Refills: 0 | Status: SHIPPED | OUTPATIENT
Start: 2019-03-22 | End: 2019-04-01

## 2019-03-22 RX ORDER — NEBULIZER ACCESSORIES
KIT MISCELLANEOUS 4 TIMES DAILY
Qty: 1 EACH | Refills: 0 | Status: SHIPPED | OUTPATIENT
Start: 2019-03-22

## 2019-03-22 NOTE — LETTER
Lilian possible back Monday for  March 22, 2019     Patient: Kindra Peraza   YOB: 1965   Date of Visit: 3/22/2019       To Whom it May Concern:    Kindra Peraza is under my professional care  She was seen in my office on 3/22/2019  She may return to work on 03/25/2019  If you have any questions or concerns, please don't hesitate to call           Sincerely,          ARTEM Chu        CC: No Recipients

## 2019-03-22 NOTE — PROGRESS NOTES
Assessment/Plan:         Diagnoses and all orders for this visit:    Acute bronchitis, unspecified organism  -     predniSONE 10 mg tablet; 4 tabs x 2 days, 3 tabs x 2 days, 2 tabs x 2 days, 1 tab x 2 days  -     azithromycin (ZITHROMAX) 250 mg tablet; Take 500mg on day 1, 250mg on days 2-5  -     Respiratory Therapy Supplies (NEBULIZER/TUBING/MOUTHPIECE) KIT; by Does not apply route 4 (four) times a day  -     albuterol (2 5 mg/3 mL) 0 083 % nebulizer solution; Take 1 vial (2 5 mg total) by nebulization every 6 (six) hours as needed for wheezing or shortness of breath    Mild persistent asthma with acute exacerbation  -     predniSONE 10 mg tablet; 4 tabs x 2 days, 3 tabs x 2 days, 2 tabs x 2 days, 1 tab x 2 days  -     azithromycin (ZITHROMAX) 250 mg tablet; Take 500mg on day 1, 250mg on days 2-5  -     Respiratory Therapy Supplies (NEBULIZER/TUBING/MOUTHPIECE) KIT; by Does not apply route 4 (four) times a day  -     albuterol (2 5 mg/3 mL) 0 083 % nebulizer solution; Take 1 vial (2 5 mg total) by nebulization every 6 (six) hours as needed for wheezing or shortness of breath          BMI Counseling: Body mass index is 40 92 kg/m²  Discussed the patient's BMI with her  The BMI is above average  BMI counseling and education was provided to the patient  Nutrition recommendations include reducing portion sizes, decreasing overall calorie intake, 3-5 servings of fruits/vegetables daily, reducing fast food intake, consuming healthier snacks, decreasing soda and/or juice intake, moderation in carbohydrate intake, increasing intake of lean protein, reducing intake of saturated fat and trans fat and reducing intake of cholesterol  Exercise recommendations include exercising 3-5 times per week, joining a gym and strength training exercises  Patient Instructions: Take medication with food  It is important that you take the entire course of antibiotics prescribed    May also take a probiotic of your choice to maintain healthy GI kae  Can take some probiotic and yogurt with the medication  Increase fluid intake, saline nasal rinses, and hot tea with honey and lemon  Cool air humidification can be helpful as well  May take Ibuprofen or Tylenol as needed for pain or fevers  Mucinex D for sinus congestion or Coricidin HBP if you have high blood pressure or a heart condition  Mucinex or Robitussin DM are effective for cough and chest congestion  Supportive care discussed and advised  Follow up for no improvement and worsening of conditions  Patient advised and educated when to see immediate medical care  Return if symptoms worsen or fail to improve  Subjective:      Patient ID: Martin Seaman is a 49 y o  female  Chief Complaint   Patient presents with    Cough     chest congestion-lj    ribs hurt       HPI  Patient stated that started with cough and congestion couple of days ago  Cough is progressively getting worse and ribs hurting from cough  Having wheezing and using ventolin as needed  Denies fever, chills and sob  Vitals:  /98   Pulse 84   Temp 99 3 °F (37 4 °C)   Resp 20   Ht 5' 3" (1 6 m)   Wt 105 kg (231 lb)   BMI 40 92 kg/m²     The following portions of the patient's history were reviewed and updated as appropriate: allergies, current medications, past family history, past medical history, past social history, past surgical history and problem list       Review of Systems   Constitutional: Negative for chills, fatigue and fever  HENT: Positive for congestion  Negative for ear discharge, ear pain, facial swelling, hearing loss, mouth sores, nosebleeds, postnasal drip, rhinorrhea, sinus pressure, sinus pain, sneezing, sore throat, trouble swallowing and voice change  Respiratory: Positive for cough, chest tightness and wheezing  Negative for shortness of breath  Cardiovascular: Negative      Gastrointestinal: Negative for abdominal pain, constipation, diarrhea and nausea  Neurological: Negative for dizziness, weakness, light-headedness and headaches  Objective:    Social History     Tobacco Use   Smoking Status Never Smoker   Smokeless Tobacco Never Used       Allergies: No Known Allergies      Current Outpatient Medications   Medication Sig Dispense Refill    acyclovir (ZOVIRAX) 200 mg capsule 200 mg as needed Only as needed for outbreak       albuterol (VENTOLIN HFA) 90 mcg/act inhaler Inhale 2 puffs every 4 (four) hours as needed        fluticasone-salmeterol (ADVAIR DISKUS) 500-50 mcg/dose Inhale 1 puff 2 (two) times a day      multivitamin (THERAGRAN) TABS Take 1 tablet by mouth daily      albuterol (2 5 mg/3 mL) 0 083 % nebulizer solution Take 1 vial (2 5 mg total) by nebulization every 6 (six) hours as needed for wheezing or shortness of breath 100 mL 0    azithromycin (ZITHROMAX) 250 mg tablet Take 500mg on day 1, 250mg on days 2-5 6 tablet 0    predniSONE 10 mg tablet 4 tabs x 2 days, 3 tabs x 2 days, 2 tabs x 2 days, 1 tab x 2 days 20 tablet 0    Respiratory Therapy Supplies (NEBULIZER/TUBING/MOUTHPIECE) KIT by Does not apply route 4 (four) times a day 1 each 0     No current facility-administered medications for this visit  Physical Exam   Constitutional: She is oriented to person, place, and time  She appears well-developed and well-nourished  HENT:   Head: Normocephalic  Right Ear: Tympanic membrane, external ear and ear canal normal    Left Ear: Tympanic membrane, external ear and ear canal normal    Nose: Nose normal  Right sinus exhibits no maxillary sinus tenderness and no frontal sinus tenderness  Left sinus exhibits no maxillary sinus tenderness and no frontal sinus tenderness  Mouth/Throat: Oropharynx is clear and moist and mucous membranes are normal    Neck: Neck supple  Cardiovascular: Normal rate, regular rhythm and normal heart sounds  Pulmonary/Chest: Effort normal  She has decreased breath sounds   She has wheezes  Abdominal: Normal appearance and bowel sounds are normal  There is no hepatosplenomegaly  There is no tenderness  There is no rebound  Musculoskeletal: Normal range of motion  Lymphadenopathy:        Right cervical: No superficial cervical and no posterior cervical adenopathy present  Left cervical: No superficial cervical and no posterior cervical adenopathy present  Neurological: She is alert and oriented to person, place, and time  Skin: Skin is warm and dry  Psychiatric: She has a normal mood and affect  Her behavior is normal  Judgment and thought content normal    Vitals reviewed                    ARTEM Dickson

## 2019-03-22 NOTE — PATIENT INSTRUCTIONS
Take medication with food  It is important that you take the entire course of antibiotics prescribed  May also take a probiotic of your choice to maintain healthy GI kae  Can take some probiotic and yogurt with the medication  Increase fluid intake, saline nasal rinses, and hot tea with honey and lemon  Cool air humidification can be helpful as well  May take Ibuprofen or Tylenol as needed for pain or fevers  Mucinex D for sinus congestion or Coricidin HBP if you have high blood pressure or a heart condition  Mucinex or Robitussin DM are effective for cough and chest congestion  Supportive care discussed and advised  Follow up for no improvement and worsening of conditions  Patient advised and educated when to see immediate medical care

## 2019-04-16 DIAGNOSIS — Z00.00 HEALTHCARE MAINTENANCE: Primary | ICD-10-CM

## 2019-04-16 RX ORDER — ACYCLOVIR 200 MG/1
200 CAPSULE ORAL DAILY PRN
Qty: 30 CAPSULE | Refills: 0 | Status: SHIPPED | OUTPATIENT
Start: 2019-04-16 | End: 2022-02-09

## 2019-05-23 ENCOUNTER — OFFICE VISIT (OUTPATIENT)
Dept: URGENT CARE | Facility: CLINIC | Age: 54
End: 2019-05-23
Payer: COMMERCIAL

## 2019-05-23 VITALS
HEIGHT: 64 IN | BODY MASS INDEX: 40.05 KG/M2 | OXYGEN SATURATION: 98 % | TEMPERATURE: 99.6 F | HEART RATE: 95 BPM | SYSTOLIC BLOOD PRESSURE: 120 MMHG | RESPIRATION RATE: 18 BRPM | WEIGHT: 234.6 LBS | DIASTOLIC BLOOD PRESSURE: 90 MMHG

## 2019-05-23 DIAGNOSIS — L25.5 RHUS DERMATITIS: Primary | ICD-10-CM

## 2019-05-23 PROCEDURE — 99213 OFFICE O/P EST LOW 20 MIN: CPT | Performed by: PHYSICIAN ASSISTANT

## 2019-05-23 RX ORDER — HYDROXYZINE PAMOATE 50 MG/1
50 CAPSULE ORAL 3 TIMES DAILY PRN
Qty: 30 CAPSULE | Refills: 0 | Status: SHIPPED | OUTPATIENT
Start: 2019-05-23 | End: 2019-07-23 | Stop reason: HOSPADM

## 2019-05-23 RX ORDER — PREDNISONE 10 MG/1
TABLET ORAL
Qty: 30 TABLET | Refills: 0 | Status: SHIPPED | OUTPATIENT
Start: 2019-05-23 | End: 2019-06-02

## 2019-05-23 RX ORDER — TRIAMCINOLONE ACETONIDE 1 MG/G
CREAM TOPICAL 2 TIMES DAILY
Qty: 30 G | Refills: 0 | Status: SHIPPED | OUTPATIENT
Start: 2019-05-23 | End: 2019-07-23 | Stop reason: HOSPADM

## 2019-07-21 ENCOUNTER — HOSPITAL ENCOUNTER (INPATIENT)
Facility: HOSPITAL | Age: 54
LOS: 2 days | Discharge: HOME | DRG: 872 | End: 2019-07-23
Attending: EMERGENCY MEDICINE | Admitting: FAMILY MEDICINE
Payer: COMMERCIAL

## 2019-07-21 ENCOUNTER — APPOINTMENT (EMERGENCY)
Dept: RADIOLOGY | Facility: HOSPITAL | Age: 54
DRG: 872 | End: 2019-07-21
Payer: COMMERCIAL

## 2019-07-21 DIAGNOSIS — K57.92 DIVERTICULITIS: Primary | ICD-10-CM

## 2019-07-21 DIAGNOSIS — K44.9 HIATAL HERNIA: ICD-10-CM

## 2019-07-21 DIAGNOSIS — K57.92 ACUTE DIVERTICULITIS: ICD-10-CM

## 2019-07-21 PROBLEM — A41.9 SEPSIS (HCC): Status: ACTIVE | Noted: 2019-07-21

## 2019-07-21 PROBLEM — D25.9 UTERINE MYOMA: Status: ACTIVE | Noted: 2019-07-21

## 2019-07-21 LAB
ALBUMIN SERPL BCP-MCNC: 3.7 G/DL (ref 3.5–5)
ALP SERPL-CCNC: 96 U/L (ref 46–116)
ALT SERPL W P-5'-P-CCNC: 23 U/L (ref 12–78)
ANION GAP SERPL CALCULATED.3IONS-SCNC: 13 MMOL/L (ref 4–13)
AST SERPL W P-5'-P-CCNC: 12 U/L (ref 5–45)
BACTERIA UR QL AUTO: ABNORMAL /HPF
BASOPHILS # BLD AUTO: 0.03 THOUSANDS/ΜL (ref 0–0.1)
BASOPHILS NFR BLD AUTO: 0 % (ref 0–1)
BILIRUB SERPL-MCNC: 0.6 MG/DL (ref 0.2–1)
BILIRUB UR QL STRIP: NEGATIVE
BUN SERPL-MCNC: 15 MG/DL (ref 5–25)
CALCIUM SERPL-MCNC: 8.9 MG/DL (ref 8.3–10.1)
CHLORIDE SERPL-SCNC: 99 MMOL/L (ref 100–108)
CLARITY UR: CLEAR
CO2 SERPL-SCNC: 24 MMOL/L (ref 21–32)
COLOR UR: ABNORMAL
CREAT SERPL-MCNC: 0.98 MG/DL (ref 0.6–1.3)
EOSINOPHIL # BLD AUTO: 0.01 THOUSAND/ΜL (ref 0–0.61)
EOSINOPHIL NFR BLD AUTO: 0 % (ref 0–6)
ERYTHROCYTE [DISTWIDTH] IN BLOOD BY AUTOMATED COUNT: 12.9 % (ref 11.6–15.1)
GFR SERPL CREATININE-BSD FRML MDRD: 66 ML/MIN/1.73SQ M
GLUCOSE SERPL-MCNC: 157 MG/DL (ref 65–140)
GLUCOSE UR STRIP-MCNC: NEGATIVE MG/DL
HCT VFR BLD AUTO: 42.6 % (ref 34.8–46.1)
HGB BLD-MCNC: 14.1 G/DL (ref 11.5–15.4)
HGB UR QL STRIP.AUTO: ABNORMAL
HOLD SPECIMEN: NORMAL
IMM GRANULOCYTES # BLD AUTO: 0.05 THOUSAND/UL (ref 0–0.2)
IMM GRANULOCYTES NFR BLD AUTO: 0 % (ref 0–2)
KETONES UR STRIP-MCNC: NEGATIVE MG/DL
LACTATE SERPL-SCNC: 2 MMOL/L (ref 0.5–2)
LEUKOCYTE ESTERASE UR QL STRIP: NEGATIVE
LYMPHOCYTES # BLD AUTO: 1.68 THOUSANDS/ΜL (ref 0.6–4.47)
LYMPHOCYTES NFR BLD AUTO: 14 % (ref 14–44)
MCH RBC QN AUTO: 29.4 PG (ref 26.8–34.3)
MCHC RBC AUTO-ENTMCNC: 33.1 G/DL (ref 31.4–37.4)
MCV RBC AUTO: 89 FL (ref 82–98)
MONOCYTES # BLD AUTO: 0.64 THOUSAND/ΜL (ref 0.17–1.22)
MONOCYTES NFR BLD AUTO: 5 % (ref 4–12)
NEUTROPHILS # BLD AUTO: 10 THOUSANDS/ΜL (ref 1.85–7.62)
NEUTS SEG NFR BLD AUTO: 81 % (ref 43–75)
NITRITE UR QL STRIP: NEGATIVE
NON-SQ EPI CELLS URNS QL MICRO: ABNORMAL /HPF
NRBC BLD AUTO-RTO: 0 /100 WBCS
PH UR STRIP.AUTO: 6.5 [PH]
PLATELET # BLD AUTO: 298 THOUSANDS/UL (ref 149–390)
PMV BLD AUTO: 9.7 FL (ref 8.9–12.7)
POTASSIUM SERPL-SCNC: 4 MMOL/L (ref 3.5–5.3)
PROT SERPL-MCNC: 7.9 G/DL (ref 6.4–8.2)
PROT UR STRIP-MCNC: NEGATIVE MG/DL
RBC # BLD AUTO: 4.79 MILLION/UL (ref 3.81–5.12)
RBC #/AREA URNS AUTO: ABNORMAL /HPF
SODIUM SERPL-SCNC: 136 MMOL/L (ref 136–145)
SP GR UR STRIP.AUTO: <=1.005 (ref 1–1.03)
UROBILINOGEN UR QL STRIP.AUTO: 0.2 E.U./DL
WBC # BLD AUTO: 12.41 THOUSAND/UL (ref 4.31–10.16)
WBC #/AREA URNS AUTO: ABNORMAL /HPF

## 2019-07-21 PROCEDURE — 96361 HYDRATE IV INFUSION ADD-ON: CPT

## 2019-07-21 PROCEDURE — 36415 COLL VENOUS BLD VENIPUNCTURE: CPT | Performed by: PHYSICIAN ASSISTANT

## 2019-07-21 PROCEDURE — C9113 INJ PANTOPRAZOLE SODIUM, VIA: HCPCS | Performed by: NURSE PRACTITIONER

## 2019-07-21 PROCEDURE — 87081 CULTURE SCREEN ONLY: CPT | Performed by: NURSE PRACTITIONER

## 2019-07-21 PROCEDURE — 99223 1ST HOSP IP/OBS HIGH 75: CPT | Performed by: FAMILY MEDICINE

## 2019-07-21 PROCEDURE — 85025 COMPLETE CBC W/AUTO DIFF WBC: CPT | Performed by: PHYSICIAN ASSISTANT

## 2019-07-21 PROCEDURE — 87040 BLOOD CULTURE FOR BACTERIA: CPT | Performed by: PHYSICIAN ASSISTANT

## 2019-07-21 PROCEDURE — 96374 THER/PROPH/DIAG INJ IV PUSH: CPT

## 2019-07-21 PROCEDURE — 96375 TX/PRO/DX INJ NEW DRUG ADDON: CPT

## 2019-07-21 PROCEDURE — 74177 CT ABD & PELVIS W/CONTRAST: CPT

## 2019-07-21 PROCEDURE — 99285 EMERGENCY DEPT VISIT HI MDM: CPT

## 2019-07-21 PROCEDURE — 83605 ASSAY OF LACTIC ACID: CPT | Performed by: PHYSICIAN ASSISTANT

## 2019-07-21 PROCEDURE — 80053 COMPREHEN METABOLIC PANEL: CPT | Performed by: PHYSICIAN ASSISTANT

## 2019-07-21 PROCEDURE — 81001 URINALYSIS AUTO W/SCOPE: CPT | Performed by: PHYSICIAN ASSISTANT

## 2019-07-21 RX ORDER — CIPROFLOXACIN 500 MG/1
500 TABLET, FILM COATED ORAL ONCE
Status: COMPLETED | OUTPATIENT
Start: 2019-07-21 | End: 2019-07-21

## 2019-07-21 RX ORDER — ACETAMINOPHEN 325 MG/1
650 TABLET ORAL EVERY 6 HOURS PRN
Status: DISCONTINUED | OUTPATIENT
Start: 2019-07-21 | End: 2019-07-23 | Stop reason: HOSPADM

## 2019-07-21 RX ORDER — MORPHINE SULFATE 4 MG/ML
4 INJECTION, SOLUTION INTRAMUSCULAR; INTRAVENOUS ONCE
Status: COMPLETED | OUTPATIENT
Start: 2019-07-21 | End: 2019-07-21

## 2019-07-21 RX ORDER — ONDANSETRON 2 MG/ML
4 INJECTION INTRAMUSCULAR; INTRAVENOUS EVERY 6 HOURS PRN
Status: DISCONTINUED | OUTPATIENT
Start: 2019-07-21 | End: 2019-07-23 | Stop reason: HOSPADM

## 2019-07-21 RX ORDER — ACETAMINOPHEN 325 MG/1
650 TABLET ORAL ONCE
Status: COMPLETED | OUTPATIENT
Start: 2019-07-21 | End: 2019-07-21

## 2019-07-21 RX ORDER — CEFTRIAXONE 1 G/50ML
1000 INJECTION, SOLUTION INTRAVENOUS EVERY 24 HOURS
Status: DISCONTINUED | OUTPATIENT
Start: 2019-07-21 | End: 2019-07-23 | Stop reason: HOSPADM

## 2019-07-21 RX ORDER — DEXTROSE AND SODIUM CHLORIDE 5; .9 G/100ML; G/100ML
50 INJECTION, SOLUTION INTRAVENOUS CONTINUOUS
Status: DISCONTINUED | OUTPATIENT
Start: 2019-07-21 | End: 2019-07-23

## 2019-07-21 RX ORDER — HYDROXYZINE HYDROCHLORIDE 25 MG/1
25 TABLET, FILM COATED ORAL EVERY 6 HOURS PRN
Status: DISCONTINUED | OUTPATIENT
Start: 2019-07-21 | End: 2019-07-23 | Stop reason: HOSPADM

## 2019-07-21 RX ORDER — FLUTICASONE FUROATE AND VILANTEROL 200; 25 UG/1; UG/1
1 POWDER RESPIRATORY (INHALATION)
Status: DISCONTINUED | OUTPATIENT
Start: 2019-07-21 | End: 2019-07-23 | Stop reason: HOSPADM

## 2019-07-21 RX ORDER — METRONIDAZOLE 500 MG/1
500 TABLET ORAL ONCE
Status: COMPLETED | OUTPATIENT
Start: 2019-07-21 | End: 2019-07-21

## 2019-07-21 RX ORDER — ONDANSETRON 2 MG/ML
4 INJECTION INTRAMUSCULAR; INTRAVENOUS ONCE
Status: COMPLETED | OUTPATIENT
Start: 2019-07-21 | End: 2019-07-21

## 2019-07-21 RX ORDER — PANTOPRAZOLE SODIUM 40 MG/1
40 INJECTION, POWDER, FOR SOLUTION INTRAVENOUS
Status: DISCONTINUED | OUTPATIENT
Start: 2019-07-21 | End: 2019-07-21

## 2019-07-21 RX ORDER — PANTOPRAZOLE SODIUM 40 MG/1
40 TABLET, DELAYED RELEASE ORAL
Status: DISCONTINUED | OUTPATIENT
Start: 2019-07-22 | End: 2019-07-23 | Stop reason: HOSPADM

## 2019-07-21 RX ORDER — ALBUTEROL SULFATE 2.5 MG/3ML
2.5 SOLUTION RESPIRATORY (INHALATION) EVERY 4 HOURS PRN
Status: DISCONTINUED | OUTPATIENT
Start: 2019-07-21 | End: 2019-07-23 | Stop reason: HOSPADM

## 2019-07-21 RX ADMIN — ACETAMINOPHEN 650 MG: 325 TABLET, FILM COATED ORAL at 13:05

## 2019-07-21 RX ADMIN — MORPHINE SULFATE 2 MG: 2 INJECTION, SOLUTION INTRAMUSCULAR; INTRAVENOUS at 14:37

## 2019-07-21 RX ADMIN — SODIUM CHLORIDE 1000 ML: 0.9 INJECTION, SOLUTION INTRAVENOUS at 10:55

## 2019-07-21 RX ADMIN — ONDANSETRON 4 MG: 2 INJECTION INTRAMUSCULAR; INTRAVENOUS at 10:55

## 2019-07-21 RX ADMIN — DEXTROSE AND SODIUM CHLORIDE 100 ML/HR: 5; .9 INJECTION, SOLUTION INTRAVENOUS at 14:27

## 2019-07-21 RX ADMIN — CIPROFLOXACIN HYDROCHLORIDE 500 MG: 500 TABLET, FILM COATED ORAL at 12:47

## 2019-07-21 RX ADMIN — FLUTICASONE FUROATE AND VILANTEROL TRIFENATATE 1 PUFF: 200; 25 POWDER RESPIRATORY (INHALATION) at 15:21

## 2019-07-21 RX ADMIN — PANTOPRAZOLE SODIUM 40 MG: 40 INJECTION, POWDER, FOR SOLUTION INTRAVENOUS at 14:52

## 2019-07-21 RX ADMIN — IOHEXOL 100 ML: 350 INJECTION, SOLUTION INTRAVENOUS at 11:48

## 2019-07-21 RX ADMIN — CEFTRIAXONE 1000 MG: 1 INJECTION, SOLUTION INTRAVENOUS at 15:55

## 2019-07-21 RX ADMIN — METRONIDAZOLE 500 MG: 500 INJECTION, SOLUTION INTRAVENOUS at 21:49

## 2019-07-21 RX ADMIN — METRONIDAZOLE 500 MG: 500 TABLET, FILM COATED ORAL at 12:47

## 2019-07-21 RX ADMIN — MORPHINE SULFATE 2 MG: 4 INJECTION INTRAVENOUS at 10:56

## 2019-07-21 NOTE — LETTER
[address] [facility] [address]  [place] [place]  [place] [address]  Dept: [phone]    July 23, 2019     Patient: Sade Crandall   YOB: 1965   Date of Visit: 7/21/2019       To Whom it May Concern:    Sade Crandall is under my professional care  She was seen in the hospital from 7/21/2019   to 07/23/19  She may return to work on 7/25/19  If you have any questions or concerns, please don't hesitate to call           Sincerely,          David Davis, DO

## 2019-07-21 NOTE — ASSESSMENT & PLAN NOTE
POA - as evidenced by tachycardia, leukocytosis, fever in the setting of diverticulitis  Ua bland   No signs of PNA  · Start IV Rocephin and Flagyl, day #1   · IV hydration  · Antipyretics as needed  · Monitor for worsening signs of infection  · Follow-up [facility]

## 2019-07-21 NOTE — ASSESSMENT & PLAN NOTE
Known finding  CT abd/pelvis: 'Enlarged myomatous uterus  Uterus is enlarged and heterogeneous in CT density  There are numerous fibroids  Findings are similar to the prior study '  Transvaginal US in July 2018: '1   Enlarged fibroid uterus  2   Normal right ovary  3   Complex cyst in the left ovary  Recommend follow-up ultrasound in 6-12 weeks   4   Prominent vessels in the left adnexal region, correlate for pelvic congestion syndrome '  · Outpatient follow-up  · Repeat pelvic US

## 2019-07-21 NOTE — ED PROVIDER NOTES
History  Chief Complaint   Patient presents with    Abdominal Pain     low left abdominal pain for4 days     30-year-old female presenting today with left lower quadrant abdominal pain x 4 days, feels very similar to prior diverticulitis  Has had some nausea without vomiting, decreased appetite however did last eat this morning having toast and eggs  Pain is 7/10 nonradiating  Feels diffusely bloated  Has had a recent colonoscopy last year  States she was admitted for diverticulitis last year as well for about 4 days  Has not taken any medication for her pain  Kirill Gary yesterday, states that she has had constipation on and off however attempts to have a high fiber intake  Mild amount of lower back pain in the lumbar region  Denies vomiting or diarrhea, fevers, shortness of breath, cough, congestion, hematochezia, weight loss, night sweats  Prior to Admission Medications   Prescriptions Last Dose Informant Patient Reported? Taking?    Respiratory Therapy Supplies (NEBULIZER/TUBING/MOUTHPIECE) KIT   No No   Sig: by Does not apply route 4 (four) times a day   acyclovir (ZOVIRAX) 200 mg capsule   No No   Sig: Take 1 capsule (200 mg total) by mouth daily as needed (flare ups) for up to 30 days Only as needed for outbreak   albuterol (2 5 mg/3 mL) 0 083 % nebulizer solution   No No   Sig: Take 1 vial (2 5 mg total) by nebulization every 6 (six) hours as needed for wheezing or shortness of breath   albuterol (VENTOLIN HFA) 90 mcg/act inhaler   Yes No   Sig: Inhale 2 puffs every 4 (four) hours as needed     fluticasone-salmeterol (ADVAIR DISKUS) 500-50 mcg/dose   Yes No   Sig: Inhale 1 puff 2 (two) times a day   hydrOXYzine pamoate (VISTARIL) 50 mg capsule Not Taking at Unknown time  No No   Sig: Take 1 capsule (50 mg total) by mouth 3 (three) times a day as needed for itching   Patient not taking: Reported on 7/21/2019   multivitamin (THERAGRAN) TABS   Yes No   Sig: Take 1 tablet by mouth daily triamcinolone (KENALOG) 0 1 % cream Not Taking at Unknown time  No No   Sig: Apply topically 2 (two) times a day   Patient not taking: Reported on 7/21/2019      Facility-Administered Medications: None       Past Medical History:   Diagnosis Date    Allergic rhinitis     Asthma     Fibroids     Physiological ovarian cysts     Sleep apnea     denatl device worn       Past Surgical History:   Procedure Laterality Date    COLONOSCOPY  2016    dr Hilda Mak ARTHROSCOPY Left 2018    meniscectomy x2, first done in 2015    MD COLONOSCOPY FLX DX W/COLLJ SPEC WHEN PFRMD N/A 9/13/2018    Procedure: COLONOSCOPY;  Surgeon: Claire Olivo MD;  Location: [facility]; Service: Gastroenterology    WRIST SURGERY Right        Family History   Problem Relation Age of Onset    Osteoporosis Mother     Thyroid disease Mother     Colon cancer Mother     Colon polyps Mother     Cancer Mother         colon    Alzheimer's disease Father     Hypertension Father     Dementia Father     Heart disease Brother     No Known Problems Daughter     No Known Problems Daughter      I have reviewed and agree with the history as documented  Social History     Tobacco Use    Smoking status: Never Smoker    Smokeless tobacco: Never Used   Substance Use Topics    Alcohol use: Yes     Comment: social - wine 2 x month    Drug use: No        Review of Systems   Constitutional: Negative  HENT: Negative  Eyes: Negative  Respiratory: Negative  Cardiovascular: Negative  Gastrointestinal: Positive for abdominal pain, constipation and nausea  Negative for abdominal distention, anal bleeding, blood in stool, diarrhea, rectal pain and vomiting  Genitourinary: Negative  Musculoskeletal: Negative  Skin: Negative  Neurological: Negative  All other systems reviewed and are negative  Physical Exam  Physical Exam   Constitutional: She is oriented to person, place, and time   She appears well-developed and well-nourished  HENT:   Head: Normocephalic and atraumatic  Nose: Nose normal    Eyes: Conjunctivae are normal    Neck: Normal range of motion  Neck supple  Cardiovascular: Normal rate, regular rhythm, normal heart sounds and intact distal pulses  Exam reveals no gallop and no friction rub  No murmur heard  Pulmonary/Chest: Effort normal and breath sounds normal  No stridor  No respiratory distress  She has no wheezes  She has no rales  She exhibits no tenderness  spo2 is 95% indicating adequate oxygenation    Abdominal: Soft  Normal appearance and bowel sounds are normal  She exhibits no distension and no mass  There is no hepatosplenomegaly, splenomegaly or hepatomegaly  There is tenderness in the left lower quadrant  There is no rigidity, no rebound, no guarding, no CVA tenderness, no tenderness at McBurney's point and negative Jaimes's sign  No hernia  Neurological: She is alert and oriented to person, place, and time  Skin: Skin is warm and dry  Capillary refill takes less than 2 seconds  Nursing note and vitals reviewed        Vital Signs  ED Triage Vitals   Temperature Pulse Respirations Blood Pressure SpO2   07/21/19 1010 07/21/19 1010 07/21/19 1010 07/21/19 1010 07/21/19 1010   99 7 °F (37 6 °C) (!) 109 22 134/66 100 %      Temp Source Heart Rate Source Patient Position - Orthostatic VS BP Location FiO2 (%)   07/21/19 1245 07/21/19 1245 07/21/19 1102 07/21/19 1102 --   Tympanic Monitor Lying Right arm       Pain Score       07/21/19 1010       9           Vitals:    07/21/19 1102 07/21/19 1145 07/21/19 1245 07/21/19 1324   BP: 145/93  134/82 138/87   Pulse: 93 84 90 88   Patient Position - Orthostatic VS: Lying  Lying Lying         Visual Acuity      ED Medications  Medications   sodium chloride 0 9 % bolus 1,000 mL (0 mL Intravenous Stopped 7/21/19 1249)   ondansetron (ZOFRAN) injection 4 mg (4 mg Intravenous Given 7/21/19 1055)   morphine (PF) 4 mg/mL injection 4 mg (2 mg Intravenous Given 7/21/19 1056)   iohexol (OMNIPAQUE) 350 MG/ML injection (MULTI-DOSE) 100 mL (100 mL Intravenous Given 7/21/19 1148)   metroNIDAZOLE (FLAGYL) tablet 500 mg (500 mg Oral Given 7/21/19 1247)   ciprofloxacin (CIPRO) tablet 500 mg (500 mg Oral Given 7/21/19 1247)   acetaminophen (TYLENOL) tablet 650 mg (650 mg Oral Given 7/21/19 1305)       Diagnostic Studies  Results Reviewed     Procedure Component Value Units Date/Time    Blood culture #2 [[id number]] Collected:  07/21/19 1253    Lab Status: In process Specimen:  Blood from Arm, Right Updated:  07/21/19 1312    [place] draw [[id number]] Collected:  07/21/19 1107    Lab Status:  Final result Specimen:  Blood from Arm, Left Updated:  07/21/19 1301    Narrative: The following orders were created for panel order [place] draw  Procedure                               Abnormality         Status                     ---------                               -----------         ------                     Vikki Frank Top on OXNN[[id number]]                           Final result               Green / Black tube on HYMU[[id number]]                       Final result                 Please view results for these tests on the individual orders      Urine Microscopic [[id number]]  (Abnormal) Collected:  07/21/19 1205    Lab Status:  Final result Specimen:  Urine, Clean Catch Updated:  07/21/19 1227     RBC, UA 0-1 /hpf      WBC, UA 1-2 /hpf      Epithelial Cells Occasional /hpf      Bacteria, UA None Seen /hpf     UA w Reflex to Microscopic [[id number]]  (Abnormal) Collected:  07/21/19 1205    Lab Status:  Final result Specimen:  Urine, Clean Catch Updated:  07/21/19 1210     Color, UA Light Yellow     Clarity, UA Clear     Specific Gravity, UA <=1 005     pH, UA 6 5     Leukocytes, UA Negative     Nitrite, UA Negative     Protein, UA Negative mg/dl      Glucose, UA Negative mg/dl      Ketones, UA Negative mg/dl      Urobilinogen, UA 0 2 E U /dl      Bilirubin, UA Negative Blood, UA Trace-Intact    Lactic acid x2 Q2H [[id number]]  (Normal) Collected:  07/21/19 1101    Lab Status:  Final result Specimen:  Blood from Arm, Left Updated:  07/21/19 1135     LACTIC ACID 2 0 mmol/L     Narrative:       Result may be elevated if tourniquet was used during collection      Comprehensive metabolic panel [[id number]]  (Abnormal) Collected:  07/21/19 1101    Lab Status:  Final result Specimen:  Blood from Arm, Left Updated:  07/21/19 1130     Sodium 136 mmol/L      Potassium 4 0 mmol/L      Chloride 99 mmol/L      CO2 24 mmol/L      ANION GAP 13 mmol/L      BUN 15 mg/dL      Creatinine 0 98 mg/dL      Glucose 157 mg/dL      Calcium 8 9 mg/dL      AST 12 U/L      ALT 23 U/L      Alkaline Phosphatase 96 U/L      Total Protein 7 9 g/dL      Albumin 3 7 g/dL      Total Bilirubin 0 60 mg/dL      eGFR 66 ml/min/1 73sq m     Narrative:       [place] guidelines for Chronic Kidney Disease (CKD):     Stage 1 with normal or high GFR (GFR > 90 mL/min/1 73 square meters)    Stage 2 Mild CKD (GFR = 60-89 mL/min/1 73 square meters)    Stage 3A Moderate CKD (GFR = 45-59 mL/min/1 73 square meters)    Stage 3B Moderate CKD (GFR = 30-44 mL/min/1 73 square meters)    Stage 4 Severe CKD (GFR = 15-29 mL/min/1 73 square meters)    Stage 5 End Stage CKD (GFR <15 mL/min/1 73 square meters)  Note: GFR calculation is accurate only with a steady state creatinine    CBC and differential [[id number]]  (Abnormal) Collected:  07/21/19 1101    Lab Status:  Final result Specimen:  Blood from Arm, Left Updated:  07/21/19 1113     WBC 12 41 Thousand/uL      RBC 4 79 Million/uL      Hemoglobin 14 1 g/dL      Hematocrit 42 6 %      MCV 89 fL      MCH 29 4 pg      MCHC 33 1 g/dL      RDW 12 9 %      MPV 9 7 fL      Platelets 956 Thousands/uL      nRBC 0 /100 WBCs      Neutrophils Relative 81 %      Immat GRANS % 0 %      Lymphocytes Relative 14 %      Monocytes Relative 5 %      Eosinophils Relative 0 % Basophils Relative 0 %      Neutrophils Absolute 10 00 Thousands/µL      Immature Grans Absolute 0 05 Thousand/uL      Lymphocytes Absolute 1 68 Thousands/µL      Monocytes Absolute 0 64 Thousand/µL      Eosinophils Absolute 0 01 Thousand/µL      Basophils Absolute 0 03 Thousands/µL     Blood culture #1 [[id number]] Collected:  07/21/19 1101    Lab Status: In process Specimen:  Blood from Arm, Left Updated:  07/21/19 1111                 CT abdomen pelvis with contrast   Final Result by  (07/21 1335)   Addendum 1 of 1 by Lisset García DO (07/21 1246)   ADDENDUM:      ADDENDUM      There is a voice recognition software related error in the IMPRESSION of    the report  A phrase which reads       " I personally discussed this study with Dionna Gonzales on 7/21/2019    at 12:11 PM  "       should read,       " The study was marked in [facility] for immediate notification  I personally discussed this study with Dionna Gonzales on 7/21/2019 at    12:45 PM "  Final                 Procedures  Procedures       ED Course  ED Course as of Jul 21 1335   Sun Jul 21, 2019   1224 CT scan report noted  I did not speak with the radiologist        1231 Patient feeling much better would like to be discharged       [phone] radiology to discuss CT scan findings       [id number] Malcolm Ramon [facility] with Dr Elan Lance who read the initial CT scan report  He made an addendum in his note stating that he did not speak with me at 12:11        1253 Paged hospitalist                                   MDM  Number of Diagnoses or Management Options  Diverticulitis:   Diagnosis management comments: Discussed with patient results of the lab work and imaging studies  I also discussed case with Dr Elan Lance of radiology  Patient does have a tortuous colon and some fluid in the pericolic gutter however no distinct abscess or perforation, may potentially be early phlegmon   Initially believed patient would be able to go home, however spiked a temperature in the ED and after speaking with radiology who noted nonspecific fluid and complicated diverticulitis, will admit patient for further evaluation  Patient and  is very agreeable to this  Amount and/or Complexity of Data Reviewed  Clinical lab tests: reviewed and ordered  Tests in the radiology section of CPT®: ordered and reviewed  Review and summarize past medical records: yes  Discuss the patient with other providers: yes (Discussed case and management with Dr Priti Campos)  Independent visualization of images, tracings, or specimens: yes        Disposition  Final diagnoses:   Diverticulitis     Time reflects when diagnosis was documented in both MDM as applicable and the Disposition within this note     Time User Action Codes Description Comment    7/21/2019  1:14 PM Mojgan Andersen 61 [K57 [id number]] Diverticulitis       ED Disposition     ED Disposition Condition Date/Time Comment    Admit Stable Sun Jul 21, 2019  1:13 PM Case was discussed with Dr Isela Gitelman and the patient's admission status was agreed to be Admission Status: inpatient status to the service of Jimena Morris  Follow-up Information    None         Patient's Medications   Discharge Prescriptions    No medications on file     No discharge procedures on file      ED Provider  Electronically Signed by           Tova Osorio PA-C  07/21/19 5688

## 2019-07-21 NOTE — PLAN OF CARE
Problem: PAIN - ADULT  Goal: Verbalizes/displays adequate comfort level or baseline comfort level  Description  Interventions:  - Encourage patient to monitor pain and request assistance  - Assess pain using appropriate pain scale  - Administer analgesics based on type and severity of pain and evaluate response  - Implement non-pharmacological measures as appropriate and evaluate response  - Consider cultural and social influences on pain and pain management  - Notify physician/advanced practitioner if interventions unsuccessful or patient reports new pain  Outcome: Progressing     Problem: INFECTION - ADULT  Goal: Absence or prevention of progression during hospitalization  Description  INTERVENTIONS:  - Assess and monitor for signs and symptoms of infection  - Monitor lab/diagnostic results  - Monitor all insertion sites, i e  indwelling lines, tubes, and drains  - Monitor endotracheal (as able) and nasal secretions for changes in amount and color  - [place] appropriate cooling/warming therapies per order  - Administer medications as ordered  - Instruct and encourage patient and family to use good hand hygiene technique  - Identify and instruct in appropriate isolation precautions for identified infection/condition  Outcome: Progressing  Goal: Absence of fever/infection during neutropenic period  Description  INTERVENTIONS:  - Monitor WBC  - Implement neutropenic guidelines  Outcome: Progressing     Problem: SAFETY ADULT  Goal: Patient will remain free of falls  Description  INTERVENTIONS:  - Assess patient frequently for physical needs  -  Identify cognitive and physical deficits and behaviors that affect risk of falls    -  [place] fall precautions as indicated by assessment   - Educate patient/family on patient safety including physical limitations  - Instruct patient to call for assistance with activity based on assessment  - Modify environment to reduce risk of injury  - Consider OT/PT consult to assist with strengthening/mobility  Outcome: Progressing     Problem: DISCHARGE PLANNING  Goal: Discharge to home or other facility with appropriate resources  Description  INTERVENTIONS:  - Identify barriers to discharge w/patient and caregiver  - Arrange for needed discharge resources and transportation as appropriate  - Identify discharge learning needs (meds, wound care, etc )  - Arrange for interpretive services to assist at discharge as needed  - Refer to Case Management Department for coordinating discharge planning if the patient needs post-hospital services based on physician/advanced practitioner order or complex needs related to functional status, cognitive ability, or social support system  Outcome: Progressing     Problem: Knowledge Deficit  Goal: Patient/family/caregiver demonstrates understanding of disease process, treatment plan, medications, and discharge instructions  Description  Complete learning assessment and assess knowledge base    Interventions:  - Provide teaching at level of understanding  - Provide teaching via preferred learning methods  Outcome: Progressing

## 2019-07-21 NOTE — ASSESSMENT & PLAN NOTE
Patient well known to GI for current mild diverticulitis   CT abdomen/pelvis: Abnormal appearance of the distal descending colon/proximal sigmoid colon, most compatible with acute, mildly complicated diverticulitis    · Will consult GI  · Start IV Rocephin and Flagyl, day #1  · Start IV hydration and IV Protonix   · Pain control with IV Morphine PRN and Antiemetics with IV Zofran PRN   · Bowel rest, NPO  · Monitor electrolytes   · Monitor for worsening signs of infection   · Will likely need a follow-up CT scan or colonoscopy, following a course of antibiotic therapy, given that inflammatory colonic neoplasm can have a similar appearance per radiology

## 2019-07-21 NOTE — H&P
H&P- Coretha Bolus 1965, 49 y o  female MRN: [id number]    Unit/Bed#: [address] [place] [address] Encounter: [id number]    Primary Care Provider: Kristen Felix DO   Date and time admitted to hospital: 7/21/2019 10:07 AM        * Diverticulitis  Assessment & Plan  Patient well known to GI for current mild diverticulitis   CT abdomen/pelvis: Abnormal appearance of the distal descending colon/proximal sigmoid colon, most compatible with acute, mildly complicated diverticulitis  · Will consult GI  · Start IV Rocephin and Flagyl, day #1  · Start IV hydration and IV Protonix   · Pain control with IV Morphine PRN and Antiemetics with IV Zofran PRN   · Bowel rest, NPO  · Monitor electrolytes   · Monitor for worsening signs of infection   · Will likely need a follow-up CT scan or colonoscopy, following a course of antibiotic therapy, given that inflammatory colonic neoplasm can have a similar appearance per radiology     Sepsis [facility])  Assessment & Plan  POA - as evidenced by tachycardia, leukocytosis, fever in the setting of diverticulitis  Ua bland  No signs of PNA  · Start IV Rocephin and Flagyl, day #1   · IV hydration  · Antipyretics as needed  · Monitor for worsening signs of infection  · Follow-up [facility]    Uterine myoma  Assessment & Plan  Known finding  CT abd/pelvis: 'Enlarged myomatous uterus  Uterus is enlarged and heterogeneous in CT density  There are numerous fibroids  Findings are similar to the prior study '  Transvaginal US in July 2018: '1   Enlarged fibroid uterus  2   Normal right ovary  3   Complex cyst in the left ovary  Recommend follow-up ultrasound in 6-12 weeks   4   Prominent vessels in the left adnexal region, correlate for pelvic congestion syndrome '  · Outpatient follow-up  · Repeat pelvic US    Hiatal hernia  Assessment & Plan  CT abd/pelvis: Hiatal hernia   · Dietary/ lifestyle modification    Asthma  Assessment & Plan  Breo and Albuterol PRN    VTE Prophylaxis: Enoxaparin (Lovenox)  / sequential compression device   Code Status: full code   POLST: POLST form is not discussed and not completed at this time  Discussion with family: none    Anticipated Length of Stay:  Patient will be admitted on an Inpatient basis with an anticipated length of stay of  Greater than 2 midnights  Justification for Hospital Stay: diverticulitis     Total Time for Visit, including Counseling / Coordination of Care: 1 hour  Greater than 50% of this total time spent on direct patient counseling and coordination of care  Chief Complaint:   Abdominal pain, diverticulitis     History of Present Illness:    Arianna Jose is a 49 y o  female with a PMH including recurrent diverticulitis, uterine fibroids, asthma, and obesity who presents with left lower quadrant pain for 4 days  Patient describes the pain as persistent, sharp pain, 8-10/10 associated with nausea  Patient reports no vomiting, constipation, or diarrhea  Patient reports normal bowel movements and had one this morning  Patient has been following a high fiber diet and had breakfast today  Patient denies rectal bleeding, GERD-like symptoms, headache, dizziness, lightheadedness, chest pain, shortness of breath, hematuria, or dysuria  Review of Systems:    Review of Systems   Constitutional: Negative for appetite change, chills and fever  HENT: Negative for congestion, postnasal drip, rhinorrhea and sore throat  Eyes: Negative for photophobia and visual disturbance  Respiratory: Negative for cough, chest tightness, shortness of breath and wheezing  Cardiovascular: Negative for chest pain, palpitations and leg swelling  Gastrointestinal: Positive for abdominal pain and nausea  Negative for abdominal distention, constipation, diarrhea and vomiting  Genitourinary: Negative for difficulty urinating, dysuria and hematuria  Musculoskeletal: Negative for arthralgias, gait problem and myalgias  Skin: Negative for rash and wound  Neurological: Negative for dizziness, weakness, light-headedness, numbness and headaches  Psychiatric/Behavioral: Negative for confusion  The patient is not nervous/anxious  Past Medical and Surgical History:     Past Medical History:   Diagnosis Date    Allergic rhinitis     Asthma     Fibroids     Physiological ovarian cysts     Sleep apnea     denatl device worn       Past Surgical History:   Procedure Laterality Date    COLONOSCOPY  2016    dr Peggy Neff ARTHROSCOPY Left 2018    meniscectomy x2, first done in 2015    [place] COLONOSCOPY FLX DX W/COLLJ SPEC WHEN PFRMD N/A 9/13/2018    Procedure: COLONOSCOPY;  Surgeon: Farida Brantley MD;  Location: [facility] GI [facility]; Service: Gastroenterology    WRIST SURGERY Right        Meds/Allergies:    Prior to Admission medications    Medication Sig Start Date End Date Taking?  Authorizing Provider   acyclovir (ZOVIRAX) 200 mg capsule Take 1 capsule (200 mg total) by mouth daily as needed (flare ups) for up to 30 days Only as needed for outbreak 4/16/19 5/16/19  Sherry Myers,    albuterol (2 5 mg/3 mL) 0 083 % nebulizer solution Take 1 vial (2 5 mg total) by nebulization every 6 (six) hours as needed for wheezing or shortness of breath 3/22/19   ARTEM Silverio   albuterol (VENTOLIN HFA) 90 mcg/act inhaler Inhale 2 puffs every 4 (four) hours as needed   11/8/17   Historical Provider, MD   fluticasone-salmeterol (ADVAIR DISKUS) 500-50 mcg/dose Inhale 1 puff 2 (two) times a day 11/8/17   Historical Provider, MD   hydrOXYzine pamoate (VISTARIL) 50 mg capsule Take 1 capsule (50 mg total) by mouth 3 (three) times a day as needed for itching  Patient not taking: Reported on 7/21/2019 5/23/19   Narendra Sánchez PA-C   multivitamin [facility]) TABS Take 1 tablet by mouth daily    Historical Provider, MD   Respiratory Therapy Supplies (NEBULIZER/TUBING/MOUTHPIECE) KIT by Does not apply route 4 (four) times a day 3/22/19   ARTEM Silverio   triamcinolone (KENALOG) 0 1 % cream Apply topically 2 (two) times a day  Patient not taking: Reported on 7/21/2019 5/23/19   Paco Tolentino PA-C     I have reviewed home medications with patient personally  Allergies: No Known Allergies    Social History:     Marital Status:    Patient Pre-hospital Living Situation: Home   Patient Pre-hospital Level of Mobility: Independent   Patient Pre-hospital Diet Restrictions: High fiber   Substance Use History:   Social History     Substance and Sexual Activity   Alcohol Use Yes    Comment: social - wine 2 x month     Social History     Tobacco Use   Smoking Status Never Smoker   Smokeless Tobacco Never Used     Social History     Substance and Sexual Activity   Drug Use No       Family History:    Family History   Problem Relation Age of Onset    Osteoporosis Mother     Thyroid disease Mother     Colon cancer Mother     Colon polyps Mother     Cancer Mother         colon    Alzheimer's disease Father     Hypertension Father     Dementia Father     Heart disease Brother     No Known Problems Daughter     No Known Problems Daughter        Physical Exam:     Vitals:   Blood Pressure: 161/96 (07/21/19 1348)  Pulse: 88 (07/21/19 1348)  Temperature: 97 8 °F (36 6 °C) (07/21/19 1348)  Temp Source: Oral (07/21/19 1348)  Respirations: 20 (07/21/19 1348)  Height: 5' 3" (160 cm) (07/21/19 1348)  Weight - Scale: 102 kg (223 lb 15 8 oz) (07/21/19 1348)  SpO2: 95 % (07/21/19 1348)    Physical Exam   Constitutional: She is oriented to person, place, and time  She appears well-developed and well-nourished  No distress  Pleasant, morbidly obese female resting comfortably on room air   HENT:   Head: Normocephalic  Mouth/Throat: Oropharynx is clear and moist    Eyes: Pupils are equal, round, and reactive to light  Conjunctivae and EOM are normal  Right eye exhibits no discharge  Left eye exhibits no discharge  No scleral icterus  Neck: Normal range of motion  Neck supple   No JVD present  Cardiovascular: Normal rate, regular rhythm, normal heart sounds and intact distal pulses  No murmur heard  Pulmonary/Chest: Effort normal  No accessory muscle usage  No tachypnea  No respiratory distress  She has decreased breath sounds in the right lower field and the left lower field  She has no wheezes  She has no rhonchi  She has no rales  Abdominal: Soft  Bowel sounds are normal  She exhibits no distension  There is guarding  There is no rebound  Musculoskeletal: Normal range of motion  She exhibits no edema or tenderness  Neurological: She is alert and oriented to person, place, and time  No cranial nerve deficit  Skin: Skin is warm and dry  Capillary refill takes less than 2 seconds  No rash noted  She is not diaphoretic  No erythema  There is pallor  Psychiatric: She has a normal mood and affect  Her behavior is normal  Judgment and thought content normal    Nursing note and vitals reviewed  Additional Data:     Lab Results: I have personally reviewed pertinent reports  Results from last 7 days   Lab Units 07/21/19  1101   WBC Thousand/uL 12 41*   HEMOGLOBIN g/dL 14 1   HEMATOCRIT % 42 6   PLATELETS Thousands/uL 298   NEUTROS PCT % 81*   LYMPHS PCT % 14   MONOS PCT % 5   EOS PCT % 0     Results from last 7 days   Lab Units 07/21/19  1101   SODIUM mmol/L 136   POTASSIUM mmol/L 4 0   CHLORIDE mmol/L 99*   CO2 mmol/L 24   BUN mg/dL 15   CREATININE mg/dL 0 98   ANION GAP mmol/L 13   CALCIUM mg/dL 8 9   ALBUMIN g/dL 3 7   TOTAL BILIRUBIN mg/dL 0 60   ALK PHOS U/L 96   ALT U/L 23   AST U/L 12   GLUCOSE RANDOM mg/dL 157*                 Results from last 7 days   Lab Units 07/21/19  1101   LACTIC ACID mmol/L 2 0       Imaging: I have personally reviewed pertinent reports        CT abdomen pelvis with contrast   Final Result by  (07/21 3214)   Addendum 1 of 1 by Christina Rodriguez DO (07/21 2781)   ADDENDUM:      ADDENDUM      There is a voice recognition software related error in the IMPRESSION of    the report  A phrase which reads       " I personally discussed this study with Shantelle Hanks on 7/21/2019    at 12:11 PM  "       should read,       " The study was marked in [facility] for immediate notification  I personally discussed this study with Shantelle Hanks on 7/21/2019 at    12:45 PM "  Final          EKG, Pathology, and Other Studies Reviewed on Admission:   · EKG: None available     Allscripts / Epic Records Reviewed: Yes     ** Please Note: This note has been constructed using a voice recognition system   **

## 2019-07-22 LAB
ANION GAP SERPL CALCULATED.3IONS-SCNC: 9 MMOL/L (ref 4–13)
BASOPHILS # BLD AUTO: 0.03 THOUSANDS/ΜL (ref 0–0.1)
BASOPHILS NFR BLD AUTO: 0 % (ref 0–1)
BUN SERPL-MCNC: 9 MG/DL (ref 5–25)
CALCIUM SERPL-MCNC: 8.1 MG/DL (ref 8.3–10.1)
CHLORIDE SERPL-SCNC: 104 MMOL/L (ref 100–108)
CO2 SERPL-SCNC: 25 MMOL/L (ref 21–32)
CREAT SERPL-MCNC: 0.85 MG/DL (ref 0.6–1.3)
EOSINOPHIL # BLD AUTO: 0.02 THOUSAND/ΜL (ref 0–0.61)
EOSINOPHIL NFR BLD AUTO: 0 % (ref 0–6)
ERYTHROCYTE [DISTWIDTH] IN BLOOD BY AUTOMATED COUNT: 12.8 % (ref 11.6–15.1)
GFR SERPL CREATININE-BSD FRML MDRD: 78 ML/MIN/1.73SQ M
GLUCOSE SERPL-MCNC: 110 MG/DL (ref 65–140)
HCT VFR BLD AUTO: 37.4 % (ref 34.8–46.1)
HGB BLD-MCNC: 12.3 G/DL (ref 11.5–15.4)
IMM GRANULOCYTES # BLD AUTO: 0.02 THOUSAND/UL (ref 0–0.2)
IMM GRANULOCYTES NFR BLD AUTO: 0 % (ref 0–2)
LYMPHOCYTES # BLD AUTO: 1.34 THOUSANDS/ΜL (ref 0.6–4.47)
LYMPHOCYTES NFR BLD AUTO: 19 % (ref 14–44)
MAGNESIUM SERPL-MCNC: 2.4 MG/DL (ref 1.6–2.6)
MCH RBC QN AUTO: 29.5 PG (ref 26.8–34.3)
MCHC RBC AUTO-ENTMCNC: 32.9 G/DL (ref 31.4–37.4)
MCV RBC AUTO: 90 FL (ref 82–98)
MONOCYTES # BLD AUTO: 0.5 THOUSAND/ΜL (ref 0.17–1.22)
MONOCYTES NFR BLD AUTO: 7 % (ref 4–12)
NEUTROPHILS # BLD AUTO: 5.13 THOUSANDS/ΜL (ref 1.85–7.62)
NEUTS SEG NFR BLD AUTO: 74 % (ref 43–75)
NRBC BLD AUTO-RTO: 0 /100 WBCS
PHOSPHATE SERPL-MCNC: 3.7 MG/DL (ref 2.7–4.5)
PLATELET # BLD AUTO: 229 THOUSANDS/UL (ref 149–390)
PMV BLD AUTO: 9.5 FL (ref 8.9–12.7)
POTASSIUM SERPL-SCNC: 3.7 MMOL/L (ref 3.5–5.3)
RBC # BLD AUTO: 4.17 MILLION/UL (ref 3.81–5.12)
SODIUM SERPL-SCNC: 138 MMOL/L (ref 136–145)
WBC # BLD AUTO: 7.04 THOUSAND/UL (ref 4.31–10.16)

## 2019-07-22 PROCEDURE — 80048 BASIC METABOLIC PNL TOTAL CA: CPT | Performed by: NURSE PRACTITIONER

## 2019-07-22 PROCEDURE — 84100 ASSAY OF PHOSPHORUS: CPT | Performed by: NURSE PRACTITIONER

## 2019-07-22 PROCEDURE — 99232 SBSQ HOSP IP/OBS MODERATE 35: CPT | Performed by: FAMILY MEDICINE

## 2019-07-22 PROCEDURE — 85025 COMPLETE CBC W/AUTO DIFF WBC: CPT | Performed by: NURSE PRACTITIONER

## 2019-07-22 PROCEDURE — 83735 ASSAY OF MAGNESIUM: CPT | Performed by: NURSE PRACTITIONER

## 2019-07-22 PROCEDURE — 99254 IP/OBS CNSLTJ NEW/EST MOD 60: CPT | Performed by: INTERNAL MEDICINE

## 2019-07-22 RX ADMIN — PANTOPRAZOLE SODIUM 40 MG: 40 TABLET, DELAYED RELEASE ORAL at 05:47

## 2019-07-22 RX ADMIN — METRONIDAZOLE 500 MG: 500 INJECTION, SOLUTION INTRAVENOUS at 21:13

## 2019-07-22 RX ADMIN — CEFTRIAXONE 1000 MG: 1 INJECTION, SOLUTION INTRAVENOUS at 16:26

## 2019-07-22 RX ADMIN — ENOXAPARIN SODIUM 40 MG: 40 INJECTION SUBCUTANEOUS at 09:35

## 2019-07-22 RX ADMIN — FLUTICASONE FUROATE AND VILANTEROL TRIFENATATE 1 PUFF: 200; 25 POWDER RESPIRATORY (INHALATION) at 09:35

## 2019-07-22 RX ADMIN — METRONIDAZOLE 500 MG: 500 INJECTION, SOLUTION INTRAVENOUS at 05:47

## 2019-07-22 RX ADMIN — DEXTROSE AND SODIUM CHLORIDE 75 ML/HR: 5; .9 INJECTION, SOLUTION INTRAVENOUS at 05:50

## 2019-07-22 RX ADMIN — METRONIDAZOLE 500 MG: 500 INJECTION, SOLUTION INTRAVENOUS at 14:42

## 2019-07-22 NOTE — PROGRESS NOTES
[address] Internal Medicine Progress Note  Patient: Jonathan Bolus 49 y o  female   MRN: [id number]  PCP: Kristen Felix DO  Unit/Bed#: [address] [place] [address] Encounter: [id number]  Date Of Visit: 07/22/19    Problem List:    Principal Problem:    Acute diverticulitis  Active Problems:    Sepsis ([facility] [address] )    Asthma    Uterine myoma    Hiatal hernia      Assessment & Plan:    * Acute diverticulitis  Assessment & Plan  Patient with recurrent diverticulitis  CT abdomen/pelvis: Abnormal appearance of the distal descending colon/proximal sigmoid colon, most compatible with acute, mildly complicated diverticulitis  · Continue IV Rocephin and Flagyl  Symptoms improving  · Patient started on clears which she is tolerating  Advance to full liquids and will monitor and advanced as tolerated  · Decreased rate of IV hydration  Continue Protonix   · Patient had a colonoscopy in September 2018 which showed polyps, internal hemorrhoids and multiple diverticula in sigmoid colon  · Pain control with IV Morphine PRN and Antiemetics with IV Zofran PRN   · Monitor for worsening signs of infection     Sepsis ([facility] )  Assessment & Plan  POA - as evidenced by tachycardia, leukocytosis, fever in the setting of diverticulitis  Ua bland  No signs of PNA  · Continue IV Rocephin and Flagyl, day #1   · Fevers have improved and leukocytosis has resolved  · Antipyretics as needed  · Blood culture negative so far    Asthma  Assessment & Plan  Stable  Continue Breo and Albuterol PRN    Hiatal hernia  Assessment & Plan  On Protonix    Uterine myoma  Assessment & Plan  Patient is aware of this finding  CT abd/pelvis: 'Enlarged myomatous uterus  Uterus is enlarged and heterogeneous in CT density  There are numerous fibroids  Transvaginal US in July 2018: '1   Enlarged fibroid uterus  2   Normal right ovary  3   Complex cyst in the left ovary  Recommend follow-up ultrasound in 6-12 weeks   4   Prominent vessels in the left adnexal region, correlate for pelvic congestion syndrome '  · Outpatient follow-up with gyn        VTE Pharmacologic Prophylaxis:   Pharmacologic: Enoxaparin (Lovenox)  Mechanical VTE Prophylaxis in Place: No    Patient Centered Rounds: I have performed bedside rounds with nursing staff today  Discussions with Specialists or Other Care Team Provider: yes    Education and Discussions with Family / Patient: yes    Time Spent for Care: 30 minutes  More than 50% of total time spent on counseling and coordination of care as described above  Current Length of Stay: 1 day(s)    Current Patient Status: Inpatient   Certification Statement: The patient will continue to require additional inpatient hospital stay due to Acute diverticulitis requiring IV antibiotics    Discharge Plan: home    Code Status: Level 1 - Full Code      Subjective:   Reports improvement in abdominal pain today  States that abdominal pain is now 5/10 in intensity    Objective:     Vitals:   Temp (24hrs), Av °F (37 2 °C), Min:98 1 °F (36 7 °C), Max:99 7 °F (37 6 °C)    Temp:  [98 1 °F (36 7 °C)-99 7 °F (37 6 °C)] 98 1 °F (36 7 °C)  HR:  [74-83] 77  Resp:  [16-19] 16  BP: (111-116)/(73-76) 116/76  SpO2:  [89 %-95 %] 95 %  Body mass index is 39 68 kg/m²  Input and Output Summary (last 24 hours): Intake/Output Summary (Last 24 hours) at 2019 1746  Last data filed at 2019 1626  Gross per 24 hour   Intake 0 ml   Output 650 ml   Net -650 ml       Physical Exam:     Physical Exam   Constitutional: She is oriented to person, place, and time  She appears well-developed and well-nourished  No distress  HENT:   Head: Normocephalic and atraumatic  Mouth/Throat: Oropharynx is clear and moist    Eyes: EOM are normal  Right eye exhibits no discharge  Left eye exhibits no discharge  No scleral icterus  Neck: Neck supple  No tracheal deviation present  Cardiovascular: Normal rate and regular rhythm     Pulmonary/Chest: Effort normal and breath sounds normal  No respiratory distress  She has no wheezes  She has no rales  Abdominal: Soft  Bowel sounds are normal  She exhibits no distension  There is tenderness (LLQ)  Musculoskeletal: She exhibits no edema  Neurological: She is alert and oriented to person, place, and time  No cranial nerve deficit  Skin: Skin is dry  She is not diaphoretic  Additional Data:     Labs:    Results from last 7 days   Lab Units 07/22/19  0517   WBC Thousand/uL 7 04   HEMOGLOBIN g/dL 12 3   HEMATOCRIT % 37 4   PLATELETS Thousands/uL 229   NEUTROS PCT % 74   LYMPHS PCT % 19   MONOS PCT % 7   EOS PCT % 0     Results from last 7 days   Lab Units 07/22/19  0517 07/21/19  1101   POTASSIUM mmol/L 3 7 4 0   CHLORIDE mmol/L 104 99*   CO2 mmol/L 25 24   BUN mg/dL 9 15   CREATININE mg/dL 0 85 0 98   CALCIUM mg/dL 8 1* 8 9   ALK PHOS U/L  --  96   ALT U/L  --  23   AST U/L  --  12           * I Have Reviewed All Lab Data Listed Above  * Additional Pertinent Lab Tests Reviewed: [address] Admission Reviewed      Imaging:  Imaging Reports Reviewed Today Include: CT abd/pelvis  Imaging Personally Reviewed by Myself Includes:  N/A    Recent Cultures (last 7 days):     Results from last 7 days   Lab Units 07/21/19  1101   BLOOD CULTURE  No Growth at 24 hrs         Last 24 Hours Medication List:     Current Facility-Administered Medications:  acetaminophen 650 mg Oral Q6H PRN Rodney Good, CRNP    albuterol 2 5 mg Nebulization Q4H PRN Rodney Good, CRNP    cefTRIAXone 1,000 mg Intravenous Q24H Rodney Good, CRNP Last Rate: 1,000 mg (07/22/19 1626)   dextrose 5 % and sodium chloride 0 9 % 50 mL/hr Intravenous Continuous Tanisha Revankar, DO Last Rate: 50 mL/hr (07/22/19 1745)   enoxaparin 40 mg Subcutaneous Daily Rodney Good, CRNP    fluticasone-vilanterol 1 puff Inhalation Daily Rodney Good, CRNP    hydrOXYzine HCL 25 mg Oral Q6H PRN Rodney Good, CRNP    metroNIDAZOLE 500 mg Intravenous Q8H Michael Sit, CRNP Last Rate: 500 mg (07/22/19 1442)   morphine injection 2 mg Intravenous Q3H PRN Michael Sit, CRNP    ondansetron 4 mg Intravenous Q6H PRN Michael Sit, CRNP    pantoprazole 40 mg Oral Early Morning Tanisha Cunningham DO    pneumococcal 13-valent conjugate vaccine 0 5 mL Intramuscular Prior to discharge Michael Sit, [facility]           Today, Patient Was Seen By: Yumiko Figueroa DO    ** Please Note: "This note has been constructed using a voice recognition system  Therefore there may be syntax, spelling, and/or grammatical errors   Please call if you have any questions  "**

## 2019-07-22 NOTE — CONSULTS
Consultation - [facility]  Abimael Yost 49 y o  female MRN: [id number]  Unit/Bed#: [facility] Encounter: [id number]        Consults    Reason for Consult / Principal Problem: acute recurrent diverticulitis    ASSESSMENT and PLAN:    Principal Problem:    Diverticulitis  Active Problems:    Asthma    Sepsis ([address] )    Uterine myoma    Hiatal hernia    #1  Acute recurrent diverticulitis:  Current episode is uncomplicated  First episode in July 0987 was complicated with small abscess that was not amendable to drainage with IR  She had another presumed episode of diverticulitis in between that was treated conservatively with antibiotics without imaging  Afebrile currently but had temp a 100 6° yesterday  White blood cell count went from 12 4 down to 7 04  Last colonoscopy was September 2018 with 1 polyp removed  -NPO, advance as tolerated; patient will ultimately need a low-fiber diet for several weeks as an outpatient and then transition to a high-fiber diet  -patient currently on ceftriaxone Flagyl  Will need to complete this for a total of 10 days  -continue to monitor abdominal exam, vitals, white blood cell count  Any significant worsening, consider repeat imaging to rule out any abscess or perforation  -as the patient has had 3 episodes of diverticulitis and less than a year, 1 of which was complicated, would recommend that she follow up with a surgeon as an outpatient to discuss elective sigmoidectomy  She will likely need a repeat colonoscopy prior to this if she were to pursue surgery  This can be done as an outpatient in 6 to 8 weeks    -------------------------------------------------------------------------------------------------------------------    HPI:  This is a 81-year-old female with a history of fibroids, asthma, and seasonal allergies who presented to the emergency room due to left lower quadrant abdominal pain    She was diagnosed with acute diverticulitis via imaging  Her 1st episode was back in July of 2018  At that time showed a small abscess that was not amenable to drainage and cleared up on its own with antibiotics  She underwent a colonoscopy in September 2018 which was normal aside from 1 polyp that was removed and diverticulosis in the sigmoid and descending colons She then had a 2nd presumed episode in November for which she was empirically treated with antibiotics but had no imaging performed  This would be her 3rd episode  She reports that she started to have symptoms last week  Upper she thought it was just her menstrual period within the symptoms began to worsen  She reports some nausea yesterday but no vomiting  She was eating normally up until her admission  She reports that her last bowel movement was yesterday and was normal   She denies any blood in the stool or black tarry stool  She denies any significant diarrhea or constipation  She reports that she typically has hot flashes at home however she was having some chills over the last few days prior to admission  No unexplained weight loss    REVIEW OF SYSTEMS:    CONSTITUTIONAL: Denies any rigors  +fever, chills, Good appetite, and no recent weight loss  HEENT: No earache or tinnitus  Denies hearing loss or visual disturbances  CARDIOVASCULAR: No chest pain or palpitations  RESPIRATORY: Denies any cough, hemoptysis, shortness of breath or dyspnea on exertion  GASTROINTESTINAL: As noted in the History of Present Illness  GENITOURINARY: No problems with urination  Denies any hematuria or dysuria  NEUROLOGIC: No dizziness or vertigo, denies headaches  MUSCULOSKELETAL: Denies any muscle or joint pain  SKIN: Denies skin rashes or itching  ENDOCRINE: Denies excessive thirst  Denies intolerance to heat or cold  Hot flashes  PSYCHOSOCIAL: Denies depression or anxiety  Denies any recent memory loss         Historical Information   Past Medical History:   Diagnosis Date    Allergic rhinitis     Asthma     Fibroids     Physiological ovarian cysts     Sleep apnea     denatl device worn     Past Surgical History:   Procedure Laterality Date    COLONOSCOPY  2016    dr Hernandez Reveal ARTHROSCOPY Left 2018    meniscectomy x2, first done in 2015    MA COLONOSCOPY FLX DX W/COLLJ SPEC WHEN PFRMD N/A 9/13/2018    Procedure: COLONOSCOPY;  Surgeon: Jimmy Wu MD;  Location: [place] [address] [facility] LAB;   Service: Gastroenterology    WRIST SURGERY Right      Social History   Social History     Substance and Sexual Activity   Alcohol Use Yes    Comment: social - wine 2 x month     Social History     Substance and Sexual Activity   Drug Use No     Social History     Tobacco Use   Smoking Status Never Smoker   Smokeless Tobacco Never Used     Family History   Problem Relation Age of Onset    Osteoporosis Mother     Thyroid disease Mother    Ashley Kiser Colon cancer Mother     Colon polyps Mother    Ashley Kiser Cancer Mother         colon    Alzheimer's disease Father     Hypertension Father     Dementia Father     Heart disease Brother     No Known Problems Daughter     No Known Problems Daughter        Meds/Allergies     Medications Prior to Admission   Medication    acyclovir (ZOVIRAX) 200 mg capsule    albuterol (2 5 mg/3 mL) 0 083 % nebulizer solution    albuterol (VENTOLIN HFA) 90 mcg/act inhaler    fluticasone-salmeterol (ADVAIR DISKUS) 500-50 mcg/dose    hydrOXYzine pamoate (VISTARIL) 50 mg capsule    multivitamin (THERAGRAN) TABS    Respiratory Therapy Supplies (NEBULIZER/TUBING/MOUTHPIECE) KIT    triamcinolone (KENALOG) 0 1 % cream     Current Facility-Administered Medications   Medication Dose Route Frequency    acetaminophen (TYLENOL) tablet 650 mg  650 mg Oral Q6H PRN    albuterol inhalation solution 2 5 mg  2 5 mg Nebulization Q4H PRN    cefTRIAXone (ROCEPHIN) IVPB (premix) 1,000 mg  1,000 mg Intravenous Q24H    dextrose 5 % and sodium chloride 0 9 % infusion  75 mL/hr Intravenous Continuous    enoxaparin (LOVENOX) subcutaneous injection 40 mg  40 mg Subcutaneous Daily    fluticasone-vilanterol (BREO ELLIPTA) 200-25 MCG/INH inhaler 1 puff  1 puff Inhalation Daily    hydrOXYzine HCL (ATARAX) tablet 25 mg  25 mg Oral Q6H PRN    metroNIDAZOLE (FLAGYL) IVPB (premix) 500 mg  500 mg Intravenous Q8H    morphine injection 2 mg  2 mg Intravenous Q3H PRN    ondansetron (ZOFRAN) injection 4 mg  4 mg Intravenous Q6H PRN    pantoprazole (PROTONIX) EC tablet 40 mg  40 mg Oral Early Morning    pneumococcal 13-valent conjugate vaccine (PREVNAR-13) IM injection 0 5 mL  0 5 mL Intramuscular Prior to discharge       No Known Allergies        Objective     Blood pressure 116/76, pulse 77, temperature 98 1 °F (36 7 °C), temperature source Oral, resp  rate 16, height 5' 3" (1 6 m), weight 102 kg (223 lb 15 8 oz), last menstrual period 05/14/2019, SpO2 95 %, not currently breastfeeding  Intake/Output Summary (Last 24 hours) at 7/22/2019 1050  Last data filed at 7/22/2019 0901  Gross per 24 hour   Intake 1000 ml   Output 150 ml   Net 850 ml         PHYSICAL EXAM:      General Appearance:   Alert, cooperative, no distress, appears stated age    HEENT:   Normocephalic, atraumatic, anicteric, no oropharyngeal thrush present      Neck:  Supple, symmetrical, trachea midline, no adenopathy;    thyroid: no enlargement/tenderness/nodules; no carotid  bruit or JVD    Lungs:   Clear to auscultation bilaterally; no rales, rhonchi or wheezing; respirations unlabored    Heart[de-identified]   S1 and S2 normal; regular rate and rhythm; no murmur, rub, or gallop     Abdomen:   Soft, tenderness to palaption in the LLQ with guarding, non-distended; normal bowel sounds; no masses, no organomegaly    Genitalia:   Deferred    Rectal:   Deferred    Extremities:  No cyanosis, clubbing or edema    Pulses:  2+ and symmetric all extremities    Skin:  Skin color, texture, turgor normal, no rashes or lesions    Lymph nodes:  No palpable cervical, axillary or inguinal lymphadenopathy        Lab Results:   Results from last 7 days   Lab Units 07/22/19  0517   WBC Thousand/uL 7 04   HEMOGLOBIN g/dL 12 3   HEMATOCRIT % 37 4   PLATELETS Thousands/uL 229   NEUTROS PCT % 74   LYMPHS PCT % 19   MONOS PCT % 7   EOS PCT % 0     Results from last 7 days   Lab Units 07/22/19  0517 07/21/19  1101   POTASSIUM mmol/L 3 7 4 0   CHLORIDE mmol/L 104 99*   CO2 mmol/L 25 24   BUN mg/dL 9 15   CREATININE mg/dL 0 85 0 98   CALCIUM mg/dL 8 1* 8 9   ALK PHOS U/L  --  96   ALT U/L  --  23   AST U/L  --  12               Imaging Studies: I have personally reviewed pertinent imaging studies  Ct Abdomen Pelvis With Contrast    Addendum Date: 7/21/2019    ADDENDUM: ADDENDUM There is a voice recognition software related error in the IMPRESSION of the report  A phrase which reads " I personally discussed this study with Yamilet Rodriguez on 7/21/2019 at 12:11 PM  " should read, " The study was marked in [facility] for immediate notification  I personally discussed this study with Yamilet Rodriguez on 7/21/2019 at 12:45 PM "  Result Date: 7/21/2019  Impression: 1  Abnormal appearance of the distal descending colon/proximal sigmoid colon, most compatible with acute, mildly complicated diverticulitis  A follow-up CT scan or colonoscopy, following a course of antibiotic therapy, is recommended, given that inflammatory colonic neoplasm can have a similar appearance  2   Enlarged myomatous uterus  3   Hiatal hernia  I personally discussed this study with Yamilet Rodriguez on 7/21/2019 at 12:11 PM  Workstation performed: [id number]           Patient was seen and examined by Dr Alvaro Betts  All rodriguez medical decisions were made by Dr Alvaro Betts  Thank you for allowing us to participate in the care of this present patient  We will follow-up with you closely

## 2019-07-22 NOTE — UTILIZATION REVIEW
Initial Clinical Review  Admission: Date/Time/Statement: 7/21/19 @ 1314   Orders Placed This Encounter   Procedures    Inpatient Admission     Standing Status:   Standing     Number of Occurrences:   1     Order Specific Question:   Admitting Physician     Answer:   Arley Marvin     Order Specific Question:   Level of Care     Answer:   Med Surg [16]     Order Specific Question:   Estimated length of stay     Answer:   More than 2 Midnights     Order Specific Question:   Certification     Answer:   I certify that inpatient services are medically necessary for this patient for a duration of greater than two midnights  See H&P and MD Progress Notes for additional information about the patient's course of treatment  ED Arrival Information     Expected Arrival Acuity Means of Arrival Escorted By Service Admission Type    - 7/21/2019 09:59 Urgent Walk-In Family Member General Medicine Urgent    Arrival Complaint    -        Chief Complaint   Patient presents with    Abdominal Pain     low left abdominal pain for4 days     Assessment/Plan:   48 YO FEMALE AMBULATORY TO ER FROM HOME C/O LLQ ABD PAIN X 4 DAYS WITH ASSOCIATED NAUSEA  HX DIVERTICULITIS, ASTHMA, OBESITY  PRESENTS FEBRILE & TACHYCARDIC WITH ABD PAIN, LLQ TENDERNESS & GUARDING, NAUSEA, LOW BACK PAIN  ADMISSION WORK-UP SHOWING LEUKOCYTOSIS, CT +DIVERTICULITIS  ADMITTED TO INPATIENT STATUS FOR ACUTE DIVERTICULITIS, STARTED ON DOUBLE IVABT, IVF, GI CONSULTED     ED Triage Vitals   Temperature Pulse Respirations Blood Pressure SpO2   07/21/19 1010 07/21/19 1010 07/21/19 1010 07/21/19 1010 07/21/19 1010   99 7 °F (37 6 °C) (!) 109 22 134/66 100 %      Temp Source Heart Rate Source Patient Position - Orthostatic VS BP Location FiO2 (%)   07/21/19 1245 07/21/19 1245 07/21/19 1102 07/21/19 1102 --   Tympanic Monitor Lying Right arm       Pain Score       07/21/19 1010       9        Wt Readings from Last 1 Encounters:   07/21/19 102 kg (223 lb 15 8 oz) Additional Vital Signs:   07/21/19 2334  99 7 °F (37 6 °C)  83  18  116/76  89 %Abnormal   None (Room air)  --   07/21/19 2039  --  74  16  --  93 %  None (Room air)  --   07/21/19 2020  99 7 °F (37 6 °C)  83  19  111/73  91 %  None (Room air)  --   07/21/19 1348  97 8 °F (36 6 °C)  88  20  161/96  95 %  None (Room air)  Lying   07/21/19 1345  97 9 °F (36 6 °C)  86  20  161/96  95 %  --  --   07/21/19 1324  --  88  20  138/87  95 %  None (Room air)  Lying   07/21/19 1245  100 6 °F (38 1 °C)Abnormal   90  20  134/82  95 %  None (Room air)  Lying   Pertinent Labs/Diagnostic Test Results:   Results from last 7 days   Lab Units 07/22/19  0517 07/21/19  1101   WBC Thousand/uL 7 04 12 41*   HEMOGLOBIN g/dL 12 3 14 1   HEMATOCRIT % 37 4 42 6   PLATELETS Thousands/uL 229 298   NEUTROS ABS Thousands/µL 5 13 10 00*     Results from last 7 days   Lab Units 07/21/19  1101   SODIUM mmol/L 136   POTASSIUM mmol/L 4 0   CHLORIDE mmol/L 99*   CO2 mmol/L 24   ANION GAP mmol/L 13   BUN mg/dL 15   CREATININE mg/dL 0 98   EGFR ml/min/1 73sq m 66   CALCIUM mg/dL 8 9     Results from last 7 days   Lab Units 07/21/19  1101   AST U/L 12   ALT U/L 23   ALK PHOS U/L 96   TOTAL PROTEIN g/dL 7 9   ALBUMIN g/dL 3 7   TOTAL BILIRUBIN mg/dL 0 60     Results from last 7 days   Lab Units 07/21/19  1101   GLUCOSE RANDOM mg/dL 157*     Results from last 7 days   Lab Units 07/21/19  1101   LACTIC ACID mmol/L 2 0     Results from last 7 days   Lab Units 07/21/19  1205   CLARITY UA  Clear   COLOR UA  Light Yellow   SPEC GRAV UA  <=1 005   PH UA  6 5   GLUCOSE UA mg/dl Negative   KETONES UA mg/dl Negative   BLOOD UA  Trace-Intact*   PROTEIN UA mg/dl Negative   NITRITE UA  Negative   BILIRUBIN UA  Negative   UROBILINOGEN UA E U /dl 0 2   LEUKOCYTES UA  Negative   WBC UA /hpf 1-2*   RBC UA /hpf 0-1*   BACTERIA UA /hpf None Seen   EPITHELIAL CELLS WET PREP /hpf Occasional   CT A/P=1    Abnormal appearance of the distal descending colon/proximal sigmoid colon, most compatible with acute, mildly complicated diverticulitis  A follow-up CT scan or colonoscopy, following a course of antibiotic therapy, is recommended, given that inflammatory colonic neoplasm can have a similar appearance  2   Enlarged myomatous uterus  3   Hiatal hernia    ED Treatment:   Medication Administration from 07/21/2019 0959 to 07/21/2019 1339       Date/Time Order Dose Route Action     07/21/2019 1055 sodium chloride 0 9 % bolus 1,000 mL 1,000 mL Intravenous New Bag     07/21/2019 1055 ondansetron (ZOFRAN) injection 4 mg 4 mg Intravenous Given     07/21/2019 1056 morphine (PF) 4 mg/mL injection 4 mg 2 mg Intravenous Given     07/21/2019 1148 iohexol (OMNIPAQUE) 350 MG/ML injection (MULTI-DOSE) 100 mL 100 mL Intravenous Given     07/21/2019 1247 metroNIDAZOLE (FLAGYL) tablet 500 mg 500 mg Oral Given     07/21/2019 1247 ciprofloxacin (CIPRO) tablet 500 mg 500 mg Oral Given     07/21/2019 1305 acetaminophen (TYLENOL) tablet 650 mg 650 mg Oral Given        Past Medical History:   Diagnosis Date    Allergic rhinitis     Asthma     Fibroids     Physiological ovarian cysts     Sleep apnea     denatl device worn     Present on Admission:   Asthma  Admitting Diagnosis: Diverticulitis [K57 92]  Abdominal pain [R10 9]  Age/Sex: 49 y o  female  Admission Orders:  MED SURG  NPO  CONSULT GI  INCENTIVE SPIROMETRY  Current Facility-Administered Medications:  acetaminophen 650 mg Oral Q6H PRN   albuterol 2 5 mg Nebulization Q4H PRN   cefTRIAXone 1,000 mg Intravenous Q24H   dextrose 5 % and sodium chloride 0 9 % 75 mL/hr Intravenous Continuous   enoxaparin 40 mg Subcutaneous Daily   fluticasone-vilanterol 1 puff Inhalation Daily   hydrOXYzine HCL 25 mg Oral Q6H PRN   metroNIDAZOLE 500 mg Intravenous Q8H   morphine injection 2 mg Intravenous Q3H PRN   ondansetron 4 mg Intravenous Q6H PRN   pantoprazole 40 mg Oral Early Morning   pneumococcal 13-valent conjugate vaccine 0 5 mL Intramuscular Prior to discharge     Network Utilization Review Department  Phone: [phone]; Fax [phone]  [email or web address]  ATTENTION: Please call with any questions or concerns to [phone]  and carefully listen to the prompts so that you are directed to the right person  Send all requests for admission clinical reviews, approved or denied determinations and any other requests to fax [phone]   All voicemails are confidential

## 2019-07-22 NOTE — PLAN OF CARE
Problem: PAIN - ADULT  Goal: Verbalizes/displays adequate comfort level or baseline comfort level  Description  Interventions:  - Encourage patient to monitor pain and request assistance  - Assess pain using appropriate pain scale  - Administer analgesics based on type and severity of pain and evaluate response  - Implement non-pharmacological measures as appropriate and evaluate response  - Consider cultural and social influences on pain and pain management  - Notify physician/advanced practitioner if interventions unsuccessful or patient reports new pain  7/22/2019 0914 by Nancie Stewart RN  Outcome: Progressing  7/22/2019 0913 by Nancie Stewart RN  Outcome: Progressing     Problem: INFECTION - ADULT  Goal: Absence or prevention of progression during hospitalization  Description  INTERVENTIONS:  - Assess and monitor for signs and symptoms of infection  - Monitor lab/diagnostic results  - Monitor all insertion sites  - Monitor secretions for changes in amount and color  - [place] appropriate cooling/warming therapies per order  - Administer medications as ordered  - Instruct and encourage patient and family to use good hand hygiene technique  - Identify and instruct in appropriate isolation precautions for identified infection/condition   7/22/2019 0914 by Nancie Stewart RN  Outcome: Progressing  7/22/2019 0913 by Nancie Stewart RN  Outcome: Progressing     Problem: SAFETY ADULT  Goal: Patient will remain free of falls  Description  INTERVENTIONS:  - Assess patient frequently for physical needs  -  Identify cognitive and physical deficits and behaviors that affect risk of falls    -  [place] fall precautions as indicated by assessment   - Educate patient/family on patient safety including physical limitations  - Instruct patient to call for assistance with activity based on assessment  - Modify environment to reduce risk of injury  - Consider OT/PT consult to assist with strengthening/mobility  7/22/2019 0914 by Ronald Recinos RN  Outcome: Progressing  7/22/2019 0913 by Ronald Recinos RN  Outcome: Progressing     Problem: DISCHARGE PLANNING  Goal: Discharge to home or other facility with appropriate resources  Description  INTERVENTIONS:  - Identify barriers to discharge w/patient and caregiver  - Arrange for needed discharge resources and transportation as appropriate  - Identify discharge learning needs (meds, wound care, etc )  - Refer to Case Management Department for coordinating discharge planning if the patient needs post-hospital services based on physician/advanced practitioner order or complex needs related to functional status, cognitive ability, or social support system   7/22/2019 0914 by Ronald Recinos RN  Outcome: Progressing  7/22/2019 0913 by Ronald Recinos RN  Outcome: Progressing     Problem: Knowledge Deficit  Goal: Patient/family/caregiver demonstrates understanding of disease process, treatment plan, medications, and discharge instructions  Description  Complete learning assessment and assess knowledge base    Interventions:  - Provide teaching at level of understanding  - Provide teaching via preferred learning methods  7/22/2019 0914 by Ronald Recinos RN  Outcome: Progressing  7/22/2019 0913 by Ronald Recinos RN  Outcome: Progressing

## 2019-07-22 NOTE — ASSESSMENT & PLAN NOTE
POA - as evidenced by tachycardia, leukocytosis, fever in the setting of diverticulitis  Ua bland   No signs of PNA  · As noted above, patient was on IV Rocephin and Flagyl  · Signs of sepsis resolved  · Blood culture negative so far

## 2019-07-22 NOTE — ASSESSMENT & PLAN NOTE
Patient with recurrent diverticulitis  CT abdomen/pelvis: Abnormal appearance of the distal descending colon/proximal sigmoid colon, most compatible with acute, mildly complicated diverticulitis  · She was on IV Rocephin and Flagyl with improvement in her  Symptoms and transitioned to Flagyl and cefuroxime on discharge  · Diet was advanced as tolerated  · She was on IV hydration    Continue Protonix   · Patient had a colonoscopy in September 2018 which showed polyps, internal hemorrhoids and multiple diverticula in sigmoid colon  · Pain control with IV Morphine PRN and Antiemetics with IV Zofran PRN while here  · Follow up with GI after discharge

## 2019-07-22 NOTE — APP STUDENT NOTE
Progress Note - Arianna Jose 49 y o  female MRN: [id number]    Unit/Bed#: [address] Encounter: [id number]      Assessment/Plan:  1  Acute diverticulitis, complicated  · Patient presented with LLQ abdominal pain x 4 days, leukocytosis, and fever x 1day  · Patient has history of diverticulitis in sigmoid colon, was hospitalized last year  · CT abdomen/ pelvis showed abnormal appearance of the distal descending colon/proximal sigmoid colon, most compatible with acute, mildly complicated diverticulitis  · Patient reports improved 5/10 LLQ pain today, and denies N/V/D  · Patient's WBC trending down to 7 04  · No fevers, chills today  · Patient will transition to liquid diet for lunch, assess for worsening abdominal pain and N/V; if she tolerates, can advance to regular diet tomorrow  · Continue on IV Ceftriaxone and flagyl    2  Sepsis  · POA- Patient was tachycardic, tachypneic, and had leukocytosis   · Due to diverticulitis  · After IV hydration and IV antibiotics, patient has stable vitals and WBC has trended down to 7 04 today  · Blood cultures pending  · Antipyretics as needed    3  Uterine myoma  · Enlarged myomatous uterus on CT  · Known finding  · Follow with outpatient   · Repeat pelvic ultrasound    4  Hiatal hernia  · Dietary and lifestyle modifications  · Follow with outpatient    5  Asthma  · Controlled  · Continue asthma management plan: Breo and albuterol PRN    Subjective:   Patient reports no significant events overnight  She states LLQ pain has improved today, rates it 5/10  Denies N/V/D  She has not had bowel movement since yesterday morning  She notes a mild headache this morning  Objective:     Vitals: Blood pressure 116/76, pulse 77, temperature 98 1 °F (36 7 °C), temperature source Oral, resp  rate 16, height 5' 3" (1 6 m), weight 102 kg (223 lb 15 8 oz), last menstrual period 05/14/2019, SpO2 95 %, not currently breastfeeding  ,Body mass index is 39 68 kg/m²        Intake/Output Summary (Last 24 hours) at 7/22/2019 0951  Last data filed at 7/22/2019 0901  Gross per 24 hour   Intake 1000 ml   Output 150 ml   Net 850 ml       Physical Exam: General appearance: alert and oriented, in no acute distress  Head: Normocephalic, without obvious abnormality, atraumatic  Lungs: clear to auscultation bilaterally  Heart: regular rate and rhythm  Abdomen: Soft, non-distended, positive bowel sounds, tender in LLQ   Extremities: extremities normal, warm and well-perfused; no cyanosis, clubbing, or edema     Invasive Devices     Peripheral Intravenous Line            Peripheral IV 07/21/19 Left Antecubital less than 1 day                Lab, Imaging and other studies: I have personally reviewed pertinent reports      VTE Pharmacologic Prophylaxis: Sequential compression device (Venodyne)   VTE Mechanical Prophylaxis: sequential compression device

## 2019-07-22 NOTE — ASSESSMENT & PLAN NOTE
Patient is aware of this finding  CT abd/pelvis: 'Enlarged myomatous uterus  Uterus is enlarged and heterogeneous in CT density  There are numerous fibroids  Transvaginal US in July 2018: '1   Enlarged fibroid uterus  2   Normal right ovary  3   Complex cyst in the left ovary  Recommend follow-up ultrasound in 6-12 weeks  4   Prominent vessels in the left adnexal region, correlate for pelvic congestion syndrome '  · Outpatient follow-up with gyn

## 2019-07-23 VITALS
HEIGHT: 63 IN | WEIGHT: 223.99 LBS | HEART RATE: 105 BPM | SYSTOLIC BLOOD PRESSURE: 113 MMHG | RESPIRATION RATE: 16 BRPM | OXYGEN SATURATION: 96 % | BODY MASS INDEX: 39.69 KG/M2 | DIASTOLIC BLOOD PRESSURE: 66 MMHG | TEMPERATURE: 97.8 F

## 2019-07-23 PROBLEM — A41.9 SEPSIS (HCC): Status: RESOLVED | Noted: 2019-07-21 | Resolved: 2019-07-23

## 2019-07-23 LAB
ANION GAP SERPL CALCULATED.3IONS-SCNC: 9 MMOL/L (ref 4–13)
BUN SERPL-MCNC: 7 MG/DL (ref 5–25)
CALCIUM SERPL-MCNC: 8.4 MG/DL (ref 8.3–10.1)
CHLORIDE SERPL-SCNC: 105 MMOL/L (ref 100–108)
CO2 SERPL-SCNC: 25 MMOL/L (ref 21–32)
CREAT SERPL-MCNC: 0.77 MG/DL (ref 0.6–1.3)
GFR SERPL CREATININE-BSD FRML MDRD: 88 ML/MIN/1.73SQ M
GLUCOSE SERPL-MCNC: 101 MG/DL (ref 65–140)
MRSA NOSE QL CULT: NORMAL
POTASSIUM SERPL-SCNC: 3.7 MMOL/L (ref 3.5–5.3)
SODIUM SERPL-SCNC: 139 MMOL/L (ref 136–145)

## 2019-07-23 PROCEDURE — 90670 PCV13 VACCINE IM: CPT | Performed by: NURSE PRACTITIONER

## 2019-07-23 PROCEDURE — 99239 HOSP IP/OBS DSCHRG MGMT >30: CPT | Performed by: FAMILY MEDICINE

## 2019-07-23 PROCEDURE — 80048 BASIC METABOLIC PNL TOTAL CA: CPT | Performed by: FAMILY MEDICINE

## 2019-07-23 PROCEDURE — 94760 N-INVAS EAR/PLS OXIMETRY 1: CPT

## 2019-07-23 RX ORDER — PANTOPRAZOLE SODIUM 40 MG/1
40 TABLET, DELAYED RELEASE ORAL
Qty: 30 TABLET | Refills: 0 | Status: SHIPPED | OUTPATIENT
Start: 2019-07-24 | End: 2021-10-06

## 2019-07-23 RX ORDER — METRONIDAZOLE 500 MG/1
500 TABLET ORAL EVERY 8 HOURS SCHEDULED
Qty: 23 TABLET | Refills: 0 | Status: SHIPPED | OUTPATIENT
Start: 2019-07-23 | End: 2019-07-31

## 2019-07-23 RX ORDER — CEFUROXIME AXETIL 500 MG/1
500 TABLET ORAL EVERY 12 HOURS SCHEDULED
Qty: 16 TABLET | Refills: 0 | Status: SHIPPED | OUTPATIENT
Start: 2019-07-23 | End: 2019-07-31

## 2019-07-23 RX ADMIN — PANTOPRAZOLE SODIUM 40 MG: 40 TABLET, DELAYED RELEASE ORAL at 06:04

## 2019-07-23 RX ADMIN — METRONIDAZOLE 500 MG: 500 INJECTION, SOLUTION INTRAVENOUS at 13:41

## 2019-07-23 RX ADMIN — ENOXAPARIN SODIUM 40 MG: 40 INJECTION SUBCUTANEOUS at 09:29

## 2019-07-23 RX ADMIN — CEFTRIAXONE 1000 MG: 1 INJECTION, SOLUTION INTRAVENOUS at 15:52

## 2019-07-23 RX ADMIN — FLUTICASONE FUROATE AND VILANTEROL TRIFENATATE 1 PUFF: 200; 25 POWDER RESPIRATORY (INHALATION) at 09:30

## 2019-07-23 RX ADMIN — PNEUMOCOCCAL 13-VALENT CONJUGATE VACCINE 0.5 ML: 2.2; 2.2; 2.2; 2.2; 2.2; 4.4; 2.2; 2.2; 2.2; 2.2; 2.2; 2.2; 2.2 INJECTION, SUSPENSION INTRAMUSCULAR at 15:54

## 2019-07-23 RX ADMIN — DEXTROSE AND SODIUM CHLORIDE 50 ML/HR: 5; .9 INJECTION, SOLUTION INTRAVENOUS at 01:09

## 2019-07-23 RX ADMIN — METRONIDAZOLE 500 MG: 500 INJECTION, SOLUTION INTRAVENOUS at 06:04

## 2019-07-23 NOTE — DISCHARGE SUMMARY
Discharge Summary - [address] [facility]    Patient Information: Lisa Frye 49 y o  female MRN: [id number]  Unit/Bed#: [facility] Encounter: [id number]    Discharging Physician / Practitioner: Awa Stroud DO  PCP: Norma Wheatley DO  Admission Date: 7/21/2019  Discharge Date: 07/23/19    Reason for Admission: Abdominal Pain (low left abdominal pain for4 days)      Discharge Diagnoses:     Principal Problem:    Acute diverticulitis  Active Problems:    Asthma    Uterine myoma    Hiatal hernia  Resolved Problems:    Sepsis ([address] )        * Acute diverticulitis  Assessment & Plan  Patient with recurrent diverticulitis  CT abdomen/pelvis: Abnormal appearance of the distal descending colon/proximal sigmoid colon, most compatible with acute, mildly complicated diverticulitis  · She was on IV Rocephin and Flagyl with improvement in her  Symptoms and transitioned to Flagyl and cefuroxime on discharge  · Diet was advanced as tolerated  · She was on IV hydration  Continue Protonix   · Patient had a colonoscopy in September 2018 which showed polyps, internal hemorrhoids and multiple diverticula in sigmoid colon  · Pain control with IV Morphine PRN and Antiemetics with IV Zofran PRN while here  · Follow up with GI after discharge    Sepsis (HCC)-resolved as of 7/23/2019  Assessment & Plan  POA - as evidenced by tachycardia, leukocytosis, fever in the setting of diverticulitis  Ua bland  No signs of PNA  · As noted above, patient was on IV Rocephin and Flagyl  · Signs of sepsis resolved  · Blood culture negative so far    Asthma  Assessment & Plan  Stable  Continue Advair and Albuterol PRN    Hiatal hernia  Assessment & Plan  On Protonix  Uterine myoma  Assessment & Plan  Patient is aware of this finding  CT abd/pelvis: 'Enlarged myomatous uterus  Uterus is enlarged and heterogeneous in CT density  There are numerous fibroids  Transvaginal US in July 2018: '1   Enlarged fibroid uterus   2   Normal right ovary  3   Complex cyst in the left ovary  Recommend follow-up ultrasound in 6-12 weeks  4   Prominent vessels in the left adnexal region, correlate for pelvic congestion syndrome '  · Outpatient follow-up with gyn  Consultations During Hospital Stay:  IP CONSULT TO GASTROENTEROLOGY    Procedures Performed:     · None    Significant Findings:     · Refer to hospital course and above listed diagnosis related plan for details    Imaging while in hospital:    Ct Abdomen Pelvis With Contrast    Addendum Date: 7/21/2019 Addendum:   ADDENDUM: ADDENDUM There is a voice recognition software related error in the IMPRESSION of the report  A phrase which reads " I personally discussed this study with Verónica Wise on 7/21/2019 at 12:11 PM  " should read, " The study was marked in [facility] for immediate notification  I personally discussed this study with Verónica Wise on 7/21/2019 at 12:45 PM "  Result Date: 7/21/2019  Narrative: CT ABDOMEN AND PELVIS WITH IV CONTRAST INDICATION:   LLQ pain, suspect diverticulitis  COMPARISON:  CT abdomen pelvis December 2, 2018 TECHNIQUE:  CT examination of the abdomen and pelvis was performed  Axial, sagittal, and coronal 2D reformatted images were created from the source data and submitted for interpretation  Radiation dose length product (DLP) for this visit:  657 04 mGy-cm   This examination, like all CT scans performed in the [facility], was performed utilizing techniques to minimize radiation dose exposure, including the use of iterative  reconstruction and automated exposure control  IV Contrast:  100 mL of iohexol (OMNIPAQUE) Enteric Contrast:  Enteric contrast was not administered  FINDINGS: ABDOMEN LOWER CHEST:  Dependent atelectasis in the lower lobes of the lungs bilaterally  LIVER/BILIARY TREE:  Enlarged with fatty infiltrative changes  GALLBLADDER:  No calcified gallstones  No pericholecystic inflammatory change  SPLEEN:  Unremarkable  PANCREAS:  Unremarkable  ADRENAL GLANDS:  Unremarkable  KIDNEYS/URETERS:  Unremarkable  No hydronephrosis  STOMACH AND BOWEL:  Stomach relatively decompressed  Hiatal hernia  No evidence of small bowel obstruction  Normal fecal burden throughout the colon  Scattered diverticula throughout the entire colon, most pronounced in the sigmoid colon  Sigmoid colon redundant and tortuous  There are moderate pericolonic inflammatory changes in the distal descending colon/proximal sigmoid colon (series 601, image 65)  Small amount of fluid in the left paracolic gutter  No focal collections to suggest an abscess at this time  No shruthi perforation  Findings most compatible with acute, mildly complicated diverticulitis  APPENDIX:  No findings to suggest appendicitis  ABDOMINOPELVIC CAVITY:  No ascites or free intraperitoneal air  No lymphadenopathy  VESSELS:  Unremarkable for patient's age  PELVIS REPRODUCTIVE ORGANS:  Uterus is enlarged and heterogeneous in CT density  There are numerous fibroids  Findings are similar to the prior study  URINARY BLADDER:  Distended  ABDOMINAL WALL/INGUINAL REGIONS:  Small bilateral inguinal hernias containing fat  OSSEOUS STRUCTURES:  No acute fracture or destructive osseous lesion  Degenerative changes lumbar spine  Impression: 1  Abnormal appearance of the distal descending colon/proximal sigmoid colon, most compatible with acute, mildly complicated diverticulitis  A follow-up CT scan or colonoscopy, following a course of antibiotic therapy, is recommended, given that inflammatory colonic neoplasm can have a similar appearance  2   Enlarged myomatous uterus  3   Hiatal hernia    I personally discussed this study with Jocelyn Mccracken on 7/21/2019 at 12:11 PM  Workstation performed: [id number]       Incidental Findings:   · Hiatal hernia  · Enlarged myomatous uterus    Test Results Pending at Discharge (will require follow up):   · As per After Visit Summary     Outpatient Tests Requested:  · none    Complications:  Refer to hospital course and above listed diagnosis related plan, if any    Hospital Course:     Ced Omalley is a 49 y o  female patient who originally presented to the hospital on 7/21/2019 due to left lower quadrant abdominal pain x4 days  Pain was sharp in nature and was 8-10/10 in intensity associated with nausea but no vomiting, diarrhea  Last BM was on day of admission and it was normal   CT scan showed acute diverticulitis and patient was admitted  Patient started on IV antibiotics and seen by GI while here  As her symptoms improved, diet was advanced and by day of discharge, patient was tolerating solids  Patient transitioned to oral antibiotics on discharge  She was cleared by GI prior to discharge  Please see above list of diagnoses and related plan for additional information  Condition at Discharge: stable     Discharge Day Visit / Exam:     Subjective:  Reports improvement in her abdominal pain and wants to go home  Tolerating oral intake    Vitals: Blood Pressure: 113/66 (07/23/19 1400)  Pulse: 105 (07/23/19 1400)  Temperature: 97 8 °F (36 6 °C) (07/23/19 1400)  Temp Source: Oral (07/23/19 1400)  Respirations: 16 (07/23/19 1400)  Height: 5' 3" (160 cm) (07/21/19 1348)  Weight - Scale: 102 kg (223 lb 15 8 oz) (07/21/19 1348)  SpO2: 96 % (07/23/19 1410)  Exam:   Physical Exam   Constitutional: She is oriented to person, place, and time  She appears well-developed and well-nourished  No distress  HENT:   Head: Normocephalic and atraumatic  Mouth/Throat: Oropharynx is clear and moist    Eyes: EOM are normal  Right eye exhibits no discharge  Left eye exhibits no discharge  No scleral icterus  Neck: Neck supple  Cardiovascular: Normal rate and regular rhythm  Pulmonary/Chest: Effort normal and breath sounds normal  No respiratory distress  She has no wheezes  She has no rales  Abdominal: Soft   Bowel sounds are normal  She exhibits no distension  There is tenderness (mild LLQ)  Musculoskeletal: She exhibits no edema  Neurological: She is alert and oriented to person, place, and time  No cranial nerve deficit  Skin: Skin is dry  She is not diaphoretic  Discharge instructions/Information to patient and family:(Discharge Medications and Follow up):   See after visit summary for information provided to patient and family  Provisions for Follow-Up Care:  See after visit summary for information related to follow-up care and any pertinent home health orders  Disposition: Home    Planned Readmission:  No     Discharge Statement:  I spent > 30 minutes discharging the patient  This time was spent on the day of discharge  I had direct contact with the patient on the day of discharge  Greater than 50% of the total time was spent examining patient, answering all patient questions, arranging and discussing plan of care with patient as well as directly providing post-discharge instructions  Additional time then spent on discharge activities  Discharge Medications:  See after visit summary for reconciled discharge medications provided to patient and family  ** Please Note: "This note has been constructed using a voice recognition system  Therefore there may be syntax, spelling, and/or grammatical errors   Please call if you have any questions  "**

## 2019-07-23 NOTE — NURSING NOTE
Discharge instructions discussed with patient, all questions answered  Education on low fiber/low residue fiber provided  Patient left 2S stable, all personal at side

## 2019-07-23 NOTE — APP STUDENT NOTE
Progress Note - Tanner Godinez 49 y o  female MRN: [id number]    Unit/Bed#: [address] Encounter: [id number]      Assessment:  1  Acute diverticulitis, mildly complicated  · Patient presented with LLQ abdominal pain x 4 days, leukocytosis, and fever x 1day  · Patient has history of diverticulitis in sigmoid colon, was hospitalized last year  · CT abdomen/ pelvis showed abnormal appearance of the distal descending colon/proximal sigmoid colon, most compatible with acute, mildly complicated diverticulitis  · Patient reports improved 4 /10 LLQ pain today, and denies N/V/D  · Patient's WBC trending down to 7 04  · No fevers, chills today  · Patient was able to tolerate solid foods for lunch  · Transition to oral Ceftin and Flagyl  · Discharge later today     2  Sepsis  · POA- Patient was tachycardic, tachypneic, and had leukocytosis   · Due to diverticulitis  · After IV hydration and IV antibiotics, patient has stable vitals and WBC has trended down to 7 04 today  · Blood cultures negative at this time; no growth at 24 hours  · Antipyretics as needed  · Resolved     3  Uterine myoma  · Enlarged myomatous uterus on CT  · Known finding  · Follow with outpatient   · Repeat pelvic ultrasound     4  Hiatal hernia  · Dietary and lifestyle modifications  · Follow with outpatient     5  Asthma  · Controlled  · Continue asthma management plan: Breo and albuterol PRN        Subjective:   Patient reports no significant events overnight  She feels a great improvement with abdominal pain, now at 4/10 when she bends forward but very mild when at rest  She denies N/V/D, but patient has not had a bowel movement since prior to admission  She was able to tolerate the solid foods this morning  Objective:     Vitals: Blood pressure 120/78, pulse 70, temperature 97 9 °F (36 6 °C), temperature source Oral, resp   rate 16, height 5' 3" (1 6 m), weight 102 kg (223 lb 15 8 oz), last menstrual period 05/14/2019, SpO2 95 %, not currently breastfeeding  ,Body mass index is 39 68 kg/m²        Intake/Output Summary (Last 24 hours) at 7/23/2019 0703  Last data filed at 7/23/2019 0737  Gross per 24 hour   Intake --   Output 1200 ml   Net -1200 ml       Physical Exam: General appearance: alert and oriented, in no acute distress  Head: Normocephalic, without obvious abnormality, atraumatic  Lungs: clear to auscultation bilaterally  Heart: regular rate and rhythm and S1, S2 normal  Abdomen: soft, mildly tender at LLQ; bowel sounds normal; no masses,  no organomegaly  Extremities: extremities normal, warm and well-perfused; no cyanosis, clubbing, or edema     Invasive Devices     Peripheral Intravenous Line            Peripheral IV 07/21/19 Left Antecubital 1 day

## 2019-07-23 NOTE — PLAN OF CARE
Problem: PAIN - ADULT  Goal: Verbalizes/displays adequate comfort level or baseline comfort level  Description  Interventions:  - Encourage patient to monitor pain and request assistance  - Assess pain using appropriate pain scale  - Administer analgesics based on type and severity of pain and evaluate response  - Implement non-pharmacological measures as appropriate and evaluate response  - Consider cultural and social influences on pain and pain management  - Notify physician/advanced practitioner if interventions unsuccessful or patient reports new pain  Outcome: Progressing - 4/10 pain level, pain is better as per patient     Problem: INFECTION - ADULT  Goal: Absence or prevention of progression during hospitalization  Description  INTERVENTIONS:  - Assess and monitor for signs and symptoms of infection  - Monitor lab/diagnostic results  - Monitor all insertion sites  - Monitor secretions for changes in amount and color  - [place] appropriate cooling/warming therapies per order  - Administer medications as ordered  - Instruct and encourage patient and family to use good hand hygiene technique  - Identify and instruct in appropriate isolation precautions for identified infection/condition   Outcome: Progressing - on IV antibiotic     Problem: SAFETY ADULT  Goal: Patient will remain free of falls  Description  INTERVENTIONS:  - Assess patient frequently for physical needs  -  Identify cognitive and physical deficits and behaviors that affect risk of falls    -  [place] fall precautions as indicated by assessment   - Educate patient/family on patient safety including physical limitations  - Instruct patient to call for assistance with activity based on assessment  - Modify environment to reduce risk of injury  - Consider OT/PT consult to assist with strengthening/mobility  Outcome: Progressing     Problem: DISCHARGE PLANNING  Goal: Discharge to home or other facility with appropriate resources  Description  INTERVENTIONS:  - Identify barriers to discharge w/patient and caregiver  - Arrange for needed discharge resources and transportation as appropriate  - Identify discharge learning needs (meds, wound care, etc )  - Refer to Case Management Department for coordinating discharge planning if the patient needs post-hospital services based on physician/advanced practitioner order or complex needs related to functional status, cognitive ability, or social support system   Outcome: Progressing     Problem: Knowledge Deficit  Goal: Patient/family/caregiver demonstrates understanding of disease process, treatment plan, medications, and discharge instructions  Description  Complete learning assessment and assess knowledge base    Interventions:  - Provide teaching at level of understanding  - Provide teaching via preferred learning methods  Outcome: Progressing

## 2019-07-23 NOTE — PLAN OF CARE
Problem: PAIN - ADULT  Goal: Verbalizes/displays adequate comfort level or baseline comfort level  Description  Interventions:  - Encourage patient to monitor pain and request assistance  - Assess pain using appropriate pain scale  - Administer analgesics based on type and severity of pain and evaluate response  - Implement non-pharmacological measures as appropriate and evaluate response  - Consider cultural and social influences on pain and pain management  - Notify physician/advanced practitioner if interventions unsuccessful or patient reports new pain  7/23/2019 1635 by Fercho Raya RN  Outcome: Completed  7/23/2019 0737 by Fercho Raya RN  Outcome: Progressing     Problem: INFECTION - ADULT  Goal: Absence or prevention of progression during hospitalization  Description  INTERVENTIONS:  - Assess and monitor for signs and symptoms of infection  - Monitor lab/diagnostic results  - Monitor all insertion sites  - Monitor secretions for changes in amount and color  - [place] appropriate cooling/warming therapies per order  - Administer medications as ordered  - Instruct and encourage patient and family to use good hand hygiene technique  - Identify and instruct in appropriate isolation precautions for identified infection/condition   7/23/2019 1635 by Fercho Raya RN  Outcome: Completed  7/23/2019 0737 by Fercho Raya RN  Outcome: Progressing     Problem: SAFETY ADULT  Goal: Patient will remain free of falls  Description  INTERVENTIONS:  - Assess patient frequently for physical needs  -  Identify cognitive and physical deficits and behaviors that affect risk of falls    -  [place] fall precautions as indicated by assessment   - Educate patient/family on patient safety including physical limitations  - Instruct patient to call for assistance with activity based on assessment  - Modify environment to reduce risk of injury  - Consider OT/PT consult to assist with strengthening/mobility  7/23/2019 1635 by Georgene Nageotte, RN  Outcome: Completed  7/23/2019 0737 by Georgene Nageotte, RN  Outcome: Progressing     Problem: DISCHARGE PLANNING  Goal: Discharge to home or other facility with appropriate resources  Description  INTERVENTIONS:  - Identify barriers to discharge w/patient and caregiver  - Arrange for needed discharge resources and transportation as appropriate  - Identify discharge learning needs (meds, wound care, etc )  - Refer to Case Management Department for coordinating discharge planning if the patient needs post-hospital services based on physician/advanced practitioner order or complex needs related to functional status, cognitive ability, or social support system   7/23/2019 1635 by Georgene Nageotte, RN  Outcome: Completed  7/23/2019 0737 by Georgene Nageotte, RN  Outcome: Progressing     Problem: Knowledge Deficit  Goal: Patient/family/caregiver demonstrates understanding of disease process, treatment plan, medications, and discharge instructions  Description  Complete learning assessment and assess knowledge base    Interventions:  - Provide teaching at level of understanding  - Provide teaching via preferred learning methods  7/23/2019 1635 by Georgene Nageotte, RN  Outcome: Completed  7/23/2019 0737 by Georgene Nageotte, RN  Outcome: Progressing

## 2019-07-23 NOTE — INCIDENTAL FINDINGS
The following findings require follow up:  Radiographic finding  ·  Finding: Hiatal hernia  · Enlarged myomatous uterus    Please notify the following clinician to assist with the follow up:    Your primary care doctor and gynecologist

## 2019-07-23 NOTE — PROGRESS NOTES
Progress Note - Suzanna Mares 49 y o  female MRN: [id number]    Unit/Bed#: [address] [address] Encounter: [id number]    Assessment and Plan:   Principal Problem:    Acute diverticulitis  Active Problems:    Asthma    Sepsis ([address] )    Uterine myoma    Hiatal hernia    #1  Recurrent acute diverticulitis: uncomplicated at this time  Symptoms improving  VSS, afebrile, no WBC elevation  -Advance to lo fiber diet  -Continue lo fiber diet x 4 weeks, then transition to high fiber diet  -No need for repeat colonoscopy at this time as last colonoscopy was done in September 2018    -If patient would have recurrence of complicated diverticulitis or more episodes in the near future, consider surgical referral for elective sigmoidectomy    -Complete course of abx for 10 days    -Outpatient follow up      ----------------------------------------------------------------------------------------------------------------    Subjective:     Pain improving, passing gas but no BM  Tolerated full liquids for breakfast  No vomiting    Objective:     Vitals: Blood pressure 120/78, pulse 70, temperature 97 9 °F (36 6 °C), temperature source Oral, resp  rate 16, height 5' 3" (1 6 m), weight 102 kg (223 lb 15 8 oz), last menstrual period 05/14/2019, SpO2 95 %, not currently breastfeeding  ,Body mass index is 39 68 kg/m²        Intake/Output Summary (Last 24 hours) at 7/23/2019 1033  Last data filed at 7/23/2019 0737  Gross per 24 hour   Intake --   Output 1200 ml   Net -1200 ml       Physical Exam:     General Appearance: Alert, appears stated age and cooperative  Lungs: Clear to auscultation bilaterally, no rales or rhonchi, no labored breathing/accessory muscle use  Heart: Regular rate and rhythm, S1, S2 normal, no murmur, click, rub or gallop  Abdomen: Soft, mild LLQ tenderness to palpation, non-distended; bowel sounds normal; no masses or no organomegaly  Extremities: No cyanosis, clubbing, or edema    Invasive Devices     Peripheral Intravenous Line            Peripheral IV 07/21/19 Left Antecubital 2 days                Lab Results:  Results from last 7 days   Lab Units 07/22/19  0517   WBC Thousand/uL 7 04   HEMOGLOBIN g/dL 12 3   HEMATOCRIT % 37 4   PLATELETS Thousands/uL 229   NEUTROS PCT % 74   LYMPHS PCT % 19   MONOS PCT % 7   EOS PCT % 0     Results from last 7 days   Lab Units 07/23/19  0610  07/21/19  1101   POTASSIUM mmol/L 3 7   < > 4 0   CHLORIDE mmol/L 105   < > 99*   CO2 mmol/L 25   < > 24   BUN mg/dL 7   < > 15   CREATININE mg/dL 0 77   < > 0 98   CALCIUM mg/dL 8 4   < > 8 9   ALK PHOS U/L  --   --  96   ALT U/L  --   --  23   AST U/L  --   --  12    < > = values in this interval not displayed  Imaging Studies: I have personally reviewed pertinent imaging studies  Ct Abdomen Pelvis With Contrast    Addendum Date: 7/21/2019    ADDENDUM: ADDENDUM There is a voice recognition software related error in the IMPRESSION of the report  A phrase which reads " I personally discussed this study with Preet Reddy on 7/21/2019 at 12:11 PM  " should read, " The study was marked in [facility] for immediate notification  I personally discussed this study with Preet Reddy on 7/21/2019 at 12:45 PM "  Result Date: 7/21/2019  Impression: 1  Abnormal appearance of the distal descending colon/proximal sigmoid colon, most compatible with acute, mildly complicated diverticulitis  A follow-up CT scan or colonoscopy, following a course of antibiotic therapy, is recommended, given that inflammatory colonic neoplasm can have a similar appearance  2   Enlarged myomatous uterus  3   Hiatal hernia    I personally discussed this study with Preet Reddy on 7/21/2019 at 12:11 PM  Workstation performed: [id number]

## 2019-07-23 NOTE — PLAN OF CARE
Problem: PAIN - ADULT  Goal: Verbalizes/displays adequate comfort level or baseline comfort level  Description  Interventions:  - Encourage patient to monitor pain and request assistance  - Assess pain using appropriate pain scale  - Administer analgesics based on type and severity of pain and evaluate response  - Implement non-pharmacological measures as appropriate and evaluate response  - Consider cultural and social influences on pain and pain management  - Notify physician/advanced practitioner if interventions unsuccessful or patient reports new pain  Outcome: Progressing     Problem: INFECTION - ADULT  Goal: Absence or prevention of progression during hospitalization  Description  INTERVENTIONS:  - Assess and monitor for signs and symptoms of infection  - Monitor lab/diagnostic results  - Monitor all insertion sites  - Monitor secretions for changes in amount and color  - [place] appropriate cooling/warming therapies per order  - Administer medications as ordered  - Instruct and encourage patient and family to use good hand hygiene technique  - Identify and instruct in appropriate isolation precautions for identified infection/condition   Outcome: Progressing     Problem: SAFETY ADULT  Goal: Patient will remain free of falls  Description  INTERVENTIONS:  - Assess patient frequently for physical needs  -  Identify cognitive and physical deficits and behaviors that affect risk of falls    -  [place] fall precautions as indicated by assessment   - Educate patient/family on patient safety including physical limitations  - Instruct patient to call for assistance with activity based on assessment  - Modify environment to reduce risk of injury  - Consider OT/PT consult to assist with strengthening/mobility  Outcome: Progressing     Problem: DISCHARGE PLANNING  Goal: Discharge to home or other facility with appropriate resources  Description  INTERVENTIONS:  - Identify barriers to discharge w/patient and caregiver  - Arrange for needed discharge resources and transportation as appropriate  - Identify discharge learning needs (meds, wound care, etc )  - Refer to Case Management Department for coordinating discharge planning if the patient needs post-hospital services based on physician/advanced practitioner order or complex needs related to functional status, cognitive ability, or social support system   Outcome: Progressing     Problem: Knowledge Deficit  Goal: Patient/family/caregiver demonstrates understanding of disease process, treatment plan, medications, and discharge instructions  Description  Complete learning assessment and assess knowledge base    Interventions:  - Provide teaching at level of understanding  - Provide teaching via preferred learning methods  Outcome: Progressing

## 2019-07-24 ENCOUNTER — TRANSITIONAL CARE MANAGEMENT (OUTPATIENT)
Dept: FAMILY MEDICINE CLINIC | Facility: CLINIC | Age: 54
End: 2019-07-24

## 2019-07-26 LAB
BACTERIA BLD CULT: NORMAL
BACTERIA BLD CULT: NORMAL

## 2019-07-30 ENCOUNTER — OFFICE VISIT (OUTPATIENT)
Dept: FAMILY MEDICINE CLINIC | Facility: CLINIC | Age: 54
End: 2019-07-30
Payer: COMMERCIAL

## 2019-07-30 VITALS
RESPIRATION RATE: 16 BRPM | HEART RATE: 76 BPM | HEIGHT: 63 IN | SYSTOLIC BLOOD PRESSURE: 130 MMHG | BODY MASS INDEX: 40.22 KG/M2 | TEMPERATURE: 98 F | WEIGHT: 227 LBS | DIASTOLIC BLOOD PRESSURE: 88 MMHG

## 2019-07-30 DIAGNOSIS — Z13.6 SCREENING FOR CARDIOVASCULAR, RESPIRATORY, AND GENITOURINARY DISEASES: ICD-10-CM

## 2019-07-30 DIAGNOSIS — Z13.83 SCREENING FOR CARDIOVASCULAR, RESPIRATORY, AND GENITOURINARY DISEASES: ICD-10-CM

## 2019-07-30 DIAGNOSIS — K57.92 ACUTE DIVERTICULITIS: ICD-10-CM

## 2019-07-30 DIAGNOSIS — Z13.89 SCREENING FOR CARDIOVASCULAR, RESPIRATORY, AND GENITOURINARY DISEASES: ICD-10-CM

## 2019-07-30 DIAGNOSIS — K57.30 DIVERTICULOSIS OF COLON: ICD-10-CM

## 2019-07-30 DIAGNOSIS — Z12.39 SCREENING BREAST EXAMINATION: Primary | ICD-10-CM

## 2019-07-30 DIAGNOSIS — E66.01 MORBID OBESITY (HCC): ICD-10-CM

## 2019-07-30 DIAGNOSIS — Z13.1 SCREENING FOR DIABETES MELLITUS (DM): ICD-10-CM

## 2019-07-30 DIAGNOSIS — J45.30 MILD PERSISTENT ASTHMA, UNSPECIFIED WHETHER COMPLICATED: ICD-10-CM

## 2019-07-30 PROCEDURE — 99496 TRANSJ CARE MGMT HIGH F2F 7D: CPT | Performed by: FAMILY MEDICINE

## 2019-08-02 ENCOUNTER — TELEPHONE (OUTPATIENT)
Dept: GASTROENTEROLOGY | Facility: CLINIC | Age: 54
End: 2019-08-02

## 2019-08-02 DIAGNOSIS — Z87.19 HISTORY OF DIVERTICULITIS: ICD-10-CM

## 2019-08-02 DIAGNOSIS — R10.32 LLQ ABDOMINAL PAIN: Primary | ICD-10-CM

## 2019-08-02 DIAGNOSIS — R19.7 DIARRHEA, UNSPECIFIED TYPE: ICD-10-CM

## 2019-08-02 NOTE — TELEPHONE ENCOUNTER
Discussed with patient  She reports that she completed her course of oral antibiotics on this past Wednesday  She reports that the next day she began to have worsening left lower abdominal pain again  She also reports she had some chills on Thursday night  Denies any fevers  She reports that she gets cramping and has to have diarrhea after every meal which started after leaving the hospital when she began her oral antibiotics  She denies any blood in the stool or black tarry stool  She denies any nausea or vomiting    Due to her history of recurrent acute diverticulitis and a history of having abscess during her 1st episode, would recommend repeating a stat CT to rule out any complications from the diverticulitis  Also because of recent hospitalization would recommend doing a C diff test   Please get the patient scheduled for the stat CT scan sometime after 6:00 p m  Today or tomorrow  Please let me know and I will call the patient back and also discussed about the C diff testing

## 2019-08-02 NOTE — TELEPHONE ENCOUNTER
Cristino Finley pt    Pt was seen in the hosp, was prescribed antibiotics  She states she finished her treatment and is still having pain and symptoms   Please advise of the next step [phone]

## 2019-08-03 ENCOUNTER — HOSPITAL ENCOUNTER (OUTPATIENT)
Dept: RADIOLOGY | Facility: HOSPITAL | Age: 54
Discharge: HOME | End: 2019-08-03
Payer: COMMERCIAL

## 2019-08-03 ENCOUNTER — TRANSCRIBE ORDERS (OUTPATIENT)
Dept: ADMINISTRATIVE | Facility: HOSPITAL | Age: 54
End: 2019-08-03

## 2019-08-03 DIAGNOSIS — R10.32 LLQ ABDOMINAL PAIN: ICD-10-CM

## 2019-08-03 DIAGNOSIS — Z87.19 HISTORY OF DIVERTICULITIS: ICD-10-CM

## 2019-08-03 PROCEDURE — 74177 CT ABD & PELVIS W/CONTRAST: CPT

## 2019-08-03 RX ADMIN — IOHEXOL 100 ML: 350 INJECTION, SOLUTION INTRAVENOUS at 11:35

## 2019-10-18 ENCOUNTER — TELEPHONE (OUTPATIENT)
Dept: GASTROENTEROLOGY | Facility: MEDICAL CENTER | Age: 54
End: 2019-10-18

## 2019-10-18 DIAGNOSIS — K57.92 ACUTE DIVERTICULITIS: Primary | ICD-10-CM

## 2019-10-18 RX ORDER — CIPROFLOXACIN 500 MG/1
500 TABLET, FILM COATED ORAL EVERY 12 HOURS SCHEDULED
Qty: 14 TABLET | Refills: 0 | Status: SHIPPED | OUTPATIENT
Start: 2019-10-18 | End: 2019-10-25

## 2019-10-18 RX ORDER — METRONIDAZOLE 500 MG/1
500 TABLET ORAL EVERY 8 HOURS SCHEDULED
Qty: 21 TABLET | Refills: 0 | Status: SHIPPED | OUTPATIENT
Start: 2019-10-18 | End: 2019-10-25

## 2019-10-18 NOTE — TELEPHONE ENCOUNTER
Spoke with patient history of diverticulitis 4 times this year  Patient c/o LLQ pain 6/10 sharp radiating to lower mid abdomen  4-5 soft formed BM daily for 2 days  Denies diarrhea, SOB, fever, chills  Last cipro/flagyl course was in July  Patient understands to follow low fiber low residue diet, encouraged plenty of fluids  Patient did not have C-diff testing done  CT scan 8/3/2019 showed resolving acute diverticulitis  Patient also understands she needs to be seen in the office for a follow-up  Any other suggestions? ABX course?

## 2019-10-18 NOTE — TELEPHONE ENCOUNTER
Patients GI provider:  Dr Reva Melvin    Number to return call: [phone]    Reason for call: Pt calling stated she has left lower pain after meal with off and on diarrhea   She would like a call back to advise    Scheduled procedure/appointment date if applicable: Apt/procedure

## 2019-10-18 NOTE — TELEPHONE ENCOUNTER
Due to her history and symptoms, I will start course of cipro/flagyl  Patient should stick to liquid diet for a day or two and then advance to lo fiber  If any fevers, worsening pain, lack of BM, etc she should go to ER  Can we get her in to see me next week  I have a 12 pm on Monday or a 3:30 pm on Tuesday  I can reassess then and order repeat imaging if no improvement

## 2019-10-18 NOTE — TELEPHONE ENCOUNTER
Spoke with patient relayed message  Patient states she is about an hour away from [facility] and cannot make impromptu appointments  She says she is available Thursday, and is willing to travel to [facility] [place] if needed  Renny Armando would you call and schedule an apt?

## 2019-10-23 NOTE — TELEPHONE ENCOUNTER
Pt scheduled for 11/6 at 4pm with [email or web address] at [facility]

## 2019-10-24 ENCOUNTER — TELEPHONE (OUTPATIENT)
Dept: GASTROENTEROLOGY | Facility: AMBULARY SURGERY CENTER | Age: 54
End: 2019-10-24

## 2019-10-24 ENCOUNTER — TELEPHONE (OUTPATIENT)
Dept: GASTROENTEROLOGY | Facility: CLINIC | Age: 54
End: 2019-10-24

## 2019-10-24 DIAGNOSIS — R10.32 LLQ PAIN: Primary | ICD-10-CM

## 2019-10-24 DIAGNOSIS — Z87.19 HISTORY OF DIVERTICULITIS OF COLON: ICD-10-CM

## 2019-10-24 NOTE — TELEPHONE ENCOUNTER
Please advise patient - given that her symptoms have been going on for over a week now and she is reporting no improvement in symptoms on day 6 of Cipro and Flagyl, I think it would be reasonable to check a CT scan at this point  Will order CT scan of the abdomen and pelvis with contrast   Again, she should go to the emergency room if she has worsening pain, develops fevers/chills, rectal bleeding or new symptoms  Keep upcoming appointment  If CT scan is unremarkable, can consider trialing an antispasmodic such as Bentyl    Thank you

## 2019-10-24 NOTE — TELEPHONE ENCOUNTER
Please see communication thread  Patient with hx of diverticulitis called to report no improvement in symptoms on 6th day of treatment with cipro/flagyl  She is reporting fatigue, abd bloating,   soft brown/diarrhea stool 3-4x/ day,  "9/10" LLQ pain after eating  She is not sure if she has fever  I suggested she resume liquid diet for bowel rest and go to ED if symptoms persist/worsen  She will also get stool for cdiff done by tomorrow  F/U office visit 11/6  Please provide recommendation

## 2019-10-24 NOTE — TELEPHONE ENCOUNTER
Patient is aware of recommendation  I gave her number to central scheduling and she will schedule CT scan

## 2019-10-24 NOTE — TELEPHONE ENCOUNTER
Patients GI provider:  Dr Shipman Comp    Number to return call: ( [phone]    Reason for call: Pt calling with sharp abd pain and diarrhea---please assist    Scheduled procedure/appointment date if applicable: Apt/procedure 11-6-19 ov

## 2019-10-25 ENCOUNTER — TELEPHONE (OUTPATIENT)
Dept: GASTROENTEROLOGY | Facility: AMBULARY SURGERY CENTER | Age: 54
End: 2019-10-25

## 2019-10-25 NOTE — TELEPHONE ENCOUNTER
Contacted Kulwant (viky [phone])  Spoke with Kaylie Longoria re cpt [id number]  She stated that if the pt gets this done at [facility] she will receive a bill being the hospital is [address] message to pt   Pt will contact insurance to see which facility is in network  Pt will then make ct appt and then call office to give the information    Contacted central scheduling to cancel appt

## 2019-10-29 ENCOUNTER — TELEPHONE (OUTPATIENT)
Dept: GASTROENTEROLOGY | Facility: CLINIC | Age: 54
End: 2019-10-29

## 2019-10-29 ENCOUNTER — TRANSCRIBE ORDERS (OUTPATIENT)
Dept: ADMINISTRATIVE | Facility: HOSPITAL | Age: 54
End: 2019-10-29

## 2019-10-29 ENCOUNTER — APPOINTMENT (OUTPATIENT)
Dept: LAB | Facility: HOSPITAL | Age: 54
End: 2019-10-29
Payer: COMMERCIAL

## 2019-10-29 DIAGNOSIS — R19.7 DIARRHEA, UNSPECIFIED TYPE: ICD-10-CM

## 2019-10-29 LAB — C DIFF TOX GENS STL QL NAA+PROBE: NORMAL

## 2019-10-29 PROCEDURE — 87493 C DIFF AMPLIFIED PROBE: CPT

## 2019-10-31 ENCOUNTER — OFFICE VISIT (OUTPATIENT)
Dept: OBGYN CLINIC | Facility: CLINIC | Age: 54
End: 2019-10-31
Payer: COMMERCIAL

## 2019-10-31 VITALS
HEIGHT: 63 IN | SYSTOLIC BLOOD PRESSURE: 158 MMHG | WEIGHT: 224.6 LBS | BODY MASS INDEX: 39.8 KG/M2 | DIASTOLIC BLOOD PRESSURE: 102 MMHG

## 2019-10-31 DIAGNOSIS — Z12.39 BREAST CANCER SCREENING: ICD-10-CM

## 2019-10-31 DIAGNOSIS — Z01.419 ENCNTR FOR GYN EXAM (GENERAL) (ROUTINE) W/O ABN FINDINGS: Primary | ICD-10-CM

## 2019-10-31 DIAGNOSIS — J45.40 MODERATE PERSISTENT ASTHMA, UNSPECIFIED WHETHER COMPLICATED: Primary | ICD-10-CM

## 2019-10-31 DIAGNOSIS — D25.9 UTERINE LEIOMYOMA, UNSPECIFIED LOCATION: ICD-10-CM

## 2019-10-31 PROCEDURE — G0145 SCR C/V CYTO,THINLAYER,RESCR: HCPCS | Performed by: NURSE PRACTITIONER

## 2019-10-31 PROCEDURE — 87624 HPV HI-RISK TYP POOLED RSLT: CPT | Performed by: NURSE PRACTITIONER

## 2019-10-31 PROCEDURE — 99386 PREV VISIT NEW AGE 40-64: CPT | Performed by: NURSE PRACTITIONER

## 2019-10-31 NOTE — TELEPHONE ENCOUNTER
Spoke with pt and she will call back to [facility]   She is going to change insurance also so she is not sure if she will be able to come back FYI     No further action needed

## 2019-10-31 NOTE — PROGRESS NOTES
Assessment/Plan   Diagnoses and all orders for this visit:    Uterine leiomyoma, unspecified location  -     US pelvis complete w transvaginal; Future    Breast cancer screening  -     Mammo screening bilateral w 3d & cad; Future    Encntr for gyn exam (general) (routine) w/o abn findings  -     Liquid-based pap, screening        Discussion    Reviewed normal exam today  Pap with HPV done today  Rx for pelvic ultrasound to assess uterine fibroids  Discussed options such as hormonal contraceptives, depo lupron, myomectomy, hysterectomy, or continuing to monitor  Pt will continue to monitor at this time  Discussed menopause is no menses x 12 months  Reva-menopause reviewed  Options to help with menopausal complaints reveiwed such as OTC herbal supplements or low dose paxil  Pt declines at this time, manageable  Normal breast exam today  Monthly SBEs advised  Mammograms yearly  Rx for mammogram given  Encourage at least 1200 mg calcium citrate + 2000 IUs vitamin D3 divided through diet and supplement throughout the day  Encourage 30-40 min weight bearing exercise most days of week  Colon cancer screening with a colonoscopy is up to date  I have reviewed the patient's risk factors for osteoporosis and ordered a dexa scan  Pt to check with insurance for coverage  All questions have been answered to her satisfaction  RTO for annual or sooner if needed    Subjective     Meri Adames is a 49 y o  female new patient who presents for annual well woman exam    Last exam 2017 Pap Normal denies any hx of abnormal pap smears  Pap guidelines reviewed with patient  Pt would like Pap today  Pt denies any abnormal vaginal discharge, itching, or odor  Pt in a mutually exclusive relationship with a male partner and denies the need for STD testing today    Menstrual Cycle:  LMP: 8/22/19  Period Cycle (Days): (60-90)  Period Duration (Days): 3-4  Period Pattern: (!) Irregular  Menstrual Flow: Heavy  Menstrual Control: Tampon, Maxi pad  Dysmenorrhea: None  Does have hot flashes and night sweats  Manageable  Pt has hx of enlarged uterus due to fibroids  Did have a myomectomy in 2016 to have one large fibroid removed  Is currently monitoring as does not want any surgical or medical interventions at this time  OB History:  G 2 P 2  Contraception:   Practices monthly SBEs, no breast complaints today  Last Mammogram 2017 normal per patient  Colonoscopy 2018   Denies any bowel or bladder issues  Pt follows with PCP for regular check-ups and blood work  Review of Systems   All other systems reviewed and are negative  The following portions of the patient's history were reviewed and updated as appropriate: allergies, current medications, past family history, past medical history, past social history, past surgical history and problem list     Past Medical History:   Diagnosis Date    Allergic rhinitis     Asthma     Fibroids     Physiological ovarian cysts     Sleep apnea     denatl device worn       Past Surgical History:   Procedure Laterality Date    COLONOSCOPY  2016    dr Mayuri Hartley ARTHROSCOPY Left 2018    meniscectomy x2, first done in 2015    MYOMECTOMY      VT COLONOSCOPY FLX DX W/COLLJ SPEC WHEN PFRMD N/A 9/13/2018    Procedure: COLONOSCOPY;  Surgeon: Carlos Silvestre MD;  Location: [place] [address] GI LAB;   Service: Gastroenterology    WRIST SURGERY Right        Family History   Problem Relation Age of Onset    Osteoporosis Mother     Thyroid disease Mother     Colon cancer Mother     Colon polyps Mother     Cancer Mother         colon    Alzheimer's disease Father     Hypertension Father     Dementia Father     Heart disease Brother     No Known Problems Daughter     No Known Problems Daughter        Social History     Socioeconomic History    Marital status:      Spouse name: Not on file    Number of children: Not on file    Years of education: Not on file    Highest education level: Not on file   Occupational History    Not on file   Social Needs    Financial resource strain: Not on file    Food insecurity:     Worry: Not on file     Inability: Not on file    Transportation needs:     Medical: Not on file     Non-medical: Not on file   Tobacco Use    Smoking status: Never Smoker    Smokeless tobacco: Never Used   Substance and Sexual Activity    Alcohol use: Yes     Frequency: Monthly or less     Drinks per session: 1 or 2     Comment: social - wine 2 x month    Drug use: No    Sexual activity: Yes     Partners: Male     Birth control/protection: Condom Male   Lifestyle    Physical activity:     Days per week: Not on file     Minutes per session: Not on file    Stress: Not on file   Relationships    Social connections:     Talks on phone: Not on file     Gets together: Not on file     Attends Quaker service: Not on file     Active member of club or organization: Not on file     Attends meetings of clubs or organizations: Not on file     Relationship status: Not on file    Intimate partner violence:     Fear of current or ex partner: Not on file     Emotionally abused: Not on file     Physically abused: Not on file     Forced sexual activity: Not on file   Other Topics Concern    Not on file   Social History Narrative    Not on file         Current Outpatient Medications:     acyclovir (ZOVIRAX) 200 mg capsule, Take 1 capsule (200 mg total) by mouth daily as needed (flare ups) for up to 30 days Only as needed for outbreak, Disp: 30 capsule, Rfl: 0    albuterol (2 5 mg/3 mL) 0 083 % nebulizer solution, Take 1 vial (2 5 mg total) by nebulization every 6 (six) hours as needed for wheezing or shortness of breath, Disp: 100 mL, Rfl: 0    albuterol (VENTOLIN HFA) 90 mcg/act inhaler, Inhale 2 puffs every 4 (four) hours as needed  , Disp: , Rfl:     fluticasone-salmeterol (ADVAIR DISKUS) 500-50 mcg/dose, Inhale 1 puff 2 (two) times a day, Disp: , Rfl:     multivitamin (Alondra Push) TABS, Take 1 tablet by mouth daily, Disp: , Rfl:     pantoprazole (PROTONIX) 40 mg tablet, Take 1 tablet (40 mg total) by mouth daily in the early morning, Disp: 30 tablet, Rfl: 0    Respiratory Therapy Supplies (NEBULIZER/TUBING/MOUTHPIECE) KIT, by Does not apply route 4 (four) times a day, Disp: 1 each, Rfl: 0    No Known Allergies    Objective   Vitals:    10/31/19 1525   BP: (!) 158/102   BP Location: Right arm   Patient Position: Sitting   Cuff Size: Standard   Weight: 102 kg (224 lb 9 6 oz)   Height: 5' 3" (1 6 m)     Physical Exam   Constitutional: She is oriented to person, place, and time  She appears well-developed and well-nourished  HENT:   Head: Normocephalic  Neck: Normal range of motion  Neck supple  No tracheal deviation present  No thyromegaly present  Cardiovascular: Normal rate, regular rhythm and normal heart sounds  Pulmonary/Chest: Effort normal and breath sounds normal  Right breast exhibits no inverted nipple, no mass, no nipple discharge, no skin change and no tenderness  Left breast exhibits no inverted nipple, no mass, no nipple discharge, no skin change and no tenderness  No breast tenderness, discharge or bleeding  Breasts are symmetrical    Abdominal: Soft  Bowel sounds are normal  She exhibits no distension and no mass  There is no tenderness  There is no rebound and no guarding  Genitourinary: Vagina normal  Rectal exam shows guaiac negative stool  No breast tenderness, discharge or bleeding  No labial fusion  There is no rash, tenderness, lesion or injury on the right labia  There is no rash, tenderness, lesion or injury on the left labia  Uterus is enlarged (18cm fibroid uterus)  Uterus is not deviated, not fixed and not tender  Cervix exhibits no motion tenderness, no discharge and no friability  Right adnexum displays no mass, no tenderness and no fullness  Left adnexum displays no mass, no tenderness and no fullness  Musculoskeletal: Normal range of motion  Neurological: She is alert and oriented to person, place, and time  Skin: Skin is warm and dry  Psychiatric: She has a normal mood and affect   Her behavior is normal  Judgment and thought content normal

## 2019-11-01 LAB
HPV HR 12 DNA CVX QL NAA+PROBE: NEGATIVE
HPV16 DNA CVX QL NAA+PROBE: NEGATIVE
HPV18 DNA CVX QL NAA+PROBE: NEGATIVE

## 2019-11-01 NOTE — TELEPHONE ENCOUNTER
Called and spoke to Elif Das started case she stated its a covered site just might be a out of pocket cost but multiple site was the same outcome started case      Case Number :  [id number]    Phone Number:  [phone]    Fax Number:  [phone]    Called and relayed this to patient

## 2019-11-05 LAB
LAB AP GYN PRIMARY INTERPRETATION: NORMAL
LAB AP LMP: NORMAL
Lab: NORMAL

## 2019-11-05 NOTE — TELEPHONE ENCOUNTER
Received the PA Approval for the CT Scan   Auth Number:  [id number]    Date range:  11/04/19 til 12/19/19    Called and relayed to patient asked her if she spoke with her Ins for that out of pocket cost which was just the co-pay of $40    She will call CS to set up apt

## 2019-11-12 NOTE — UTILIZATION REVIEW
Initial Clinical Review  Admission: Date/Time/Statement: 7/21/19 @ 1314   Orders Placed This Encounter   Procedures    Inpatient Admission     Standing Status:   Standing     Number of Occurrences:   1     Order Specific Question:   Admitting Physician     Answer:   Valentine Suarez     Order Specific Question:   Level of Care     Answer:   Med Surg [16]     Order Specific Question:   Estimated length of stay     Answer:   More than 2 Midnights     Order Specific Question:   Certification     Answer:   I certify that inpatient services are medically necessary for this patient for a duration of greater than two midnights  See H&P and MD Progress Notes for additional information about the patient's course of treatment  ED Arrival Information     Expected Arrival Acuity Means of Arrival Escorted By Service Admission Type    - 7/21/2019 09:59 Urgent Walk-In Family Member General Medicine Urgent    Arrival Complaint    -        Chief Complaint   Patient presents with    Abdominal Pain     low left abdominal pain for4 days     Assessment/Plan:   50 YO FEMALE AMBULATORY TO ER FROM HOME C/O LLQ ABD PAIN X 4 DAYS WITH ASSOCIATED NAUSEA  HX DIVERTICULITIS, ASTHMA, OBESITY  PRESENTS FEBRILE & TACHYCARDIC WITH ABD PAIN, LLQ TENDERNESS & GUARDING, NAUSEA, LOW BACK PAIN  ADMISSION WORK-UP SHOWING LEUKOCYTOSIS, CT +DIVERTICULITIS  ADMITTED TO INPATIENT STATUS FOR ACUTE DIVERTICULITIS, STARTED ON DOUBLE IVABT, IVF, GI CONSULTED     ED Triage Vitals   Temperature Pulse Respirations Blood Pressure SpO2   07/21/19 1010 07/21/19 1010 07/21/19 1010 07/21/19 1010 07/21/19 1010   99 7 °F (37 6 °C) (!) 109 22 134/66 100 %      Temp Source Heart Rate Source Patient Position - Orthostatic VS BP Location FiO2 (%)   07/21/19 1245 07/21/19 1245 07/21/19 1102 07/21/19 1102 --   Tympanic Monitor Lying Right arm       Pain Score       07/21/19 1010       9          Wt Readings from Last 1 Encounters:   10/31/19 102 kg (224 lb 9 6 oz) Additional Vital Signs:   07/21/19 2334  99 7 °F (37 6 °C)  83  18  116/76  89 %Abnormal   None (Room air)  --   07/21/19 2039  --  74  16  --  93 %  None (Room air)  --   07/21/19 2020  99 7 °F (37 6 °C)  83  19  111/73  91 %  None (Room air)  --   07/21/19 1348  97 8 °F (36 6 °C)  88  20  161/96  95 %  None (Room air)  Lying   07/21/19 1345  97 9 °F (36 6 °C)  86  20  161/96  95 %  --  --   07/21/19 1324  --  88  20  138/87  95 %  None (Room air)  Lying   07/21/19 1245  100 6 °F (38 1 °C)Abnormal   90  20  134/82  95 %  None (Room air)  Lying   Pertinent Labs/Diagnostic Test Results:                         CT A/P=1  Abnormal appearance of the distal descending colon/proximal sigmoid colon, most compatible with acute, mildly complicated diverticulitis  A follow-up CT scan or colonoscopy, following a course of antibiotic therapy, is recommended, given that inflammatory colonic neoplasm can have a similar appearance  2   Enlarged myomatous uterus  3   Hiatal hernia    ED Treatment:   Medication Administration from 07/21/2019 0959 to 07/21/2019 1339       Date/Time Order Dose Route Action     07/21/2019 1055 sodium chloride 0 9 % bolus 1,000 mL 1,000 mL Intravenous New Bag     07/21/2019 1055 ondansetron (ZOFRAN) injection 4 mg 4 mg Intravenous Given     07/21/2019 1056 morphine (PF) 4 mg/mL injection 4 mg 2 mg Intravenous Given     07/21/2019 1148 iohexol (OMNIPAQUE) 350 MG/ML injection (MULTI-DOSE) 100 mL 100 mL Intravenous Given     07/21/2019 1247 metroNIDAZOLE (FLAGYL) tablet 500 mg 500 mg Oral Given     07/21/2019 1247 ciprofloxacin (CIPRO) tablet 500 mg 500 mg Oral Given     07/21/2019 1305 acetaminophen (TYLENOL) tablet 650 mg 650 mg Oral Given        Past Medical History:   Diagnosis Date    Allergic rhinitis     Asthma     Fibroids     Physiological ovarian cysts     Sleep apnea     denatl device worn     Present on Admission:   Acute diverticulitis   Asthma  Admitting Diagnosis: Diverticulitis [K57 92]  Abdominal pain [R10 9]  Age/Sex: 49 y o  female  Admission Orders:  MED SURG  NPO  CONSULT GI  INCENTIVE SPIROMETRY  Current Facility-Administered Medications:  acetaminophen 650 mg Oral Q6H PRN   albuterol 2 5 mg Nebulization Q4H PRN   cefTRIAXone 1,000 mg Intravenous Q24H   dextrose 5 % and sodium chloride 0 9 % 75 mL/hr Intravenous Continuous   enoxaparin 40 mg Subcutaneous Daily   fluticasone-vilanterol 1 puff Inhalation Daily   hydrOXYzine HCL 25 mg Oral Q6H PRN   metroNIDAZOLE 500 mg Intravenous Q8H   morphine injection 2 mg Intravenous Q3H PRN   ondansetron 4 mg Intravenous Q6H PRN   pantoprazole 40 mg Oral Early Morning   pneumococcal 13-valent conjugate vaccine 0 5 mL Intramuscular Prior to discharge     Network Utilization Review Department  Phone: [phone]; Fax [phone]  [email or web address]  ATTENTION: Please call with any questions or concerns to [phone]  and carefully listen to the prompts so that you are directed to the right person  Send all requests for admission clinical reviews, approved or denied determinations and any other requests to fax [phone]  All voicemails are confidential     Notification of Discharge  This is a Notification of Discharge from our facility [address]  Please be advised that this patient has been discharge from our facility  Below you will find the admission and discharge date and time including the patients disposition  PRESENTATION DATE: 7/21/2019 10:07 AM  OBS ADMISSION DATE:   IP ADMISSION DATE: 7/21/19 1314   DISCHARGE DATE: 7/23/2019  5:49 PM  DISPOSITION: Home/Self Care Home/Self Care   Admission Orders listed below:  Admission Orders (From admission, onward)     Ordered        07/21/19 1314  Inpatient Admission  Once                   Please contact the UR Department if additional information is required to close this patient's authorization/case      [employer] Utilization Review Department  Main: [phone] x carefully listen to the prompts  All voicemails are confidential   [email or web address]  Send all requests for admission clinical reviews, approved or denied determinations and any other requests to dedicated fax number below belonging to the campus where the patient is receiving treatment    List of dedicated fax numbers:  [address] DENIALS (Administrative/Medical Necessity) [phone]   [address] (Maternity/NICU/Pediatrics) [phone]   Jagdish Aguilera [phone]   Favio Koch [phone]   Bee Chen [phone]   [address] [place] [address] [address] [phone]   Hurman Leyden [address] [phone]

## 2019-12-22 ENCOUNTER — OFFICE VISIT (OUTPATIENT)
Dept: URGENT CARE | Facility: CLINIC | Age: 54
End: 2019-12-22
Payer: COMMERCIAL

## 2019-12-22 VITALS
DIASTOLIC BLOOD PRESSURE: 99 MMHG | RESPIRATION RATE: 22 BRPM | HEIGHT: 63 IN | WEIGHT: 226 LBS | SYSTOLIC BLOOD PRESSURE: 143 MMHG | HEART RATE: 104 BPM | OXYGEN SATURATION: 95 % | TEMPERATURE: 99.6 F | BODY MASS INDEX: 40.04 KG/M2

## 2019-12-22 DIAGNOSIS — J20.9 ACUTE BRONCHITIS, UNSPECIFIED ORGANISM: Primary | ICD-10-CM

## 2019-12-22 PROCEDURE — 99213 OFFICE O/P EST LOW 20 MIN: CPT | Performed by: PHYSICIAN ASSISTANT

## 2019-12-22 RX ORDER — PREDNISONE 20 MG/1
40 TABLET ORAL DAILY
Qty: 10 TABLET | Refills: 0 | Status: SHIPPED | OUTPATIENT
Start: 2019-12-22 | End: 2019-12-27

## 2019-12-22 RX ORDER — BENZONATATE 200 MG/1
200 CAPSULE ORAL 3 TIMES DAILY PRN
Qty: 20 CAPSULE | Refills: 0 | Status: SHIPPED | OUTPATIENT
Start: 2019-12-22 | End: 2021-10-06

## 2019-12-22 RX ORDER — AZITHROMYCIN 250 MG/1
TABLET, FILM COATED ORAL
Qty: 6 TABLET | Refills: 0 | Status: SHIPPED | OUTPATIENT
Start: 2019-12-22 | End: 2019-12-26

## 2019-12-22 NOTE — PROGRESS NOTES
[facility]        NAME: Haider Trevino is a 49 y o  female  : 1965    MRN: [id number]  DATE: 2019  TIME: 10:28 AM    Assessment and Plan   Acute bronchitis, unspecified organism [J20 [id number]]  1  Acute bronchitis, unspecified organism  azithromycin (ZITHROMAX) 250 mg tablet    predniSONE 20 mg tablet    benzonatate (TESSALON) 200 MG capsule         Patient Instructions     Discussed condition with patient  She has acute bronchitis which I will treat with combination of Z-Dileep, prednisone burst, and Tessalon Perles and also recommended she had Mucinex DM over-the-counter  She is to hydrate, rest, continue with rescue inhaler and nebulizer treatments as needed, and observation  Follow up with PCP in 3-5 days  Proceed to  ER if symptoms worsen  Chief Complaint     Chief Complaint   Patient presents with    Cough     worsening cough x 3 days with fever    Fever         History of Present Illness       Pt presents with 3 day history of fever up to 101 6 F, productive cough, PND, SOB, wheezing, chest tightness, nasal congestion, ST from the cough  Denies N/V/D  She has taken Advil as well as Albuterol nebs  Has a sick coworker she has been exposed to  She has history of asthma  Does not smoke or vape  She has had flu shot  Review of Systems   Review of Systems   Constitutional: Positive for fever  HENT: Positive for congestion, postnasal drip and sore throat  Respiratory: Positive for cough, chest tightness, shortness of breath and wheezing  Cardiovascular: Negative  Gastrointestinal: Negative  Genitourinary: Negative            Current Medications       Current Outpatient Medications:     acyclovir (ZOVIRAX) 200 mg capsule, Take 1 capsule (200 mg total) by mouth daily as needed (flare ups) for up to 30 days Only as needed for outbreak, Disp: 30 capsule, Rfl: 0    albuterol (2 5 mg/3 mL) 0 083 % nebulizer solution, Take 1 vial (2 5 mg total) by nebulization every 6 (six) hours as needed for wheezing or shortness of breath, Disp: 100 mL, Rfl: 0    albuterol (VENTOLIN HFA) 90 mcg/act inhaler, Inhale 2 puffs every 4 (four) hours as needed  , Disp: , Rfl:     azithromycin (ZITHROMAX) 250 mg tablet, Take 2 tablets day 1 with food, then 1 tablet daily days 2-5 with food  , Disp: 6 tablet, Rfl: 0    benzonatate (TESSALON) 200 MG capsule, Take 1 capsule (200 mg total) by mouth 3 (three) times a day as needed for cough, Disp: 20 capsule, Rfl: 0    fluticasone-salmeterol (ADVAIR DISKUS) 500-50 mcg/dose inhaler, Inhale 1 puff 2 (two) times a day, Disp: 1 Inhaler, Rfl: 2    multivitamin (THERAGRAN) TABS, Take 1 tablet by mouth daily, Disp: , Rfl:     pantoprazole (PROTONIX) 40 mg tablet, Take 1 tablet (40 mg total) by mouth daily in the early morning, Disp: 30 tablet, Rfl: 0    predniSONE 20 mg tablet, Take 2 tablets (40 mg total) by mouth daily for 5 days, Disp: 10 tablet, Rfl: 0    Respiratory Therapy Supplies (NEBULIZER/TUBING/MOUTHPIECE) KIT, by Does not apply route 4 (four) times a day, Disp: 1 each, Rfl: 0    Current Allergies     Allergies as of 12/22/2019    (No Known Allergies)            The following portions of the patient's history were reviewed and updated as appropriate: allergies, current medications, past family history, past medical history, past social history, past surgical history and problem list      Past Medical History:   Diagnosis Date    Allergic rhinitis     Asthma     Fibroids     Physiological ovarian cysts     Sleep apnea     denatl device worn       Past Surgical History:   Procedure Laterality Date    COLONOSCOPY  2016    dr Cee Valerio ARTHROSCOPY Left 2018    meniscectomy x2, first done in 2015    MYOMECTOMY      ID COLONOSCOPY FLX DX W/COLLJ SPEC WHEN PFRMD N/A 9/13/2018    Procedure: COLONOSCOPY;  Surgeon: Lisette Piña MD;  Location: [place] [address] [facility];   Service: Gastroenterology    WRIST SURGERY Right        Family History   Problem Relation Age of Onset    Osteoporosis Mother     Thyroid disease Mother     Colon cancer Mother     Colon polyps Mother     Cancer Mother         colon    Alzheimer's disease Father     Hypertension Father     Dementia Father     Heart disease Brother     No Known Problems Daughter     No Known Problems Daughter          Medications have been verified  Objective   /99   Pulse 104   Temp 99 6 °F (37 6 °C)   Resp 22   Ht 5' 3" (1 6 m)   Wt 103 kg (226 lb)   SpO2 95%   BMI 40 03 kg/m²        Physical Exam     Physical Exam   Constitutional: She is oriented to person, place, and time  She appears well-developed and well-nourished  No distress  HENT:   Right Ear: Hearing, tympanic membrane, external ear and ear canal normal    Left Ear: Hearing, tympanic membrane, external ear and ear canal normal    Nose: Mucosal edema (B/L boggy turbinates) present  Mouth/Throat: Mucous membranes are normal  Posterior oropharyngeal erythema (PND) present  No oropharyngeal exudate  Neck: Neck supple  Cardiovascular: Normal rate, regular rhythm and normal heart sounds  Pulmonary/Chest: Effort normal  She has wheezes ( diffuse scattered wheezes heard throughout)  She has rhonchi ( diffuse coarse rhonchi heard throughout)  Lymphadenopathy:     She has no cervical adenopathy  Neurological: She is alert and oriented to person, place, and time  Psychiatric: She has a normal mood and affect  Vitals reviewed

## 2019-12-22 NOTE — PATIENT INSTRUCTIONS
Acute Bronchitis   WHAT YOU NEED TO KNOW:   Acute bronchitis is swelling and irritation in the air passages of your lungs  This irritation may cause you to cough or have other breathing problems  Acute bronchitis often starts because of another illness, such as a cold or the flu  The illness spreads from your nose and throat to your windpipe and airways  Bronchitis is often called a chest cold  Acute bronchitis lasts about 3 to 6 weeks and is usually not a serious illness  Your cough can last for several weeks  DISCHARGE INSTRUCTIONS:   Return to the emergency department if:   · You cough up blood  · Your lips or fingernails turn blue  · You feel like you are not getting enough air when you breathe  Contact your healthcare provider if:   · You have a fever  · Your breathing problems do not go away or get worse  · Your cough does not get better within 4 weeks  · You have questions or concerns about your condition or care  Self-care:   · Get more rest   Rest helps your body to heal  Slowly start to do more each day  Rest when you feel it is needed  · Avoid irritants in the air  Avoid chemicals, fumes, and dust  Wear a face mask if you must work around dust or fumes  Stay inside on days when air pollution levels are high  If you have allergies, stay inside when pollen counts are high  Do not use aerosol products, such as spray-on deodorant, bug spray, and hair spray  · Do not smoke or be around others who smoke  Nicotine and other chemicals in cigarettes and cigars damages the cilia that move mucus out of your lungs  Ask your healthcare provider for information if you currently smoke and need help to quit  E-cigarettes or smokeless tobacco still contain nicotine  Talk to your healthcare provider before you use these products  · Drink liquids as directed  Liquids help keep your air passages moist and help you cough up mucus   You may need to drink more liquids when you have acute bronchitis  Ask how much liquid to drink each day and which liquids are best for you  · Use a humidifier or vaporizer  Use a cool mist humidifier or a vaporizer to increase air moisture in your home  This may make it easier for you to breathe and help decrease your cough  Decrease risk for acute bronchitis:   · Get the vaccinations you need  Ask your healthcare provider if you should get vaccinated against the flu or pneumonia  · Prevent the spread of germs  You can decrease your risk of acute bronchitis and other illnesses by doing the following:     [facility] your hands often with soap and water  Carry germ-killing hand lotion or gel with you  You can use the lotion or gel to clean your hands when soap and water are not available  ¨ Do not touch your eyes, nose, or mouth unless you have washed your hands first     ¨ Always cover your mouth when you cough to prevent the spread of germs  It is best to cough into a tissue or your shirt sleeve instead of into your hand  Ask those around you cover their mouths when they cough  ¨ Try to avoid people who have a cold or the flu  If you are sick, stay away from others as much as possible  Medicines: Your healthcare provider may  give you any of the following:  · Ibuprofen or acetaminophen  are medicines that help lower your fever  They are available without a doctor's order  Ask your healthcare provider which medicine is right for you  Ask how much to take and how often to take it  Follow directions  These medicines can cause stomach bleeding if not taken correctly  Ibuprofen can cause kidney damage  Do not take ibuprofen if you have kidney disease, an ulcer, or allergies to aspirin  Acetaminophen can cause liver damage  Do not take more than 4,000 milligrams in 24 hours  · Decongestants  help loosen mucus in your lungs and make it easier to cough up  This can help you breathe easier  · Cough suppressants  decrease your urge to cough   If your cough produces mucus, do not take a cough suppressant unless your healthcare provider tells you to  Your healthcare provider may suggest that you take a cough suppressant at night so you can rest     · Inhalers  may be given  Your healthcare provider may give you one or more inhalers to help you breathe easier and cough less  An inhaler gives your medicine to open your airways  Ask your healthcare provider to show you how to use your inhaler correctly  · Take your medicine as directed  Contact your healthcare provider if you think your medicine is not helping or if you have side effects  Tell him of her if you are allergic to any medicine  Keep a list of the medicines, vitamins, and herbs you take  Include the amounts, and when and why you take them  Bring the list or the pill bottles to follow-up visits  Carry your medicine list with you in case of an emergency  Follow up with your healthcare provider as directed:  Write down questions you have so you will remember to ask them during your follow-up visits  © 2017 [employer] Information is for End User's use only and may not be sold, redistributed or otherwise used for commercial purposes  All illustrations and images included in CareNotes® are the copyrighted property of [employer]  or Nestor Wilhelm  The above information is an  only  It is not intended as medical advice for individual conditions or treatments  Talk to your doctor, nurse or pharmacist before following any medical regimen to see if it is safe and effective for you

## 2020-03-28 ENCOUNTER — TELEMEDICINE (OUTPATIENT)
Dept: FAMILY MEDICINE CLINIC | Facility: CLINIC | Age: 55
End: 2020-03-28
Payer: COMMERCIAL

## 2020-03-28 DIAGNOSIS — R06.00 DYSPNEA, UNSPECIFIED TYPE: ICD-10-CM

## 2020-03-28 DIAGNOSIS — J45.40 MODERATE PERSISTENT ASTHMA, UNSPECIFIED WHETHER COMPLICATED: ICD-10-CM

## 2020-03-28 DIAGNOSIS — Z20.828 EXPOSURE TO SARS-ASSOCIATED CORONAVIRUS: ICD-10-CM

## 2020-03-28 DIAGNOSIS — R50.9 FEVER, UNSPECIFIED FEVER CAUSE: ICD-10-CM

## 2020-03-28 DIAGNOSIS — Z20.828 EXPOSURE TO SARS-ASSOCIATED CORONAVIRUS: Primary | ICD-10-CM

## 2020-03-28 PROCEDURE — 87635 SARS-COV-2 COVID-19 AMP PRB: CPT

## 2020-03-28 PROCEDURE — G2012 BRIEF CHECK IN BY MD/QHP: HCPCS | Performed by: INTERNAL MEDICINE

## 2020-03-28 NOTE — PROGRESS NOTES
Virtual Brief Visit    Problem List Items Addressed This Visit        Respiratory    Asthma      Other Visit Diagnoses     Exposure to SARS-associated coronavirus    -  Primary    Relevant Orders    MISC COVID-19 TEST- Collected at [facility]    Fever, unspecified fever cause        Dyspnea, unspecified type          Due to exposure and dyspnea/history of asthma, patient was referred for testing for covid-19  Currently her symptoms are mild and she will otherwise self quarantine  She feels her dyspnea is relieved by her inhalers  She will or go to the ER for any worsening dyspnea  Reason for visit is fever and dysobea    Encounter provider Comfort Tapia MD    Provider located at  [address]  [address] [place] [address] 1  [place] [address]      Recent Visits  No visits were found meeting these conditions  Showing recent visits within past 7 days and meeting all other requirements     Today's Visits  Date Type Provider Dept   03/28/20 [address] [place] [address] Anayeli, [address] [place] [address] today's visits and meeting all other requirements     Future Appointments  Date Type Provider Dept   03/28/20 [facility] Joel Guadalupe  [facility] future appointments within next 150 days and meeting all other requirements        After connecting through telephone, the patient was identified by name and date of birth  Nimesh Sigala was informed that this is a telemedicine visit and that the visit is being conducted through telephone  My office door was closed  No one else was in the room  She acknowledged consent and understanding of privacy and security of the video platform  The patient has agreed to participate and understands they can discontinue the visit at any time  Patient is aware this is a billable service  Subjective  Nimesh Sigala is a 48 y o  female with a history of asthma      Hot flashes, intermittently  Dry throat  Winded folding laundry, out of breath going to the bathroom  Temp 99 this morning, has a headache  Has history of asthma, started with a wheeze this morning  Has a possible exposure  Past Medical History:   Diagnosis Date    Allergic rhinitis     Asthma     Fibroids     Physiological ovarian cysts     Sleep apnea     denatl device worn       Past Surgical History:   Procedure Laterality Date    COLONOSCOPY  2016    dr Saldana ARTHROSCOPY Left 2018    meniscectomy x2, first done in 2015    MYOMECTOMY      [place] COLONOSCOPY FLX DX W/COLLJ SPEC WHEN PFRMD N/A 9/13/2018    Procedure: COLONOSCOPY;  Surgeon: Jarek Stovall MD;  Location: [place] [address] [facility] LAB; Service: Gastroenterology    WRIST SURGERY Right        Current Outpatient Medications   Medication Sig Dispense Refill    acyclovir (ZOVIRAX) 200 mg capsule Take 1 capsule (200 mg total) by mouth daily as needed (flare ups) for up to 30 days Only as needed for outbreak 30 capsule 0    albuterol (2 5 mg/3 mL) 0 083 % nebulizer solution Take 1 vial (2 5 mg total) by nebulization every 6 (six) hours as needed for wheezing or shortness of breath 100 mL 0    albuterol (VENTOLIN HFA) 90 mcg/act inhaler Inhale 2 puffs every 4 (four) hours as needed        benzonatate (TESSALON) 200 MG capsule Take 1 capsule (200 mg total) by mouth 3 (three) times a day as needed for cough 20 capsule 0    fluticasone-salmeterol (ADVAIR DISKUS) 500-50 mcg/dose inhaler Inhale 1 puff 2 (two) times a day 1 Inhaler 2    multivitamin (THERAGRAN) TABS Take 1 tablet by mouth daily      pantoprazole (PROTONIX) 40 mg tablet Take 1 tablet (40 mg total) by mouth daily in the early morning 30 tablet 0    Respiratory Therapy Supplies (NEBULIZER/TUBING/MOUTHPIECE) KIT by Does not apply route 4 (four) times a day 1 each 0     No current facility-administered medications for this visit           No Known Allergies    Review of Systems   Constitutional: Positive for fatigue and fever    HENT: Positive for congestion  Respiratory: Positive for cough, shortness of breath and wheezing  Cardiovascular: Negative  Negative for chest pain  I spent 15 minutes with the patient during this visit

## 2020-04-02 ENCOUNTER — TELEPHONE (OUTPATIENT)
Dept: FAMILY MEDICINE CLINIC | Facility: CLINIC | Age: 55
End: 2020-04-02

## 2020-04-02 LAB — SARS-COV-2 RNA SPEC QL NAA+PROBE: NOT DETECTED

## 2020-05-01 ENCOUNTER — TELEPHONE (OUTPATIENT)
Dept: FAMILY MEDICINE CLINIC | Facility: CLINIC | Age: 55
End: 2020-05-01

## 2020-11-14 ENCOUNTER — OFFICE VISIT (OUTPATIENT)
Dept: URGENT CARE | Facility: CLINIC | Age: 55
End: 2020-11-14
Payer: COMMERCIAL

## 2020-11-14 VITALS
SYSTOLIC BLOOD PRESSURE: 158 MMHG | DIASTOLIC BLOOD PRESSURE: 67 MMHG | WEIGHT: 237 LBS | RESPIRATION RATE: 18 BRPM | OXYGEN SATURATION: 97 % | HEIGHT: 63 IN | HEART RATE: 89 BPM | BODY MASS INDEX: 41.99 KG/M2 | TEMPERATURE: 98.3 F

## 2020-11-14 DIAGNOSIS — H10.9 BACTERIAL CONJUNCTIVITIS OF BOTH EYES: Primary | ICD-10-CM

## 2020-11-14 DIAGNOSIS — B96.89 BACTERIAL CONJUNCTIVITIS OF BOTH EYES: Primary | ICD-10-CM

## 2020-11-14 PROCEDURE — 99213 OFFICE O/P EST LOW 20 MIN: CPT | Performed by: PHYSICIAN ASSISTANT

## 2020-11-14 RX ORDER — CIPROFLOXACIN HYDROCHLORIDE 3.5 MG/ML
1 SOLUTION/ DROPS TOPICAL
Qty: 5 ML | Refills: 0 | Status: SHIPPED | OUTPATIENT
Start: 2020-11-14 | End: 2020-11-21

## 2021-01-20 ENCOUNTER — TELEMEDICINE (OUTPATIENT)
Dept: FAMILY MEDICINE CLINIC | Facility: CLINIC | Age: 56
End: 2021-01-20
Payer: COMMERCIAL

## 2021-01-20 DIAGNOSIS — B34.9 VIRAL INFECTION, UNSPECIFIED: ICD-10-CM

## 2021-01-20 DIAGNOSIS — Z20.822 EXPOSURE TO COVID-19 VIRUS: ICD-10-CM

## 2021-01-20 PROCEDURE — 99213 OFFICE O/P EST LOW 20 MIN: CPT | Performed by: NURSE PRACTITIONER

## 2021-01-20 PROCEDURE — U0005 INFEC AGEN DETEC AMPLI PROBE: HCPCS | Performed by: NURSE PRACTITIONER

## 2021-01-20 PROCEDURE — U0003 INFECTIOUS AGENT DETECTION BY NUCLEIC ACID (DNA OR RNA); SEVERE ACUTE RESPIRATORY SYNDROME CORONAVIRUS 2 (SARS-COV-2) (CORONAVIRUS DISEASE [COVID-19]), AMPLIFIED PROBE TECHNIQUE, MAKING USE OF HIGH THROUGHPUT TECHNOLOGIES AS DESCRIBED BY CMS-2020-01-R: HCPCS | Performed by: NURSE PRACTITIONER

## 2021-01-20 NOTE — PROGRESS NOTES
COVID-19 Virtual Visit     Assessment/Plan:    Problem List Items Addressed This Visit     None      Visit Diagnoses     Exposure to COVID-19 virus        Relevant Orders    Novel Coronavirus (Covid-19),PCR [facility] - Collected at [facility]    Viral infection, unspecified        Relevant Orders    Novel Coronavirus (Covid-19),PCR [facility] - Collected at [facility]         Disposition:     I referred patient to one of our centralized sites for a COVID-19 swab  I have spent 7 minutes directly with the patient  Encounter provider ARTEM Frey    Provider located at [address] [address]    Recent Visits  No visits were found meeting these conditions  Showing recent visits within past 7 days and meeting all other requirements     Today's Visits  Date Type Provider Dept   01/20/21 Telemedicine Warner Frey today's visits and meeting all other requirements     Future Appointments  No visits were found meeting these conditions  Showing future appointments within next 150 days and meeting all other requirements      This virtual check-in was done via [employer] and patient was informed that this is a secure, HIPAA-compliant platform  She agrees to proceed  Patient agrees to participate in a virtual check in via telephone or video visit instead of presenting to the office to address urgent/immediate medical needs  Patient is aware this is a billable service  After connecting through [facility], the patient was identified by name and date of birth  Miguel Angel Servin was informed that this was a telemedicine visit and that the exam was being conducted confidentially over secure lines  My office door was closed  No one else was in the room  Miguel Angel Servin acknowledged consent and understanding of privacy and security of the telemedicine visit   I informed the patient that I have reviewed her record in [address] [place] [address] and presented the opportunity for her to ask any questions regarding the visit today  The patient agreed to participate  Subjective:   Josef Rodriguez is a 48 y o  female who is concerned about COVID-19  Patient's symptoms include fatigue and cough  Patient denies fever, chills, malaise, congestion, rhinorrhea, sore throat, anosmia, loss of taste, shortness of breath, chest tightness, abdominal pain, nausea, vomiting, diarrhea, myalgias and headaches  Date of symptom onset: 1/19/2021  Date of exposure: 1/14/2021    Exposure:   Contact with a person who is under investigation (PUI) for or who is positive for COVID-19 within the last 14 days?: Yes    Hospitalized recently for fever and/or lower respiratory symptoms?: No      Currently a healthcare worker that is involved in direct patient care?: No      Works in a special setting where the risk of COVID-19 transmission may be high? (this may include long-term care, correctional and [facility] facilities; homeless shelters; assisted-living facilities and group homes ): No      Resident in a special setting where the risk of COVID-19 transmission may be high? (this may include long-term care, correctional and [facility] facilities; homeless shelters; assisted-living facilities and group homes ): No      Patient started with cough on 1/19/2021 and feeling very tired and back of mouth feels dry   Your finance is diagnosed with COVID-19 on 1/14/2021 and fiance started self isolation since 1/14/2021    Lab Results   Component Value Date    SARSCOV2 Not Detected 03/28/2020     Past Medical History:   Diagnosis Date    Allergic rhinitis     Asthma     Fibroids     Physiological ovarian cysts     Sleep apnea     denatl device worn     Past Surgical History:   Procedure Laterality Date    COLONOSCOPY  2016    dr Jessica Jaramillo ARTHROSCOPY Left 2018    meniscectomy x2, first done in 2015    MYOMECTOMY      [place] COLONOSCOPY FLX DX W/COLLJ SPEC WHEN PFRMD N/A 9/13/2018    Procedure: COLONOSCOPY;  Surgeon: Rolo Montgomery MD;  Location: [facility]; Service: Gastroenterology    WRIST SURGERY Right      Current Outpatient Medications   Medication Sig Dispense Refill    albuterol (2 5 mg/3 mL) 0 083 % nebulizer solution Take 1 vial (2 5 mg total) by nebulization every 6 (six) hours as needed for wheezing or shortness of breath 100 mL 0    albuterol (VENTOLIN HFA) 90 mcg/act inhaler Inhale 2 puffs every 4 (four) hours as needed        fluticasone-salmeterol (ADVAIR DISKUS) 500-50 mcg/dose inhaler Inhale 1 puff 2 (two) times a day 1 Inhaler 2    acyclovir (ZOVIRAX) 200 mg capsule Take 1 capsule (200 mg total) by mouth daily as needed (flare ups) for up to 30 days Only as needed for outbreak 30 capsule 0    benzonatate (TESSALON) 200 MG capsule Take 1 capsule (200 mg total) by mouth 3 (three) times a day as needed for cough (Patient not taking: Reported on 11/14/2020) 20 capsule 0    multivitamin (THERAGRAN) TABS Take 1 tablet by mouth daily      pantoprazole (PROTONIX) 40 mg tablet Take 1 tablet (40 mg total) by mouth daily in the early morning (Patient not taking: Reported on 11/14/2020) 30 tablet 0    Respiratory Therapy Supplies (NEBULIZER/TUBING/MOUTHPIECE) KIT by Does not apply route 4 (four) times a day 1 each 0     No current facility-administered medications for this visit  No Known Allergies    Review of Systems   Constitutional: Positive for fatigue  Negative for chills, diaphoresis, fever and unexpected weight change  HENT: Negative for congestion, dental problem, drooling, ear discharge, ear pain, facial swelling, hearing loss, mouth sores, nosebleeds, postnasal drip, rhinorrhea, sinus pressure, sinus pain, sneezing, sore throat, tinnitus, trouble swallowing and voice change  Respiratory: Positive for cough  Negative for chest tightness, shortness of breath and wheezing  Cardiovascular: Negative  Gastrointestinal: Negative for abdominal pain, constipation, diarrhea, nausea and vomiting  Musculoskeletal: Negative  Negative for myalgias  Skin: Negative  Neurological: Negative for dizziness, light-headedness and headaches  Hematological: Negative  Objective: There were no vitals filed for this visit  Physical Exam  HENT:      Head: Normocephalic  Right Ear: External ear normal       Left Ear: External ear normal       Nose: Nose normal    Eyes:      Conjunctiva/sclera: Conjunctivae normal    Neck:      Musculoskeletal: Normal range of motion  Pulmonary:      Effort: Pulmonary effort is normal       Comments: No distress noted on video call  Cough noted on video call  Skin:     Comments: No diaphoresis noted on video call   Neurological:      Mental Status: She is alert and oriented to person, place, and time  Psychiatric:         Mood and Affect: Mood normal          Behavior: Behavior normal          Thought Content: Thought content normal          Judgment: Judgment normal        VIRTUAL VISIT DISCLAIMER    Lorena Mcgowan acknowledges that she has consented to an online visit or consultation  She understands that the online visit is based solely on information provided by her, and that, in the absence of a face-to-face physical evaluation by the physician, the diagnosis she receives is both limited and provisional in terms of accuracy and completeness  This is not intended to replace a full medical face-to-face evaluation by the physician  Lorena Mcgowan understands and accepts these terms

## 2021-01-21 LAB — SARS-COV-2 RNA RESP QL NAA+PROBE: NEGATIVE

## 2021-03-10 DIAGNOSIS — Z23 ENCOUNTER FOR IMMUNIZATION: ICD-10-CM

## 2021-06-08 ENCOUNTER — OFFICE VISIT (OUTPATIENT)
Dept: OBGYN CLINIC | Facility: CLINIC | Age: 56
End: 2021-06-08
Payer: COMMERCIAL

## 2021-06-08 ENCOUNTER — APPOINTMENT (OUTPATIENT)
Dept: RADIOLOGY | Facility: CLINIC | Age: 56
End: 2021-06-08
Payer: COMMERCIAL

## 2021-06-08 VITALS
BODY MASS INDEX: 42.35 KG/M2 | DIASTOLIC BLOOD PRESSURE: 84 MMHG | HEART RATE: 86 BPM | HEIGHT: 63 IN | WEIGHT: 239 LBS | SYSTOLIC BLOOD PRESSURE: 120 MMHG

## 2021-06-08 DIAGNOSIS — M17.11 PRIMARY OSTEOARTHRITIS OF RIGHT KNEE: Primary | ICD-10-CM

## 2021-06-08 DIAGNOSIS — Z01.89 ENCOUNTER FOR LOWER EXTREMITY COMPARISON IMAGING STUDY: ICD-10-CM

## 2021-06-08 DIAGNOSIS — M25.561 RIGHT KNEE PAIN, UNSPECIFIED CHRONICITY: ICD-10-CM

## 2021-06-08 PROCEDURE — 73562 X-RAY EXAM OF KNEE 3: CPT

## 2021-06-08 PROCEDURE — 73560 X-RAY EXAM OF KNEE 1 OR 2: CPT

## 2021-06-08 PROCEDURE — 20610 DRAIN/INJ JOINT/BURSA W/O US: CPT | Performed by: ORTHOPAEDIC SURGERY

## 2021-06-08 PROCEDURE — 99204 OFFICE O/P NEW MOD 45 MIN: CPT | Performed by: ORTHOPAEDIC SURGERY

## 2021-06-08 RX ORDER — DEXAMETHASONE SODIUM PHOSPHATE 100 MG/10ML
40 INJECTION INTRAMUSCULAR; INTRAVENOUS
Status: COMPLETED | OUTPATIENT
Start: 2021-06-08 | End: 2021-06-08

## 2021-06-08 RX ORDER — LIDOCAINE HYDROCHLORIDE 10 MG/ML
4 INJECTION, SOLUTION INFILTRATION; PERINEURAL
Status: COMPLETED | OUTPATIENT
Start: 2021-06-08 | End: 2021-06-08

## 2021-06-08 RX ORDER — ACETAMINOPHEN 325 MG/1
650 TABLET ORAL EVERY 6 HOURS PRN
COMMUNITY

## 2021-06-08 RX ADMIN — LIDOCAINE HYDROCHLORIDE 4 ML: 10 INJECTION, SOLUTION INFILTRATION; PERINEURAL at 17:22

## 2021-06-08 RX ADMIN — DEXAMETHASONE SODIUM PHOSPHATE 40 MG: 100 INJECTION INTRAMUSCULAR; INTRAVENOUS at 17:22

## 2021-06-08 NOTE — PROGRESS NOTES
Assessment/Plan:  1  Primary osteoarthritis of right knee  XR knee 3 vw right non injury       Yvrose Fleming has right-sided knee pain consistent with osteoarthritis  We did provide a right knee cortisone injection the office today which she tolerated well  We could consider viscosupplementation in the future if clinically indicated  She will follow up in the office in 2-3 weeks if pain persists or worsens we could consider further imaging or viscosupplementation if clinically indicated  Large joint arthrocentesis: R knee  Universal Protocol:  Consent given by: patient  Time out: Immediately prior to procedure a "time out" was called to verify the correct patient, procedure, equipment, support staff and site/side marked as required  Site marked: the operative site was marked  Supporting Documentation  Indications: pain   Procedure Details  Location: knee - R knee  Preparation: Patient was prepped and draped in the usual sterile fashion  Needle size: 22 G  Ultrasound guidance: no  Approach: anterolateral  Medications administered: 40 mg dexamethasone 100 mg/10 mL; 4 mL lidocaine 1 %    Patient tolerance: patient tolerated the procedure well with no immediate complications  Dressing:  Sterile dressing applied            Subjective:   Alphonse Hill is a 55 y o  female who presents to the office for evaluation for the acute onset of right-sided knee pain  She denies any injury or trauma  She has been feeling increased discomfort in the medial aspect of the right knee  She denies any previous pain in her right knee  Over the last 2-3 months the pain is been increasing  She feels increased pain with walking and bending her knee  She states that sometimes when she is walking she feels like her knee is going to give out due to the pain  She has a history of left knee osteoarthritis for which she has been treated with viscosupplementation a year and half ago  She denies any pain in her left knee today    She had surgery in her left knee but never in her right  Review of Systems   Constitutional: Negative for chills, fever and unexpected weight change  HENT: Negative for hearing loss, nosebleeds and sore throat  Eyes: Negative for pain, redness and visual disturbance  Respiratory: Negative for cough, shortness of breath and wheezing  Cardiovascular: Negative for chest pain, palpitations and leg swelling  Gastrointestinal: Negative for abdominal pain, nausea and vomiting  Endocrine: Negative for polydipsia and polyuria  Genitourinary: Negative for dysuria and hematuria  Musculoskeletal:        See HPI   Skin: Negative for rash and wound  Neurological: Negative for dizziness, numbness and headaches  Psychiatric/Behavioral: Negative for decreased concentration and suicidal ideas  The patient is not nervous/anxious  Past Medical History:   Diagnosis Date    Allergic rhinitis     Asthma     Fibroids     Physiological ovarian cysts     Sleep apnea     denatl device worn       Past Surgical History:   Procedure Laterality Date    COLONOSCOPY  2016    dr Earnest Blank ARTHROSCOPY Left 2018    meniscectomy x2, first done in 2015    MYOMECTOMY      [place] COLONOSCOPY FLX DX W/COLLJ SPEC WHEN PFRMD N/A 9/13/2018    Procedure: COLONOSCOPY;  Surgeon: Brandy Phillips MD;  Location: [place] [address] [facility]; Service: Gastroenterology    WRIST SURGERY Right        Family History   Problem Relation Age of Onset    Osteoporosis Mother     Thyroid disease Mother     Colon cancer Mother     Colon polyps Mother     Cancer Mother         colon    Alzheimer's disease Father     Hypertension Father     Dementia Father     Heart disease Brother     No Known Problems Daughter     No Known Problems Daughter        Social History     Occupational History    Not on file   Tobacco Use    Smoking status: Never Smoker    Smokeless tobacco: Never Used   Substance and Sexual Activity    Alcohol use:  Yes Frequency: Monthly or less     Drinks per session: 1 or 2     Comment: social - wine 2 x month    Drug use: No    Sexual activity: Yes     Partners: Male     Birth control/protection: Condom Male         Current Outpatient Medications:     acetaminophen (TYLENOL) 325 mg tablet, Take 650 mg by mouth every 6 (six) hours as needed for mild pain, Disp: , Rfl:     albuterol (2 5 mg/3 mL) 0 083 % nebulizer solution, Take 1 vial (2 5 mg total) by nebulization every 6 (six) hours as needed for wheezing or shortness of breath, Disp: 100 mL, Rfl: 0    albuterol (VENTOLIN HFA) 90 mcg/act inhaler, Inhale 2 puffs every 4 (four) hours as needed  , Disp: , Rfl:     fluticasone-salmeterol (ADVAIR DISKUS) 500-50 mcg/dose inhaler, Inhale 1 puff 2 (two) times a day, Disp: 1 Inhaler, Rfl: 2    multivitamin (THERAGRAN) TABS, Take 1 tablet by mouth daily, Disp: , Rfl:     Respiratory Therapy Supplies (NEBULIZER/TUBING/MOUTHPIECE) KIT, by Does not apply route 4 (four) times a day, Disp: 1 each, Rfl: 0    acyclovir (ZOVIRAX) 200 mg capsule, Take 1 capsule (200 mg total) by mouth daily as needed (flare ups) for up to 30 days Only as needed for outbreak, Disp: 30 capsule, Rfl: 0    benzonatate (TESSALON) 200 MG capsule, Take 1 capsule (200 mg total) by mouth 3 (three) times a day as needed for cough (Patient not taking: Reported on 11/14/2020), Disp: 20 capsule, Rfl: 0    pantoprazole (PROTONIX) 40 mg tablet, Take 1 tablet (40 mg total) by mouth daily in the early morning (Patient not taking: Reported on 11/14/2020), Disp: 30 tablet, Rfl: 0    No Known Allergies    Objective:  Vitals:    06/08/21 1645   BP: 120/84   Pulse: 86       Right Knee Exam     Muscle Strength   The patient has normal right knee strength  Tenderness   The patient is experiencing tenderness in the medial joint line      Range of Motion   Extension: normal   Flexion: normal     Tests   Arjun:  Medial - negative Lateral - negative  Varus: negative Valgus: negative    Other   Erythema: absent  Sensation: normal  Pulse: present  Swelling: none  Effusion: no effusion present          Observations     Right Knee   Negative for effusion  Physical Exam  Vitals signs reviewed  Constitutional:       Appearance: She is well-developed  HENT:      Head: Normocephalic and atraumatic  Eyes:      Conjunctiva/sclera: Conjunctivae normal       Pupils: Pupils are equal, round, and reactive to light  Neck:      Musculoskeletal: Normal range of motion and neck supple  Cardiovascular:      Rate and Rhythm: Normal rate  Pulmonary:      Effort: Pulmonary effort is normal  No respiratory distress  Musculoskeletal:      Right knee: She exhibits no effusion  Skin:     General: Skin is warm and dry  Neurological:      Mental Status: She is alert and oriented to person, place, and time  Psychiatric:         Behavior: Behavior normal          I have personally reviewed pertinent films in PACS and my interpretation is as follows: Three-view x-rays of the right knee demonstrate moderate right knee osteoarthritis with medial joint space narrowing and osteophyte formation

## 2021-06-24 ENCOUNTER — TELEPHONE (OUTPATIENT)
Dept: OBGYN CLINIC | Facility: HOSPITAL | Age: 56
End: 2021-06-24

## 2021-06-24 NOTE — TELEPHONE ENCOUNTER
Patient sees Dr Jerald Huff  Patient is calling to request visco injections for her right knee  Can the order please be placed?       CB: [phone]

## 2021-06-24 NOTE — TELEPHONE ENCOUNTER
Please order right knee Euflexxa injections for her    She failed cortisone injection earlier this month

## 2021-07-23 ENCOUNTER — TELEPHONE (OUTPATIENT)
Dept: GASTROENTEROLOGY | Facility: CLINIC | Age: 56
End: 2021-07-23

## 2021-07-23 NOTE — TELEPHONE ENCOUNTER
Patient is scheduled for colonoscopy on October 8, 2021 at [address] [place] [address] with Tushar Anderson MD  Patient is aware of pre-procedure prep of Suprep and they will be called the day prior between 2 and 6 pm for time to report for procedure  Prep instructions emailed to patient

## 2021-07-27 ENCOUNTER — PROCEDURE VISIT (OUTPATIENT)
Dept: OBGYN CLINIC | Facility: CLINIC | Age: 56
End: 2021-07-27
Payer: COMMERCIAL

## 2021-07-27 DIAGNOSIS — M17.11 PRIMARY OSTEOARTHRITIS OF RIGHT KNEE: Primary | ICD-10-CM

## 2021-07-27 PROCEDURE — 20610 DRAIN/INJ JOINT/BURSA W/O US: CPT | Performed by: ORTHOPAEDIC SURGERY

## 2021-07-27 RX ORDER — HYALURONATE SODIUM 10 MG/ML
20 SYRINGE (ML) INTRAARTICULAR
Status: COMPLETED | OUTPATIENT
Start: 2021-07-27 | End: 2021-07-27

## 2021-07-27 RX ADMIN — Medication 20 MG: at 18:02

## 2021-07-27 NOTE — PROGRESS NOTES
Assessment/Plan:  1  Primary osteoarthritis of right knee         Colette tolerated her first Euflexxa injection the right knee today  She will follow up in 1 week for the next injection    Subjective:   Wendy Tiwari is a 55 y o  female who presents for her first Euflexxa injection the right knee today  Past Medical History:   Diagnosis Date    Allergic rhinitis     Asthma     Fibroids     Physiological ovarian cysts     Sleep apnea     denatl device worn       Past Surgical History:   Procedure Laterality Date    COLONOSCOPY  2016    dr Caryle Ege ARTHROSCOPY Left 2018    meniscectomy x2, first done in 2015    MYOMECTOMY      [place] COLONOSCOPY FLX DX W/COLLJ SPEC WHEN PFRMD N/A 9/13/2018    Procedure: COLONOSCOPY;  Surgeon: Jen Rogers MD;  Location: [facility];   Service: Gastroenterology    WRIST SURGERY Right        Family History   Problem Relation Age of Onset    Osteoporosis Mother     Thyroid disease Mother    Chris Kena Colon cancer Mother     Colon polyps Mother     Cancer Mother         colon    Alzheimer's disease Father     Hypertension Father     Dementia Father     Heart disease Brother     No Known Problems Daughter     No Known Problems Daughter        Social History     Occupational History    Not on file   Tobacco Use    Smoking status: Never Smoker    Smokeless tobacco: Never Used   Vaping Use    Vaping Use: Never used   Substance and Sexual Activity    Alcohol use: Yes     Comment: social - wine 2 x month    Drug use: No    Sexual activity: Yes     Partners: Male     Birth control/protection: Condom Male         Current Outpatient Medications:     acetaminophen (TYLENOL) 325 mg tablet, Take 650 mg by mouth every 6 (six) hours as needed for mild pain, Disp: , Rfl:     acyclovir (ZOVIRAX) 200 mg capsule, Take 1 capsule (200 mg total) by mouth daily as needed (flare ups) for up to 30 days Only as needed for outbreak, Disp: 30 capsule, Rfl: 0    albuterol (2 5 mg/3 mL) 0 083 % nebulizer solution, Take 1 vial (2 5 mg total) by nebulization every 6 (six) hours as needed for wheezing or shortness of breath, Disp: 100 mL, Rfl: 0    albuterol (VENTOLIN HFA) 90 mcg/act inhaler, Inhale 2 puffs every 4 (four) hours as needed  , Disp: , Rfl:     benzonatate (TESSALON) 200 MG capsule, Take 1 capsule (200 mg total) by mouth 3 (three) times a day as needed for cough (Patient not taking: Reported on 11/14/2020), Disp: 20 capsule, Rfl: 0    fluticasone-salmeterol (ADVAIR DISKUS) 500-50 mcg/dose inhaler, Inhale 1 puff 2 (two) times a day, Disp: 1 Inhaler, Rfl: 2    multivitamin (THERAGRAN) TABS, Take 1 tablet by mouth daily, Disp: , Rfl:     pantoprazole (PROTONIX) 40 mg tablet, Take 1 tablet (40 mg total) by mouth daily in the early morning (Patient not taking: Reported on 11/14/2020), Disp: 30 tablet, Rfl: 0    Respiratory Therapy Supplies (NEBULIZER/TUBING/MOUTHPIECE) KIT, by Does not apply route 4 (four) times a day, Disp: 1 each, Rfl: 0    No Known Allergies    Objective: There were no vitals filed for this visit  Ortho Exam    Physical Exam  Vitals reviewed  Constitutional:       Appearance: She is well-developed  HENT:      Head: Normocephalic and atraumatic  Eyes:      Conjunctiva/sclera: Conjunctivae normal       Pupils: Pupils are equal, round, and reactive to light  Cardiovascular:      Rate and Rhythm: Normal rate  Pulmonary:      Effort: Pulmonary effort is normal  No respiratory distress  Musculoskeletal:      Cervical back: Normal range of motion and neck supple  Skin:     General: Skin is warm and dry  Neurological:      Mental Status: She is alert and oriented to person, place, and time     Psychiatric:         Behavior: Behavior normal          Large joint arthrocentesis: R knee  Universal Protocol:  Risks and benefits: risks, benefits and alternatives were discussed  Consent given by: patient  Site marked: the operative site was marked  Supporting Documentation  Indications: pain   Procedure Details  Location: knee - R knee  Needle size: 22 G  Ultrasound guidance: no  Approach: anterolateral  Medications administered: 20 mg Sodium Hyaluronate 20 MG/2ML  Specialty Pharmacy Supplied: received medications from pharmacy  Patient tolerance: patient tolerated the procedure well with no immediate complications  Dressing:  Sterile dressing applied

## 2021-08-03 ENCOUNTER — PROCEDURE VISIT (OUTPATIENT)
Dept: OBGYN CLINIC | Facility: CLINIC | Age: 56
End: 2021-08-03
Payer: COMMERCIAL

## 2021-08-03 DIAGNOSIS — M17.11 PRIMARY OSTEOARTHRITIS OF RIGHT KNEE: Primary | ICD-10-CM

## 2021-08-03 PROCEDURE — 20610 DRAIN/INJ JOINT/BURSA W/O US: CPT | Performed by: ORTHOPAEDIC SURGERY

## 2021-08-03 RX ORDER — HYALURONATE SODIUM 10 MG/ML
20 SYRINGE (ML) INTRAARTICULAR
Status: COMPLETED | OUTPATIENT
Start: 2021-08-03 | End: 2021-08-03

## 2021-08-03 RX ADMIN — Medication 20 MG: at 17:30

## 2021-08-03 NOTE — PROGRESS NOTES
Assessment/Plan:  1  Primary osteoarthritis of right knee         Colette tolerated her second Euflexxa injection the right knee today  She will follow up in 1 week for the next injection    Subjective:   Jasper Thibodeaux is a 55 y o  female who presents to the office for her second Euflexxa injection in the right knee today  Past Medical History:   Diagnosis Date    Allergic rhinitis     Asthma     Fibroids     Physiological ovarian cysts     Sleep apnea     denatl device worn       Past Surgical History:   Procedure Laterality Date    COLONOSCOPY  2016    dr Hernandez Alert ARTHROSCOPY Left 2018    meniscectomy x2, first done in 2015    MYOMECTOMY      [place] COLONOSCOPY FLX DX W/COLLJ SPEC WHEN PFRMD N/A 9/13/2018    Procedure: COLONOSCOPY;  Surgeon: Dilshad Gilliland MD;  Location: [facility];   Service: Gastroenterology    WRIST SURGERY Right        Family History   Problem Relation Age of Onset    Osteoporosis Mother     Thyroid disease Mother    [employer] Colon cancer Mother     Colon polyps Mother    [employer] Cancer Mother         colon    Alzheimer's disease Father     Hypertension Father     Dementia Father     Heart disease Brother     No Known Problems Daughter     No Known Problems Daughter        Social History     Occupational History    Not on file   Tobacco Use    Smoking status: Never Smoker    Smokeless tobacco: Never Used   Vaping Use    Vaping Use: Never used   Substance and Sexual Activity    Alcohol use: Yes     Comment: social - wine 2 x month    Drug use: No    Sexual activity: Yes     Partners: Male     Birth control/protection: Condom Male         Current Outpatient Medications:     acetaminophen (TYLENOL) 325 mg tablet, Take 650 mg by mouth every 6 (six) hours as needed for mild pain, Disp: , Rfl:     acyclovir (ZOVIRAX) 200 mg capsule, Take 1 capsule (200 mg total) by mouth daily as needed (flare ups) for up to 30 days Only as needed for outbreak, Disp: 30 capsule, Rfl: 0    albuterol (2 5 mg/3 mL) 0 083 % nebulizer solution, Take 1 vial (2 5 mg total) by nebulization every 6 (six) hours as needed for wheezing or shortness of breath, Disp: 100 mL, Rfl: 0    albuterol (VENTOLIN HFA) 90 mcg/act inhaler, Inhale 2 puffs every 4 (four) hours as needed  , Disp: , Rfl:     benzonatate (TESSALON) 200 MG capsule, Take 1 capsule (200 mg total) by mouth 3 (three) times a day as needed for cough (Patient not taking: Reported on 11/14/2020), Disp: 20 capsule, Rfl: 0    fluticasone-salmeterol (ADVAIR DISKUS) 500-50 mcg/dose inhaler, Inhale 1 puff 2 (two) times a day, Disp: 1 Inhaler, Rfl: 2    multivitamin (THERAGRAN) TABS, Take 1 tablet by mouth daily, Disp: , Rfl:     pantoprazole (PROTONIX) 40 mg tablet, Take 1 tablet (40 mg total) by mouth daily in the early morning (Patient not taking: Reported on 11/14/2020), Disp: 30 tablet, Rfl: 0    Respiratory Therapy Supplies (NEBULIZER/TUBING/MOUTHPIECE) KIT, by Does not apply route 4 (four) times a day, Disp: 1 each, Rfl: 0    No Known Allergies    Objective: There were no vitals filed for this visit  Ortho Exam    Physical Exam  Vitals and nursing note reviewed  Constitutional:       Appearance: She is well-developed  HENT:      Head: Normocephalic and atraumatic  Eyes:      General: No scleral icterus  Conjunctiva/sclera: Conjunctivae normal    Cardiovascular:      Rate and Rhythm: Normal rate  Pulmonary:      Effort: Pulmonary effort is normal  No respiratory distress  Musculoskeletal:      Cervical back: Normal range of motion and neck supple  Comments: As noted in HPI   Skin:     General: Skin is warm and dry  Neurological:      Mental Status: She is alert and oriented to person, place, and time     Psychiatric:         Behavior: Behavior normal          Large joint arthrocentesis: R knee  Universal Protocol:  Consent given by: patient  Site marked: the operative site was marked  Supporting Documentation  Indications: pain   Procedure Details  Location: knee - R knee  Needle size: 22 G  Ultrasound guidance: no  Approach: anterolateral  Medications administered: 20 mg Sodium Hyaluronate 20 MG/2ML  Specialty Pharmacy Supplied: received medications from pharmacy  Patient tolerance: patient tolerated the procedure well with no immediate complications  Dressing:  Sterile dressing applied

## 2021-08-10 ENCOUNTER — PROCEDURE VISIT (OUTPATIENT)
Dept: OBGYN CLINIC | Facility: CLINIC | Age: 56
End: 2021-08-10
Payer: COMMERCIAL

## 2021-08-10 DIAGNOSIS — M17.11 PRIMARY OSTEOARTHRITIS OF RIGHT KNEE: Primary | ICD-10-CM

## 2021-08-10 PROCEDURE — 20610 DRAIN/INJ JOINT/BURSA W/O US: CPT | Performed by: ORTHOPAEDIC SURGERY

## 2021-08-10 RX ORDER — HYALURONATE SODIUM 20 MG/2 ML
SYRINGE (ML) INTRAARTICULAR
COMMUNITY
Start: 2021-07-08 | End: 2021-10-06

## 2021-08-10 RX ORDER — HYALURONATE SODIUM 10 MG/ML
20 SYRINGE (ML) INTRAARTICULAR
Status: COMPLETED | OUTPATIENT
Start: 2021-08-10 | End: 2021-08-10

## 2021-08-10 RX ADMIN — Medication 20 MG: at 17:30

## 2021-08-10 NOTE — PROGRESS NOTES
Assessment/Plan:  1  Primary osteoarthritis of right knee  Large joint arthrocentesis: R knee       Colette tolerated her third Euflexxa injection the right knee today  We could repeat these injections in 6 months if clinically indicated  Subjective:   Shirley Samuels is a 55 y o  female who presents to the office for her third Euflexxa injection the right knee         Past Medical History:   Diagnosis Date    Allergic rhinitis     Asthma     Fibroids     Physiological ovarian cysts     Sleep apnea     denatl device worn       Past Surgical History:   Procedure Laterality Date    COLONOSCOPY  2016    dr Kirill Rahman ARTHROSCOPY Left 2018    meniscectomy x2, first done in 2015    MYOMECTOMY      [place] COLONOSCOPY FLX DX W/COLLJ SPEC WHEN PFRMD N/A 9/13/2018    Procedure: COLONOSCOPY;  Surgeon: Lacy Pires MD;  Location: [facility];   Service: Gastroenterology    WRIST SURGERY Right        Family History   Problem Relation Age of Onset    Osteoporosis Mother     Thyroid disease Mother    Deadra Marco Antonio Colon cancer Mother     Colon polyps Mother    Deadra Marco Antonio Cancer Mother         colon    Alzheimer's disease Father     Hypertension Father     Dementia Father     Heart disease Brother     No Known Problems Daughter     No Known Problems Daughter        Social History     Occupational History    Not on file   Tobacco Use    Smoking status: Never Smoker    Smokeless tobacco: Never Used   Vaping Use    Vaping Use: Never used   Substance and Sexual Activity    Alcohol use: Yes     Comment: social - wine 2 x month    Drug use: No    Sexual activity: Yes     Partners: Male     Birth control/protection: Condom Male         Current Outpatient Medications:     acetaminophen (TYLENOL) 325 mg tablet, Take 650 mg by mouth every 6 (six) hours as needed for mild pain, Disp: , Rfl:     albuterol (2 5 mg/3 mL) 0 083 % nebulizer solution, Take 1 vial (2 5 mg total) by nebulization every 6 (six) hours as needed for wheezing or shortness of breath, Disp: 100 mL, Rfl: 0    albuterol (VENTOLIN HFA) 90 mcg/act inhaler, Inhale 2 puffs every 4 (four) hours as needed  , Disp: , Rfl:     benzonatate (TESSALON) 200 MG capsule, Take 1 capsule (200 mg total) by mouth 3 (three) times a day as needed for cough, Disp: 20 capsule, Rfl: 0    fluticasone-salmeterol (ADVAIR DISKUS) 500-50 mcg/dose inhaler, Inhale 1 puff 2 (two) times a day, Disp: 1 Inhaler, Rfl: 2    multivitamin (THERAGRAN) TABS, Take 1 tablet by mouth daily, Disp: , Rfl:     pantoprazole (PROTONIX) 40 mg tablet, Take 1 tablet (40 mg total) by mouth daily in the early morning, Disp: 30 tablet, Rfl: 0    Respiratory Therapy Supplies (NEBULIZER/TUBING/MOUTHPIECE) KIT, by Does not apply route 4 (four) times a day, Disp: 1 each, Rfl: 0    acyclovir (ZOVIRAX) 200 mg capsule, Take 1 capsule (200 mg total) by mouth daily as needed (flare ups) for up to 30 days Only as needed for outbreak, Disp: 30 capsule, Rfl: 0    Euflexxa 20 MG/2ML SOSY, , Disp: , Rfl:     No Known Allergies    Objective: There were no vitals filed for this visit      Ortho Exam    Physical Exam    Large joint arthrocentesis: R knee  Universal Protocol:  Risks and benefits: risks, benefits and alternatives were discussed  Consent given by: patient  Site marked: the operative site was marked  Supporting Documentation  Indications: pain   Procedure Details  Location: knee - R knee  Needle size: 22 G  Ultrasound guidance: no  Approach: anterolateral  Medications administered: 20 mg Sodium Hyaluronate 20 MG/2ML  Specialty Pharmacy Supplied: received medications from pharmacy  Patient tolerance: patient tolerated the procedure well with no immediate complications  Dressing:  Sterile dressing applied

## 2021-08-25 ENCOUNTER — TELEPHONE (OUTPATIENT)
Dept: OBGYN CLINIC | Facility: CLINIC | Age: 56
End: 2021-08-25

## 2021-08-25 NOTE — TELEPHONE ENCOUNTER
Spoke to patient  Denies redness, heat to touch, increased swelling, or fever  Stated she has to ice and elevate often because it is painful  She is alternating tylenol and advil currently and ice  Advised she can try lidocaine patches or voltaren gel instead of advil if she would like  These injections can take up to 6 weeks to feel effective  Understanding verbalized  Please advise

## 2021-08-25 NOTE — TELEPHONE ENCOUNTER
Carlee Good,         Patient is calling in requesting a call  She received a 3 series of injections in the right knee  She states she is feeling more pain now than before  She can not sleep at night  Patient would like to know what can she do in the mean time?        Please adviseSalvador#: [phone]

## 2021-08-26 NOTE — TELEPHONE ENCOUNTER
Agree with recommendations  F/u in office if pain continues   We could consider alternative treatments of MRI

## 2021-08-29 PROBLEM — K57.92 ACUTE DIVERTICULITIS: Status: RESOLVED | Noted: 2019-07-21 | Resolved: 2021-08-29

## 2021-08-31 ENCOUNTER — OFFICE VISIT (OUTPATIENT)
Dept: FAMILY MEDICINE CLINIC | Facility: CLINIC | Age: 56
End: 2021-08-31
Payer: COMMERCIAL

## 2021-08-31 VITALS
HEART RATE: 97 BPM | HEIGHT: 64 IN | SYSTOLIC BLOOD PRESSURE: 140 MMHG | TEMPERATURE: 99.3 F | RESPIRATION RATE: 18 BRPM | DIASTOLIC BLOOD PRESSURE: 100 MMHG | OXYGEN SATURATION: 98 % | BODY MASS INDEX: 39.61 KG/M2 | WEIGHT: 232 LBS

## 2021-08-31 DIAGNOSIS — R73.03 PREDIABETES: ICD-10-CM

## 2021-08-31 DIAGNOSIS — Z00.00 HEALTHCARE MAINTENANCE: Primary | ICD-10-CM

## 2021-08-31 DIAGNOSIS — Z13.1 SCREENING FOR DIABETES MELLITUS (DM): ICD-10-CM

## 2021-08-31 DIAGNOSIS — R03.0 ELEVATED BP WITHOUT DIAGNOSIS OF HYPERTENSION: ICD-10-CM

## 2021-08-31 DIAGNOSIS — E66.9 OBESITY (BMI 30-39.9): ICD-10-CM

## 2021-08-31 DIAGNOSIS — Z12.31 BREAST CANCER SCREENING BY MAMMOGRAM: ICD-10-CM

## 2021-08-31 DIAGNOSIS — Z13.6 SCREENING FOR CARDIOVASCULAR, RESPIRATORY, AND GENITOURINARY DISEASES: ICD-10-CM

## 2021-08-31 DIAGNOSIS — Z13.89 SCREENING FOR CARDIOVASCULAR, RESPIRATORY, AND GENITOURINARY DISEASES: ICD-10-CM

## 2021-08-31 DIAGNOSIS — Z13.83 SCREENING FOR CARDIOVASCULAR, RESPIRATORY, AND GENITOURINARY DISEASES: ICD-10-CM

## 2021-08-31 PROCEDURE — 99396 PREV VISIT EST AGE 40-64: CPT | Performed by: FAMILY MEDICINE

## 2021-08-31 NOTE — PROGRESS NOTES
Assessment/Plan:    No problem-specific Assessment & Plan notes found for this encounter  cpe    Elevated bp, recheck 1m after TLC efforts to see if needs treatment    bmi aware, advised wt loss     Diagnoses and all orders for this visit:    Healthcare maintenance    Elevated BP without diagnosis of hypertension    Screening for cardiovascular, respiratory, and genitourinary diseases  -     Lipid Panel with Direct LDL reflex; Future    Screening for diabetes mellitus (DM)  -     Comprehensive metabolic panel; Future    Obesity (BMI 30-39 9)  -     TSH, 3rd generation; Future    Breast cancer screening by mammogram  -     Mammo screening bilateral w 3d & cad; Future    Prediabetes  -     Hemoglobin A1C; Future        Return in about 1 month (around 9/30/2021) for Recheck  Subjective:      Patient ID: [id number] Omalley is a 55 y o  female  Chief Complaint   Patient presents with    Physical Exam     sas/cma       HPI  Labs at work  fbs 108  Tot chol 219  Completed last month    dbp 96  No fam hx htn    Eating better now  More veggies  Lots of carbs though  Limits portions  Has a bike now  Knee issues    Some wt loss on her scale noted    The following portions of the patient's history were reviewed and updated as appropriate: allergies, current medications, past family history, past medical history, past social history, past surgical history and problem list     Review of Systems   Constitutional: Negative for fever  Respiratory: Negative for shortness of breath  Cardiovascular: Negative for chest pain           Current Outpatient Medications   Medication Sig Dispense Refill    acetaminophen (TYLENOL) 325 mg tablet Take 650 mg by mouth every 6 (six) hours as needed for mild pain      acyclovir (ZOVIRAX) 200 mg capsule Take 1 capsule (200 mg total) by mouth daily as needed (flare ups) for up to 30 days Only as needed for outbreak 30 capsule 0    albuterol (2 5 mg/3 mL) 0 083 % nebulizer solution Take 1 vial (2 5 mg total) by nebulization every 6 (six) hours as needed for wheezing or shortness of breath 100 mL 0    albuterol (VENTOLIN HFA) 90 mcg/act inhaler Inhale 2 puffs every 4 (four) hours as needed        fluticasone-salmeterol (ADVAIR DISKUS) 500-50 mcg/dose inhaler Inhale 1 puff 2 (two) times a day 1 Inhaler 2    multivitamin (THERAGRAN) TABS Take 1 tablet by mouth daily      Respiratory Therapy Supplies (NEBULIZER/TUBING/MOUTHPIECE) KIT by Does not apply route 4 (four) times a day 1 each 0    benzonatate (TESSALON) 200 MG capsule Take 1 capsule (200 mg total) by mouth 3 (three) times a day as needed for cough (Patient not taking: Reported on 8/31/2021) 20 capsule 0    Euflexxa 20 MG/2ML SOSY  (Patient not taking: Reported on 8/31/2021)      pantoprazole (PROTONIX) 40 mg tablet Take 1 tablet (40 mg total) by mouth daily in the early morning (Patient not taking: Reported on 8/31/2021) 30 tablet 0     No current facility-administered medications for this visit  Objective:    /100   Pulse 97   Temp 99 3 °F (37 4 °C)   Resp 18   Ht 5' 4" (1 626 m)   Wt 105 kg (232 lb)   LMP 04/16/2021 (Approximate)   SpO2 98%   BMI 39 82 kg/m²        Physical Exam  Vitals and nursing note reviewed  Constitutional:       Appearance: She is well-developed  She is obese  She is not ill-appearing  HENT:      Head: Normocephalic  Right Ear: Tympanic membrane normal       Left Ear: Tympanic membrane normal       Mouth/Throat:      Mouth: Mucous membranes are moist       Pharynx: No oropharyngeal exudate  Eyes:      General: No scleral icterus  Conjunctiva/sclera: Conjunctivae normal    Neck:      Thyroid: No thyromegaly  Cardiovascular:      Rate and Rhythm: Normal rate and regular rhythm  Heart sounds: No murmur heard  Pulmonary:      Effort: Pulmonary effort is normal  No respiratory distress  Breath sounds: No rales     Abdominal:      General: Bowel sounds are normal  Palpations: Abdomen is soft  Tenderness: There is no abdominal tenderness  Musculoskeletal:         General: No deformity  Cervical back: Neck supple  Right lower leg: No edema  Left lower leg: No edema  Lymphadenopathy:      Cervical: No cervical adenopathy  Skin:     General: Skin is warm and dry  Coloration: Skin is not jaundiced or pale  Neurological:      Mental Status: She is alert  Motor: No weakness  Gait: Gait normal    Psychiatric:         Mood and Affect: Mood normal          Behavior: Behavior normal          Thought Content: Thought content normal        BMI Counseling: Body mass index is 39 82 kg/m²  The BMI is above normal  Nutrition recommendations include decreasing portion sizes and moderation in carbohydrate intake  Exercise recommendations include exercising 3-5 times per week  No pharmacotherapy was ordered                  Castro Oseguera DO

## 2021-09-01 PROBLEM — R73.03 PREDIABETES: Status: ACTIVE | Noted: 2021-09-01

## 2021-09-01 PROBLEM — Z12.31 BREAST CANCER SCREENING BY MAMMOGRAM: Status: ACTIVE | Noted: 2021-09-01

## 2021-09-27 ENCOUNTER — HOSPITAL ENCOUNTER (OUTPATIENT)
Dept: RADIOLOGY | Facility: HOSPITAL | Age: 56
Discharge: HOME | End: 2021-09-27
Attending: FAMILY MEDICINE
Payer: COMMERCIAL

## 2021-09-27 VITALS — WEIGHT: 232 LBS | BODY MASS INDEX: 39.61 KG/M2 | HEIGHT: 64 IN

## 2021-09-27 DIAGNOSIS — Z12.31 BREAST CANCER SCREENING BY MAMMOGRAM: ICD-10-CM

## 2021-09-27 PROCEDURE — 77063 BREAST TOMOSYNTHESIS BI: CPT

## 2021-09-27 PROCEDURE — 77067 SCR MAMMO BI INCL CAD: CPT

## 2021-09-30 ENCOUNTER — TELEPHONE (OUTPATIENT)
Dept: OBGYN CLINIC | Facility: HOSPITAL | Age: 56
End: 2021-09-30

## 2021-09-30 DIAGNOSIS — Z12.11 COLON CANCER SCREENING: Primary | ICD-10-CM

## 2021-09-30 RX ORDER — SODIUM, POTASSIUM,MAG SULFATES 17.5-3.13G
177 SOLUTION, RECONSTITUTED, ORAL ORAL SEE ADMIN INSTRUCTIONS
Qty: 354 ML | Refills: 0 | Status: SHIPPED | OUTPATIENT
Start: 2021-09-30 | End: 2021-12-29

## 2021-09-30 NOTE — TELEPHONE ENCOUNTER
Patient had her series of injections about 6 weeks ago  She called in today to advise there has been no improvement and she would like a call if possible  Patient can be reached at [phone]   Thank you

## 2021-10-01 ENCOUNTER — TELEPHONE (OUTPATIENT)
Dept: OBGYN CLINIC | Facility: HOSPITAL | Age: 56
End: 2021-10-01

## 2021-10-01 NOTE — TELEPHONE ENCOUNTER
I am going to order an MRI for her right knee since the injections have not helped  This will look for other issues within her knee which might be causing pain

## 2021-10-06 ENCOUNTER — TELEPHONE (OUTPATIENT)
Dept: PREADMISSION TESTING | Facility: HOSPITAL | Age: 56
End: 2021-10-06

## 2021-10-06 RX ORDER — MELATONIN
1000 DAILY
COMMUNITY

## 2021-10-06 RX ORDER — CHLORAL HYDRATE 500 MG
1000 CAPSULE ORAL DAILY
COMMUNITY

## 2021-10-07 ENCOUNTER — ANESTHESIA EVENT (OUTPATIENT)
Dept: ANESTHESIOLOGY | Facility: HOSPITAL | Age: 56
End: 2021-10-07

## 2021-10-07 ENCOUNTER — ANESTHESIA (OUTPATIENT)
Dept: ANESTHESIOLOGY | Facility: HOSPITAL | Age: 56
End: 2021-10-07

## 2021-10-08 ENCOUNTER — ANESTHESIA (OUTPATIENT)
Dept: GASTROENTEROLOGY | Facility: AMBULARY SURGERY CENTER | Age: 56
End: 2021-10-08

## 2021-10-08 ENCOUNTER — ANESTHESIA EVENT (OUTPATIENT)
Dept: GASTROENTEROLOGY | Facility: AMBULARY SURGERY CENTER | Age: 56
End: 2021-10-08

## 2021-10-08 ENCOUNTER — HOSPITAL ENCOUNTER (OUTPATIENT)
Dept: GASTROENTEROLOGY | Facility: AMBULARY SURGERY CENTER | Age: 56
Setting detail: OUTPATIENT SURGERY
Discharge: HOME | End: 2021-10-08
Attending: INTERNAL MEDICINE | Admitting: INTERNAL MEDICINE
Payer: COMMERCIAL

## 2021-10-08 VITALS
DIASTOLIC BLOOD PRESSURE: 84 MMHG | RESPIRATION RATE: 18 BRPM | SYSTOLIC BLOOD PRESSURE: 138 MMHG | HEART RATE: 77 BPM | OXYGEN SATURATION: 98 % | TEMPERATURE: 96.8 F

## 2021-10-08 DIAGNOSIS — Z86.010 HX OF COLONIC POLYPS: ICD-10-CM

## 2021-10-08 PROBLEM — Z86.0100 PERSONAL HISTORY OF COLONIC POLYPS: Status: ACTIVE | Noted: 2021-10-08

## 2021-10-08 PROCEDURE — 45380 COLONOSCOPY AND BIOPSY: CPT | Performed by: INTERNAL MEDICINE

## 2021-10-08 PROCEDURE — 88305 TISSUE EXAM BY PATHOLOGIST: CPT | Performed by: PATHOLOGY

## 2021-10-08 RX ORDER — SODIUM CHLORIDE, SODIUM LACTATE, POTASSIUM CHLORIDE, CALCIUM CHLORIDE 600; 310; 30; 20 MG/100ML; MG/100ML; MG/100ML; MG/100ML
75 INJECTION, SOLUTION INTRAVENOUS CONTINUOUS
Status: DISCONTINUED | OUTPATIENT
Start: 2021-10-08 | End: 2021-10-12 | Stop reason: HOSPADM

## 2021-10-08 RX ORDER — PROPOFOL 10 MG/ML
INJECTION, EMULSION INTRAVENOUS AS NEEDED
Status: DISCONTINUED | OUTPATIENT
Start: 2021-10-08 | End: 2021-10-08

## 2021-10-08 RX ORDER — PROPOFOL 10 MG/ML
INJECTION, EMULSION INTRAVENOUS CONTINUOUS PRN
Status: DISCONTINUED | OUTPATIENT
Start: 2021-10-08 | End: 2021-10-08

## 2021-10-08 RX ORDER — SODIUM CHLORIDE, SODIUM LACTATE, POTASSIUM CHLORIDE, CALCIUM CHLORIDE 600; 310; 30; 20 MG/100ML; MG/100ML; MG/100ML; MG/100ML
INJECTION, SOLUTION INTRAVENOUS CONTINUOUS PRN
Status: DISCONTINUED | OUTPATIENT
Start: 2021-10-08 | End: 2021-10-08

## 2021-10-08 RX ADMIN — SODIUM CHLORIDE, SODIUM LACTATE, POTASSIUM CHLORIDE, AND CALCIUM CHLORIDE: .6; .31; .03; .02 INJECTION, SOLUTION INTRAVENOUS at 11:32

## 2021-10-08 RX ADMIN — PROPOFOL 100 MG: 10 INJECTION, EMULSION INTRAVENOUS at 11:52

## 2021-10-08 RX ADMIN — SODIUM CHLORIDE, SODIUM LACTATE, POTASSIUM CHLORIDE, AND CALCIUM CHLORIDE 75 ML/HR: .6; .31; .03; .02 INJECTION, SOLUTION INTRAVENOUS at 11:46

## 2021-10-08 RX ADMIN — LIDOCAINE HYDROCHLORIDE 40 MG: 20 INJECTION, SOLUTION INTRAVENOUS at 11:52

## 2021-10-08 RX ADMIN — PROPOFOL 120 MCG/KG/MIN: 10 INJECTION, EMULSION INTRAVENOUS at 11:52

## 2021-10-08 RX ADMIN — PROPOFOL 50 MG: 10 INJECTION, EMULSION INTRAVENOUS at 11:57

## 2021-10-15 ENCOUNTER — ANNUAL EXAM (OUTPATIENT)
Dept: OBGYN CLINIC | Facility: CLINIC | Age: 56
End: 2021-10-15
Payer: COMMERCIAL

## 2021-10-15 VITALS — WEIGHT: 230 LBS | BODY MASS INDEX: 39.48 KG/M2

## 2021-10-15 DIAGNOSIS — D25.9 UTERINE LEIOMYOMA, UNSPECIFIED LOCATION: ICD-10-CM

## 2021-10-15 DIAGNOSIS — Z01.419 ENCNTR FOR GYN EXAM (GENERAL) (ROUTINE) W/O ABN FINDINGS: Primary | ICD-10-CM

## 2021-10-15 DIAGNOSIS — Z12.31 BREAST CANCER SCREENING BY MAMMOGRAM: ICD-10-CM

## 2021-10-15 DIAGNOSIS — N39.3 STRESS INCONTINENCE IN FEMALE: ICD-10-CM

## 2021-10-15 PROCEDURE — 99396 PREV VISIT EST AGE 40-64: CPT | Performed by: NURSE PRACTITIONER

## 2021-10-29 ENCOUNTER — HOSPITAL ENCOUNTER (OUTPATIENT)
Dept: RADIOLOGY | Facility: HOSPITAL | Age: 56
Discharge: HOME | End: 2021-10-29
Attending: ORTHOPAEDIC SURGERY
Payer: COMMERCIAL

## 2021-10-29 DIAGNOSIS — M17.11 PRIMARY OSTEOARTHRITIS OF RIGHT KNEE: ICD-10-CM

## 2021-10-29 PROCEDURE — 73721 MRI JNT OF LWR EXTRE W/O DYE: CPT

## 2021-10-29 PROCEDURE — G1004 CDSM NDSC: HCPCS

## 2021-11-11 ENCOUNTER — OFFICE VISIT (OUTPATIENT)
Dept: OBGYN CLINIC | Facility: CLINIC | Age: 56
End: 2021-11-11
Payer: COMMERCIAL

## 2021-11-11 ENCOUNTER — HOSPITAL ENCOUNTER (OUTPATIENT)
Dept: RADIOLOGY | Facility: HOSPITAL | Age: 56
Discharge: HOME | End: 2021-11-11
Payer: COMMERCIAL

## 2021-11-11 VITALS
BODY MASS INDEX: 39.95 KG/M2 | WEIGHT: 234 LBS | HEIGHT: 64 IN | SYSTOLIC BLOOD PRESSURE: 134 MMHG | TEMPERATURE: 98.9 F | DIASTOLIC BLOOD PRESSURE: 77 MMHG | HEART RATE: 92 BPM

## 2021-11-11 DIAGNOSIS — D25.9 UTERINE LEIOMYOMA, UNSPECIFIED LOCATION: ICD-10-CM

## 2021-11-11 DIAGNOSIS — M17.11 PRIMARY OSTEOARTHRITIS OF RIGHT KNEE: ICD-10-CM

## 2021-11-11 DIAGNOSIS — S83.241A OTHER TEAR OF MEDIAL MENISCUS, CURRENT INJURY, RIGHT KNEE, INITIAL ENCOUNTER: Primary | ICD-10-CM

## 2021-11-11 PROCEDURE — 3008F BODY MASS INDEX DOCD: CPT | Performed by: ORTHOPAEDIC SURGERY

## 2021-11-11 PROCEDURE — 99213 OFFICE O/P EST LOW 20 MIN: CPT | Performed by: ORTHOPAEDIC SURGERY

## 2021-11-11 PROCEDURE — 76856 US EXAM PELVIC COMPLETE: CPT

## 2021-11-11 PROCEDURE — 76830 TRANSVAGINAL US NON-OB: CPT

## 2021-11-12 ENCOUNTER — PROCEDURE VISIT (OUTPATIENT)
Dept: OBGYN CLINIC | Facility: CLINIC | Age: 56
End: 2021-11-12
Payer: COMMERCIAL

## 2021-11-12 VITALS — WEIGHT: 235 LBS | BODY MASS INDEX: 40.34 KG/M2

## 2021-11-12 DIAGNOSIS — Z87.42 STATUS POST CERVICAL POLYPECTOMY: Primary | ICD-10-CM

## 2021-11-12 DIAGNOSIS — Z98.890 STATUS POST CERVICAL POLYPECTOMY: Primary | ICD-10-CM

## 2021-11-12 DIAGNOSIS — N84.1 CERVICAL POLYP: ICD-10-CM

## 2021-11-12 PROCEDURE — 57500 BIOPSY OF CERVIX: CPT | Performed by: NURSE PRACTITIONER

## 2021-11-12 PROCEDURE — 88305 TISSUE EXAM BY PATHOLOGIST: CPT | Performed by: PATHOLOGY

## 2021-12-01 ENCOUNTER — OFFICE VISIT (OUTPATIENT)
Dept: OBGYN CLINIC | Facility: CLINIC | Age: 56
End: 2021-12-01
Payer: COMMERCIAL

## 2021-12-01 VITALS
BODY MASS INDEX: 40.12 KG/M2 | WEIGHT: 235 LBS | DIASTOLIC BLOOD PRESSURE: 97 MMHG | SYSTOLIC BLOOD PRESSURE: 138 MMHG | HEART RATE: 99 BPM | HEIGHT: 64 IN

## 2021-12-01 DIAGNOSIS — S83.241A OTHER TEAR OF MEDIAL MENISCUS OF RIGHT KNEE, UNSPECIFIED WHETHER OLD OR CURRENT TEAR, INITIAL ENCOUNTER: Primary | ICD-10-CM

## 2021-12-01 DIAGNOSIS — Z01.812 ENCOUNTER FOR PRE-OPERATIVE LABORATORY TESTING: ICD-10-CM

## 2021-12-01 PROCEDURE — 99214 OFFICE O/P EST MOD 30 MIN: CPT | Performed by: ORTHOPAEDIC SURGERY

## 2021-12-20 ENCOUNTER — APPOINTMENT (OUTPATIENT)
Dept: LAB | Facility: HOSPITAL | Age: 56
End: 2021-12-20
Attending: ORTHOPAEDIC SURGERY
Payer: COMMERCIAL

## 2021-12-20 DIAGNOSIS — S83.241A OTHER TEAR OF MEDIAL MENISCUS OF RIGHT KNEE, UNSPECIFIED WHETHER OLD OR CURRENT TEAR, INITIAL ENCOUNTER: ICD-10-CM

## 2021-12-20 DIAGNOSIS — Z01.812 ENCOUNTER FOR PRE-OPERATIVE LABORATORY TESTING: ICD-10-CM

## 2021-12-20 LAB
ANION GAP SERPL CALCULATED.3IONS-SCNC: 9 MMOL/L (ref 4–13)
APTT PPP: 25 SECONDS (ref 23–37)
ATRIAL RATE: 73 BPM
BASOPHILS # BLD AUTO: 0.02 THOUSANDS/ΜL (ref 0–0.1)
BASOPHILS NFR BLD AUTO: 0 % (ref 0–1)
BUN SERPL-MCNC: 13 MG/DL (ref 5–25)
CALCIUM SERPL-MCNC: 8.9 MG/DL (ref 8.3–10.1)
CHLORIDE SERPL-SCNC: 104 MMOL/L (ref 100–108)
CO2 SERPL-SCNC: 27 MMOL/L (ref 21–32)
CREAT SERPL-MCNC: 1 MG/DL (ref 0.6–1.3)
EOSINOPHIL # BLD AUTO: 0.02 THOUSAND/ΜL (ref 0–0.61)
EOSINOPHIL NFR BLD AUTO: 0 % (ref 0–6)
ERYTHROCYTE [DISTWIDTH] IN BLOOD BY AUTOMATED COUNT: 13.6 % (ref 11.6–15.1)
GFR SERPL CREATININE-BSD FRML MDRD: 63 ML/MIN/1.73SQ M
GLUCOSE P FAST SERPL-MCNC: 100 MG/DL (ref 65–99)
HCT VFR BLD AUTO: 44.1 % (ref 34.8–46.1)
HGB BLD-MCNC: 13.9 G/DL (ref 11.5–15.4)
IMM GRANULOCYTES # BLD AUTO: 0.01 THOUSAND/UL (ref 0–0.2)
IMM GRANULOCYTES NFR BLD AUTO: 0 % (ref 0–2)
INR PPP: 0.93 (ref 0.84–1.19)
LYMPHOCYTES # BLD AUTO: 1.53 THOUSANDS/ΜL (ref 0.6–4.47)
LYMPHOCYTES NFR BLD AUTO: 27 % (ref 14–44)
MCH RBC QN AUTO: 28.5 PG (ref 26.8–34.3)
MCHC RBC AUTO-ENTMCNC: 31.5 G/DL (ref 31.4–37.4)
MCV RBC AUTO: 91 FL (ref 82–98)
MONOCYTES # BLD AUTO: 0.38 THOUSAND/ΜL (ref 0.17–1.22)
MONOCYTES NFR BLD AUTO: 7 % (ref 4–12)
NEUTROPHILS # BLD AUTO: 3.76 THOUSANDS/ΜL (ref 1.85–7.62)
NEUTS SEG NFR BLD AUTO: 66 % (ref 43–75)
NRBC BLD AUTO-RTO: 0 /100 WBCS
P AXIS: 9 DEGREES
PLATELET # BLD AUTO: 204 THOUSANDS/UL (ref 149–390)
PMV BLD AUTO: 10.6 FL (ref 8.9–12.7)
POTASSIUM SERPL-SCNC: 4 MMOL/L (ref 3.5–5.3)
PR INTERVAL: 208 MS
PROTHROMBIN TIME: 12.3 SECONDS (ref 11.6–14.5)
QRS AXIS: -13 DEGREES
QRSD INTERVAL: 84 MS
QT INTERVAL: 398 MS
QTC INTERVAL: 438 MS
RBC # BLD AUTO: 4.87 MILLION/UL (ref 3.81–5.12)
SODIUM SERPL-SCNC: 140 MMOL/L (ref 136–145)
T WAVE AXIS: 0 DEGREES
VENTRICULAR RATE: 73 BPM
WBC # BLD AUTO: 5.72 THOUSAND/UL (ref 4.31–10.16)

## 2021-12-20 PROCEDURE — 93010 ELECTROCARDIOGRAM REPORT: CPT | Performed by: INTERNAL MEDICINE

## 2021-12-20 PROCEDURE — 80048 BASIC METABOLIC PNL TOTAL CA: CPT

## 2021-12-20 PROCEDURE — 85025 COMPLETE CBC W/AUTO DIFF WBC: CPT

## 2021-12-20 PROCEDURE — 93005 ELECTROCARDIOGRAM TRACING: CPT

## 2021-12-20 PROCEDURE — 36415 COLL VENOUS BLD VENIPUNCTURE: CPT

## 2021-12-20 PROCEDURE — 85610 PROTHROMBIN TIME: CPT

## 2021-12-20 PROCEDURE — 85730 THROMBOPLASTIN TIME PARTIAL: CPT

## 2021-12-28 NOTE — PRE-PROCEDURE INSTRUCTIONS
My Surgical Experience    The following information was developed to assist you to prepare for your operation  What do I need to do before coming to the hospital?   Arrange for a responsible person to drive you to and from the hospital    Arrange care for your children at home  Children are not allowed in the recovery areas of the hospital   Plan to wear clothing that is easy to put on and take off  If you are having shoulder surgery, wear a shirt that buttons or zippers in the front  Bathing  o Shower the evening before and the morning of your surgery with an antibacterial soap  Please refer to the Pre Op Showering Instructions for Surgery Patients Sheet   o Remove nail polish and all body piercing jewelry  o Do not shave any body part for at least 24 hours before surgery-this includes face, arms, legs and upper body  Food  o Nothing to eat or drink after midnight the night before your surgery  This includes candy and chewing gum  o Exception: If your surgery is after 12:00pm (noon), you may have clear liquids such as 7-Up®, ginger ale, apple or cranberry juice, Jell-O®, water, or clear broth until 8:00 am  o Do not drink milk or juice with pulp on the morning before surgery  o Do not drink alcohol 24 hours before surgery  Medicine  o Follow instructions you received from your surgeon about which medicines you may take on the day of surgery  o If instructed to take medicine on the morning of surgery, take pills with just a small sip of water  Call your prescribing doctor for specific infroamtion on what to do if you take insulin    What should I bring to the hospital?    Bring:  Jerzy Nims or a walker, if you have them, for foot or knee surgery   A list of the daily medicines, vitamins, minerals, herbals and nutritional supplements you take   Include the dosages of medicines and the time you take them each day   Glasses, dentures or hearing aids   Minimal clothing; you will be wearing hospital sleepwear   Photo ID; required to verify your identity   If you have a Living Will or Power of , bring a copy of the documents   If you have an ostomy, bring an extra pouch and any supplies you use    Do not bring   Medicines or inhalers   Money, valuables or jewelry    What other information should I know about the day of surgery?  Notify your surgeons if you develop a cold, sore throat, cough, fever, rash or any other illness   Report to the Ambulatory Surgical/Same Day Surgery Unit   You will be instructed to stop at Registration only if you have not been pre-registered   Inform your  fi they do not stay that they will be asked by the staff to leave a phone number where they can be reached   Be available to be reached before surgery  In the event the operating room schedule changes, you may be asked to come in earlier or later than expected    *It is important to tell your doctor and others involved in your health care if you are taking or have been taking any non-prescription drugs, vitamins, minerals, herbals or other nutritional supplements  Any of these may interact with some food or medicines and cause a reaction      Pre-Surgery Instructions:   Medication Instructions    acetaminophen (TYLENOL) 325 mg tablet Instructed patient per Anesthesia Guidelines   albuterol (2 5 mg/3 mL) 0 083 % nebulizer solution Instructed patient per Anesthesia Guidelines   albuterol (VENTOLIN HFA) 90 mcg/act inhaler Instructed patient per Anesthesia Guidelines   Calcium Carb-Cholecalciferol 600-500 MG-UNIT CAPS Instructed patient per Anesthesia Guidelines   cholecalciferol (VITAMIN D3) 1,000 units tablet Instructed patient per Anesthesia Guidelines   fluticasone-salmeterol (ADVAIR DISKUS) 500-50 mcg/dose inhaler Instructed patient per Anesthesia Guidelines   multivitamin (THERAGRAN) TABS Instructed patient per Anesthesia Guidelines      Respiratory Therapy Supplies (NEBULIZER/TUBING/MOUTHPIECE) KIT Instructed patient per Anesthesia Guidelines  To use inhaler a m  of surgery

## 2021-12-29 ENCOUNTER — OFFICE VISIT (OUTPATIENT)
Dept: FAMILY MEDICINE CLINIC | Facility: CLINIC | Age: 56
End: 2021-12-29
Payer: COMMERCIAL

## 2021-12-29 VITALS
HEIGHT: 64 IN | BODY MASS INDEX: 41.35 KG/M2 | TEMPERATURE: 99.7 F | HEART RATE: 103 BPM | RESPIRATION RATE: 17 BRPM | SYSTOLIC BLOOD PRESSURE: 138 MMHG | OXYGEN SATURATION: 98 % | DIASTOLIC BLOOD PRESSURE: 88 MMHG | WEIGHT: 242.2 LBS

## 2021-12-29 DIAGNOSIS — R73.03 PREDIABETES: ICD-10-CM

## 2021-12-29 DIAGNOSIS — E66.01 MORBID OBESITY (HCC): ICD-10-CM

## 2021-12-29 DIAGNOSIS — J45.40 MODERATE PERSISTENT ASTHMA WITHOUT COMPLICATION: ICD-10-CM

## 2021-12-29 DIAGNOSIS — R94.31 ABNORMAL FINDING ON EKG: ICD-10-CM

## 2021-12-29 DIAGNOSIS — Z01.818 PREOPERATIVE CLEARANCE: Primary | ICD-10-CM

## 2021-12-29 PROBLEM — R03.0 ELEVATED BP WITHOUT DIAGNOSIS OF HYPERTENSION: Status: RESOLVED | Noted: 2021-08-31 | Resolved: 2021-12-29

## 2021-12-29 PROCEDURE — 99215 OFFICE O/P EST HI 40 MIN: CPT | Performed by: FAMILY MEDICINE

## 2021-12-29 PROCEDURE — 3008F BODY MASS INDEX DOCD: CPT | Performed by: FAMILY MEDICINE

## 2022-01-04 ENCOUNTER — HOSPITAL ENCOUNTER (OUTPATIENT)
Dept: NON INVASIVE DIAGNOSTICS | Facility: HOSPITAL | Age: 57
Discharge: HOME | End: 2022-01-04
Attending: FAMILY MEDICINE
Payer: COMMERCIAL

## 2022-01-04 VITALS
SYSTOLIC BLOOD PRESSURE: 136 MMHG | BODY MASS INDEX: 41.32 KG/M2 | WEIGHT: 242 LBS | DIASTOLIC BLOOD PRESSURE: 89 MMHG | HEART RATE: 96 BPM | HEIGHT: 64 IN

## 2022-01-04 DIAGNOSIS — R94.31 ABNORMAL FINDING ON EKG: ICD-10-CM

## 2022-01-04 PROCEDURE — 93306 TTE W/DOPPLER COMPLETE: CPT | Performed by: INTERNAL MEDICINE

## 2022-01-04 PROCEDURE — 93306 TTE W/DOPPLER COMPLETE: CPT

## 2022-01-05 ENCOUNTER — ANESTHESIA EVENT (OUTPATIENT)
Dept: PERIOP | Facility: HOSPITAL | Age: 57
End: 2022-01-05
Payer: COMMERCIAL

## 2022-01-05 ENCOUNTER — TELEPHONE (OUTPATIENT)
Dept: OBGYN CLINIC | Facility: CLINIC | Age: 57
End: 2022-01-05

## 2022-01-05 LAB
AORTIC ROOT: 3 CM
E WAVE DECELERATION TIME: 142 MS
FRACTIONAL SHORTENING: 35 % (ref 28–44)
INTERVENTRICULAR SEPTUM IN DIASTOLE (PARASTERNAL SHORT AXIS VIEW): 1 CM
LEFT ATRIUM AREA SYSTOLE SINGLE PLANE A4C: 21.8 CM2
LEFT INTERNAL DIMENSION IN SYSTOLE: 2.6 CM (ref 2.1–4)
LEFT VENTRICULAR INTERNAL DIMENSION IN DIASTOLE: 4 CM (ref 7.04–10.49)
LEFT VENTRICULAR POSTERIOR WALL IN END DIASTOLE: 1 CM
LEFT VENTRICULAR STROKE VOLUME: 45 ML
MV E'TISSUE VEL-LAT: 15 CM/S
MV E'TISSUE VEL-SEP: 13 CM/S
MV PEAK A VEL: 1.45 M/S
MV PEAK E VEL: 94 CM/S
MV STENOSIS PRESSURE HALF TIME: 0 MS
RIGHT ATRIUM AREA SYSTOLE A4C: 18.8 CM2
RIGHT VENTRICLE ID DIMENSION: 3.5 CM
RV PSP: 30 MMHG
SL CV LV EF: 60
SL CV PED ECHO LEFT VENTRICLE DIASTOLIC VOLUME (MOD BIPLANE) 2D: 69 ML
SL CV PED ECHO LEFT VENTRICLE SYSTOLIC VOLUME (MOD BIPLANE) 2D: 25 ML
TR MAX PG: 8 MMHG
TRICUSPID VALVE PEAK REGURGITATION VELOCITY: 2.36 M/S
TRICUSPID VALVE S': 0.9 CM/S
TV PEAK GRADIENT: 22 MMHG
Z-SCORE OF LEFT VENTRICULAR DIMENSION IN END SYSTOLE: -7.64

## 2022-01-05 NOTE — TELEPHONE ENCOUNTER
She is cleared for surgery  I talked to her about the echocardiogram result and is aware she is cleared also

## 2022-01-06 ENCOUNTER — HOSPITAL ENCOUNTER (OUTPATIENT)
Facility: HOSPITAL | Age: 57
Setting detail: OUTPATIENT SURGERY
Discharge: HOME | End: 2022-01-06
Attending: ORTHOPAEDIC SURGERY | Admitting: ORTHOPAEDIC SURGERY
Payer: COMMERCIAL

## 2022-01-06 ENCOUNTER — ANESTHESIA (OUTPATIENT)
Dept: PERIOP | Facility: HOSPITAL | Age: 57
End: 2022-01-06
Payer: COMMERCIAL

## 2022-01-06 VITALS
SYSTOLIC BLOOD PRESSURE: 143 MMHG | RESPIRATION RATE: 18 BRPM | HEIGHT: 64 IN | WEIGHT: 238.6 LBS | TEMPERATURE: 97.8 F | BODY MASS INDEX: 40.74 KG/M2 | DIASTOLIC BLOOD PRESSURE: 87 MMHG | OXYGEN SATURATION: 97 % | HEART RATE: 83 BPM

## 2022-01-06 LAB
EXT PREGNANCY TEST URINE: NEGATIVE
EXT. CONTROL: NORMAL

## 2022-01-06 PROCEDURE — 81025 URINE PREGNANCY TEST: CPT | Performed by: ANESTHESIOLOGY

## 2022-01-06 PROCEDURE — 29881 ARTHRS KNE SRG MNISECTMY M/L: CPT | Performed by: ORTHOPAEDIC SURGERY

## 2022-01-06 PROCEDURE — 29881 ARTHRS KNE SRG MNISECTMY M/L: CPT | Performed by: PHYSICIAN ASSISTANT

## 2022-01-06 PROCEDURE — 99024 POSTOP FOLLOW-UP VISIT: CPT | Performed by: PHYSICIAN ASSISTANT

## 2022-01-06 RX ORDER — SODIUM CHLORIDE, SODIUM LACTATE, POTASSIUM CHLORIDE, CALCIUM CHLORIDE 600; 310; 30; 20 MG/100ML; MG/100ML; MG/100ML; MG/100ML
125 INJECTION, SOLUTION INTRAVENOUS CONTINUOUS
Status: DISCONTINUED | OUTPATIENT
Start: 2022-01-06 | End: 2022-01-06 | Stop reason: HOSPADM

## 2022-01-06 RX ORDER — ONDANSETRON 2 MG/ML
4 INJECTION INTRAMUSCULAR; INTRAVENOUS ONCE AS NEEDED
Status: DISCONTINUED | OUTPATIENT
Start: 2022-01-06 | End: 2022-01-06 | Stop reason: HOSPADM

## 2022-01-06 RX ORDER — SODIUM CHLORIDE, SODIUM LACTATE, POTASSIUM CHLORIDE, CALCIUM CHLORIDE 600; 310; 30; 20 MG/100ML; MG/100ML; MG/100ML; MG/100ML
50 INJECTION, SOLUTION INTRAVENOUS CONTINUOUS
Status: DISCONTINUED | OUTPATIENT
Start: 2022-01-06 | End: 2022-01-06 | Stop reason: HOSPADM

## 2022-01-06 RX ORDER — MAGNESIUM HYDROXIDE 1200 MG/15ML
LIQUID ORAL AS NEEDED
Status: DISCONTINUED | OUTPATIENT
Start: 2022-01-06 | End: 2022-01-06 | Stop reason: HOSPADM

## 2022-01-06 RX ORDER — FENTANYL CITRATE 50 UG/ML
INJECTION, SOLUTION INTRAMUSCULAR; INTRAVENOUS AS NEEDED
Status: DISCONTINUED | OUTPATIENT
Start: 2022-01-06 | End: 2022-01-06

## 2022-01-06 RX ORDER — SODIUM CHLORIDE, SODIUM LACTATE, POTASSIUM CHLORIDE, CALCIUM CHLORIDE 600; 310; 30; 20 MG/100ML; MG/100ML; MG/100ML; MG/100ML
INJECTION, SOLUTION INTRAVENOUS CONTINUOUS PRN
Status: DISCONTINUED | OUTPATIENT
Start: 2022-01-06 | End: 2022-01-06

## 2022-01-06 RX ORDER — DEXAMETHASONE SODIUM PHOSPHATE 4 MG/ML
INJECTION, SOLUTION INTRA-ARTICULAR; INTRALESIONAL; INTRAMUSCULAR; INTRAVENOUS; SOFT TISSUE AS NEEDED
Status: DISCONTINUED | OUTPATIENT
Start: 2022-01-06 | End: 2022-01-06

## 2022-01-06 RX ORDER — PROPOFOL 10 MG/ML
INJECTION, EMULSION INTRAVENOUS AS NEEDED
Status: DISCONTINUED | OUTPATIENT
Start: 2022-01-06 | End: 2022-01-06

## 2022-01-06 RX ORDER — ONDANSETRON 2 MG/ML
INJECTION INTRAMUSCULAR; INTRAVENOUS AS NEEDED
Status: DISCONTINUED | OUTPATIENT
Start: 2022-01-06 | End: 2022-01-06

## 2022-01-06 RX ORDER — FENTANYL CITRATE/PF 50 MCG/ML
25 SYRINGE (ML) INJECTION
Status: DISCONTINUED | OUTPATIENT
Start: 2022-01-06 | End: 2022-01-06 | Stop reason: HOSPADM

## 2022-01-06 RX ORDER — KETOROLAC TROMETHAMINE 30 MG/ML
INJECTION, SOLUTION INTRAMUSCULAR; INTRAVENOUS AS NEEDED
Status: DISCONTINUED | OUTPATIENT
Start: 2022-01-06 | End: 2022-01-06

## 2022-01-06 RX ORDER — LIDOCAINE HYDROCHLORIDE 10 MG/ML
INJECTION, SOLUTION EPIDURAL; INFILTRATION; INTRACAUDAL; PERINEURAL AS NEEDED
Status: DISCONTINUED | OUTPATIENT
Start: 2022-01-06 | End: 2022-01-06

## 2022-01-06 RX ORDER — CEFAZOLIN SODIUM 2 G/50ML
SOLUTION INTRAVENOUS AS NEEDED
Status: DISCONTINUED | OUTPATIENT
Start: 2022-01-06 | End: 2022-01-06

## 2022-01-06 RX ORDER — MIDAZOLAM HYDROCHLORIDE 2 MG/2ML
INJECTION, SOLUTION INTRAMUSCULAR; INTRAVENOUS AS NEEDED
Status: DISCONTINUED | OUTPATIENT
Start: 2022-01-06 | End: 2022-01-06

## 2022-01-06 RX ORDER — CEFAZOLIN SODIUM 2 G/50ML
2000 SOLUTION INTRAVENOUS ONCE
Status: DISCONTINUED | OUTPATIENT
Start: 2022-01-06 | End: 2022-01-06 | Stop reason: HOSPADM

## 2022-01-06 RX ORDER — BUPIVACAINE HYDROCHLORIDE AND EPINEPHRINE 2.5; 5 MG/ML; UG/ML
INJECTION, SOLUTION INFILTRATION; PERINEURAL AS NEEDED
Status: DISCONTINUED | OUTPATIENT
Start: 2022-01-06 | End: 2022-01-06 | Stop reason: HOSPADM

## 2022-01-06 RX ADMIN — FENTANYL CITRATE 50 MCG: 50 INJECTION, SOLUTION INTRAMUSCULAR; INTRAVENOUS at 09:24

## 2022-01-06 RX ADMIN — FENTANYL CITRATE 25 MCG: 50 INJECTION INTRAMUSCULAR; INTRAVENOUS at 10:43

## 2022-01-06 RX ADMIN — LIDOCAINE HYDROCHLORIDE 50 MG: 10 INJECTION, SOLUTION EPIDURAL; INFILTRATION; INTRACAUDAL; PERINEURAL at 09:15

## 2022-01-06 RX ADMIN — SODIUM CHLORIDE, SODIUM LACTATE, POTASSIUM CHLORIDE, AND CALCIUM CHLORIDE 125 ML/HR: .6; .31; .03; .02 INJECTION, SOLUTION INTRAVENOUS at 06:50

## 2022-01-06 RX ADMIN — MIDAZOLAM 2 MG: 1 INJECTION INTRAMUSCULAR; INTRAVENOUS at 09:07

## 2022-01-06 RX ADMIN — FENTANYL CITRATE 50 MCG: 50 INJECTION, SOLUTION INTRAMUSCULAR; INTRAVENOUS at 09:15

## 2022-01-06 RX ADMIN — SODIUM CHLORIDE, SODIUM LACTATE, POTASSIUM CHLORIDE, AND CALCIUM CHLORIDE: .6; .31; .03; .02 INJECTION, SOLUTION INTRAVENOUS at 09:08

## 2022-01-06 RX ADMIN — PROPOFOL 200 MG: 10 INJECTION, EMULSION INTRAVENOUS at 09:15

## 2022-01-06 RX ADMIN — FENTANYL CITRATE 25 MCG: 50 INJECTION INTRAMUSCULAR; INTRAVENOUS at 10:18

## 2022-01-06 RX ADMIN — CEFAZOLIN SODIUM 2000 MG: 2 SOLUTION INTRAVENOUS at 09:10

## 2022-01-06 RX ADMIN — KETOROLAC TROMETHAMINE 30 MG: 30 INJECTION, SOLUTION INTRAMUSCULAR at 09:44

## 2022-01-06 RX ADMIN — FENTANYL CITRATE 25 MCG: 50 INJECTION INTRAMUSCULAR; INTRAVENOUS at 10:13

## 2022-01-06 RX ADMIN — ONDANSETRON 4 MG: 2 INJECTION INTRAMUSCULAR; INTRAVENOUS at 09:24

## 2022-01-06 RX ADMIN — DEXAMETHASONE SODIUM PHOSPHATE 8 MG: 4 INJECTION, SOLUTION INTRA-ARTICULAR; INTRALESIONAL; INTRAMUSCULAR; INTRAVENOUS; SOFT TISSUE at 09:24

## 2022-01-06 NOTE — ANESTHESIA PREPROCEDURE EVALUATION
Procedure:  KNEE ARTHROSCOPY MENISCECTOMY MEDIAL (Right Knee)    Relevant Problems   GI/HEPATIC   (+) Hiatal hernia      NEURO/PSYCH   (+) Personal history of colonic polyps      PULMONARY   (+) Moderate persistent asthma without complication   (+) Sleep apnea      Other   (+) Morbid obesity (HCC)   (+) Prediabetes        Physical Exam    Airway    Mallampati score: III  TM Distance: >3 FB  Neck ROM: full     Dental       Cardiovascular  Rhythm: regular, Rate: normal,     Pulmonary  Breath sounds clear to auscultation,     Other Findings        Anesthesia Plan  ASA Score- 3     Anesthesia Type- general with ASA Monitors  Additional Monitors:   Airway Plan: LMA  Plan Factors-    Chart reviewed  Patient is not a current smoker  Induction- intravenous  Postoperative Plan- Plan for postoperative opioid use  Informed Consent- Anesthetic plan and risks discussed with patient  I personally reviewed this patient with the CRNA  Discussed and agreed on the Anesthesia Plan with the CRNA  Omar Bear

## 2022-01-06 NOTE — H&P
Assessment/Plan:  The patient would like to proceed with right knee arthroscopy with medial meniscectomy  We discussed the procedure and risks at length, including but not limited to, infection, bleeding, wound issues, nerve injury, blood clot, stiffness, failure of procedure, and need for additional surgery  We will see the patient back at the time of surgery  Subjective:   Martin Seaman is a 55 y o  female with right knee medial meniscus tear who notes ongoing pain about the medial knee  Review of Systems   Constitutional: Negative for chills, fever and unexpected weight change  HENT: Negative for hearing loss, nosebleeds and sore throat  Eyes: Negative for pain, redness and visual disturbance  Respiratory: Negative for cough, shortness of breath and wheezing  Cardiovascular: Negative for chest pain, palpitations and leg swelling  Gastrointestinal: Negative for abdominal pain, nausea and vomiting  Endocrine: Negative for polydipsia and polyuria  Genitourinary: Negative for dysuria and hematuria  Musculoskeletal:        See HPI   Skin: Negative for rash and wound  Neurological: Negative for dizziness, numbness and headaches  Psychiatric/Behavioral: Negative for decreased concentration and suicidal ideas  The patient is not nervous/anxious  Past Medical History:   Diagnosis Date    Allergic rhinitis     Asthma     Fibroids     Physiological ovarian cysts     Sleep apnea     denatl device worn       Past Surgical History:   Procedure Laterality Date    COLONOSCOPY  2016    dr Keon Rivers ARTHROSCOPY Left 2018    meniscectomy x2, first done in 2015    MYOMECTOMY      MI COLONOSCOPY FLX DX W/COLLJ SPEC WHEN PFRMD N/A 9/13/2018    Procedure: COLONOSCOPY;  Surgeon: Jacque Chaparro MD;  Location: [place] [address] [facility];   Service: Gastroenterology    WRIST SURGERY Right        Family History   Problem Relation Age of Onset    Osteoporosis Mother     Thyroid disease Mother  Colon cancer Mother 79    Colon polyps Mother    [place] Cancer Mother         colon    Alzheimer's disease Father     Hypertension Father     Dementia Father     Heart disease Brother     No Known Problems Daughter     No Known Problems Daughter     No Known Problems Maternal Aunt     No Known Problems Maternal Aunt     No Known Problems Paternal Aunt     No Known Problems Paternal Aunt     No Known Problems Paternal Aunt     No Known Problems Paternal Aunt     No Known Problems Paternal Aunt     No Known Problems Paternal Aunt     No Known Problems Paternal Aunt     No Known Problems Paternal Aunt        Social History     Occupational History    Not on file   Tobacco Use    Smoking status: Never Smoker    Smokeless tobacco: Never Used   Vaping Use    Vaping Use: Never used   Substance and Sexual Activity    Alcohol use: Yes     Comment: social - wine 2 x month    Drug use: Never    Sexual activity: Yes     Partners: Male     Birth control/protection: Condom Male         Current Facility-Administered Medications:     ceFAZolin (ANCEF) IVPB (premix in dextrose) 2,000 mg 50 mL, 2,000 mg, Intravenous, Once, Dominique Jeff PA-C    lactated ringers infusion, 125 mL/hr, Intravenous, Continuous, Steve MD Geovanny, Last Rate: 125 mL/hr at 01/06/22 0650, 125 mL/hr at 01/06/22 0650    No Known Allergies    Objective:  Vitals:    01/06/22 0718   BP: 146/97   Pulse: 83   Resp: 18   Temp: 97 8 °F (36 6 °C)   SpO2: 94%       Ortho Exam    Physical Exam  Constitutional:       General: She is not in acute distress  Appearance: She is well-developed  HENT:      Head: Normocephalic and atraumatic  Eyes:      General: No scleral icterus  Conjunctiva/sclera: Conjunctivae normal    Neck:      Vascular: No JVD  Cardiovascular:      Rate and Rhythm: Normal rate  Pulmonary:      Effort: Pulmonary effort is normal  No respiratory distress  Skin:     General: Skin is warm     Neurological: Mental Status: She is alert and oriented to person, place, and time  Coordination: Coordination normal          Right knee: Medial joint line tenderness

## 2022-01-06 NOTE — ANESTHESIA POSTPROCEDURE EVALUATION
Post-Op Assessment Note    CV Status:  Stable  Pain Score: 0    Pain management: adequate     Mental Status:  Sleepy   Hydration Status:  Stable   PONV Controlled:  None   Airway Patency:  Patent   Two or more mitigation strategies used for obstructive sleep apnea   Post Op Vitals Reviewed: Yes      Staff: CRNA         No complications documented      BP   158/91   Temp 98   Pulse 105   Resp 16   SpO2 99

## 2022-01-06 NOTE — DISCHARGE INSTRUCTIONS
INSTRUCTIONS FOLLOWING YOUR KNEE ARTHROSCOPY    FIRST FOLLOW-UP VISIT AFTER SURGERY      Call the office the day after your surgery & make an appointment for 1 week to see Dr Ervin Betancourt  At that appointment, your stitches will be removed  CANE OR CRUTCHES      You may put as much weight on your leg as tolerated when using the crutches/cane  When you feel that the crutches/cane is not necessary, you may discontinue its use  Use common sense, let pain be your guide for your activities  Climbing stairs, walking and sitting are all permitted as you tolerate them  EXERCISE PROGRAM      At your 1st follow-up visit you will be given a prescription for physical therapy if you have not already been given one  SHOWERING      Keep original bandage on for 48 hours after surgery & replace with band-aids  You should keep the band-aids on until you see Dr Ervin Betancourt  After 48 hours, it is okay to get your incision wet & you may shower  Do not take any baths or soak in a hot tub or pool until your stitches have been removed  INCISION SITE      You may notice a small amount of blood on your bandage, about the size of a quarter, this is normal and there is no need to worry  If you notice uncontrollable or excessive bleeding, oozing, or redness of the wound please call the office  SWELLING AND DISCOMFORT      You will experience some pain and discomfort after surgery  You will be given a prescription for pain medication  Take the medication as prescribed  If the discomfort is mild, you can take 1 or 2 Tylenol, every 4-6 hours as needed, instead of the prescribed pain medication  You will notice some swelling of your knee after surgery, this is normal      To help reduce the swelling, elevate your leg as much as possible the first two days  In addition, you may place ice on your knee to reduce swelling  Place a plastic bag filled with ice, wrapped in a thin towel, on your knee   Do not keep ice on for more than 15-20 minutes, repeat 4-5 TIMES A DAY, IF POSSIBLE  BLOOD CLOT PREVENTION    Unless otherwise instructed, take one 325 mg aspirin daily for the first 3 weeks following surgery  This is to help decrease the risk of blood clots  If at any time you have discomfort, swelling, or redness in the calf (behind the leg between the knee and the ankle)  or difficulty breathing or heaviness in the chest, please call the doctor immediately  TEMPERATURE      A temperature of less than 101 degrees is common for the first 1-2 days after surgery  If your temperature is elevated above 101 degrees, call the office  IF YOU HAVE ANY OTHER CONCERNS OR QUESTIONS AT ANY TIME, DO NOT HESITATE TO CALL THE OFFICE [phone]

## 2022-01-06 NOTE — PERIOPERATIVE NURSING NOTE
Iv access removed, pt able to tolerate fluids post procedure, dressing clean dry intact, discharge instruction provided to patient and spouse, both stated understanding, left floor via wheelchair, discharged to home

## 2022-01-06 NOTE — OP NOTE
PERATIVE REPORT  PATIENT NAME: Martin Seaman    :  1965  MRN: [id number]  Pt Location: [facility] OR ROOM 01    SURGERY DATE: 2022    Surgeon(s) and Role:     * Steve Soriano MD - Primary     * Silverio Hooks PA-C - Assisting necessary for the procedure for assistance with improved visualization due to the minimally invasive arthroscopic techniques utilized for this operation for assistance utilization of the camera and shaver and biter  Tabby RAO  [facility] 2nd assistant    Preop Diagnosis:  Other tear of medial meniscus of right knee, unspecified whether old or current tear, subsequent encounter [S83 241D]    Post-Op Diagnosis Codes:     * Other tear of medial meniscus of right knee, unspecified whether old or current tear, subsequent encounter [S83 785D]    Procedure(s) (LRB):  KNEE ARTHROSCOPY MENISCECTOMY MEDIAL (Right)    Specimen(s):  * No specimens in log *    Estimated Blood Loss:   Minimal    Drains:  * No LDAs found *    Anesthesia Type:   General    Operative Indications: Other tear of medial meniscus of right knee, unspecified whether old or current tear, subsequent encounter Marilee Pinzon is a 71-year-old female who has been suffering long-term with right knee pain  MRI demonstrated a medial meniscus tear  She had failed non operative measures and wished to undergo a right knee arthroscopy for medial meniscectomy  She understood the risks and benefits of that procedure wished to go ahead  The risks are inclusive of but not limited to infection, stiffness, nerve or blood vessel injury causing numbness pain and weakness, blood clots, persistence of pain, failure to achieve anticipated results, worsening of symptoms, and need for further surgery  Operative Findings:  Right knee exam under anesthesia demonstrated good stability to varus and valgus stress well as anterior posterior drawer with range of motion 0110°    Intra-articular findings demonstrated grade 3 changes undersurface of the patella with no loose bodies in either gutter and intact lateral compartment with intact lateral meniscus and ACL  The medial compartment demonstrated grade 3-4 changes along medial femoral condyle and complex tearing along the body and posterior horn of the medial meniscus requiring debridement to a stable rim of tissue with use of a biter and shaver and a Wand for any peripheral bleeding control  Complications:   None    Procedure and Technique:  Collin Rutherford was taken to the operating room and placed supine on the OR table  She was given preoperative IV antibiotics  General anesthesia was induced in the right knee was taken through exam under anesthesia as described above  Right lower extremity was then prepped and draped in usual sterile fashion  A surgical time-out was taken  We injected local anesthetic into both portals  The anterolateral portal made with 11 blade  Diagnostic arthroscopy begun and an anteromedial portal made with 11 blade  We demonstrated significant articular cartilage damage on the surface of the patella with no loose bodies in either gutter  Lateral compartment was in good condition from articular cartilage and meniscal standpoint  The ACL was intact  Medial compartment demonstrated grade 3-4 changes along medial femoral condyle with also grade 2-3 changes along the tibial plateau  Medial meniscus had highly complex series of tears along the body and posterior horn requiring debridement with use of a biter and shaver to create a stable rim meniscal tissue  We did switch portals to get better access to different portions of the tear  We were very pleased with our overall result at the end of the procedure and utilized the Wand for any peripheral bleeding control  We had a very smooth border of meniscal tissue at the end of the operation  We then removed the arthroscopic equipment and closed the portals with 4-0 nylon suture    Local anesthetic injected into the knee and dry, sterile dressings were applied with Ace bandage  She tolerated procedure well and transferred to recovery room stable condition  She will follow up in 1 week for suture removal   She will be on aspirin for DVT prophylaxis  She will start physical therapy soon as possible and will weightbear as tolerated     I was present for the entire procedure and A qualified resident physician was not available    Patient Disposition:  PACU       SIGNATURE: Patrick Phillips MD  DATE: January 6, 2022  TIME: 9:56 AM

## 2022-01-07 ENCOUNTER — EVALUATION (OUTPATIENT)
Dept: PHYSICAL THERAPY | Facility: CLINIC | Age: 57
End: 2022-01-07
Payer: COMMERCIAL

## 2022-01-07 DIAGNOSIS — S83.241A OTHER TEAR OF MEDIAL MENISCUS OF RIGHT KNEE, UNSPECIFIED WHETHER OLD OR CURRENT TEAR, INITIAL ENCOUNTER: Primary | ICD-10-CM

## 2022-01-07 PROCEDURE — 97161 PT EVAL LOW COMPLEX 20 MIN: CPT | Performed by: PHYSICAL THERAPIST

## 2022-01-07 NOTE — PROGRESS NOTES
PT Evaluation     Today's date: 2022  Patient name: Martin Seaman  : 1965  MRN: [id number]  Referring provider: Ramone Garcia*  Dx:   Encounter Diagnosis     ICD-10-CM    1  Other tear of medial meniscus of right knee, unspecified whether old or current tear, initial encounter  S83 241A Ambulatory referral to Physical Therapy                  Assessment  Assessment details: Martin Seaman is a 55 y o  female who presents to physical therapy with pain, decreased LE range of motion, decreased LE strength, fair balance, impaired function and decreased activity tolerance  Patient's clinical presentation is consistent with their referring diagnosis of Other tear of medial meniscus of right knee, unspecified whether old or current tear, initial encounter  (primary encounter diagnosis)  The pt presents with functional limitations of ADLs, recreational activities, ambulation, performing household chores and stair negotiation  Pt would benefit from physical therapy services to address these limitations and maximize function  Pt was instructed and educated on home exercise program today and demonstrates understanding  Impairments: abnormal gait, abnormal muscle firing, abnormal muscle tone, abnormal or restricted ROM, abnormal movement, activity intolerance, impaired balance, impaired physical strength, lacks appropriate home exercise program, pain with function, weight-bearing intolerance, poor posture  and poor body mechanics  Understanding of Dx/Px/POC: good   Prognosis: good    Goals  Short term goals  (3 weeks)  1  Patient will have no pain right knee  2   Patient will have full range of motion right knee  3  Patient will report a 50% improvement with activities of daily living   4  Patient will decrease girth of right knee by 1 to 2 cm  Long term goals - (6 weeks)  1  Patient will be independent with home exercise program  2    Patient will have no gait deviations ambulating on level surfaces  3   Patient will report 80 % improvement with activity of daily living function  4   Patient will negotiate stairs up and down pain free with use of one railing  Plan  Patient would benefit from: PT eval and skilled physical therapy  Planned modality interventions: cryotherapy  Planned therapy interventions: ADL training, balance/weight bearing training, joint mobilization, manual therapy, massage, neuromuscular re-education, patient education, postural training, strengthening, stretching, functional ROM exercises, therapeutic activities, therapeutic exercise, gait training, home exercise program and abdominal trunk stabilization  Frequency: 2x week  Duration in visits: 12  Duration in weeks: 6  Treatment plan discussed with: patient        Subjective Evaluation    History of Present Illness  Date of surgery: 2022  Mechanism of injury: She notes right knee pain for one year with insidious onset  She followed up with Dr Anastasia Pratt  Initially, she had gel injections which did not improve her symptoms  She had an MRI  It indicated Right medial meniscus tear  She underwent Right knee menisectomy yesterday  She was then referred to PT  Pain  Current pain ratin  At best pain ratin  At worst pain ratin  Location: Medial Right knee  Quality: tight, throbbing, sharp, dull ache and pulling  Relieving factors: medications, ice and rest  Aggravating factors: walking, standing and stair climbing    Social Support    Employment status: working ([employer])  Exercise history: Bike, Walk, House work    Patient Goals  Patient goals for therapy: decreased pain and independence with ADLs/IADLs  Patient's goals regarding treatment: Don socks again, walk normally  Objective     Palpation     Right   Hypertonic in the distal biceps femoris, distal semimembranosus and distal semitendinosus       Active Range of Motion   Left Knee   Flexion: 122 degrees Extension: 0 degrees     Right Knee   Flexion: 71 degrees   Extension: -6 degrees     Mobility   Patellar Mobility:     Right Knee   Hypomobile: medial, lateral, superior and inferior     Strength/Myotome Testing     Left Knee   Flexion: 5  Extension: 5    Right Knee   Flexion: 4-  Extension: 4-    Swelling     Left Knee Girth Measurement (cm)   Joint line: 48 9 cm    Right Knee Girth Measurement (cm)   Joint line: 50 1 cm    Ambulation   Weight-Bearing Status   Weight-Bearing Status (Right): weight-bearing as tolerated    Assistive device used: none    Additional Weight-Bearing Status Details  Discussed with patient using an assistive device to prevent antalgic pattern is more important than trying to force ambulation without device  She agreed to this and will use a cane or single crutch through the weekend  Observational Gait   Gait: antalgic   Decreased walking speed and right stance time                Precautions: Asthma    Specialty Daily Treatment Diary     Manuals 1/7/22       Visit # 1       PF mobs        Stretch HS Quad        Knee PROM                Warm-up        NuStep        Neuro Re-Ed        Qset        Wt shift                                Ther Ex        Mini squat        Side step        Knee flex        Knee ext        SLR        Clamshell        Heel slides                Ther Activity                        Gait Training        W/ cane                Modalities        CP

## 2022-01-10 ENCOUNTER — OFFICE VISIT (OUTPATIENT)
Dept: PHYSICAL THERAPY | Facility: CLINIC | Age: 57
End: 2022-01-10
Payer: COMMERCIAL

## 2022-01-10 DIAGNOSIS — S83.241A OTHER TEAR OF MEDIAL MENISCUS OF RIGHT KNEE, UNSPECIFIED WHETHER OLD OR CURRENT TEAR, INITIAL ENCOUNTER: Primary | ICD-10-CM

## 2022-01-10 PROCEDURE — 97110 THERAPEUTIC EXERCISES: CPT | Performed by: PHYSICAL THERAPIST

## 2022-01-10 PROCEDURE — 97140 MANUAL THERAPY 1/> REGIONS: CPT | Performed by: PHYSICAL THERAPIST

## 2022-01-10 NOTE — PROGRESS NOTES
Daily Note     Today's date: 1/10/2022  Patient name: David Brennan  : 1965  MRN: [id number]  Referring provider: Flako Owen*  Dx:   Encounter Diagnosis     ICD-10-CM    1  Other tear of medial meniscus of right knee, unspecified whether old or current tear, initial encounter  F98 287R                   Subjective:  She reports pain 7/10      Objective: See treatment diary below      Assessment: Tolerated treatment well  Patient would benefit from continued PT  Quad set has improved since last visit and is now fair  She has difficulty with SLR without assistance  Plan: Continue per plan of care  Progress treatment as tolerated         Precautions: Asthma    Specialty Daily Treatment Diary     Manuals 1/7/22 1/10/22      Visit # 1 2      PF mobs  2'      Stretch HS Quad  4'      Knee PROM  4'              Warm-up        NuStep  10'      Neuro Re-Ed        Qset  20      Wt shift                                Ther Ex        Mini squat        Side step        Knee flex  20      Knee ext  20      SLR  5x  AA      Clamshell  20      Heel slides  20              Ther Activity                        Gait Training        W/ cane                Modalities        CP  10'

## 2022-01-12 ENCOUNTER — OFFICE VISIT (OUTPATIENT)
Dept: PHYSICAL THERAPY | Facility: CLINIC | Age: 57
End: 2022-01-12
Payer: COMMERCIAL

## 2022-01-12 DIAGNOSIS — S83.241A OTHER TEAR OF MEDIAL MENISCUS OF RIGHT KNEE, UNSPECIFIED WHETHER OLD OR CURRENT TEAR, INITIAL ENCOUNTER: Primary | ICD-10-CM

## 2022-01-12 PROCEDURE — 97110 THERAPEUTIC EXERCISES: CPT | Performed by: PHYSICAL THERAPIST

## 2022-01-12 PROCEDURE — 97140 MANUAL THERAPY 1/> REGIONS: CPT | Performed by: PHYSICAL THERAPIST

## 2022-01-12 NOTE — PROGRESS NOTES
Daily Note     Today's date: 2022  Patient name: Candido Toth  : 1965  MRN: [id number]  Referring provider: Madison Goldberg*  Dx:   Encounter Diagnosis     ICD-10-CM    1  Other tear of medial meniscus of right knee, unspecified whether old or current tear, initial encounter  S83 207A                   Subjective:  She reports pain 7/10 tonight  She had a little increased soreness the day following the last treatment  Objective: See treatment diary below      Assessment: Tolerated treatment well  Patient would benefit from continued PT  No progression was given to exercise today due to soreness following the last session  She agreed to continue to rest and ice throughout the day  Taught patient correct use of the cane to un-weight her right leg as needed  Plan: Continue per plan of care  Progress treatment as tolerated         Precautions: Asthma    Specialty Daily Treatment Diary     Manuals 1/7/22 1/10/22 1/12/22     Visit # 1 2 3     PF mobs  2' 2'     Stretch HS Quad  4' 4'     Knee PROM  4' 4'             Warm-up        NuStep  10' 10'     Neuro Re-Ed        Qset  20 20     Wt shift                                Ther Ex        Mini squat        Side step        Knee flex  20 20     Knee ext  20 20     SLR  5x  AA 2 x 5 AA     Clamshell  20 20  Red loop     Heel slides  20 20             Ther Activity                        Gait Training        W/ cane                Modalities        CP  10' 10'

## 2022-01-13 ENCOUNTER — OFFICE VISIT (OUTPATIENT)
Dept: PHYSICAL THERAPY | Facility: CLINIC | Age: 57
End: 2022-01-13
Payer: COMMERCIAL

## 2022-01-13 DIAGNOSIS — S83.241A OTHER TEAR OF MEDIAL MENISCUS OF RIGHT KNEE, UNSPECIFIED WHETHER OLD OR CURRENT TEAR, INITIAL ENCOUNTER: Primary | ICD-10-CM

## 2022-01-13 PROCEDURE — 97140 MANUAL THERAPY 1/> REGIONS: CPT | Performed by: PHYSICAL THERAPIST

## 2022-01-13 PROCEDURE — 97110 THERAPEUTIC EXERCISES: CPT | Performed by: PHYSICAL THERAPIST

## 2022-01-13 NOTE — PROGRESS NOTES
Daily Note     Today's date: 2022  Patient name: Rush Javier  : 1965  MRN: [id number]  Referring provider: Tvoa Jimenez*  Dx:   Encounter Diagnosis     ICD-10-CM    1  Other tear of medial meniscus of right knee, unspecified whether old or current tear, initial encounter  S88 459U                   Subjective:  She reports pain 6/10  She feels soreness of right knee was due to sitting with knee bent all day while working  She tried to keep knee in a straight position today and feels a little better  Objective: See treatment diary below      Assessment: Tolerated treatment well  Patient would benefit from continued PT  Advised patient to change position throughout her work day more often and not try to maintain one position for a lng period of time  She ambulated with a unilateral crutch today and gait pattern was less antalgic  Plan: Continue per plan of care  Progress treatment as tolerated         Precautions: Asthma    Specialty Daily Treatment Diary     Manuals 1/7/22 1/10/22 1/12/22 1/13/22    Visit # 1 2 3 4    PF mobs  2' 2' 2'    Stretch HS Quad  4' 4' 4'    Knee PROM  4' 4' 4'            Warm-up        NuStep  10' 10' 10'    Neuro Re-Ed        Qset  20 20 20    Wt shift                                Ther Ex        Mini squat        Side step        Knee flex  20 20 20    Knee ext  20 20 20    SLR  5x  AA 2 x 5 AA 2 x 5 AA    Clamshell  20 20  Red loop 20  Red loop    Heel slides  20 20 20            Ther Activity                        Gait Training        W/ cane                Modalities        CP  10' 10' 10'

## 2022-01-17 ENCOUNTER — OFFICE VISIT (OUTPATIENT)
Dept: OBGYN CLINIC | Facility: CLINIC | Age: 57
End: 2022-01-17

## 2022-01-17 ENCOUNTER — OFFICE VISIT (OUTPATIENT)
Dept: PHYSICAL THERAPY | Facility: CLINIC | Age: 57
End: 2022-01-17
Payer: COMMERCIAL

## 2022-01-17 DIAGNOSIS — M17.11 OSTEOARTHRITIS OF RIGHT KNEE, UNSPECIFIED OSTEOARTHRITIS TYPE: ICD-10-CM

## 2022-01-17 DIAGNOSIS — S83.241A OTHER TEAR OF MEDIAL MENISCUS OF RIGHT KNEE, UNSPECIFIED WHETHER OLD OR CURRENT TEAR, INITIAL ENCOUNTER: Primary | ICD-10-CM

## 2022-01-17 DIAGNOSIS — Z98.890 S/P MEDIAL MENISCECTOMY OF RIGHT KNEE: Primary | ICD-10-CM

## 2022-01-17 PROCEDURE — 97140 MANUAL THERAPY 1/> REGIONS: CPT | Performed by: PHYSICAL THERAPIST

## 2022-01-17 PROCEDURE — 97110 THERAPEUTIC EXERCISES: CPT | Performed by: PHYSICAL THERAPIST

## 2022-01-17 PROCEDURE — 99024 POSTOP FOLLOW-UP VISIT: CPT | Performed by: PHYSICIAN ASSISTANT

## 2022-01-17 NOTE — PROGRESS NOTES
Assessment/Plan:  1  S/P medial meniscectomy of right knee       Patient is making progress, though she does still have limitations in range of motion of her knee  I did review her arthroscopic pictures with her today and did stress that she had significant arthritic changes mostly about the medial compartment noted at the time of arthroscopy  I did explain that this may continue to bother her, even after she recovers from her meniscectomy  She will continue physical therapy for range of motion and strengthening  She can continue activity as tolerated  There are no signs of infection or DVT today  She will call immediately if she develops any calf pain or cramping  She will follow up in 4 weeks for repeat evaluation  Subjective:   Ernestine Barboza is a 55 y o  female who presents today for follow-up of her right knee, now 11 days status post arthroscopic medial meniscectomy  The patient did have significant medial compartment noted at the time of arthroscopy  She has been doing physical therapy and notes slow progress with this  She still notes medial knee pain though  She also notes limitations in range of motion still  She notes good sensation of the right lower extremity  She denies any calf pain or cramping  Review of Systems      Past Medical History:   Diagnosis Date    Allergic rhinitis     Asthma     Fibroids     Physiological ovarian cysts     Sleep apnea     denatl device worn       Past Surgical History:   Procedure Laterality Date    COLONOSCOPY  2016    dr Carbone [place] ARTHROSCOPY Left 2018    meniscectomy x2, first done in 2015    MYOMECTOMY      [place] COLONOSCOPY FLX DX W/COLLJ SPEC WHEN PFRMD N/A 9/13/2018    Procedure: COLONOSCOPY;  Surgeon: Rachel Alcala MD;  Location: [facility] GI LAB;   Service: Gastroenterology    [place] KNEE SCOPE,MED/LAT MENISECTOMY Right 1/6/2022    Procedure: KNEE ARTHROSCOPY MENISCECTOMY MEDIAL;  Surgeon: Al Turcios MD;  Location: [facility] MAIN OR;  Service: Orthopedics    WRIST SURGERY Right        Family History   Problem Relation Age of Onset    Osteoporosis Mother     Thyroid disease Mother     Colon cancer Mother 79    Colon polyps Mother    Ananya Aguilar Cancer Mother         colon    Alzheimer's disease Father     Hypertension Father     Dementia Father     Heart disease Brother     No Known Problems Daughter     No Known Problems Daughter     No Known Problems Maternal Aunt     No Known Problems Maternal Aunt     No Known Problems Paternal Aunt     No Known Problems Paternal Aunt     No Known Problems Paternal Aunt     No Known Problems Paternal Aunt     No Known Problems Paternal Aunt     No Known Problems Paternal Aunt     No Known Problems Paternal Aunt     No Known Problems Paternal Aunt        Social History     Occupational History    Not on file   Tobacco Use    Smoking status: Never Smoker    Smokeless tobacco: Never Used   Vaping Use    Vaping Use: Never used   Substance and Sexual Activity    Alcohol use: Yes     Comment: social - wine 2 x month    Drug use: Never    Sexual activity: Yes     Partners: Male     Birth control/protection: Condom Male         Current Outpatient Medications:     acetaminophen (TYLENOL) 325 mg tablet, Take 650 mg by mouth every 6 (six) hours as needed for mild pain, Disp: , Rfl:     albuterol (2 5 mg/3 mL) 0 083 % nebulizer solution, Take 1 vial (2 5 mg total) by nebulization every 6 (six) hours as needed for wheezing or shortness of breath, Disp: 100 mL, Rfl: 0    albuterol (VENTOLIN HFA) 90 mcg/act inhaler, Inhale 2 puffs every 4 (four) hours as needed  , Disp: , Rfl:     Calcium Carb-Cholecalciferol 600-500 MG-UNIT CAPS, Take by mouth, Disp: , Rfl:     cholecalciferol (VITAMIN D3) 1,000 units tablet, Take 1,000 Units by mouth daily, Disp: , Rfl:     fluticasone-salmeterol (ADVAIR DISKUS) 500-50 mcg/dose inhaler, Inhale 1 puff 2 (two) times a day, Disp: 1 Inhaler, Rfl: 2   multivitamin (THERAGRAN) TABS, Take 1 tablet by mouth daily, Disp: , Rfl:     Omega-3 Fatty Acids (fish oil) 1,000 mg, Take 1,000 mg by mouth daily Last dose 12/28/21 , Disp: , Rfl:     Respiratory Therapy Supplies (NEBULIZER/TUBING/MOUTHPIECE) KIT, by Does not apply route 4 (four) times a day, Disp: 1 each, Rfl: 0    acyclovir (ZOVIRAX) 200 mg capsule, Take 1 capsule (200 mg total) by mouth daily as needed (flare ups) for up to 30 days Only as needed for outbreak, Disp: 30 capsule, Rfl: 0    No Known Allergies    Objective: There were no vitals filed for this visit  Right Knee Exam     Tenderness   The patient is experiencing tenderness in the medial joint line  Range of Motion   Extension: -5   Flexion: 90     Other   Erythema: absent  Sensation: normal  Pulse: present  Swelling: none  Effusion: effusion (trace) present    Comments:  No calf tenderness  Sutures removed  Incisions CDI  No erythema  Observations     Right Knee   Positive for effusion (trace)  Physical Exam  Musculoskeletal:      Right knee: Effusion (trace) present

## 2022-01-17 NOTE — PROGRESS NOTES
Daily Note     Today's date: 2022  Patient name: Marge Greco  : 1965  MRN: [id number]  Referring provider: Екатерина Velazco*  Dx:   Encounter Diagnosis     ICD-10-CM    1  Other tear of medial meniscus of right knee, unspecified whether old or current tear, initial encounter  S84 568L                   Subjective:  She reports pain right knee is 4/10  She feels she turned a corner this weekend with a good reduction in her pain level  Objective: See treatment diary below      Assessment: Tolerated treatment well  Patient would benefit from continued PT  She performed SLR independently this session  ROM is normalizing well and she has improved gait pattern this session  Plan: Continue per plan of care  Progress treatment as tolerated  Add SLS next session       Precautions: Asthma    Specialty Daily Treatment Diary     Manuals 1/7/22 1/10/22 1/12/22 1/13/22 1/17/22   Visit # 1 2 3 4 5- foto   PF mobs  2' 2' 2' 2'   Stretch HS Quad  4' 4' 4' 4'   Knee PROM  4' 4' 4' 4'           Warm-up        NuStep  10' 10' 10' 10'   Neuro Re-Ed        Qset  20 20 20 20   3" hold   SLS                                Ther Ex        Mini squat        Side step     5 x 10 ft   Knee flex  20 20 20 20   Knee ext  20 20 20 20   SLR  5x  AA 2 x 5 AA 2 x 5 AA  3 x 5           no assist   Clamshell  20 20  Red loop 20  Red loop 20  Red loop   Heel slides  20 20 20 20   LP     20  45#           Ther Activity                        Gait Training        W/ cane                Modalities        CP  10' 10' 10'

## 2022-01-19 ENCOUNTER — OFFICE VISIT (OUTPATIENT)
Dept: PHYSICAL THERAPY | Facility: CLINIC | Age: 57
End: 2022-01-19
Payer: COMMERCIAL

## 2022-01-19 DIAGNOSIS — S83.241A OTHER TEAR OF MEDIAL MENISCUS OF RIGHT KNEE, UNSPECIFIED WHETHER OLD OR CURRENT TEAR, INITIAL ENCOUNTER: Primary | ICD-10-CM

## 2022-01-19 PROCEDURE — 97140 MANUAL THERAPY 1/> REGIONS: CPT | Performed by: PHYSICAL THERAPIST

## 2022-01-19 PROCEDURE — 97110 THERAPEUTIC EXERCISES: CPT | Performed by: PHYSICAL THERAPIST

## 2022-01-19 NOTE — PROGRESS NOTES
Daily Note     Today's date: 2022  Patient name: Ki Simon  : 1965  MRN: [id number]  Referring provider: Shaun Meyer*  Dx:   Encounter Diagnosis     ICD-10-CM    1  Other tear of medial meniscus of right knee, unspecified whether old or current tear, initial encounter  P60 822W                   Subjective:  She reports pain right knee is 3/10  Continued improvement  She is still negotiating stairs with step-to pattern  Objective: See treatment diary below      Assessment: Tolerated treatment well  Patient would benefit from continued PT  She can perform SLR independently but fatigues quickly  Plan: Continue per plan of care  Progress treatment as tolerated  Add step ups next session       Precautions: Asthma    Specialty Daily Treatment Diary     Manuals 22       Visit # 6       PF mobs 2'       Stretch HS Quad 4'       Knee PROM 4'               Warm-up        NuStep 10'       Neuro Re-Ed        Qset 20       SLS        Step ups  4"                      Ther Ex        Mini squat 10       Side step 4 x 20 ft       Knee flex 20       Knee ext 20       SLR 20       Clamshell 20 red loop       Heel slides 20       LP 20  55#       Bridges 20               Ther Activity                        Gait Training                        Modalities        CP 10'

## 2022-01-20 ENCOUNTER — OFFICE VISIT (OUTPATIENT)
Dept: PHYSICAL THERAPY | Facility: CLINIC | Age: 57
End: 2022-01-20
Payer: COMMERCIAL

## 2022-01-20 DIAGNOSIS — S83.241A OTHER TEAR OF MEDIAL MENISCUS OF RIGHT KNEE, UNSPECIFIED WHETHER OLD OR CURRENT TEAR, INITIAL ENCOUNTER: Primary | ICD-10-CM

## 2022-01-20 PROCEDURE — 97140 MANUAL THERAPY 1/> REGIONS: CPT | Performed by: PHYSICAL THERAPIST

## 2022-01-20 PROCEDURE — 97110 THERAPEUTIC EXERCISES: CPT | Performed by: PHYSICAL THERAPIST

## 2022-01-20 NOTE — PROGRESS NOTES
Daily Note     Today's date: 2022  Patient name: Jose Elias Ruiz  : 1965  MRN: [id number]  Referring provider: Gris Kimbrough*  Dx:   Encounter Diagnosis     ICD-10-CM    1  Other tear of medial meniscus of right knee, unspecified whether old or current tear, initial encounter  S85 095I                   Subjective:  She reports 5/10 pain today  Objective: See treatment diary below      Assessment: Tolerated treatment well  Patient would benefit from continued PT  Taught patient a 4 inch step up tonight broken down into small steps  She had some discomfort with the exercise so the exercise was stopped at 5 reps  Plan: Continue per plan of care  Progress treatment as tolerated          Precautions: Asthma    Specialty Daily Treatment Diary     Manuals 22      Visit # 6 7      PF mobs 2' 2'      Stretch HS Quad 4' 4'      Knee PROM 4' 4'              Warm-up        NuStep 10' 10'      Neuro Re-Ed        Qset 20 HEP      SLS  10x      Step ups  4"   5x                      Ther Ex        Mini squat 10 10      Side step 4 x 20 ft 4 x 20 ft      Knee flex 20 20      Knee ext 20 20      SLR 20 20      Clamshell 20 red loop 20  Red loop      Heel slides 20 20      LP 20  55# 20   55#      Bridges 20 20              Ther Activity                        Gait Training                        Modalities        CP 10' 10'

## 2022-01-24 ENCOUNTER — OFFICE VISIT (OUTPATIENT)
Dept: PHYSICAL THERAPY | Facility: CLINIC | Age: 57
End: 2022-01-24
Payer: COMMERCIAL

## 2022-01-24 DIAGNOSIS — S83.241A OTHER TEAR OF MEDIAL MENISCUS OF RIGHT KNEE, UNSPECIFIED WHETHER OLD OR CURRENT TEAR, INITIAL ENCOUNTER: Primary | ICD-10-CM

## 2022-01-24 PROCEDURE — 97112 NEUROMUSCULAR REEDUCATION: CPT

## 2022-01-24 PROCEDURE — 97110 THERAPEUTIC EXERCISES: CPT

## 2022-01-24 NOTE — PROGRESS NOTES
Daily Note     Today's date: 2022  Patient name: Lencho Houston  : 1965  MRN: [id number]  Referring provider: Trini Sandoval*  Dx:   Encounter Diagnosis     ICD-10-CM    1  Other tear of medial meniscus of right knee, unspecified whether old or current tear, initial encounter  S88 021V                   Subjective: Patient stated that on Saturday her pain level was a 7/10 and currently it is at a 4-5/10  Objective: See treatment diary below      Assessment: Tolerated treatment well  Patient demonstrated fatigue post treatment  Patient was able to complete all exercises with proper form but was antalgic in distal median knee  Plan: Continue per plan of care        Precautions: Asthma    Specialty Daily Treatment Diary     Manuals 22     Visit # 6 7 8     PF mobs 2' 2' 2'     Stretch HS Quad 4' 4' 4'     Knee PROM 4' 4' 4'             Warm-up        NuStep 10' 10' 10' lvl 2     Neuro Re-Ed        Qset 20 HEP      SLS  10x 10x     Step ups  4"   5x 4" 5x                     Ther Ex        Mini squat 10 10 20     Side step 4 x 20 ft 4 x 20 ft 4x20 ft     Knee flex 20 20 20     Knee ext 20 20 20     SLR 20 20 20     Clamshell 20 red loop 20  Red loop 20 red loop     Heel slides 20 20 20     LP 20  55# 20   55# 20 55#     Bridges 20 20 20             Ther Activity                        Gait Training                        Modalities        CP 10' 10' 8'

## 2022-01-26 ENCOUNTER — OFFICE VISIT (OUTPATIENT)
Dept: PHYSICAL THERAPY | Facility: CLINIC | Age: 57
End: 2022-01-26
Payer: COMMERCIAL

## 2022-01-26 DIAGNOSIS — S83.241A OTHER TEAR OF MEDIAL MENISCUS OF RIGHT KNEE, UNSPECIFIED WHETHER OLD OR CURRENT TEAR, INITIAL ENCOUNTER: Primary | ICD-10-CM

## 2022-01-26 PROCEDURE — 97110 THERAPEUTIC EXERCISES: CPT

## 2022-01-26 PROCEDURE — 97112 NEUROMUSCULAR REEDUCATION: CPT

## 2022-01-26 NOTE — PROGRESS NOTES
Daily Note     Today's date: 2022  Patient name: Rush Javier  : 1965  MRN: [id number]  Referring provider: Tova Jimenez*  Dx:   Encounter Diagnosis     ICD-10-CM    1  Other tear of medial meniscus of right knee, unspecified whether old or current tear, initial encounter  S87 360R                   Subjective: Pt reports some soreness along inside of R knee  Drove to office for work today for first time since surgery, about an hour each way  Notes that moving from gas to break caused some soreness  Feels she is improving overall      Objective: requires significant cueing throughout to promote proper glute activation w/ functional stair work, corrects but fatigues quickly  Assessment: Tolerated treatment well  Patient demonstrated fatigue post treatment and would benefit from continued PT  Does well w/ exercise progressions that focus on glute activation in relation to everyday activities  Will benefit from more aggressive strengthening barring setback in pain  Plan: Continue per plan of care   stair progressions, mini split squat, single leg press      Precautions: Asthma    Specialty Daily Treatment Diary     Manuals 22    Visit # 6 7 8 9    PF mobs 2' 2' 2'     Stretch HS Quad 4' 4' 4' x2-3 mins     Knee PROM 4' 4' 4' 4-5 min            Warm-up        NuStep 10' 10' 10' lvl 2 10 min L2    Neuro Re-Ed        Qset 20 HEP  Rev     SLS  10x 10x     Step ups  4"   5x 4" 5x 2x10 ant 4" w/ glute drive    Lat 4" glute med tap x 10        Blue tband hip abd 20'x4            Ther Ex        Mini squat 10 10 20 W/ blue tband around knees 2x10    Side step 4 x 20 ft 4 x 20 ft 4x20 ft     Knee flex 20 20 20     Knee ext 20 20 20     SLR 20 20 20 3x10    Clamshell 20 red loop 20  Red loop 20 red loop S/l hip abd 2x10 on R only    Heel slides 20 20 20 /    LP 20  55# 20   55# 20 55# 65# 2x10    Bridges 20 20 20 3x10 w/ ad sq            Ther Activity Gait Training                        Modalities        CP 10' 10' 8' home

## 2022-01-27 ENCOUNTER — OFFICE VISIT (OUTPATIENT)
Dept: PHYSICAL THERAPY | Facility: CLINIC | Age: 57
End: 2022-01-27
Payer: COMMERCIAL

## 2022-01-27 DIAGNOSIS — S83.241A OTHER TEAR OF MEDIAL MENISCUS OF RIGHT KNEE, UNSPECIFIED WHETHER OLD OR CURRENT TEAR, INITIAL ENCOUNTER: Primary | ICD-10-CM

## 2022-01-27 PROCEDURE — 97112 NEUROMUSCULAR REEDUCATION: CPT

## 2022-01-27 PROCEDURE — 97110 THERAPEUTIC EXERCISES: CPT

## 2022-01-27 PROCEDURE — 97140 MANUAL THERAPY 1/> REGIONS: CPT

## 2022-01-27 NOTE — PROGRESS NOTES
Daily Note     Today's date: 2022  Patient name: Trevon Gomez  : 1965  MRN: [id number]  Referring provider: Nani Liao  Dx:   Encounter Diagnosis     ICD-10-CM    1  Other tear of medial meniscus of right knee, unspecified whether old or current tear, initial encounter  S83 241A        Start Time: 1750  Stop Time: 1835  Total time in clinic (min): 45 minutes    Subjective: Pt reports no complaints prior to session, no issues from last session      Objective: See treatment below      Assessment: Tolerated treatment well  Patient demonstrated fatigue post treatment and would benefit from continued PT  Pt demo good tolerance with exercises, no pain reported, she reported dec pain post session      Plan: Continue per plan of care         Precautions: Asthma    Specialty Daily Treatment Diary     Manuals 22   Visit # 6 7 8 9 10   PF mobs 2' 2' 2'  2'   Stretch HS Quad 4' 4' 4' x2-3 mins  4'   Knee PROM 4' 4' 4' 4-5 min 4'           Warm-up        NuStep 10' 10' 10' lvl 2 10 min L2 10 min L2   Neuro Re-Ed        Qset 20 HEP  Rev  20x   SLS  10x 10x     Step ups  4"   5x 4" 5x 2x10 ant 4" w/ glute drive    Lat 4" glute med tap x 10 2x10 4" fwd/lat       Blue tband hip abd 20'x4            Ther Ex        Mini squat 10 10 20 W/ blue tband around knees 2x10 20   Side step 4 x 20 ft 4 x 20 ft 4x20 ft  4x20ft blue loop   Knee flex 20 20 20  20x   Knee ext 20 20 20  20x   SLR 20 20 20 3x10 20x   Clamshell 20 red loop 20  Red loop 20 red loop S/l hip abd 2x10 on R only 20 red loop   Heel slides 20 20 20 / 20x   LP 20  55# 20   55# 20 55# 65# 2x10 65# 2x10   Bridges 20 20 20 3x10 w/ ad sq 2x10           Ther Activity                        Gait Training                        Modalities        CP 10' 10' 8' home home

## 2022-01-31 ENCOUNTER — OFFICE VISIT (OUTPATIENT)
Dept: PHYSICAL THERAPY | Facility: CLINIC | Age: 57
End: 2022-01-31
Payer: COMMERCIAL

## 2022-01-31 DIAGNOSIS — S83.241A OTHER TEAR OF MEDIAL MENISCUS OF RIGHT KNEE, UNSPECIFIED WHETHER OLD OR CURRENT TEAR, INITIAL ENCOUNTER: Primary | ICD-10-CM

## 2022-01-31 PROCEDURE — 97110 THERAPEUTIC EXERCISES: CPT | Performed by: PHYSICAL THERAPIST

## 2022-01-31 PROCEDURE — 97140 MANUAL THERAPY 1/> REGIONS: CPT | Performed by: PHYSICAL THERAPIST

## 2022-01-31 NOTE — PROGRESS NOTES
Daily Note     Today's date: 2022  Patient name: Chema Palumbo  : 1965  MRN: [id number]  Referring provider: Yaniv Valdovinos*  Dx:   Encounter Diagnosis     ICD-10-CM    1  Other tear of medial meniscus of right knee, unspecified whether old or current tear, initial encounter  [id number]                   Subjective: Pt reports no complaints       Objective: See treatment below      Assessment: Tolerated treatment well  She is making steady functional improvements  She is now able to perform a 4inch stair without pain  Plan: Continue per plan of care  Progress to 6 inch stair       Precautions: Asthma    Specialty Daily Treatment Diary     Manuals 22       Visit # 11       PF mobs 2'       Stretch HS Quad 4'       Knee PROM 4'               Warm-up        NuStep 10'       Neuro Re-Ed        Qset 20       SLS 20       Step ups 20  4"       Hip abd   10  BTB               Ther Ex        Mini squat 20       Side step 5x 20 ft       Knee flex 20       Knee ext 20       SLR 20       Clamshell 20 red loop       Heel slides 20       LP 30   65#       Bridges 20               Ther Activity                        Gait Training                        Modalities        CP 5'

## 2022-02-03 ENCOUNTER — OFFICE VISIT (OUTPATIENT)
Dept: PHYSICAL THERAPY | Facility: CLINIC | Age: 57
End: 2022-02-03
Payer: COMMERCIAL

## 2022-02-03 DIAGNOSIS — S83.241A OTHER TEAR OF MEDIAL MENISCUS OF RIGHT KNEE, UNSPECIFIED WHETHER OLD OR CURRENT TEAR, INITIAL ENCOUNTER: Primary | ICD-10-CM

## 2022-02-03 PROCEDURE — 97140 MANUAL THERAPY 1/> REGIONS: CPT

## 2022-02-03 PROCEDURE — 97110 THERAPEUTIC EXERCISES: CPT

## 2022-02-03 NOTE — PROGRESS NOTES
Daily Note     Today's date: 2/3/2022  Patient name: Po Jacobson  : 1965  MRN: [id number]  Referring provider: Ankit Rehman*  Dx:   Encounter Diagnosis     ICD-10-CM    1  Other tear of medial meniscus of right knee, unspecified whether old or current tear, initial encounter  S85 [id number]                   Subjective: Patient states that she is finally feeling more like herself and that her pain level is no more than 2/10 today  Objective: See treatment diary below      Assessment: Tolerated treatment well  Patient exhibited good technique with therapeutic exercises  Patient was able to step up and down 4' step with no pain  Patient will continue to benefit from skilled physical therapy in order to address strength and ROM deficits in order to meet goals  Plan: Continue per plan of care        Precautions: Asthma    Specialty Daily Treatment Diary     Manuals 22 2/3      Visit # 11 12      PF mobs 2' 2'      Stretch HS Quad 4' 4'      Knee PROM 4' 4'              Warm-up        NuStep 10' 10' lvl 4      Neuro Re-Ed        Qset 20 20      SLS 20 20      Step ups 20  4" 20 4"      Hip abd   10  BTB 10 BTB              Ther Ex        Mini squat 20 30      Side step 5x 20 ft 5x20ft      Knee flex 20 20      Knee ext 20 20      SLR 20 20      Clamshell 20 red loop 20 red loop      Heel slides 20 20      LP 30   65# 30 65#      Bridges 20 20              Ther Activity                        Gait Training                        Modalities        CP 5'  5'

## 2022-02-07 ENCOUNTER — OFFICE VISIT (OUTPATIENT)
Dept: PHYSICAL THERAPY | Facility: CLINIC | Age: 57
End: 2022-02-07
Payer: COMMERCIAL

## 2022-02-07 DIAGNOSIS — S83.241A OTHER TEAR OF MEDIAL MENISCUS OF RIGHT KNEE, UNSPECIFIED WHETHER OLD OR CURRENT TEAR, INITIAL ENCOUNTER: Primary | ICD-10-CM

## 2022-02-07 PROCEDURE — 97110 THERAPEUTIC EXERCISES: CPT

## 2022-02-07 PROCEDURE — 97112 NEUROMUSCULAR REEDUCATION: CPT

## 2022-02-07 NOTE — PROGRESS NOTES
Daily Note     Today's date: 2022  Patient name: Samantha Redmond  : 1965  MRN: [id number]  Referring provider: Iman Morgan*  Dx:   Encounter Diagnosis     ICD-10-CM    1  Other tear of medial meniscus of right knee, unspecified whether old or current tear, initial encounter  Q93 [id number]                   Subjective: Patient stated that she fell on her back this weekend after her right knee gave out  Patient reported that is what used to happen before the meniscal surgery  Patient noted that she was uninjured and just a little bit sore which she is controlling with tylenol  Objective: See treatment diary below      Assessment: Tolerated treatment well  Patient exhibited good technique with therapeutic exercises although the patient lack ROM in standing flexion  Patient will continue to benefit from skilled physical therapy in order to gain strength and ROM  Plan: Continue per plan of care        Precautions: Asthma    Specialty Daily Treatment Diary     Manuals 1/31/22 2/3 2/7     Visit # 11 12 13     PF mobs 2' 2'      Stretch HS Quad 4' 4'      Knee PROM 4' 4'              Warm-up        NuStep 10' 10' lvl 4 10' lvl 4     Neuro Re-Ed        Qset 20 20      SLS 20 20      Step ups 20  4" 20 4" 20x4"     Hip abd   10  BTB 10 BTB              Ther Ex        Mini squat 20 30 30     Side step 5x 20 ft 5x20ft 5x20 ft RTB     Knee flex 20 20 20     Knee ext 20 20      SLR 20 20      Clamshell 20 red loop 20 red loop      Heel slides 20 20 20 SOS     LP 30   65# 30 65# 30 65#     Bridges 20 20      Heel raises    30     Ther Activity                        Gait Training                        Modalities        CP 5'  5'

## 2022-02-09 ENCOUNTER — OFFICE VISIT (OUTPATIENT)
Dept: FAMILY MEDICINE CLINIC | Facility: CLINIC | Age: 57
End: 2022-02-09
Payer: COMMERCIAL

## 2022-02-09 VITALS
HEART RATE: 99 BPM | SYSTOLIC BLOOD PRESSURE: 136 MMHG | BODY MASS INDEX: 41.18 KG/M2 | HEIGHT: 64 IN | DIASTOLIC BLOOD PRESSURE: 90 MMHG | OXYGEN SATURATION: 100 % | WEIGHT: 241.2 LBS | TEMPERATURE: 99.6 F | RESPIRATION RATE: 18 BRPM

## 2022-02-09 DIAGNOSIS — Z01.812 ENCOUNTER FOR PRE-OPERATIVE LABORATORY TESTING: ICD-10-CM

## 2022-02-09 DIAGNOSIS — J45.40 MODERATE PERSISTENT ASTHMA WITHOUT COMPLICATION: ICD-10-CM

## 2022-02-09 DIAGNOSIS — B34.9 VIRAL INFECTION, UNSPECIFIED: Primary | ICD-10-CM

## 2022-02-09 DIAGNOSIS — A60.04 HERPES SIMPLEX VULVOVAGINITIS: ICD-10-CM

## 2022-02-09 DIAGNOSIS — B00.2 HERPES GINGIVOSTOMATITIS: ICD-10-CM

## 2022-02-09 DIAGNOSIS — S83.241A OTHER TEAR OF MEDIAL MENISCUS OF RIGHT KNEE, UNSPECIFIED WHETHER OLD OR CURRENT TEAR, INITIAL ENCOUNTER: ICD-10-CM

## 2022-02-09 PROCEDURE — U0003 INFECTIOUS AGENT DETECTION BY NUCLEIC ACID (DNA OR RNA); SEVERE ACUTE RESPIRATORY SYNDROME CORONAVIRUS 2 (SARS-COV-2) (CORONAVIRUS DISEASE [COVID-19]), AMPLIFIED PROBE TECHNIQUE, MAKING USE OF HIGH THROUGHPUT TECHNOLOGIES AS DESCRIBED BY CMS-2020-01-R: HCPCS | Performed by: FAMILY MEDICINE

## 2022-02-09 PROCEDURE — U0005 INFEC AGEN DETEC AMPLI PROBE: HCPCS | Performed by: FAMILY MEDICINE

## 2022-02-09 PROCEDURE — 99214 OFFICE O/P EST MOD 30 MIN: CPT | Performed by: FAMILY MEDICINE

## 2022-02-09 RX ORDER — VALACYCLOVIR HYDROCHLORIDE 1 G/1
TABLET, FILM COATED ORAL
Qty: 40 TABLET | Refills: 5 | Status: SHIPPED | OUTPATIENT
Start: 2022-02-09 | End: 2023-02-09

## 2022-02-09 RX ORDER — METHYLPREDNISOLONE 4 MG/1
TABLET ORAL
Qty: 21 TABLET | Refills: 0 | Status: SHIPPED | OUTPATIENT
Start: 2022-02-09 | End: 2022-02-15

## 2022-02-09 NOTE — PROGRESS NOTES
Assessment/Plan:    No problem-specific Assessment & Plan notes found for this encounter  R/o covid  PUI status aware    hsv labialis, new, subacute, valtrex instructions reviewed    Recurrent hsv genitals, change acyclovir to valtrex, instructions advised    Asthma unchanged, possible flare, start medrol     Diagnoses and all orders for this visit:    Viral infection, unspecified  -     COVID Only - Office Collect  -     methylPREDNISolone 4 MG tablet therapy pack; Use as directed on package    Herpes simplex vulvovaginitis  -     valACYclovir (VALTREX) 1,000 mg tablet; 2 tabs at earliest onset of cold sore, repeat once in 12 hours  Take 500mg bid for 3 days for genital herpes flare up  Herpes gingivostomatitis  -     valACYclovir (VALTREX) 1,000 mg tablet; 2 tabs at earliest onset of cold sore, repeat once in 12 hours  Take 500mg bid for 3 days for genital herpes flare up  Moderate persistent asthma without complication          Return if symptoms worsen or fail to improve  Subjective:      Patient ID: Cody Umana is a 55 y o  female  Chief Complaint   Patient presents with    Fever     symptoms started monday nm  lpn    Dizziness    Generalized Body Aches    Fatigue    Chills    Cough       HPI  First day was 2/7/22  Body aches  Slight cough  Head fog  Pressure  Fatigue  Low grade fever 100 1  Dizzy  Thirsty  No n/v  No c/d  Maybe some sob at rest  No covid exposures  Had 2 pfizers, w/o booster    Mucus is clear  On advair 500/50 bid  Did use nebulizer last pm    Tested in past but never positive yet  The following portions of the patient's history were reviewed and updated as appropriate: allergies, current medications, past family history, past medical history, past social history, past surgical history and problem list     Review of Systems   Respiratory: Positive for shortness of breath and wheezing            Current Outpatient Medications   Medication Sig Dispense Refill    acetaminophen (TYLENOL) 325 mg tablet Take 650 mg by mouth every 6 (six) hours as needed for mild pain      albuterol (2 5 mg/3 mL) 0 083 % nebulizer solution Take 1 vial (2 5 mg total) by nebulization every 6 (six) hours as needed for wheezing or shortness of breath 100 mL 0    Calcium Carb-Cholecalciferol 600-500 MG-UNIT CAPS Take by mouth      cholecalciferol (VITAMIN D3) 1,000 units tablet Take 1,000 Units by mouth daily      fluticasone-salmeterol (ADVAIR DISKUS) 500-50 mcg/dose inhaler Inhale 1 puff 2 (two) times a day 1 Inhaler 2    multivitamin (THERAGRAN) TABS Take 1 tablet by mouth daily      Omega-3 Fatty Acids (fish oil) 1,000 mg Take 1,000 mg by mouth daily Last dose 12/28/21       Respiratory Therapy Supplies (NEBULIZER/TUBING/MOUTHPIECE) KIT by Does not apply route 4 (four) times a day 1 each 0    methylPREDNISolone 4 MG tablet therapy pack Use as directed on package 21 tablet 0    valACYclovir (VALTREX) 1,000 mg tablet 2 tabs at earliest onset of cold sore, repeat once in 12 hours  Take 500mg bid for 3 days for genital herpes flare up  40 tablet 5     No current facility-administered medications for this visit  Objective:    /90   Pulse 99   Temp 99 6 °F (37 6 °C)   Resp 18   Ht 5' 4" (1 626 m)   Wt 109 kg (241 lb 3 2 oz)   SpO2 100%   BMI 41 40 kg/m²        Physical Exam  Vitals and nursing note reviewed  Constitutional:       General: She is not in acute distress  Appearance: She is well-developed  She is obese  She is not ill-appearing  HENT:      Head: Normocephalic  Right Ear: Tympanic membrane normal       Left Ear: Tympanic membrane normal    Eyes:      General: No scleral icterus  Conjunctiva/sclera: Conjunctivae normal    Cardiovascular:      Rate and Rhythm: Normal rate and regular rhythm  Pulmonary:      Effort: Pulmonary effort is normal  No respiratory distress  Breath sounds: No stridor  Wheezing present  No rhonchi or rales  Abdominal:      Palpations: Abdomen is soft  Tenderness: There is no abdominal tenderness  Musculoskeletal:         General: No deformity  Cervical back: Neck supple  Skin:     General: Skin is warm and dry  Coloration: Skin is not pale  Neurological:      Mental Status: She is alert  Psychiatric:         Behavior: Behavior normal          Thought Content:  Thought content normal                 Lynnette Jimenezs, DO

## 2022-02-10 ENCOUNTER — APPOINTMENT (OUTPATIENT)
Dept: PHYSICAL THERAPY | Facility: CLINIC | Age: 57
End: 2022-02-10
Payer: COMMERCIAL

## 2022-02-10 LAB — SARS-COV-2 RNA RESP QL NAA+PROBE: NEGATIVE

## 2022-02-14 ENCOUNTER — OFFICE VISIT (OUTPATIENT)
Dept: PHYSICAL THERAPY | Facility: CLINIC | Age: 57
End: 2022-02-14
Payer: COMMERCIAL

## 2022-02-14 DIAGNOSIS — S83.241A OTHER TEAR OF MEDIAL MENISCUS OF RIGHT KNEE, UNSPECIFIED WHETHER OLD OR CURRENT TEAR, INITIAL ENCOUNTER: Primary | ICD-10-CM

## 2022-02-14 PROCEDURE — 97112 NEUROMUSCULAR REEDUCATION: CPT

## 2022-02-14 PROCEDURE — 97110 THERAPEUTIC EXERCISES: CPT

## 2022-02-14 PROCEDURE — 97140 MANUAL THERAPY 1/> REGIONS: CPT

## 2022-02-14 NOTE — PROGRESS NOTES
Daily Note     Today's date: 2022  Patient name: Moira Perry  : 1965  MRN: [id number]  Referring provider: hCay Brewer*  Dx:   Encounter Diagnosis     ICD-10-CM    1  Other tear of medial meniscus of right knee, unspecified whether old or current tear, initial encounter  S83 241A        Start Time: 1715  Stop Time: 1800  Total time in clinic (min): 45 minutes    Subjective: Patient reports improvement overall today, she states she cancelled last week because of an Upper respiratory infection, Covid (-)      Objective: See treatment diary below       Assessment: Tolerated treatment well  Patient exhibited good technique with therapeutic exercises  Pt tolerated session well with no pain  Patient will continue to benefit from skilled physical therapy in order to gain strength and ROM  Plan: Continue per plan of care        Precautions: Asthma    Specialty Daily Treatment Diary     Manuals 1/31/22 2/3 2/7 2/14    Visit # 11 12 13 14    PF mobs 2' 2'  2'    Stretch HS Quad 4' 4'  4'    Knee PROM 4' 4'  4'            Warm-up        NuStep 10' 10' lvl 4 10' lvl 4 Rec bike 10' lvl 3    Neuro Re-Ed        Qset 20 20      SLS 20 20  20    Step ups 20  4" 20 4" 20x4" 20x6"    Hip abd   10  BTB 10 BTB              Ther Ex        Mini squat 20 30 30 30    Side step 5x 20 ft 5x20ft 5x20 ft RTB 5x medium loop    Knee flex 20 20 20 20    Knee ext 20 20  20    SLR 20 20  20    Clamshell 20 red loop 20 red loop  20 red loop    Heel slides 20 20 20 SOS 20     LP 30   65# 30 65# 30 65# 30 65#    Bridges 20 20  20    Heel raises    30 30    Ther Activity                        Gait Training                        Modalities        CP 5'  5'

## 2022-02-16 ENCOUNTER — OFFICE VISIT (OUTPATIENT)
Dept: OBGYN CLINIC | Facility: CLINIC | Age: 57
End: 2022-02-16

## 2022-02-16 VITALS
HEIGHT: 64 IN | TEMPERATURE: 97.6 F | BODY MASS INDEX: 40.46 KG/M2 | DIASTOLIC BLOOD PRESSURE: 86 MMHG | HEART RATE: 82 BPM | WEIGHT: 237 LBS | SYSTOLIC BLOOD PRESSURE: 150 MMHG

## 2022-02-16 DIAGNOSIS — M17.11 OSTEOARTHRITIS OF RIGHT KNEE, UNSPECIFIED OSTEOARTHRITIS TYPE: ICD-10-CM

## 2022-02-16 DIAGNOSIS — Z98.890 S/P MEDIAL MENISCECTOMY OF RIGHT KNEE: Primary | ICD-10-CM

## 2022-02-16 PROCEDURE — 99024 POSTOP FOLLOW-UP VISIT: CPT | Performed by: PHYSICIAN ASSISTANT

## 2022-02-16 PROCEDURE — 3008F BODY MASS INDEX DOCD: CPT | Performed by: FAMILY MEDICINE

## 2022-02-16 NOTE — PROGRESS NOTES
Assessment/Plan:  1  S/P medial meniscectomy of right knee     2  Osteoarthritis of right knee, unspecified osteoarthritis type         The patient continues to make progress with her knee, but is certainly not asymptomatic yet  We again discussed that her  Medial compartment arthritis is likely contributing to this  She will transition to home exercises at this point  We discussed possible steroid vs joint lubricant injections in the future if needed  She will follow-up as needed  Subjective:   Isaura Becerra is a 55 y o  female who presents today for follow-up of her right knee, now about 6 weeks status post arthroscopic meniscectomy  She also had significant medial compartment arthritis of the right knee  She notes she continues to make progress with physical therapy  She notes a giving way episode about a week and a half ago  She still notes some medial knee pain, but notes improving ROM and strength  Review of Systems      Past Medical History:   Diagnosis Date    Allergic rhinitis     Asthma     Fibroids     Physiological ovarian cysts     Sleep apnea     denatl device worn       Past Surgical History:   Procedure Laterality Date    COLONOSCOPY  2016    dr Leslee Disla ARTHROSCOPY Left 2018    meniscectomy x2, first done in 2015    MYOMECTOMY      [place] COLONOSCOPY FLX DX W/COLLJ SPEC WHEN PFRMD N/A 9/13/2018    Procedure: COLONOSCOPY;  Surgeon: Vipin Mitchell MD;  Location: [facility];   Service: Gastroenterology    [place] KNEE SCOPE,MED/LAT MENISECTOMY Right 1/6/2022    Procedure: KNEE ARTHROSCOPY MENISCECTOMY MEDIAL;  Surgeon: Yo Haq MD;  [facility];  Service: Orthopedics    WRIST SURGERY Right        Family History   Problem Relation Age of Onset    Osteoporosis Mother     Thyroid disease Mother     Colon cancer Mother 79    Colon polyps Mother     Cancer Mother         colon    Alzheimer's disease Father     Hypertension Father     Dementia Father  Heart disease Brother     No Known Problems Daughter     No Known Problems Daughter     No Known Problems Maternal Aunt     No Known Problems Maternal Aunt     No Known Problems Paternal Aunt     No Known Problems Paternal Aunt     No Known Problems Paternal Aunt     No Known Problems Paternal Aunt     No Known Problems Paternal Aunt     No Known Problems Paternal Aunt     No Known Problems Paternal Aunt     No Known Problems Paternal Aunt        Social History     Occupational History    Not on file   Tobacco Use    Smoking status: Never Smoker    Smokeless tobacco: Never Used   Vaping Use    Vaping Use: Never used   Substance and Sexual Activity    Alcohol use: Yes     Comment: social - wine 2 x month    Drug use: Never    Sexual activity: Yes     Partners: Male     Birth control/protection: Condom Male         Current Outpatient Medications:     acetaminophen (TYLENOL) 325 mg tablet, Take 650 mg by mouth every 6 (six) hours as needed for mild pain, Disp: , Rfl:     albuterol (2 5 mg/3 mL) 0 083 % nebulizer solution, Take 1 vial (2 5 mg total) by nebulization every 6 (six) hours as needed for wheezing or shortness of breath, Disp: 100 mL, Rfl: 0    Calcium Carb-Cholecalciferol 600-500 MG-UNIT CAPS, Take by mouth, Disp: , Rfl:     cholecalciferol (VITAMIN D3) 1,000 units tablet, Take 1,000 Units by mouth daily, Disp: , Rfl:     fluticasone-salmeterol (ADVAIR DISKUS) 500-50 mcg/dose inhaler, Inhale 1 puff 2 (two) times a day, Disp: 1 Inhaler, Rfl: 2    multivitamin (THERAGRAN) TABS, Take 1 tablet by mouth daily, Disp: , Rfl:     Omega-3 Fatty Acids (fish oil) 1,000 mg, Take 1,000 mg by mouth daily Last dose 12/28/21 , Disp: , Rfl:     Respiratory Therapy Supplies (NEBULIZER/TUBING/MOUTHPIECE) KIT, by Does not apply route 4 (four) times a day, Disp: 1 each, Rfl: 0    valACYclovir (VALTREX) 1,000 mg tablet, 2 tabs at earliest onset of cold sore, repeat once in 12 hours   Take 500mg bid for 3 days for genital herpes flare up , Disp: 40 tablet, Rfl: 5    No Known Allergies    Objective:  Vitals:    02/16/22 0752   BP: 150/86   Pulse: 82   Temp: 97 6 °F (36 4 °C)       Right Knee Exam     Tenderness   The patient is experiencing tenderness in the medial joint line  Range of Motion   Extension: 0   Flexion: 110     Tests   Varus: negative Valgus: negative  Drawer:  Anterior - negative    Posterior - negative    Other   Erythema: absent  Sensation: normal  Pulse: present  Swelling: none  Effusion: no effusion present          Observations     Right Knee   Negative for effusion  Physical Exam  Musculoskeletal:      Right knee: No effusion

## 2022-02-17 ENCOUNTER — OFFICE VISIT (OUTPATIENT)
Dept: PHYSICAL THERAPY | Facility: CLINIC | Age: 57
End: 2022-02-17
Payer: COMMERCIAL

## 2022-02-17 DIAGNOSIS — S83.241A OTHER TEAR OF MEDIAL MENISCUS OF RIGHT KNEE, UNSPECIFIED WHETHER OLD OR CURRENT TEAR, INITIAL ENCOUNTER: Primary | ICD-10-CM

## 2022-02-17 PROCEDURE — 97110 THERAPEUTIC EXERCISES: CPT

## 2022-02-17 NOTE — PROGRESS NOTES
Daily Note     Today's date: 2022  Patient name: Kindra Peraza  : 1965  MRN: [id number]  Referring provider: Lauren Vega*  Dx:   Encounter Diagnosis     ICD-10-CM    1  Other tear of medial meniscus of right knee, unspecified whether old or current tear, initial encounter  S87 551A                   Subjective: Patient reports MD states she can transition to HEP, pt is agreeable to this  Objective: See treatment diary below       Assessment: Tolerated treatment well  Patient exhibited good technique with therapeutic exercises   Pt given HEP and demo good understanding      Plan: Discharge     Precautions: Asthma    Specialty Daily Treatment Diary     Manuals 1/31/22 2/3 2/7 2/14 2/17   Visit # 11 12 13 14 15   PF mobs 2' 2'  2' --   Stretch HS Quad 4' 4'  4' --   Knee PROM 4' 4'  4' --           Warm-up        NuStep 10' 10' lvl 4 10' lvl 4 Rec bike 10' lvl 3 Rec bike 10' Lvl 3   Neuro Re-Ed        Qset 20 20      SLS 20 20  20    Step ups 20  4" 20 4" 20x4" 20x6" 20x 8"   Hip abd   10  BTB 10 BTB              Ther Ex        Mini squat 20 30 30 30 30   Side step 5x 20 ft 5x20ft 5x20 ft RTB 5x medium loop 5x medium loop   Knee flex 20 20 20 20 20   Knee ext 20 20  20 20   SLR 20 20  20 20   Clamshell 20 red loop 20 red loop  20 red loop grn loop 20   Heel slides 20 20 20 SOS 20     LP 30   65# 30 65# 30 65# 30 65# 30 75#   Bridges 20 20  20 rev   Heel raises    30 30 30   Ther Activity                        Gait Training                        Modalities        CP 5'  5'

## 2022-02-21 ENCOUNTER — APPOINTMENT (OUTPATIENT)
Dept: PHYSICAL THERAPY | Facility: CLINIC | Age: 57
End: 2022-02-21
Payer: COMMERCIAL

## 2022-02-24 ENCOUNTER — APPOINTMENT (OUTPATIENT)
Dept: PHYSICAL THERAPY | Facility: CLINIC | Age: 57
End: 2022-02-24
Payer: COMMERCIAL

## 2022-05-09 ENCOUNTER — OFFICE VISIT (OUTPATIENT)
Dept: URGENT CARE | Facility: CLINIC | Age: 57
End: 2022-05-09
Payer: COMMERCIAL

## 2022-05-09 VITALS — TEMPERATURE: 99.9 F | OXYGEN SATURATION: 100 % | HEART RATE: 97 BPM | RESPIRATION RATE: 14 BRPM

## 2022-05-09 DIAGNOSIS — H66.91 RIGHT OTITIS MEDIA, UNSPECIFIED OTITIS MEDIA TYPE: ICD-10-CM

## 2022-05-09 DIAGNOSIS — J02.9 SORE THROAT: Primary | ICD-10-CM

## 2022-05-09 DIAGNOSIS — J03.90 ACUTE TONSILLITIS, UNSPECIFIED ETIOLOGY: ICD-10-CM

## 2022-05-09 PROCEDURE — 87636 SARSCOV2 & INF A&B AMP PRB: CPT | Performed by: PHYSICIAN ASSISTANT

## 2022-05-09 PROCEDURE — 99214 OFFICE O/P EST MOD 30 MIN: CPT | Performed by: PHYSICIAN ASSISTANT

## 2022-05-09 RX ORDER — AMOXICILLIN 500 MG/1
500 TABLET, FILM COATED ORAL 2 TIMES DAILY
Qty: 20 TABLET | Refills: 0 | Status: SHIPPED | OUTPATIENT
Start: 2022-05-09 | End: 2022-05-19

## 2022-05-09 RX ORDER — PREDNISONE 10 MG/1
40 TABLET ORAL DAILY
Qty: 16 TABLET | Refills: 0 | Status: SHIPPED | OUTPATIENT
Start: 2022-05-09 | End: 2022-05-13

## 2022-05-09 NOTE — PROGRESS NOTES
[address] [employer] [facility]        NAME: Hector Bolden is a 65 y o  female  : 1965    MRN: [id number]  DATE: May 9, 2022  TIME: 6:29 PM    Assessment and Plan   Sore throat [J02 9]  1  Sore throat  Covid/Flu-Office Collect    al mag oxide-diphenhydramine-lidocaine viscous (MAGIC MOUTHWASH) 1:1:1 suspension   2  Acute tonsillitis, unspecified etiology  amoxicillin (AMOXIL) 500 MG tablet    predniSONE 10 mg tablet   3  Right otitis media, unspecified otitis media type  amoxicillin (AMOXIL) 500 MG tablet         Patient Instructions   There are no Patient Instructions on file for this visit  Follow up with PCP in 3-5 days  Proceed to  ER if symptoms worsen  Chief Complaint     Chief Complaint   Patient presents with    Cold Like Symptoms     pt presents with sore throat, cough, chills/sweats; itchyear s bilateral;          History of Present Illness       The pt is a 52-year-old female presenting for a sore throat, cough, chills/sweats and itching ears  No fevers  She reports feeling pinks and needles when she swallow  Review of Systems   Review of Systems   Constitutional: Negative for activity change, appetite change, chills, fatigue and fever  HENT: Positive for sore throat  Negative for congestion, rhinorrhea, sinus pressure and sinus pain  Respiratory: Negative for cough, chest tightness and shortness of breath  Cardiovascular: Negative for chest pain and palpitations  Gastrointestinal: Negative for diarrhea, nausea and vomiting  Musculoskeletal: Negative for arthralgias and myalgias  Skin: Negative for color change and pallor  Neurological: Negative for headaches           Current Medications       Current Outpatient Medications:     acetaminophen (TYLENOL) 325 mg tablet, Take 650 mg by mouth every 6 (six) hours as needed for mild pain, Disp: , Rfl:     albuterol (2 5 mg/3 mL) 0 083 % nebulizer solution, Take 1 vial (2 5 mg total) by nebulization every 6 (six) hours as needed for wheezing or shortness of breath, Disp: 100 mL, Rfl: 0    Calcium Carb-Cholecalciferol 600-500 MG-UNIT CAPS, Take by mouth, Disp: , Rfl:     cholecalciferol (VITAMIN D3) 1,000 units tablet, Take 1,000 Units by mouth daily, Disp: , Rfl:     fluticasone-salmeterol (ADVAIR DISKUS) 500-50 mcg/dose inhaler, Inhale 1 puff 2 (two) times a day, Disp: 1 Inhaler, Rfl: 2    multivitamin (THERAGRAN) TABS, Take 1 tablet by mouth daily, Disp: , Rfl:     Omega-3 Fatty Acids (fish oil) 1,000 mg, Take 1,000 mg by mouth daily Last dose 12/28/21 , Disp: , Rfl:     Respiratory Therapy Supplies (NEBULIZER/TUBING/MOUTHPIECE) KIT, by Does not apply route 4 (four) times a day, Disp: 1 each, Rfl: 0    valACYclovir (VALTREX) 1,000 mg tablet, 2 tabs at earliest onset of cold sore, repeat once in 12 hours   Take 500mg bid for 3 days for genital herpes flare up , Disp: 40 tablet, Rfl: 5    al mag oxide-diphenhydramine-lidocaine viscous (MAGIC MOUTHWASH) 1:1:1 suspension, Swish and spit 10 mL every 4 (four) hours as needed for mouth pain or discomfort, Disp: 300 mL, Rfl: 0    amoxicillin (AMOXIL) 500 MG tablet, Take 1 tablet (500 mg total) by mouth 2 (two) times a day for 10 days, Disp: 20 tablet, Rfl: 0    predniSONE 10 mg tablet, Take 4 tablets (40 mg total) by mouth daily for 4 days, Disp: 16 tablet, Rfl: 0    Current Allergies     Allergies as of 05/09/2022    (No Known Allergies)            The following portions of the patient's history were reviewed and updated as appropriate: allergies, current medications, past family history, past medical history, past social history, past surgical history and problem list      Past Medical History:   Diagnosis Date    Allergic rhinitis     Asthma     Fibroids     Physiological ovarian cysts     Sleep apnea     denatl device worn       Past Surgical History:   Procedure Laterality Date    COLONOSCOPY  2016    dr Carlos Ibarra KNEE ARTHROSCOPY Left 2018    meniscectomy x2, first done in 2015   [facility] [place] COLONOSCOPY FLX DX W/COLLJ SPEC WHEN PFRMD N/A 9/13/2018    Procedure: COLONOSCOPY;  Surgeon: Esther Salinas MD;  Location: [facility]; Service: Gastroenterology    [place] KNEE SCOPE,MED/LAT MENISECTOMY Right 1/6/2022    Procedure: KNEE ARTHROSCOPY MENISCECTOMY MEDIAL;  Surgeon: Angel Matthew MD;  Location: [facility];  Service: Orthopedics    WRIST SURGERY Right        Family History   Problem Relation Age of Onset    Osteoporosis Mother     Thyroid disease Mother     Colon cancer Mother 79    Colon polyps Mother    Aleks Flower Cancer Mother         colon    Alzheimer's disease Father     Hypertension Father     Dementia Father     Heart disease Brother     No Known Problems Daughter     No Known Problems Daughter     No Known Problems Maternal Aunt     No Known Problems Maternal Aunt     No Known Problems Paternal Aunt     No Known Problems Paternal Aunt     No Known Problems Paternal Aunt     No Known Problems Paternal Aunt     No Known Problems Paternal Aunt     No Known Problems Paternal Aunt     No Known Problems Paternal Aunt     No Known Problems Paternal Aunt          Medications have been verified  Objective   Pulse 97   Temp 99 9 °F (37 7 °C)   Resp 14   SpO2 100%        Physical Exam     Physical Exam  Vitals and nursing note reviewed  Constitutional:       General: She is not in acute distress  Appearance: Normal appearance  She is well-developed and normal weight  She is not ill-appearing, toxic-appearing or diaphoretic  HENT:      Head: Normocephalic and atraumatic  Right Ear: Ear canal and external ear normal  No drainage, swelling or tenderness  No middle ear effusion  Tympanic membrane is not erythematous  Left Ear: Tympanic membrane, ear canal and external ear normal  No drainage, swelling or tenderness  No middle ear effusion  Tympanic membrane is not erythematous        Ears:      Comments: Right TM injected Nose: Nose normal  No congestion or rhinorrhea  Mouth/Throat:      Mouth: Mucous membranes are moist  No oral lesions  Pharynx: Oropharynx is clear  Uvula midline  Posterior oropharyngeal erythema present  No pharyngeal swelling, oropharyngeal exudate or uvula swelling  Tonsils: No tonsillar exudate or tonsillar abscesses  0 on the right  0 on the left  Comments: Erythema of throat and 2+ swelling   Eyes:      Extraocular Movements: Extraocular movements intact  Right eye: Normal extraocular motion  Left eye: Normal extraocular motion  Conjunctiva/sclera: Conjunctivae normal       Pupils: Pupils are equal, round, and reactive to light  Cardiovascular:      Rate and Rhythm: Normal rate and regular rhythm  Heart sounds: Normal heart sounds  No murmur heard  No friction rub  No gallop  Pulmonary:      Effort: Pulmonary effort is normal  No respiratory distress  Breath sounds: Normal breath sounds  No stridor  No wheezing, rhonchi or rales  Chest:      Chest wall: No tenderness  Skin:     General: Skin is warm and dry  Capillary Refill: Capillary refill takes less than 2 seconds  Neurological:      Mental Status: She is alert

## 2022-05-10 LAB
FLUAV RNA RESP QL NAA+PROBE: NEGATIVE
FLUBV RNA RESP QL NAA+PROBE: NEGATIVE
SARS-COV-2 RNA RESP QL NAA+PROBE: NEGATIVE

## 2022-07-08 ENCOUNTER — OFFICE VISIT (OUTPATIENT)
Dept: OBGYN CLINIC | Facility: CLINIC | Age: 57
End: 2022-07-08
Payer: COMMERCIAL

## 2022-07-08 VITALS
WEIGHT: 228 LBS | TEMPERATURE: 98.2 F | HEART RATE: 88 BPM | HEIGHT: 64 IN | DIASTOLIC BLOOD PRESSURE: 90 MMHG | RESPIRATION RATE: 19 BRPM | BODY MASS INDEX: 38.93 KG/M2 | SYSTOLIC BLOOD PRESSURE: 140 MMHG

## 2022-07-08 DIAGNOSIS — M17.11 OSTEOARTHRITIS OF RIGHT KNEE, UNSPECIFIED OSTEOARTHRITIS TYPE: ICD-10-CM

## 2022-07-08 DIAGNOSIS — Z98.890 S/P MEDIAL MENISCECTOMY OF RIGHT KNEE: Primary | ICD-10-CM

## 2022-07-08 PROCEDURE — 99214 OFFICE O/P EST MOD 30 MIN: CPT | Performed by: PHYSICIAN ASSISTANT

## 2022-07-08 NOTE — PROGRESS NOTES
Assessment/Plan:  1  S/P medial meniscectomy of right knee     2  Osteoarthritis of right knee, unspecified osteoarthritis type  Injection Procedure Prior Authorization     The patient does have ongoing pain about the knee which is likely arthritic in nature  As she had more pain relief with prior joint lubricant injections than  steroid injections, we are going to try repeat joint lubricant injections  Now that her meniscus tear has been dressed, is my hope these injections will help her more  I will submit this for authorization  We will start his series once authorized by insurance  In the meantime, she can ice and take over-the-counter medications as needed for discomfort  If she fails to improve with injections, we may consider consultation for total knee replacement  Subjective:   Nadege Meraz is a 65 y o  female who presents today for follow-up of her right knee  She is now 6 months status post medial meniscectomy, but also has moderate arthritis about the right knee  She did have steroid and joint lubricant injections prior to her meniscectomy, with minimal relief  She notes she had been doing relatively well until about 2 months ago when her pain got progressively worse  She denies any inciting event prior to this progression  Her pain is mostly about the anterior and medial knee, and is worse with activity and better with rest   She still notes fair range of motion of the knee  Review of Systems   Constitutional: Negative  Negative for chills and fever  HENT: Negative  Negative for ear pain and sore throat  Eyes: Negative  Negative for pain and redness  Respiratory: Negative  Negative for shortness of breath and wheezing  Cardiovascular: Negative for chest pain and palpitations  Gastrointestinal: Negative  Negative for abdominal pain and blood in stool  Endocrine: Negative  Negative for polydipsia and polyuria  Genitourinary: Negative    Negative for difficulty urinating and dysuria  Musculoskeletal:        As noted in HPI   Skin: Negative  Negative for pallor and rash  Neurological: Negative  Negative for dizziness and numbness  Hematological: Negative  Negative for adenopathy  Does not bruise/bleed easily  Psychiatric/Behavioral: Negative  Negative for confusion and suicidal ideas  Past Medical History:   Diagnosis Date    Allergic rhinitis     Asthma     Fibroids     Physiological ovarian cysts     Sleep apnea     denatl device worn       Past Surgical History:   Procedure Laterality Date    COLONOSCOPY  2016    dr Rani Castillo ARTHROSCOPY Left 2018    meniscectomy x2, first done in 2015    MYOMECTOMY      [place] COLONOSCOPY FLX DX W/COLLJ SPEC WHEN PFRMD N/A 9/13/2018    Procedure: COLONOSCOPY;  Surgeon: Yissel Cedeño MD;  Location: [place] [address] [facility];   Service: Gastroenterology    MD KNEE SCOPE,MED/LAT MENISECTOMY Right 1/6/2022    Procedure: KNEE ARTHROSCOPY MENISCECTOMY MEDIAL;  Surgeon: Pallavi Brennan MD;  [facility];  Service: Orthopedics    WRIST SURGERY Right        Family History   Problem Relation Age of Onset    Osteoporosis Mother     Thyroid disease Mother     Colon cancer Mother 79    Colon polyps Mother    Laurann [place] Cancer Mother         colon    Alzheimer's disease Father     Hypertension Father     Dementia Father     Heart disease Brother     No Known Problems Daughter     No Known Problems Daughter     No Known Problems Maternal Aunt     No Known Problems Maternal Aunt     No Known Problems Paternal Aunt     No Known Problems Paternal Aunt     No Known Problems Paternal Aunt     No Known Problems Paternal Aunt     No Known Problems Paternal Aunt     No Known Problems Paternal Aunt     No Known Problems Paternal Aunt     No Known Problems Paternal Aunt        Social History     Occupational History    Not on file   Tobacco Use    Smoking status: Never Smoker    Smokeless tobacco: Never Used Vaping Use    Vaping Use: Never used   Substance and Sexual Activity    Alcohol use: Yes     Comment: social - wine 2 x month    Drug use: Never    Sexual activity: Yes     Partners: Male     Birth control/protection: Condom Male         Current Outpatient Medications:     acetaminophen (TYLENOL) 325 mg tablet, Take 650 mg by mouth every 6 (six) hours as needed for mild pain, Disp: , Rfl:     al mag oxide-diphenhydramine-lidocaine viscous (MAGIC MOUTHWASH) 1:1:1 suspension, Swish and spit 10 mL every 4 (four) hours as needed for mouth pain or discomfort, Disp: 300 mL, Rfl: 0    albuterol (2 5 mg/3 mL) 0 083 % nebulizer solution, Take 1 vial (2 5 mg total) by nebulization every 6 (six) hours as needed for wheezing or shortness of breath, Disp: 100 mL, Rfl: 0    Calcium Carb-Cholecalciferol 600-500 MG-UNIT CAPS, Take by mouth, Disp: , Rfl:     cholecalciferol (VITAMIN D3) 1,000 units tablet, Take 1,000 Units by mouth daily, Disp: , Rfl:     fluticasone-salmeterol (ADVAIR DISKUS) 500-50 mcg/dose inhaler, Inhale 1 puff 2 (two) times a day, Disp: 1 Inhaler, Rfl: 2    multivitamin (THERAGRAN) TABS, Take 1 tablet by mouth daily, Disp: , Rfl:     Omega-3 Fatty Acids (fish oil) 1,000 mg, Take 1,000 mg by mouth daily Last dose 12/28/21 , Disp: , Rfl:     Respiratory Therapy Supplies (NEBULIZER/TUBING/MOUTHPIECE) KIT, by Does not apply route 4 (four) times a day, Disp: 1 each, Rfl: 0    valACYclovir (VALTREX) 1,000 mg tablet, 2 tabs at earliest onset of cold sore, repeat once in 12 hours  Take 500mg bid for 3 days for genital herpes flare up , Disp: 40 tablet, Rfl: 5    No Known Allergies    Objective:  Vitals:    07/08/22 1446   BP: 140/90   Pulse: 88   Resp: 19   Temp: 98 2 °F (36 8 °C)       Right Knee Exam     Tenderness   The patient is experiencing tenderness in the medial joint line      Range of Motion   Extension: 0   Flexion: 100     Tests   Varus: negative Valgus: negative  Drawer:  Anterior - negative    Posterior - negative    Other   Erythema: absent  Sensation: normal  Pulse: present  Swelling: none  Effusion: no effusion present          Observations     Right Knee   Negative for effusion  Physical Exam  Constitutional:       General: She is not in acute distress  Appearance: She is well-developed  HENT:      Head: Normocephalic and atraumatic  Eyes:      General: No scleral icterus  Conjunctiva/sclera: Conjunctivae normal    Neck:      Vascular: No JVD  Cardiovascular:      Rate and Rhythm: Normal rate  Pulmonary:      Effort: Pulmonary effort is normal  No respiratory distress  Musculoskeletal:      Right knee: No effusion  Skin:     General: Skin is warm  Neurological:      Mental Status: She is alert and oriented to person, place, and time        Coordination: Coordination normal

## 2022-09-21 ENCOUNTER — PROCEDURE VISIT (OUTPATIENT)
Dept: OBGYN CLINIC | Facility: CLINIC | Age: 57
End: 2022-09-21
Payer: COMMERCIAL

## 2022-09-21 VITALS — TEMPERATURE: 98.7 F

## 2022-09-21 DIAGNOSIS — M17.11 OSTEOARTHRITIS OF RIGHT KNEE, UNSPECIFIED OSTEOARTHRITIS TYPE: Primary | ICD-10-CM

## 2022-09-21 PROCEDURE — 20610 DRAIN/INJ JOINT/BURSA W/O US: CPT | Performed by: PHYSICIAN ASSISTANT

## 2022-09-21 RX ORDER — HYALURONATE SODIUM 10 MG/ML
20 SYRINGE (ML) INTRAARTICULAR
Status: COMPLETED | OUTPATIENT
Start: 2022-09-21 | End: 2022-09-21

## 2022-09-21 RX ADMIN — Medication 20 MG: at 13:08

## 2022-09-21 NOTE — PROGRESS NOTES
Assessment/Plan:  1  Osteoarthritis of right knee, unspecified osteoarthritis type       Follow-up 1 week  Subjective:   Chema Palumbo is a 65 y o  female who presents today for euflexxa #1 right knee  Review of Systems      Past Medical History:   Diagnosis Date    Allergic rhinitis     Asthma     Fibroids     Physiological ovarian cysts     Sleep apnea     denatl device worn       Past Surgical History:   Procedure Laterality Date    COLONOSCOPY  2016    dr Eloise Mahan ARTHROSCOPY Left 2018    meniscectomy x2, first done in 2015    MYOMECTOMY      [place] COLONOSCOPY FLX DX W/COLLJ SPEC WHEN PFRMD N/A 9/13/2018    Procedure: COLONOSCOPY;  Surgeon: Jennifer Soni MD;  Location: [facility];   Service: Gastroenterology    [place] KNEE SCOPE,MED/LAT MENISECTOMY Right 1/6/2022    Procedure: KNEE ARTHROSCOPY MENISCECTOMY MEDIAL;  Surgeon: Mona Greenfield MD;  Location: [facility] OR;  Service: Orthopedics    WRIST SURGERY Right        Family History   Problem Relation Age of Onset    Osteoporosis Mother     Thyroid disease Mother     Colon cancer Mother 79    Colon polyps Mother    Ardeth Needs Cancer Mother         colon    Alzheimer's disease Father     Hypertension Father     Dementia Father     Heart disease Brother     No Known Problems Daughter     No Known Problems Daughter     No Known Problems Maternal Aunt     No Known Problems Maternal Aunt     No Known Problems Paternal Aunt     No Known Problems Paternal Aunt     No Known Problems Paternal Aunt     No Known Problems Paternal Aunt     No Known Problems Paternal Aunt     No Known Problems Paternal Aunt     No Known Problems Paternal Aunt     No Known Problems Paternal Aunt        Social History     Occupational History    Not on file   Tobacco Use    Smoking status: Never Smoker    Smokeless tobacco: Never Used   Vaping Use    Vaping Use: Never used   Substance and Sexual Activity    Alcohol use: Yes     Comment: social - wine 2 x month    Drug use: Never    Sexual activity: Yes     Partners: Male     Birth control/protection: Condom Male         Current Outpatient Medications:     acetaminophen (TYLENOL) 325 mg tablet, Take 650 mg by mouth every 6 (six) hours as needed for mild pain, Disp: , Rfl:     al mag oxide-diphenhydramine-lidocaine viscous (MAGIC MOUTHWASH) 1:1:1 suspension, Swish and spit 10 mL every 4 (four) hours as needed for mouth pain or discomfort, Disp: 300 mL, Rfl: 0    albuterol (2 5 mg/3 mL) 0 083 % nebulizer solution, Take 1 vial (2 5 mg total) by nebulization every 6 (six) hours as needed for wheezing or shortness of breath, Disp: 100 mL, Rfl: 0    Calcium Carb-Cholecalciferol 600-500 MG-UNIT CAPS, Take by mouth, Disp: , Rfl:     cholecalciferol (VITAMIN D3) 1,000 units tablet, Take 1,000 Units by mouth daily, Disp: , Rfl:     fluticasone-salmeterol (ADVAIR DISKUS) 500-50 mcg/dose inhaler, Inhale 1 puff 2 (two) times a day, Disp: 1 Inhaler, Rfl: 2    multivitamin (THERAGRAN) TABS, Take 1 tablet by mouth daily, Disp: , Rfl:     Omega-3 Fatty Acids (fish oil) 1,000 mg, Take 1,000 mg by mouth daily Last dose 12/28/21 , Disp: , Rfl:     Respiratory Therapy Supplies (NEBULIZER/TUBING/MOUTHPIECE) KIT, by Does not apply route 4 (four) times a day, Disp: 1 each, Rfl: 0    valACYclovir (VALTREX) 1,000 mg tablet, 2 tabs at earliest onset of cold sore, repeat once in 12 hours   Take 500mg bid for 3 days for genital herpes flare up , Disp: 40 tablet, Rfl: 5    No Known Allergies    Objective:  Vitals:    09/21/22 1300   Temp: 98 7 °F (37 1 °C)       Ortho Exam    Physical Exam    Large joint arthrocentesis: R knee  Universal Protocol:  Risks and benefits: risks, benefits and alternatives were discussed  Consent given by: patient  Timeout called at: 9/21/2022 1:07 PM   Site marked: the operative site was marked  Supporting Documentation  Indications: pain   Procedure Details  Location: knee - R knee  Preparation: Patient was prepped and draped in the usual sterile fashion (Alcohol prep)  Needle size: 22 G  Ultrasound guidance: no  Approach: anterolateral  Medications administered: 20 mg Sodium Hyaluronate 20 MG/2ML  Specialty Pharmacy Supplied: received medications from pharmacy  Patient tolerance: patient tolerated the procedure well with no immediate complications  Dressing:  Sterile dressing applied              This document was created using speech voice recognition software  Grammatical errors, random word insertions, pronoun errors, and incomplete sentences are an occasional consequence of this system due to software limitations, ambient noise, and hardware issues  Any formal questions or concerns about content, text, or information contained within the body of this dictation should be directly addressed to the provider for clarification

## 2022-09-28 ENCOUNTER — PROCEDURE VISIT (OUTPATIENT)
Dept: OBGYN CLINIC | Facility: CLINIC | Age: 57
End: 2022-09-28
Payer: COMMERCIAL

## 2022-09-28 DIAGNOSIS — M17.11 OSTEOARTHRITIS OF RIGHT KNEE, UNSPECIFIED OSTEOARTHRITIS TYPE: Primary | ICD-10-CM

## 2022-09-28 PROCEDURE — 20610 DRAIN/INJ JOINT/BURSA W/O US: CPT | Performed by: PHYSICIAN ASSISTANT

## 2022-09-28 RX ORDER — HYALURONATE SODIUM 10 MG/ML
20 SYRINGE (ML) INTRAARTICULAR
Status: COMPLETED | OUTPATIENT
Start: 2022-09-28 | End: 2022-09-28

## 2022-09-28 RX ADMIN — Medication 20 MG: at 15:20

## 2022-09-28 NOTE — PROGRESS NOTES
Assessment/Plan:  1  Osteoarthritis of right knee, unspecified osteoarthritis type         Follow-up 1 week  Subjective:   Sean Han is a 65 y o  female who presents today for euflexxa #2 right knee  Review of Systems      Past Medical History:   Diagnosis Date    Allergic rhinitis     Asthma     Fibroids     Physiological ovarian cysts     Sleep apnea     denatl device worn       Past Surgical History:   Procedure Laterality Date    COLONOSCOPY  2016    dr Fortunato Mendes ARTHROSCOPY Left 2018    meniscectomy x2, first done in 2015    MYOMECTOMY      DE COLONOSCOPY FLX DX W/COLLJ SPEC WHEN PFRMD N/A 9/13/2018    Procedure: COLONOSCOPY;  Surgeon: Supriya Iraheta MD;  Location: [place] [address] [facility];   Service: Gastroenterology    DE KNEE SCOPE,MED/LAT MENISECTOMY Right 1/6/2022    Procedure: KNEE ARTHROSCOPY MENISCECTOMY MEDIAL;  Surgeon: Liseth Metcalf MD;  [facility];  Service: Orthopedics    WRIST SURGERY Right        Family History   Problem Relation Age of Onset    Osteoporosis Mother     Thyroid disease Mother     Colon cancer Mother 79    Colon polyps Mother    [employer] Cancer Mother         colon    Alzheimer's disease Father     Hypertension Father     Dementia Father     Heart disease Brother     No Known Problems Daughter     No Known Problems Daughter     No Known Problems Maternal Aunt     No Known Problems Maternal Aunt     No Known Problems Paternal Aunt     No Known Problems Paternal Aunt     No Known Problems Paternal Aunt     No Known Problems Paternal Aunt     No Known Problems Paternal Aunt     No Known Problems Paternal Aunt     No Known Problems Paternal Aunt     No Known Problems Paternal Aunt        Social History     Occupational History    Not on file   Tobacco Use    Smoking status: Never Smoker    Smokeless tobacco: Never Used   Vaping Use    Vaping Use: Never used   Substance and Sexual Activity    Alcohol use: Yes     Comment: social - wine 2 x month    Drug use: Never    Sexual activity: Yes     Partners: Male     Birth control/protection: Condom Male         Current Outpatient Medications:     acetaminophen (TYLENOL) 325 mg tablet, Take 650 mg by mouth every 6 (six) hours as needed for mild pain, Disp: , Rfl:     al mag oxide-diphenhydramine-lidocaine viscous (MAGIC MOUTHWASH) 1:1:1 suspension, Swish and spit 10 mL every 4 (four) hours as needed for mouth pain or discomfort, Disp: 300 mL, Rfl: 0    albuterol (2 5 mg/3 mL) 0 083 % nebulizer solution, Take 1 vial (2 5 mg total) by nebulization every 6 (six) hours as needed for wheezing or shortness of breath, Disp: 100 mL, Rfl: 0    Calcium Carb-Cholecalciferol 600-500 MG-UNIT CAPS, Take by mouth, Disp: , Rfl:     cholecalciferol (VITAMIN D3) 1,000 units tablet, Take 1,000 Units by mouth daily, Disp: , Rfl:     fluticasone-salmeterol (ADVAIR DISKUS) 500-50 mcg/dose inhaler, Inhale 1 puff 2 (two) times a day, Disp: 1 Inhaler, Rfl: 2    multivitamin (THERAGRAN) TABS, Take 1 tablet by mouth daily, Disp: , Rfl:     Omega-3 Fatty Acids (fish oil) 1,000 mg, Take 1,000 mg by mouth daily Last dose 12/28/21 , Disp: , Rfl:     Respiratory Therapy Supplies (NEBULIZER/TUBING/MOUTHPIECE) KIT, by Does not apply route 4 (four) times a day, Disp: 1 each, Rfl: 0    valACYclovir (VALTREX) 1,000 mg tablet, 2 tabs at earliest onset of cold sore, repeat once in 12 hours  Take 500mg bid for 3 days for genital herpes flare up , Disp: 40 tablet, Rfl: 5    No Known Allergies    Objective: There were no vitals filed for this visit      Ortho Exam    Physical Exam    Large joint arthrocentesis: R knee  Universal Protocol:  Risks and benefits: risks, benefits and alternatives were discussed  Consent given by: patient  Timeout called at: 9/28/2022 2:57 PM   Site marked: the operative site was marked  Supporting Documentation  Indications: pain   Procedure Details  Location: knee - R knee  Preparation: Patient was prepped and draped in the usual sterile fashion (Alcohol prep)  Needle size: 22 G  Ultrasound guidance: no  Approach: anterolateral  Medications administered: 20 mg Sodium Hyaluronate 20 MG/2ML  Specialty Pharmacy Supplied: received medications from pharmacy  Patient tolerance: patient tolerated the procedure well with no immediate complications  Dressing:  Sterile dressing applied              This document was created using speech voice recognition software  Grammatical errors, random word insertions, pronoun errors, and incomplete sentences are an occasional consequence of this system due to software limitations, ambient noise, and hardware issues  Any formal questions or concerns about content, text, or information contained within the body of this dictation should be directly addressed to the provider for clarification

## 2022-10-05 ENCOUNTER — PROCEDURE VISIT (OUTPATIENT)
Dept: OBGYN CLINIC | Facility: CLINIC | Age: 57
End: 2022-10-05
Payer: COMMERCIAL

## 2022-10-05 DIAGNOSIS — M17.11 OSTEOARTHRITIS OF RIGHT KNEE, UNSPECIFIED OSTEOARTHRITIS TYPE: Primary | ICD-10-CM

## 2022-10-05 PROCEDURE — 20610 DRAIN/INJ JOINT/BURSA W/O US: CPT | Performed by: PHYSICIAN ASSISTANT

## 2022-10-05 RX ORDER — HYALURONATE SODIUM 10 MG/ML
20 SYRINGE (ML) INTRAARTICULAR
Status: COMPLETED | OUTPATIENT
Start: 2022-10-05 | End: 2022-10-05

## 2022-10-05 RX ADMIN — Medication 20 MG: at 13:47

## 2022-10-05 NOTE — PROGRESS NOTES
Assessment/Plan:  1  Osteoarthritis of right knee, unspecified osteoarthritis type       Follow-up prn  Subjective:   Po Jacobson is a 65 y o  female who presents today for euflexxa #3 right knee  Review of Systems      Past Medical History:   Diagnosis Date    Allergic rhinitis     Asthma     Fibroids     Physiological ovarian cysts     Sleep apnea     denatl device worn       Past Surgical History:   Procedure Laterality Date    COLONOSCOPY  2016    dr Michael Garrido ARTHROSCOPY Left 2018    meniscectomy x2, first done in 2015    MYOMECTOMY      DE COLONOSCOPY FLX DX W/COLLJ SPEC WHEN PFRMD N/A 9/13/2018    Procedure: COLONOSCOPY;  Surgeon: An Astorga MD;  Location: [place] [address] [facility] LAB;   Service: Gastroenterology    DE KNEE SCOPE,MED/LAT MENISECTOMY Right 1/6/2022    Procedure: KNEE ARTHROSCOPY MENISCECTOMY MEDIAL;  Surgeon: Mortimer Fix, MD;  Location: [facility];  Service: Orthopedics    WRIST SURGERY Right        Family History   Problem Relation Age of Onset    Osteoporosis Mother     Thyroid disease Mother     Colon cancer Mother 79    Colon polyps Mother    [de-identified] Cancer Mother         colon    Alzheimer's disease Father     Hypertension Father     Dementia Father     Heart disease Brother     No Known Problems Daughter     No Known Problems Daughter     No Known Problems Maternal Aunt     No Known Problems Maternal Aunt     No Known Problems Paternal Aunt     No Known Problems Paternal Aunt     No Known Problems Paternal Aunt     No Known Problems Paternal Aunt     No Known Problems Paternal Aunt     No Known Problems Paternal Aunt     No Known Problems Paternal Aunt     No Known Problems Paternal Aunt        Social History     Occupational History    Not on file   Tobacco Use    Smoking status: Never Smoker    Smokeless tobacco: Never Used   Vaping Use    Vaping Use: Never used   Substance and Sexual Activity    Alcohol use: Yes     Comment: social - wine 2 x month    Drug use: Never    Sexual activity: Yes     Partners: Male     Birth control/protection: Condom Male         Current Outpatient Medications:     acetaminophen (TYLENOL) 325 mg tablet, Take 650 mg by mouth every 6 (six) hours as needed for mild pain, Disp: , Rfl:     al mag oxide-diphenhydramine-lidocaine viscous (MAGIC MOUTHWASH) 1:1:1 suspension, Swish and spit 10 mL every 4 (four) hours as needed for mouth pain or discomfort, Disp: 300 mL, Rfl: 0    albuterol (2 5 mg/3 mL) 0 083 % nebulizer solution, Take 1 vial (2 5 mg total) by nebulization every 6 (six) hours as needed for wheezing or shortness of breath, Disp: 100 mL, Rfl: 0    Calcium Carb-Cholecalciferol 600-500 MG-UNIT CAPS, Take by mouth, Disp: , Rfl:     cholecalciferol (VITAMIN D3) 1,000 units tablet, Take 1,000 Units by mouth daily, Disp: , Rfl:     fluticasone-salmeterol (ADVAIR DISKUS) 500-50 mcg/dose inhaler, Inhale 1 puff 2 (two) times a day, Disp: 1 Inhaler, Rfl: 2    multivitamin (THERAGRAN) TABS, Take 1 tablet by mouth daily, Disp: , Rfl:     Omega-3 Fatty Acids (fish oil) 1,000 mg, Take 1,000 mg by mouth daily Last dose 12/28/21 , Disp: , Rfl:     Respiratory Therapy Supplies (NEBULIZER/TUBING/MOUTHPIECE) KIT, by Does not apply route 4 (four) times a day, Disp: 1 each, Rfl: 0    valACYclovir (VALTREX) 1,000 mg tablet, 2 tabs at earliest onset of cold sore, repeat once in 12 hours  Take 500mg bid for 3 days for genital herpes flare up , Disp: 40 tablet, Rfl: 5    No Known Allergies    Objective: There were no vitals filed for this visit      Ortho Exam    Physical Exam    Large joint arthrocentesis: R knee  Universal Protocol:  Risks and benefits: risks, benefits and alternatives were discussed  Consent given by: patient  Timeout called at: 10/5/2022 1:46 PM   Site marked: the operative site was marked  Supporting Documentation  Indications: pain   Procedure Details  Location: knee - R knee  Preparation: Patient was prepped and draped in the usual sterile fashion (Alcohol prep)  Needle size: 22 G  Ultrasound guidance: no  Approach: anterolateral  Medications administered: 20 mg Sodium Hyaluronate 20 MG/2ML  Specialty Pharmacy Supplied: received medications from pharmacy  Patient tolerance: patient tolerated the procedure well with no immediate complications  Dressing:  Sterile dressing applied              This document was created using speech voice recognition software  Grammatical errors, random word insertions, pronoun errors, and incomplete sentences are an occasional consequence of this system due to software limitations, ambient noise, and hardware issues  Any formal questions or concerns about content, text, or information contained within the body of this dictation should be directly addressed to the provider for clarification

## 2022-10-12 PROBLEM — Z00.00 HEALTHCARE MAINTENANCE: Status: RESOLVED | Noted: 2021-08-31 | Resolved: 2022-10-12

## 2022-12-09 ENCOUNTER — OFFICE VISIT (OUTPATIENT)
Dept: OBGYN CLINIC | Facility: CLINIC | Age: 57
End: 2022-12-09

## 2022-12-09 ENCOUNTER — APPOINTMENT (OUTPATIENT)
Dept: RADIOLOGY | Facility: CLINIC | Age: 57
End: 2022-12-09

## 2022-12-09 VITALS
SYSTOLIC BLOOD PRESSURE: 154 MMHG | HEIGHT: 64 IN | TEMPERATURE: 98.7 F | WEIGHT: 236 LBS | HEART RATE: 77 BPM | BODY MASS INDEX: 40.29 KG/M2 | DIASTOLIC BLOOD PRESSURE: 103 MMHG

## 2022-12-09 DIAGNOSIS — M17.11 OSTEOARTHRITIS OF RIGHT KNEE, UNSPECIFIED OSTEOARTHRITIS TYPE: Primary | ICD-10-CM

## 2022-12-09 DIAGNOSIS — M17.12 PRIMARY OSTEOARTHRITIS OF LEFT KNEE: ICD-10-CM

## 2022-12-09 DIAGNOSIS — Z01.89 ENCOUNTER FOR LOWER EXTREMITY COMPARISON IMAGING STUDY: ICD-10-CM

## 2022-12-09 DIAGNOSIS — M17.11 OSTEOARTHRITIS OF RIGHT KNEE, UNSPECIFIED OSTEOARTHRITIS TYPE: ICD-10-CM

## 2022-12-09 NOTE — PROGRESS NOTES
Assessment/Plan:  1  Osteoarthritis of right knee, unspecified osteoarthritis type  XR knee 3 vw right non injury    Ambulatory Referral to Orthopedic Surgery      2  Primary osteoarthritis of left knee          Patient does have significant medial compartment arthritis of the right knee  She also has lateral compartment arthritis of the left knee  The right knee is much more symptomatic at this point  She has failed extensive conservative treatment including viscosupplementation injections, steroid injections, and physical therapy  She has already had an arthroscopy of this knee as well  As the patient's quality of life is quite poor this point due to her constant pain, I am referring her to Dr Bandar Koch for surgical consultation  I do feel she would benefit from a total knee arthroplasty  The patient is interested in this procedure  She is a nonsmoker and her BMI is currently 40 5  I did explain that she will need to lose several pounds, as Dr Bandar Koch requires a BMI under 40 to proceed with total knee arthroplasty  The patient expressed understanding of this  She will follow up with Dr Bandar Koch  We will help facilitate making this appointment today  In the meantime, she can ice the knee and take over-the-counter medications as needed for discomfort  Subjective:   Zina Betts is a 65 y o  female who presents for follow-up of her right knee  for follow-up of her right knee  We have been treating the patient conservatively for arthritis of this knee  She did have an arthroscopy of the knee with medial meniscectomy about a year ago  The patient did have significant arthritic changes noted about the medial compartment at that time  The patient has done steroid injections and viscosupplementation injections  with minimal improvement  She has also done physical therapy  She notes her quality of life is quite poor this point as she has constant pain about the knee    She did recently travel to [place] and had difficulty hiking and doing any physical activity  She notes most of her pain to be about the medial knee,  Which is worse with activity and better with rest   She notes good sensation of the right lower extremity  The patient does also complain of some left knee pain, though not as significant as the right  She has had multiple knee arthroscopies of the left knee as well  Review of Systems   Constitutional: Negative  Negative for chills and fever  HENT: Negative  Negative for ear pain and sore throat  Eyes: Negative  Negative for pain and redness  Respiratory: Negative  Negative for shortness of breath and wheezing  Cardiovascular: Negative for chest pain and palpitations  Gastrointestinal: Negative  Negative for abdominal pain and blood in stool  Endocrine: Negative  Negative for polydipsia and polyuria  Genitourinary: Negative  Negative for difficulty urinating and dysuria  Musculoskeletal:        As noted in HPI   Skin: Negative  Negative for pallor and rash  Neurological: Negative  Negative for dizziness and numbness  Hematological: Negative  Negative for adenopathy  Does not bruise/bleed easily  Psychiatric/Behavioral: Negative  Negative for confusion and suicidal ideas  Past Medical History:   Diagnosis Date   • Allergic rhinitis    • Asthma    • Fibroids    • Physiological ovarian cysts    • Sleep apnea     denatl device worn       Past Surgical History:   Procedure Laterality Date   • COLONOSCOPY  2016    dr Jessi Centeno   • KNEE ARTHROSCOPY Left 2018    meniscectomy x2, first done in 2015   • MYOMECTOMY     • [place] COLONOSCOPY FLX DX W/COLLJ SPEC WHEN PFRMD N/A 9/13/2018    Procedure: COLONOSCOPY;  Surgeon: Carlos Alva MD;  Location: [place] [address] GI LAB;   Service: Gastroenterology   • [place] KNEE SCOPE,MED/LAT MENISECTOMY Right 1/6/2022    Procedure: KNEE ARTHROSCOPY MENISCECTOMY MEDIAL;  Surgeon: Mar Kauffman MD;  Location: [address] [place] [address];  Service: Orthopedics   • WRIST SURGERY Right        Family History   Problem Relation Age of Onset   • Osteoporosis Mother    • Thyroid disease Mother    • Colon cancer Mother 79   • Colon polyps Mother    • Cancer Mother         colon   • Alzheimer's disease Father    • Hypertension Father    • Dementia Father    • Heart disease Brother    • No Known Problems Daughter    • No Known Problems Daughter    • No Known Problems Maternal Aunt    • No Known Problems Maternal Aunt    • No Known Problems Paternal Aunt    • No Known Problems Paternal Aunt    • No Known Problems Paternal Aunt    • No Known Problems Paternal Aunt    • No Known Problems Paternal Aunt    • No Known Problems Paternal Aunt    • No Known Problems Paternal Aunt    • No Known Problems Paternal Aunt        Social History     Occupational History   • Not on file   Tobacco Use   • Smoking status: Never   • Smokeless tobacco: Never   Vaping Use   • Vaping Use: Never used   Substance and Sexual Activity   • Alcohol use: Yes     Comment: social - wine 2 x month   • Drug use: Never   • Sexual activity: Yes     Partners: Male     Birth control/protection: Condom Male         Current Outpatient Medications:   •  acetaminophen (TYLENOL) 325 mg tablet, Take 650 mg by mouth every 6 (six) hours as needed for mild pain, Disp: , Rfl:   •  al mag oxide-diphenhydramine-lidocaine viscous (MAGIC MOUTHWASH) 1:1:1 suspension, Swish and spit 10 mL every 4 (four) hours as needed for mouth pain or discomfort, Disp: 300 mL, Rfl: 0  •  albuterol (2 5 mg/3 mL) 0 083 % nebulizer solution, Take 1 vial (2 5 mg total) by nebulization every 6 (six) hours as needed for wheezing or shortness of breath, Disp: 100 mL, Rfl: 0  •  Calcium Carb-Cholecalciferol 600-500 MG-UNIT CAPS, Take by mouth, Disp: , Rfl:   •  cholecalciferol (VITAMIN D3) 1,000 units tablet, Take 1,000 Units by mouth daily, Disp: , Rfl:   •  fluticasone-salmeterol (ADVAIR DISKUS) 500-50 mcg/dose inhaler, Inhale 1 puff 2 (two) times a day, Disp: 1 Inhaler, Rfl: 2  •  multivitamin (THERAGRAN) TABS, Take 1 tablet by mouth daily, Disp: , Rfl:   •  Omega-3 Fatty Acids (fish oil) 1,000 mg, Take 1,000 mg by mouth daily Last dose 12/28/21 , Disp: , Rfl:   •  Respiratory Therapy Supplies (NEBULIZER/TUBING/MOUTHPIECE) KIT, by Does not apply route 4 (four) times a day, Disp: 1 each, Rfl: 0  •  valACYclovir (VALTREX) 1,000 mg tablet, 2 tabs at earliest onset of cold sore, repeat once in 12 hours  Take 500mg bid for 3 days for genital herpes flare up , Disp: 40 tablet, Rfl: 5    No Known Allergies    Objective:  Vitals:    12/09/22 1452   BP: (!) 154/103   Pulse: 77   Temp: 98 7 °F (37 1 °C)       Right Knee Exam     Tenderness   The patient is experiencing tenderness in the medial joint line  Range of Motion   Extension: 0   Flexion: 110     Tests   Varus: negative Valgus: negative  Drawer:  Anterior - negative    Posterior - negative    Other   Erythema: absent  Sensation: normal  Pulse: present  Effusion: effusion (trace) present      Left Knee Exam     Tenderness   The patient is experiencing tenderness in the lateral joint line  Range of Motion   Extension: 0   Flexion: 120     Tests   Varus: negative Valgus: negative  Drawer:  Anterior - negative     Posterior - negative    Other   Erythema: absent  Sensation: normal  Pulse: present  Swelling: none          Observations     Right Knee   Positive for effusion (trace)  Physical Exam  Constitutional:       General: She is not in acute distress  Appearance: She is well-developed  HENT:      Head: Normocephalic and atraumatic  Eyes:      General: No scleral icterus  Conjunctiva/sclera: Conjunctivae normal    Neck:      Vascular: No JVD  Cardiovascular:      Rate and Rhythm: Normal rate  Pulmonary:      Effort: Pulmonary effort is normal  No respiratory distress  Musculoskeletal:      Right knee: Effusion (trace) present  Skin:     General: Skin is warm  Neurological:      Mental Status: She is alert and oriented to person, place, and time  Coordination: Coordination normal          I have personally reviewed pertinent films in PACS and my interpretation is as follows:  X-rays bilateral knees:  Significant medial compartment arthritis of the right knee  Significant lateral compartment arthritis of the left knee  Moderate patellofemoral compartment arthritis bilaterally  This document was created using speech voice recognition software  Grammatical errors, random word insertions, pronoun errors, and incomplete sentences are an occasional consequence of this system due to software limitations, ambient noise, and hardware issues  Any formal questions or concerns about content, text, or information contained within the body of this dictation should be directly addressed to the provider for clarification

## 2022-12-21 ENCOUNTER — OFFICE VISIT (OUTPATIENT)
Dept: OBGYN CLINIC | Facility: CLINIC | Age: 57
End: 2022-12-21

## 2022-12-21 VITALS
TEMPERATURE: 98.2 F | BODY MASS INDEX: 40.12 KG/M2 | HEIGHT: 64 IN | WEIGHT: 235 LBS | SYSTOLIC BLOOD PRESSURE: 142 MMHG | HEART RATE: 88 BPM | DIASTOLIC BLOOD PRESSURE: 88 MMHG

## 2022-12-21 DIAGNOSIS — M25.561 CHRONIC PAIN OF RIGHT KNEE: ICD-10-CM

## 2022-12-21 DIAGNOSIS — M17.11 PRIMARY OSTEOARTHRITIS OF RIGHT KNEE: Primary | ICD-10-CM

## 2022-12-21 DIAGNOSIS — G89.29 CHRONIC PAIN OF RIGHT KNEE: ICD-10-CM

## 2022-12-21 NOTE — PROGRESS NOTES
Assessment/Plan:  1  Primary osteoarthritis of right knee  Ambulatory Referral to Orthopedic Surgery      2  Chronic pain of right knee        3  BMI 40 0-44 9, adult ([address] )          Scribe Attestation    I,:  Erich Starrroshan am acting as a scribe while in the presence of the attending physician :       I,:  Martha Sheridan, DO personally performed the services described in this documentation    as scribed in my presence :         Ar Bergeron is a pleasant 52-year-old female who presents today for initial evaluation of her right knee pain after referral by my partner Dr Oneida Love  After reviewing her imaging and performing a thorough history and physical exam I explained that she is symptomatic of her severe underlying osteoarthritis  Based on the progression of her underlying disease and her persistent pain despite exhausting nonoperative management, I explained that she would benefit from total knee arthroplasty  We spent time today discussing the details of the procedure as well as the expected recovery  We also spent time discussing same-day discharge and use of press-fit implants, both of which I feel would be appropriate for her  I did explain that she is currently contraindicated for the procedure due to her elevated BMI  Together, we set a weight loss goal of 10 pounds which would make her a candidate for surgery  When she achieves this goal in the near future, she will return to the office for preoperative examination and surgical consent for robotic assisted right total knee arthroplasty  Subjective: Initial evaluation for right knee pain    Patient ID: Rajiv Hayes is a 65 y o  female who presents today for initial evaluation of her right knee pain after referral by my partner Dr Oneida Love  She underwent a right knee arthroscopy with medial meniscectomy on 1/6/2022  Since that time, she has experienced persistent pain in her knee    She describes constant aching pain about the medial and anteromedial aspect of her knee that worsens with activity  This pain can be severe  She also reports that the knee "gives out" at times  Her left knee is also somewhat painful but not as severe as her right knee  She has had treatment to include physical therapy, over-the-counter medications, and intra-articular corticosteroid and viscosupplementation injection  Her most recent injection was a viscosupplementation injection series concluded on 10/5/2022  She is interested in discussing total knee arthroplasty  She works from home and reports that her job is not physically demanding  She denies any recent injury or trauma  Review of Systems   Constitutional: Positive for activity change  Negative for chills, fever and unexpected weight change  HENT: Negative for hearing loss, nosebleeds and sore throat  Eyes: Negative for pain, redness and visual disturbance  Respiratory: Negative for cough, shortness of breath and wheezing  Cardiovascular: Negative for chest pain, palpitations and leg swelling  Gastrointestinal: Negative for abdominal pain, nausea and vomiting  Endocrine: Negative for polydipsia and polyuria  Genitourinary: Negative for dysuria and hematuria  Musculoskeletal: Positive for arthralgias and myalgias  Negative for joint swelling  See HPI   Skin: Negative for rash and wound  Neurological: Negative for dizziness, numbness and headaches  Psychiatric/Behavioral: Negative for decreased concentration and suicidal ideas  The patient is not nervous/anxious            Past Medical History:   Diagnosis Date   • Allergic rhinitis    • Asthma    • Fibroids    • Physiological ovarian cysts    • Sleep apnea     denatl device worn       Past Surgical History:   Procedure Laterality Date   • COLONOSCOPY  2016    dr Pankaj Oh   • KNEE ARTHROSCOPY Left 2018    meniscectomy x2, first done in 2015   • MYOMECTOMY     • [place] COLONOSCOPY FLX DX W/COLLJ SPEC WHEN PFRMD N/A 9/13/2018 Procedure: COLONOSCOPY;  Surgeon: Roberto Adams MD;  Location: [facility];   Service: Gastroenterology   • [place] KNEE SCOPE,MED/LAT MENISECTOMY Right 1/6/2022    Procedure: KNEE ARTHROSCOPY MENISCECTOMY MEDIAL;  Surgeon: Harish Bragg MD;  Location: [facility] OR;  Service: Orthopedics   • WRIST SURGERY Right        Family History   Problem Relation Age of Onset   • Osteoporosis Mother    • Thyroid disease Mother    • Colon cancer Mother 79   • Colon polyps Mother    • Cancer Mother         colon   • Alzheimer's disease Father    • Hypertension Father    • Dementia Father    • Heart disease Brother    • No Known Problems Daughter    • No Known Problems Daughter    • No Known Problems Maternal Aunt    • No Known Problems Maternal Aunt    • No Known Problems Paternal Aunt    • No Known Problems Paternal Aunt    • No Known Problems Paternal Aunt    • No Known Problems Paternal Aunt    • No Known Problems Paternal Aunt    • No Known Problems Paternal Aunt    • No Known Problems Paternal Aunt    • No Known Problems Paternal Aunt        Social History     Occupational History   • Not on file   Tobacco Use   • Smoking status: Never   • Smokeless tobacco: Never   Vaping Use   • Vaping Use: Never used   Substance and Sexual Activity   • Alcohol use: Yes     Comment: social - wine 2 x month   • Drug use: Never   • Sexual activity: Yes     Partners: Male     Birth control/protection: Condom Male         Current Outpatient Medications:   •  acetaminophen (TYLENOL) 325 mg tablet, Take 650 mg by mouth every 6 (six) hours as needed for mild pain, Disp: , Rfl:   •  al mag oxide-diphenhydramine-lidocaine viscous (MAGIC MOUTHWASH) 1:1:1 suspension, Swish and spit 10 mL every 4 (four) hours as needed for mouth pain or discomfort, Disp: 300 mL, Rfl: 0  •  albuterol (2 5 mg/3 mL) 0 083 % nebulizer solution, Take 1 vial (2 5 mg total) by nebulization every 6 (six) hours as needed for wheezing or shortness of breath, Disp: 100 mL, Rfl: 0  •  Calcium Carb-Cholecalciferol 600-500 MG-UNIT CAPS, Take by mouth, Disp: , Rfl:   •  cholecalciferol (VITAMIN D3) 1,000 units tablet, Take 1,000 Units by mouth daily, Disp: , Rfl:   •  fluticasone-salmeterol (ADVAIR DISKUS) 500-50 mcg/dose inhaler, Inhale 1 puff 2 (two) times a day, Disp: 1 Inhaler, Rfl: 2  •  multivitamin (THERAGRAN) TABS, Take 1 tablet by mouth daily, Disp: , Rfl:   •  Omega-3 Fatty Acids (fish oil) 1,000 mg, Take 1,000 mg by mouth daily Last dose 12/28/21 , Disp: , Rfl:   •  Respiratory Therapy Supplies (NEBULIZER/TUBING/MOUTHPIECE) KIT, by Does not apply route 4 (four) times a day, Disp: 1 each, Rfl: 0  •  valACYclovir (VALTREX) 1,000 mg tablet, 2 tabs at earliest onset of cold sore, repeat once in 12 hours  Take 500mg bid for 3 days for genital herpes flare up , Disp: 40 tablet, Rfl: 5    No Known Allergies    Objective:  Vitals:    12/21/22 0740   BP: 142/88   Pulse: 88   Temp: 98 2 °F (36 8 °C)       Body mass index is 40 34 kg/m²  Right Knee Exam     Tenderness   The patient is experiencing tenderness in the medial retinaculum and medial joint line  Range of Motion   Extension: 5   Flexion: 130     Tests   Varus: negative Valgus: negative  Drawer:  Anterior - negative    Posterior - negative    Other   Erythema: absent  Scars: present (well healed arthroscopic portals)  Sensation: normal  Pulse: present  Swelling: none  Effusion: no effusion present    Comments:  Stable at 0, 30 and 90 degrees  Neurovascularly in tact distally  No warmth or erythema  Parapatellar crepitance noted  Patellofemoral grind: positive            Observations     Right Knee   Negative for effusion  Physical Exam  Vitals and nursing note reviewed  Constitutional:       Appearance: She is well-developed  HENT:      Head: Normocephalic and atraumatic  Right Ear: External ear normal       Left Ear: External ear normal    Eyes:      General: No scleral icterus       Extraocular Movements: Extraocular movements intact  Conjunctiva/sclera: Conjunctivae normal    Cardiovascular:      Rate and Rhythm: Normal rate  Pulmonary:      Effort: Pulmonary effort is normal  No respiratory distress  Musculoskeletal:      Cervical back: Normal range of motion and neck supple  Right knee: No effusion  Comments: As noted in HPI   Skin:     General: Skin is warm and dry  Neurological:      Mental Status: She is alert and oriented to person, place, and time  Psychiatric:         Behavior: Behavior normal          I have personally reviewed pertinent films in PACS  Bilateral knee x-rays obtained on 12/9/2022 reviewed demonstrating severe degenerative change in a windswept pattern with a varus right knee and a valgus left knee  There is tricompartmental sclerosis and osteophytosis  There is no acute fracture, dislocation, lytic or blastic lesion  Intraoperative imaging from prior right knee arthroscopy reviewed demonstrating advanced medial compartment degenerative change  This document was created using speech voice recognition software  Grammatical errors, random word insertions, pronoun errors, and incomplete sentences are an occasional consequence of this system due to software limitations, ambient noise, and hardware issues  Any formal questions or concerns about content, text, or information contained within the body of this dictation should be directly addressed to the provider for clarification

## 2023-02-09 ENCOUNTER — OFFICE VISIT (OUTPATIENT)
Dept: OBGYN CLINIC | Facility: CLINIC | Age: 58
End: 2023-02-09

## 2023-02-09 VITALS
WEIGHT: 219 LBS | HEART RATE: 68 BPM | DIASTOLIC BLOOD PRESSURE: 68 MMHG | BODY MASS INDEX: 37.59 KG/M2 | TEMPERATURE: 98.2 F | SYSTOLIC BLOOD PRESSURE: 126 MMHG

## 2023-02-09 DIAGNOSIS — Z01.818 PRE-OP EXAM: ICD-10-CM

## 2023-02-09 DIAGNOSIS — G89.29 CHRONIC PAIN OF RIGHT KNEE: ICD-10-CM

## 2023-02-09 DIAGNOSIS — M17.11 PRIMARY OSTEOARTHRITIS OF RIGHT KNEE: Primary | ICD-10-CM

## 2023-02-09 DIAGNOSIS — M17.12 PRIMARY OSTEOARTHRITIS OF LEFT KNEE: ICD-10-CM

## 2023-02-09 DIAGNOSIS — M25.561 CHRONIC PAIN OF RIGHT KNEE: ICD-10-CM

## 2023-02-09 RX ORDER — MELATONIN
1000 DAILY
Qty: 30 TABLET | Refills: 0 | Status: SHIPPED | OUTPATIENT
Start: 2023-02-09 | End: 2023-03-11

## 2023-02-09 RX ORDER — ASCORBIC ACID 500 MG
500 TABLET ORAL 2 TIMES DAILY
Qty: 60 TABLET | Refills: 0 | Status: SHIPPED | OUTPATIENT
Start: 2023-02-09 | End: 2023-03-11

## 2023-02-09 RX ORDER — FOLIC ACID 1 MG/1
1 TABLET ORAL DAILY
Qty: 30 TABLET | Refills: 0 | Status: SHIPPED | OUTPATIENT
Start: 2023-02-09 | End: 2023-03-11

## 2023-02-09 RX ORDER — FERROUS SULFATE TAB EC 324 MG (65 MG FE EQUIVALENT) 324 (65 FE) MG
324 TABLET DELAYED RESPONSE ORAL
Qty: 30 TABLET | Refills: 0 | Status: SHIPPED | OUTPATIENT
Start: 2023-02-09 | End: 2023-03-11

## 2023-02-09 RX ORDER — ZINC SULFATE 50(220)MG
220 CAPSULE ORAL DAILY
Qty: 30 CAPSULE | Refills: 0 | Status: SHIPPED | OUTPATIENT
Start: 2023-02-09 | End: 2023-03-11

## 2023-02-09 NOTE — PROGRESS NOTES
Assessment/Plan:  1  Primary osteoarthritis of right knee  Case request operating room: ARTHROPLASTY KNEE TOTAL W ROBOT - RIGHT - PRESS FIT - SAME DAY    Comprehensive metabolic panel    Hemoglobin A1C W/EAG Estimation    CBC and differential    If Symptomatic, order: UA w Reflex to Microscopic w Reflex to Culture    Protime-INR    APTT    MRSA culture    Ambulatory referral to [facility]    Ambulatory referral to Physical Therapy    EKG 12 lead    XR chest pa & lateral    Case request operating room: ARTHROPLASTY KNEE TOTAL W ROBOT - RIGHT - PRESS FIT - SAME DAY    Walker    ascorbic acid (VITAMIN C) 500 MG tablet    ferrous sulfate 324 (65 Fe) mg    folic acid (FOLVITE) 1 mg tablet    zinc sulfate (ZINCATE) 220 mg capsule    cholecalciferol (VITAMIN D3) 1,000 units tablet      2  Primary osteoarthritis of left knee        3   Chronic pain of right knee  Case request operating room: ARTHROPLASTY KNEE TOTAL W ROBOT - RIGHT - PRESS FIT - SAME DAY    Comprehensive metabolic panel    Hemoglobin A1C W/EAG Estimation    CBC and differential    If Symptomatic, order: UA w Reflex to Microscopic w Reflex to Culture    Protime-INR    APTT    MRSA culture    Ambulatory referral to [facility]    Ambulatory referral to Physical Therapy    EKG 12 lead    XR chest pa & lateral    Case request operating room: ARTHROPLASTY KNEE TOTAL W ROBOT - RIGHT - PRESS FIT - SAME DAY      4  Pre-op exam  Case request operating room: ARTHROPLASTY KNEE TOTAL W ROBOT - RIGHT - [place]    Ambulatory referral to [facility]    Ambulatory referral to Physical Therapy    Case request operating room: ARTHROPLASTY KNEE TOTAL W ROBOT - RIGHT - PRESS FIT - SAME DAY    [place]    ascorbic acid (VITAMIN C) 500 MG tablet    ferrous sulfate 324 (65 Fe) mg    folic acid (FOLVITE) 1 mg tablet    zinc sulfate (ZINCATE) 220 mg capsule    cholecalciferol (VITAMIN D3) 1,000 units tablet        Noemi Attestation    I,:  Carina Varela am acting as a scribe while in the presence of the attending physician :       I,:  Jaron Michelle DO personally performed the services described in this documentation    as scribed in my presence :         Patient is a 78-year-old female here for follow-up regarding her activity related bilateral knee pain  Currently the right is worse than the left  Based on the progression of her underlying disease and persistent pain despite nonoperative management including activity modification, maintenance of appropriate weight, exercise program, OTC NSAIDs and analgesics, assistive device and intra-articular injection therapy, I explained that she would benefit from right total knee arthroplasty  The pre alysia and postoperative expectations were discussed here in the office today  The risks and benefits of undergoing right total knee arthroplasty were discussed at length and consents were signed and placed in the chart  Please see risk discussion below  She denies a history of DVT/PE, MRSA infection, diabetes, GI bleeding or peptic ulcer and malignancy  She is not a smoker or excessive drinker and is not using turmeric  She understands she requires preoperative clearance from her  PCP  She is an excellent candidate for outpatient physical therapy postoperatively  she will be placed on our same-day  discharge pathway  We will utilize pressfit implants  she will meet with my surgery scheduler today to pick a date for her procedure and make preoperative arrangements  All of her questions and concerns were addressed today  We will see her back at time of surgery  Patient will return to the office one week prior to surgery for cortisone injection of non-operative left knee  Subjective: Follow up evaluation for bilateral knee osteoarthritis     Patient ID: Gopal Dodd is a 65 y o  female presents today for follow up evaluation of bilateral knee osteoarthritis   History of right knee arthroscopy with medial meniscectomy on 1/6/2022 with Dr Daniel Mejia  Pain has persisted since then  She describes a constant aching pain in the medial and lateral aspects of her bilateral knees that is aggravated with activity, ascending and descending stairs  She also describes a "shifting" sensation of her patella that is painful  Reports frequent mechanical symptoms of the bilateral knees and intermittent sensations of instability  She recently began exercising and has successfully lost 15 pounds since her last visit, but she reports exercise has significantly increased her pain  Her prior treatments include cortisone injections, VISCO injections most recently concluded in October of 2022, over the counter medication, physical therapy  She states she works from home and her job is not physically demanding  Denies paraesthesias, secondary trauma to the knees  She would like to discuss total knee arthroplasty and would like to schedule as soon as possible  Review of Systems   Constitutional: Positive for activity change  Negative for chills and fever  HENT: Negative for ear pain and sore throat  Eyes: Negative for pain and visual disturbance  Respiratory: Negative for cough and shortness of breath  Cardiovascular: Negative for chest pain and palpitations  Gastrointestinal: Negative for abdominal pain and vomiting  Genitourinary: Negative for dysuria and hematuria  Musculoskeletal: Positive for arthralgias  Negative for back pain  Skin: Negative for color change and rash  Neurological: Negative for seizures and syncope  All other systems reviewed and are negative          Past Medical History:   Diagnosis Date   • Allergic rhinitis    • Asthma    • Fibroids    • Physiological ovarian cysts    • Sleep apnea     denatl device worn       Past Surgical History:   Procedure Laterality Date   • COLONOSCOPY  2016    dr Cynthia Angulo   • KNEE ARTHROSCOPY Left 2018    meniscectomy x2, first done in 2015   • MYOMECTOMY     • [place] ARTHRS KNE SURG W/MENISCECTOMY MED/LAT W/SHVG Right 1/6/2022    Procedure: KNEE ARTHROSCOPY MENISCECTOMY MEDIAL;  Surgeon: Kavitha Baron MD;  Location: [address] [place] [address];  Service: Orthopedics   • NM COLONOSCOPY FLX DX W/COLLJ SPEC WHEN PFRMD N/A 9/13/2018    Procedure: COLONOSCOPY;  Surgeon: Darcy Velez MD;  Location: [facility];   Service: Gastroenterology   • WRIST SURGERY Right        Family History   Problem Relation Age of Onset   • Osteoporosis Mother    • Thyroid disease Mother    • Colon cancer Mother 79   • Colon polyps Mother    • Cancer Mother         colon   • Alzheimer's disease Father    • Hypertension Father    • Dementia Father    • Heart disease Brother    • No Known Problems Daughter    • No Known Problems Daughter    • No Known Problems Maternal Aunt    • No Known Problems Maternal Aunt    • No Known Problems Paternal Aunt    • No Known Problems Paternal Aunt    • No Known Problems Paternal Aunt    • No Known Problems Paternal Aunt    • No Known Problems Paternal Aunt    • No Known Problems Paternal Aunt    • No Known Problems Paternal Aunt    • No Known Problems Paternal Aunt        Social History     Occupational History   • Not on file   Tobacco Use   • Smoking status: Never   • Smokeless tobacco: Never   Vaping Use   • Vaping Use: Never used   Substance and Sexual Activity   • Alcohol use: Yes     Comment: social - wine 2 x month   • Drug use: Never   • Sexual activity: Yes     Partners: Male     Birth control/protection: Condom Male         Current Outpatient Medications:   •  acetaminophen (TYLENOL) 325 mg tablet, Take 650 mg by mouth every 6 (six) hours as needed for mild pain, Disp: , Rfl:   •  albuterol (2 5 mg/3 mL) 0 083 % nebulizer solution, Take 1 vial (2 5 mg total) by nebulization every 6 (six) hours as needed for wheezing or shortness of breath, Disp: 100 mL, Rfl: 0  •  ascorbic acid (VITAMIN C) 500 MG tablet, Take 1 tablet (500 mg total) by mouth 2 (two) times a day, Disp: 60 tablet, Rfl: 0  •  Calcium Carb-Cholecalciferol 600-500 MG-UNIT CAPS, Take by mouth, Disp: , Rfl:   •  cholecalciferol (VITAMIN D3) 1,000 units tablet, Take 1,000 Units by mouth daily, Disp: , Rfl:   •  cholecalciferol (VITAMIN D3) 1,000 units tablet, Take 1 tablet (1,000 Units total) by mouth daily, Disp: 30 tablet, Rfl: 0  •  ferrous sulfate 324 (65 Fe) mg, Take 1 tablet (324 mg total) by mouth daily before breakfast, Disp: 30 tablet, Rfl: 0  •  fluticasone-salmeterol (ADVAIR DISKUS) 500-50 mcg/dose inhaler, Inhale 1 puff 2 (two) times a day, Disp: 1 Inhaler, Rfl: 2  •  folic acid (FOLVITE) 1 mg tablet, Take 1 tablet (1 mg total) by mouth daily, Disp: 30 tablet, Rfl: 0  •  multivitamin (THERAGRAN) TABS, Take 1 tablet by mouth daily, Disp: , Rfl:   •  Omega-3 Fatty Acids (fish oil) 1,000 mg, Take 1,000 mg by mouth daily Last dose 12/28/21 , Disp: , Rfl:   •  Respiratory Therapy Supplies (NEBULIZER/TUBING/MOUTHPIECE) KIT, by Does not apply route 4 (four) times a day, Disp: 1 each, Rfl: 0  •  valACYclovir (VALTREX) 1,000 mg tablet, 2 tabs at earliest onset of cold sore, repeat once in 12 hours  Take 500mg bid for 3 days for genital herpes flare up , Disp: 40 tablet, Rfl: 5  •  zinc sulfate (ZINCATE) 220 mg capsule, Take 1 capsule (220 mg total) by mouth daily, Disp: 30 capsule, Rfl: 0  •  al mag oxide-diphenhydramine-lidocaine viscous (MAGIC MOUTHWASH) 1:1:1 suspension, Swish and spit 10 mL every 4 (four) hours as needed for mouth pain or discomfort (Patient not taking: Reported on 2/9/2023), Disp: 300 mL, Rfl: 0    No Known Allergies    Objective:  Vitals:    02/09/23 0804   BP: 126/68   Pulse: 68   Temp: 98 2 °F (36 8 °C)       Body mass index is 37 59 kg/m²  Right Knee Exam     Tenderness   The patient is experiencing tenderness in the lateral joint line, medial joint line, patella and pes anserinus      Range of Motion   Extension: 0   Flexion: 130 (with pain and crepitation)     Tests   Varus: negative Valgus: negative  Patellar apprehension: negative    Other   Erythema: absent  Scars: absent  Sensation: normal  Pulse: present  Swelling: none  Effusion: no effusion present    Comments:  Genu varum  Knee stable at 0, 30, 90 degrees  Calf compartments soft and supple  Brisk capillary refill      Left Knee Exam     Tenderness   The patient is experiencing tenderness in the lateral joint line, medial joint line, patella and pes anserinus  Range of Motion   Extension: 0   Flexion: 130 (with pain and crepitation)     Tests   Varus: negative Valgus: negative  Patellar apprehension: negative    Other   Erythema: absent  Scars: absent  Sensation: normal  Pulse: present  Swelling: none  Effusion: no effusion present    Comments:  Genu valgum  Knee stable at 0, 30, 90 degrees  Calf compartments soft and supple  Brisk capillary refill          Observations   Left Knee   Negative for effusion  Right Knee   Negative for effusion  Physical Exam  Vitals and nursing note reviewed  Constitutional:       General: She is not in acute distress  Appearance: Normal appearance  HENT:      Head: Normocephalic and atraumatic  Right Ear: External ear normal       Left Ear: External ear normal    Eyes:      Extraocular Movements: Extraocular movements intact  Pupils: Pupils are equal, round, and reactive to light  Cardiovascular:      Rate and Rhythm: Normal rate  Pulses: Normal pulses  Pulmonary:      Effort: Pulmonary effort is normal  No respiratory distress  Breath sounds: Normal breath sounds  Musculoskeletal:      Cervical back: Normal range of motion and neck supple  Right knee: No effusion  Left knee: No effusion  Comments: See ortho exam   Skin:     General: Skin is warm and dry  Neurological:      General: No focal deficit present  Mental Status: She is alert and oriented to person, place, and time           I have personally reviewed pertinent films in PACS     Bilateral knee x-rays obtained 12/9/2022 reviewed today and demonstrate severe degenerative osteoarthritis with a windswept pattern with right genu varus and left genu valgus  Tricompartmental sclerotic changes and bone spur formation  No acute osseous abnormalities  This document was created using speech voice recognition software  Grammatical errors, random word insertions, pronoun errors, and incomplete sentences are an occasional consequence of this system due to software limitations, ambient noise, and hardware issues  Any formal questions or concerns about content, text, or information contained within the body of this dictation should be directly addressed to the provider for clarification

## 2023-02-09 NOTE — H&P (VIEW-ONLY)
Assessment/Plan:  1  Primary osteoarthritis of right knee  Case request operating room: ARTHROPLASTY KNEE TOTAL W ROBOT - RIGHT - PRESS FIT - SAME DAY    Comprehensive metabolic panel    Hemoglobin A1C W/EAG Estimation    CBC and differential    If Symptomatic, order: UA w Reflex to Microscopic w Reflex to Culture    Protime-INR    APTT    MRSA culture    Ambulatory referral to [facility]    Ambulatory referral to Physical Therapy    EKG 12 lead    XR chest pa & lateral    Case request operating room: ARTHROPLASTY KNEE TOTAL W ROBOT - RIGHT - PRESS FIT - SAME DAY    Walker    ascorbic acid (VITAMIN C) 500 MG tablet    ferrous sulfate 324 (65 Fe) mg    folic acid (FOLVITE) 1 mg tablet    zinc sulfate (ZINCATE) 220 mg capsule    cholecalciferol (VITAMIN D3) 1,000 units tablet      2  Primary osteoarthritis of left knee        3   Chronic pain of right knee  Case request operating room: ARTHROPLASTY KNEE TOTAL W ROBOT - RIGHT - PRESS FIT - SAME DAY    Comprehensive metabolic panel    Hemoglobin A1C W/EAG Estimation    CBC and differential    If Symptomatic, order: UA w Reflex to Microscopic w Reflex to Culture    Protime-INR    APTT    MRSA culture    Ambulatory referral to [facility]    Ambulatory referral to Physical Therapy    EKG 12 lead    XR chest pa & lateral    Case request operating room: ARTHROPLASTY KNEE TOTAL W ROBOT - RIGHT - PRESS FIT - SAME DAY      4  Pre-op exam  Case request operating room: ARTHROPLASTY KNEE TOTAL W ROBOT - RIGHT - [place]    Ambulatory referral to [facility]    Ambulatory referral to Physical Therapy    Case request operating room: ARTHROPLASTY KNEE TOTAL W ROBOT - RIGHT - PRESS FIT - SAME DAY    Walker    ascorbic acid (VITAMIN C) 500 MG tablet    ferrous sulfate 324 (65 Fe) mg    folic acid (FOLVITE) 1 mg tablet    zinc sulfate (ZINCATE) 220 mg capsule    cholecalciferol (VITAMIN D3) 1,000 units tablet        Noemi Attestation    I,:  Parker Alcala am acting as a scribe while in the presence of the attending physician :       I,:  Misbah Harmon,  personally performed the services described in this documentation    as scribed in my presence :         Patient is a 50-year-old female here for follow-up regarding her activity related bilateral knee pain  Currently the right is worse than the left  Based on the progression of her underlying disease and persistent pain despite nonoperative management including activity modification, maintenance of appropriate weight, exercise program, OTC NSAIDs and analgesics, assistive device and intra-articular injection therapy, I explained that she would benefit from right total knee arthroplasty  The pre alysia and postoperative expectations were discussed here in the office today  The risks and benefits of undergoing right total knee arthroplasty were discussed at length and consents were signed and placed in the chart  Please see risk discussion below  She denies a history of DVT/PE, MRSA infection, diabetes, GI bleeding or peptic ulcer and malignancy  She is not a smoker or excessive drinker and is not using turmeric  She understands she requires preoperative clearance from her  PCP  She is an excellent candidate for outpatient physical therapy postoperatively  she will be placed on our same-day  discharge pathway  We will utilize pressfit implants  she will meet with my surgery scheduler today to pick a date for her procedure and make preoperative arrangements  All of her questions and concerns were addressed today  We will see her back at time of surgery  Patient will return to the office one week prior to surgery for cortisone injection of non-operative left knee  Subjective: Follow up evaluation for bilateral knee osteoarthritis     Patient ID: Sugar Still is a 65 y o  female presents today for follow up evaluation of bilateral knee osteoarthritis   History of right knee arthroscopy with medial meniscectomy on 1/6/2022 with Dr Carter Carroll  Pain has persisted since then  She describes a constant aching pain in the medial and lateral aspects of her bilateral knees that is aggravated with activity, ascending and descending stairs  She also describes a "shifting" sensation of her patella that is painful  Reports frequent mechanical symptoms of the bilateral knees and intermittent sensations of instability  She recently began exercising and has successfully lost 15 pounds since her last visit, but she reports exercise has significantly increased her pain  Her prior treatments include cortisone injections, VISCO injections most recently concluded in October of 2022, over the counter medication, physical therapy  She states she works from home and her job is not physically demanding  Denies paraesthesias, secondary trauma to the knees  She would like to discuss total knee arthroplasty and would like to schedule as soon as possible  Review of Systems   Constitutional: Positive for activity change  Negative for chills and fever  HENT: Negative for ear pain and sore throat  Eyes: Negative for pain and visual disturbance  Respiratory: Negative for cough and shortness of breath  Cardiovascular: Negative for chest pain and palpitations  Gastrointestinal: Negative for abdominal pain and vomiting  Genitourinary: Negative for dysuria and hematuria  Musculoskeletal: Positive for arthralgias  Negative for back pain  Skin: Negative for color change and rash  Neurological: Negative for seizures and syncope  All other systems reviewed and are negative          Past Medical History:   Diagnosis Date   • Allergic rhinitis    • Asthma    • Fibroids    • Physiological ovarian cysts    • Sleep apnea     denatl device worn       Past Surgical History:   Procedure Laterality Date   • COLONOSCOPY  2016    dr Dariana Brantley   • KNEE ARTHROSCOPY Left 2018    meniscectomy x2, first done in 2015   • MYOMECTOMY     • [place] ARTHRS KNE SURG W/MENISCECTOMY MED/LAT W/SHVG Right 1/6/2022    Procedure: KNEE ARTHROSCOPY MENISCECTOMY MEDIAL;  Surgeon: Kyaw Hicks MD;  Location: [address] [place] [address];  Service: Orthopedics   • [place] COLONOSCOPY FLX DX W/COLLJ SPEC WHEN PFRMD N/A 9/13/2018    Procedure: COLONOSCOPY;  Surgeon: Lizandro Moran MD;  Location: [facility] GI LAB;   Service: Gastroenterology   • WRIST SURGERY Right        Family History   Problem Relation Age of Onset   • Osteoporosis Mother    • Thyroid disease Mother    • Colon cancer Mother 79   • Colon polyps Mother    • Cancer Mother         colon   • Alzheimer's disease Father    • Hypertension Father    • Dementia Father    • Heart disease Brother    • No Known Problems Daughter    • No Known Problems Daughter    • No Known Problems Maternal Aunt    • No Known Problems Maternal Aunt    • No Known Problems Paternal Aunt    • No Known Problems Paternal Aunt    • No Known Problems Paternal Aunt    • No Known Problems Paternal Aunt    • No Known Problems Paternal Aunt    • No Known Problems Paternal Aunt    • No Known Problems Paternal Aunt    • No Known Problems Paternal Aunt        Social History     Occupational History   • Not on file   Tobacco Use   • Smoking status: Never   • Smokeless tobacco: Never   Vaping Use   • Vaping Use: Never used   Substance and Sexual Activity   • Alcohol use: Yes     Comment: social - wine 2 x month   • Drug use: Never   • Sexual activity: Yes     Partners: Male     Birth control/protection: Condom Male         Current Outpatient Medications:   •  acetaminophen (TYLENOL) 325 mg tablet, Take 650 mg by mouth every 6 (six) hours as needed for mild pain, Disp: , Rfl:   •  albuterol (2 5 mg/3 mL) 0 083 % nebulizer solution, Take 1 vial (2 5 mg total) by nebulization every 6 (six) hours as needed for wheezing or shortness of breath, Disp: 100 mL, Rfl: 0  •  ascorbic acid (VITAMIN C) 500 MG tablet, Take 1 tablet (500 mg total) by mouth 2 (two) times a day, Disp: 60 tablet, Rfl: 0  •  Calcium Carb-Cholecalciferol 600-500 MG-UNIT CAPS, Take by mouth, Disp: , Rfl:   •  cholecalciferol (VITAMIN D3) 1,000 units tablet, Take 1,000 Units by mouth daily, Disp: , Rfl:   •  cholecalciferol (VITAMIN D3) 1,000 units tablet, Take 1 tablet (1,000 Units total) by mouth daily, Disp: 30 tablet, Rfl: 0  •  ferrous sulfate 324 (65 Fe) mg, Take 1 tablet (324 mg total) by mouth daily before breakfast, Disp: 30 tablet, Rfl: 0  •  fluticasone-salmeterol (ADVAIR DISKUS) 500-50 mcg/dose inhaler, Inhale 1 puff 2 (two) times a day, Disp: 1 Inhaler, Rfl: 2  •  folic acid (FOLVITE) 1 mg tablet, Take 1 tablet (1 mg total) by mouth daily, Disp: 30 tablet, Rfl: 0  •  multivitamin (THERAGRAN) TABS, Take 1 tablet by mouth daily, Disp: , Rfl:   •  Omega-3 Fatty Acids (fish oil) 1,000 mg, Take 1,000 mg by mouth daily Last dose 12/28/21 , Disp: , Rfl:   •  Respiratory Therapy Supplies (NEBULIZER/TUBING/MOUTHPIECE) KIT, by Does not apply route 4 (four) times a day, Disp: 1 each, Rfl: 0  •  valACYclovir (VALTREX) 1,000 mg tablet, 2 tabs at earliest onset of cold sore, repeat once in 12 hours  Take 500mg bid for 3 days for genital herpes flare up , Disp: 40 tablet, Rfl: 5  •  zinc sulfate (ZINCATE) 220 mg capsule, Take 1 capsule (220 mg total) by mouth daily, Disp: 30 capsule, Rfl: 0  •  al mag oxide-diphenhydramine-lidocaine viscous (MAGIC MOUTHWASH) 1:1:1 suspension, Swish and spit 10 mL every 4 (four) hours as needed for mouth pain or discomfort (Patient not taking: Reported on 2/9/2023), Disp: 300 mL, Rfl: 0    No Known Allergies    Objective:  Vitals:    02/09/23 0804   BP: 126/68   Pulse: 68   Temp: 98 2 °F (36 8 °C)       Body mass index is 37 59 kg/m²  Right Knee Exam     Tenderness   The patient is experiencing tenderness in the lateral joint line, medial joint line, patella and pes anserinus      Range of Motion   Extension: 0   Flexion: 130 (with pain and crepitation)     Tests   Varus: negative Valgus: negative  Patellar apprehension: negative    Other   Erythema: absent  Scars: absent  Sensation: normal  Pulse: present  Swelling: none  Effusion: no effusion present    Comments:  Genu varum  Knee stable at 0, 30, 90 degrees  Calf compartments soft and supple  Brisk capillary refill      Left Knee Exam     Tenderness   The patient is experiencing tenderness in the lateral joint line, medial joint line, patella and pes anserinus  Range of Motion   Extension: 0   Flexion: 130 (with pain and crepitation)     Tests   Varus: negative Valgus: negative  Patellar apprehension: negative    Other   Erythema: absent  Scars: absent  Sensation: normal  Pulse: present  Swelling: none  Effusion: no effusion present    Comments:  Genu valgum  Knee stable at 0, 30, 90 degrees  Calf compartments soft and supple  Brisk capillary refill          Observations   Left Knee   Negative for effusion  Right Knee   Negative for effusion  Physical Exam  Vitals and nursing note reviewed  Constitutional:       General: She is not in acute distress  Appearance: Normal appearance  HENT:      Head: Normocephalic and atraumatic  Right Ear: External ear normal       Left Ear: External ear normal    Eyes:      Extraocular Movements: Extraocular movements intact  Pupils: Pupils are equal, round, and reactive to light  Cardiovascular:      Rate and Rhythm: Normal rate  Pulses: Normal pulses  Pulmonary:      Effort: Pulmonary effort is normal  No respiratory distress  Breath sounds: Normal breath sounds  Musculoskeletal:      Cervical back: Normal range of motion and neck supple  Right knee: No effusion  Left knee: No effusion  Comments: See ortho exam   Skin:     General: Skin is warm and dry  Neurological:      General: No focal deficit present  Mental Status: She is alert and oriented to person, place, and time           I have personally reviewed pertinent films in PACS     Bilateral knee x-rays obtained 12/9/2022 reviewed today and demonstrate severe degenerative osteoarthritis with a windswept pattern with right genu varus and left genu valgus  Tricompartmental sclerotic changes and bone spur formation  No acute osseous abnormalities  This document was created using speech voice recognition software  Grammatical errors, random word insertions, pronoun errors, and incomplete sentences are an occasional consequence of this system due to software limitations, ambient noise, and hardware issues  Any formal questions or concerns about content, text, or information contained within the body of this dictation should be directly addressed to the provider for clarification

## 2023-02-11 ENCOUNTER — HOSPITAL ENCOUNTER (OUTPATIENT)
Dept: RADIOLOGY | Facility: HOSPITAL | Age: 58
Discharge: HOME | End: 2023-02-11

## 2023-02-11 ENCOUNTER — APPOINTMENT (OUTPATIENT)
Dept: LAB | Facility: HOSPITAL | Age: 58
End: 2023-02-11
Attending: ORTHOPAEDIC SURGERY

## 2023-02-11 ENCOUNTER — APPOINTMENT (OUTPATIENT)
Dept: LAB | Facility: HOSPITAL | Age: 58
End: 2023-02-11

## 2023-02-11 DIAGNOSIS — G89.29 CHRONIC PAIN OF RIGHT KNEE: ICD-10-CM

## 2023-02-11 DIAGNOSIS — M17.11 PRIMARY OSTEOARTHRITIS OF RIGHT KNEE: ICD-10-CM

## 2023-02-11 DIAGNOSIS — M25.561 CHRONIC PAIN OF RIGHT KNEE: ICD-10-CM

## 2023-02-11 LAB
ALBUMIN SERPL BCP-MCNC: 3.8 G/DL (ref 3.5–5)
ALP SERPL-CCNC: 84 U/L (ref 46–116)
ALT SERPL W P-5'-P-CCNC: 23 U/L (ref 12–78)
ANION GAP SERPL CALCULATED.3IONS-SCNC: 7 MMOL/L (ref 4–13)
APTT PPP: 29 SECONDS (ref 23–37)
AST SERPL W P-5'-P-CCNC: 15 U/L (ref 5–45)
BACTERIA UR QL AUTO: NORMAL /HPF
BASOPHILS # BLD AUTO: 0.04 THOUSANDS/ÂΜL (ref 0–0.1)
BASOPHILS NFR BLD AUTO: 1 % (ref 0–1)
BILIRUB SERPL-MCNC: 0.38 MG/DL (ref 0.2–1)
BILIRUB UR QL STRIP: NEGATIVE
BUN SERPL-MCNC: 21 MG/DL (ref 5–25)
CALCIUM SERPL-MCNC: 9.1 MG/DL (ref 8.3–10.1)
CHLORIDE SERPL-SCNC: 104 MMOL/L (ref 96–108)
CLARITY UR: CLEAR
CO2 SERPL-SCNC: 29 MMOL/L (ref 21–32)
COLOR UR: ABNORMAL
CREAT SERPL-MCNC: 0.78 MG/DL (ref 0.6–1.3)
EOSINOPHIL # BLD AUTO: 0.03 THOUSAND/ÂΜL (ref 0–0.61)
EOSINOPHIL NFR BLD AUTO: 1 % (ref 0–6)
ERYTHROCYTE [DISTWIDTH] IN BLOOD BY AUTOMATED COUNT: 13.2 % (ref 11.6–15.1)
GFR SERPL CREATININE-BSD FRML MDRD: 85 ML/MIN/1.73SQ M
GLUCOSE P FAST SERPL-MCNC: 96 MG/DL (ref 65–99)
GLUCOSE UR STRIP-MCNC: NEGATIVE MG/DL
HCT VFR BLD AUTO: 41.2 % (ref 34.8–46.1)
HGB BLD-MCNC: 13.5 G/DL (ref 11.5–15.4)
HGB UR QL STRIP.AUTO: ABNORMAL
IMM GRANULOCYTES # BLD AUTO: 0.01 THOUSAND/UL (ref 0–0.2)
IMM GRANULOCYTES NFR BLD AUTO: 0 % (ref 0–2)
INR PPP: 0.96 (ref 0.84–1.19)
KETONES UR STRIP-MCNC: NEGATIVE MG/DL
LEUKOCYTE ESTERASE UR QL STRIP: ABNORMAL
LYMPHOCYTES # BLD AUTO: 1.47 THOUSANDS/ÂΜL (ref 0.6–4.47)
LYMPHOCYTES NFR BLD AUTO: 31 % (ref 14–44)
MCH RBC QN AUTO: 29.3 PG (ref 26.8–34.3)
MCHC RBC AUTO-ENTMCNC: 32.8 G/DL (ref 31.4–37.4)
MCV RBC AUTO: 89 FL (ref 82–98)
MONOCYTES # BLD AUTO: 0.29 THOUSAND/ÂΜL (ref 0.17–1.22)
MONOCYTES NFR BLD AUTO: 6 % (ref 4–12)
NEUTROPHILS # BLD AUTO: 2.86 THOUSANDS/ÂΜL (ref 1.85–7.62)
NEUTS SEG NFR BLD AUTO: 61 % (ref 43–75)
NITRITE UR QL STRIP: NEGATIVE
NON-SQ EPI CELLS URNS QL MICRO: NORMAL /HPF
NRBC BLD AUTO-RTO: 0 /100 WBCS
PH UR STRIP.AUTO: 6.5 [PH]
PLATELET # BLD AUTO: 292 THOUSANDS/UL (ref 149–390)
PMV BLD AUTO: 9.7 FL (ref 8.9–12.7)
POTASSIUM SERPL-SCNC: 4.9 MMOL/L (ref 3.5–5.3)
PROT SERPL-MCNC: 7.9 G/DL (ref 6.4–8.4)
PROT UR STRIP-MCNC: NEGATIVE MG/DL
PROTHROMBIN TIME: 12.9 SECONDS (ref 11.6–14.5)
RBC # BLD AUTO: 4.61 MILLION/UL (ref 3.81–5.12)
RBC #/AREA URNS AUTO: NORMAL /HPF
SODIUM SERPL-SCNC: 140 MMOL/L (ref 135–147)
SP GR UR STRIP.AUTO: 1.01 (ref 1–1.03)
UROBILINOGEN UR QL STRIP.AUTO: 0.2 E.U./DL
WBC # BLD AUTO: 4.7 THOUSAND/UL (ref 4.31–10.16)
WBC #/AREA URNS AUTO: NORMAL /HPF

## 2023-02-12 LAB — MRSA NOSE QL CULT: NORMAL

## 2023-02-16 ENCOUNTER — TELEPHONE (OUTPATIENT)
Dept: OBGYN CLINIC | Facility: HOSPITAL | Age: 58
End: 2023-02-16

## 2023-02-16 DIAGNOSIS — M17.11 PRIMARY OSTEOARTHRITIS OF RIGHT KNEE: Primary | ICD-10-CM

## 2023-02-16 LAB
ATRIAL RATE: 70 BPM
DME PARACHUTE DELIVERY DATE REQUESTED: NORMAL
DME PARACHUTE ITEM DESCRIPTION: NORMAL
DME PARACHUTE ORDER STATUS: NORMAL
DME PARACHUTE SUPPLIER NAME: NORMAL
DME PARACHUTE SUPPLIER PHONE: NORMAL
P AXIS: 48 DEGREES
PR INTERVAL: 216 MS
QRS AXIS: -8 DEGREES
QRSD INTERVAL: 84 MS
QT INTERVAL: 410 MS
QTC INTERVAL: 442 MS
T WAVE AXIS: 11 DEGREES
VENTRICULAR RATE: 70 BPM

## 2023-02-16 NOTE — TELEPHONE ENCOUNTER
Preoperative Elective Admission Assessment- spoke to patient    Gillian Hernandez Situation:    Who does pt live with: spouse  What kind of home: ranch  How do they enter the home: front  How many levels in home: 1 level    # of steps to enter home: 2 to enter   Sleeping arrangement: first/entry floor  Where is Bathroom: Walk in shower ( with seat and grab bars)  and step in tub      First Floor Setup:   Is there a bathroom: Yes  Where would pt sleep: bed     DME: Denies DME      Patient's Current Level of Function: Ambulates: Independently and ADLs: Independent    Post-op Caregiver: spouse  Caregiver Name and phone number for Inpatient discharge needs: [facility], spouse- [phone] mother also coming to stay for 1-2 weeks   Currently receive any HHC/aides/community supports: No     Post-op Transport: spouse  To/from hospital: spouse  To/from PT 2-3x/week: spouse or mother   Uses community transport now: No     Outpatient Physical Therapy Site:  Site: [facility]   pre and post-op appts scheduled? Yes     Medication Management: self  Preferred Pharmacy for Post-op Medications: [facility]   Blood Management Vitamin Regimen: Pt confirms taking as prescribed  Post-op anticoagulant: to be determined by surgical team postoperatively     DC Plan: Pt plans to be discharged home  Pt educated that our goal, if at all possible, is to appropriately discharge patient based off their post-op function while striving to maintain maximal independence  If possible, the goal is to discharge patient to home and for them to attend outpatient physical therapy  Barriers to DC identified preoperatively: none identified    BMI: 37 59     Enrolled in [employer]     Patient Education:  Pt educated on post-op pain, early mobilization (POD0), Average inpt LOS, OP PT goal   Patient educated that our goal is to appropriately discharge patient based off their post-op function while striving to maintain maximal independence   The goal is to discharge patient to home and for them to attend outpatient physical therapy

## 2023-02-20 LAB
DME PARACHUTE DELIVERY DATE ACTUAL: NORMAL
DME PARACHUTE DELIVERY DATE REQUESTED: NORMAL
DME PARACHUTE ITEM DESCRIPTION: NORMAL
DME PARACHUTE ORDER STATUS: NORMAL
DME PARACHUTE SUPPLIER NAME: NORMAL
DME PARACHUTE SUPPLIER PHONE: NORMAL

## 2023-02-23 ENCOUNTER — OFFICE VISIT (OUTPATIENT)
Dept: OBGYN CLINIC | Facility: CLINIC | Age: 58
End: 2023-02-23

## 2023-02-23 VITALS
TEMPERATURE: 98.2 F | SYSTOLIC BLOOD PRESSURE: 137 MMHG | HEIGHT: 64 IN | BODY MASS INDEX: 37.22 KG/M2 | WEIGHT: 218 LBS | DIASTOLIC BLOOD PRESSURE: 92 MMHG | HEART RATE: 77 BPM

## 2023-02-23 DIAGNOSIS — G89.29 CHRONIC PAIN OF LEFT KNEE: ICD-10-CM

## 2023-02-23 DIAGNOSIS — M17.12 PRIMARY OSTEOARTHRITIS OF LEFT KNEE: Primary | ICD-10-CM

## 2023-02-23 DIAGNOSIS — M25.562 CHRONIC PAIN OF LEFT KNEE: ICD-10-CM

## 2023-02-23 RX ORDER — BUPIVACAINE HYDROCHLORIDE 5 MG/ML
6 INJECTION, SOLUTION EPIDURAL; INTRACAUDAL
Status: COMPLETED | OUTPATIENT
Start: 2023-02-23 | End: 2023-02-23

## 2023-02-23 RX ORDER — TRIAMCINOLONE ACETONIDE 40 MG/ML
80 INJECTION, SUSPENSION INTRA-ARTICULAR; INTRAMUSCULAR
Status: COMPLETED | OUTPATIENT
Start: 2023-02-23 | End: 2023-02-23

## 2023-02-23 RX ADMIN — TRIAMCINOLONE ACETONIDE 80 MG: 40 INJECTION, SUSPENSION INTRA-ARTICULAR; INTRAMUSCULAR at 08:22

## 2023-02-23 RX ADMIN — BUPIVACAINE HYDROCHLORIDE 6 ML: 5 INJECTION, SOLUTION EPIDURAL; INTRACAUDAL at 08:22

## 2023-02-23 NOTE — PROGRESS NOTES
Assessment/Plan:  1  Primary osteoarthritis of left knee  Large joint arthrocentesis: L knee      2  Chronic pain of left knee  Large joint arthrocentesis: L knee        Scribe Attestation    I,:  Cale Wiseman am acting as a scribe while in the presence of the attending physician :       I,:  Alex Morris DO personally performed the services described in this documentation    as scribed in my presence :         Abida Pinzon is a pleasant 60-year-old female who returns today for administration of a left knee corticosteroid injection to aid in her recovery following right total knee arthroplasty scheduled next week  She consented to and underwent the injection as documented below without issue or complication  This was well-tolerated by the patient  Postinjection instructions were provided  She is ready for surgery and has no questions or concerns today  We will see her back next week for her procedure  Large joint arthrocentesis: L knee  Universal Protocol:  Consent: Verbal consent obtained    Risks and benefits: risks, benefits and alternatives were discussed  Consent given by: patient  Patient understanding: patient states understanding of the procedure being performed  Site marked: the operative site was marked  Patient identity confirmed: verbally with patient    Supporting Documentation  Indications: pain   Procedure Details  Location: knee - L knee  Preparation: Patient was prepped and draped in the usual sterile fashion  Needle size: 20 G  Ultrasound guidance: no  Approach: anterolateral  Medications administered: 6 mL bupivacaine (PF) 0 5 %; 80 mg triamcinolone acetonide 40 mg/mL    Patient tolerance: patient tolerated the procedure well with no immediate complications  Dressing:  Sterile dressing applied        Subjective: Left knee injection    Patient ID: Marisela Pyle is a 65 y o  female who returns today for administration of a left knee corticosteroid injection to aid in her recovery following right total knee arthroplasty scheduled next week  She continues to experience activity related pain in the left knee  She denies any new injury or trauma  Review of Systems   Constitutional: Positive for activity change  Negative for chills, fever and unexpected weight change  HENT: Negative for hearing loss, nosebleeds and sore throat  Eyes: Negative for pain, redness and visual disturbance  Respiratory: Negative for cough, shortness of breath and wheezing  Cardiovascular: Negative for chest pain, palpitations and leg swelling  Gastrointestinal: Negative for abdominal pain, nausea and vomiting  Endocrine: Negative for polydipsia and polyuria  Genitourinary: Negative for dysuria and hematuria  Musculoskeletal: Positive for arthralgias and myalgias  Negative for joint swelling  See HPI   Skin: Negative for rash and wound  Neurological: Negative for dizziness, numbness and headaches  Psychiatric/Behavioral: Negative for decreased concentration and suicidal ideas  The patient is not nervous/anxious  Past Medical History:   Diagnosis Date   • Allergic rhinitis    • Asthma    • Fibroids    • Physiological ovarian cysts    • Sleep apnea     denatl device worn       Past Surgical History:   Procedure Laterality Date   • COLONOSCOPY  2016    dr Anaya Lopez   • KNEE ARTHROSCOPY Left 2018    meniscectomy x2, first done in 2015   • MYOMECTOMY     • [place] ARTHRS KNE SURG W/MENISCECTOMY MED/LAT W/SHVG Right 1/6/2022    Procedure: KNEE ARTHROSCOPY MENISCECTOMY MEDIAL;  Surgeon: Davon Rivers MD;  Location: [address] [place] [address];  Service: Orthopedics   • [place] COLONOSCOPY FLX DX W/COLLJ SPEC WHEN PFRMD N/A 9/13/2018    Procedure: COLONOSCOPY;  Surgeon: Marisela Neff MD;  Location: [place] [address] GI LAB;   Service: Gastroenterology   • WRIST SURGERY Right        Family History   Problem Relation Age of Onset   • Osteoporosis Mother    • Thyroid disease Mother    • Colon cancer Mother 79   • Colon polyps Mother    • Cancer Mother         colon   • Alzheimer's disease Father    • Hypertension Father    • Dementia Father    • Heart disease Brother    • No Known Problems Daughter    • No Known Problems Daughter    • No Known Problems Maternal Aunt    • No Known Problems Maternal Aunt    • No Known Problems Paternal Aunt    • No Known Problems Paternal Aunt    • No Known Problems Paternal Aunt    • No Known Problems Paternal Aunt    • No Known Problems Paternal Aunt    • No Known Problems Paternal Aunt    • No Known Problems Paternal Aunt    • No Known Problems Paternal Aunt        Social History     Occupational History   • Not on file   Tobacco Use   • Smoking status: Never   • Smokeless tobacco: Never   Vaping Use   • Vaping Use: Never used   Substance and Sexual Activity   • Alcohol use: Yes     Comment: social - wine 2 x month   • Drug use: Never   • Sexual activity: Yes     Partners: Male     Birth control/protection: Condom Male         Current Outpatient Medications:   •  acetaminophen (TYLENOL) 325 mg tablet, Take 650 mg by mouth every 6 (six) hours as needed for mild pain, Disp: , Rfl:   •  albuterol (2 5 mg/3 mL) 0 083 % nebulizer solution, Take 1 vial (2 5 mg total) by nebulization every 6 (six) hours as needed for wheezing or shortness of breath, Disp: 100 mL, Rfl: 0  •  ascorbic acid (VITAMIN C) 500 MG tablet, Take 1 tablet (500 mg total) by mouth 2 (two) times a day, Disp: 60 tablet, Rfl: 0  •  Calcium Carb-Cholecalciferol 600-500 MG-UNIT CAPS, Take by mouth, Disp: , Rfl:   •  cholecalciferol (VITAMIN D3) 1,000 units tablet, Take 1,000 Units by mouth daily, Disp: , Rfl:   •  cholecalciferol (VITAMIN D3) 1,000 units tablet, Take 1 tablet (1,000 Units total) by mouth daily, Disp: 30 tablet, Rfl: 0  •  ferrous sulfate 324 (65 Fe) mg, Take 1 tablet (324 mg total) by mouth daily before breakfast, Disp: 30 tablet, Rfl: 0  •  fluticasone-salmeterol (ADVAIR DISKUS) 500-50 mcg/dose inhaler, Inhale 1 puff 2 (two) times a day, Disp: 1 Inhaler, Rfl: 2  •  folic acid (FOLVITE) 1 mg tablet, Take 1 tablet (1 mg total) by mouth daily, Disp: 30 tablet, Rfl: 0  •  multivitamin (THERAGRAN) TABS, Take 1 tablet by mouth daily, Disp: , Rfl:   •  Respiratory Therapy Supplies (NEBULIZER/TUBING/MOUTHPIECE) KIT, by Does not apply route 4 (four) times a day, Disp: 1 each, Rfl: 0  •  zinc sulfate (ZINCATE) 220 mg capsule, Take 1 capsule (220 mg total) by mouth daily, Disp: 30 capsule, Rfl: 0  •  al mag oxide-diphenhydramine-lidocaine viscous (MAGIC MOUTHWASH) 1:1:1 suspension, Swish and spit 10 mL every 4 (four) hours as needed for mouth pain or discomfort (Patient not taking: Reported on 2/9/2023), Disp: 300 mL, Rfl: 0  •  Omega-3 Fatty Acids (fish oil) 1,000 mg, Take 1,000 mg by mouth daily Last dose 12/28/21  (Patient not taking: Reported on 2/23/2023), Disp: , Rfl:   •  valACYclovir (VALTREX) 1,000 mg tablet, 2 tabs at earliest onset of cold sore, repeat once in 12 hours  Take 500mg bid for 3 days for genital herpes flare up , Disp: 40 tablet, Rfl: 5    No Known Allergies    Objective:  Vitals:    02/23/23 0801   BP: 137/92   Pulse: 77   Temp: 98 2 °F (36 8 °C)       Body mass index is 37 42 kg/m²  Left Knee Exam     Tenderness   The patient is experiencing tenderness in the lateral joint line, medial joint line, patella and pes anserinus  Range of Motion   Extension: 0   Flexion: 130 (with pain and crepitation)     Tests   Varus: negative Valgus: negative  Patellar apprehension: negative    Other   Erythema: absent  Scars: absent  Sensation: normal  Pulse: present  Swelling: none  Effusion: no effusion present    Comments:  Genu valgum  Stable at 0, 30, 90 degrees            Observations   Left Knee   Negative for effusion  Physical Exam  Vitals and nursing note reviewed  Constitutional:       Appearance: Normal appearance  She is well-developed  HENT:      Head: Normocephalic and atraumatic        Right Ear: External ear normal       Left Ear: External ear normal    Eyes:      General: No scleral icterus  Extraocular Movements: Extraocular movements intact  Conjunctiva/sclera: Conjunctivae normal    Cardiovascular:      Rate and Rhythm: Normal rate  Pulmonary:      Effort: Pulmonary effort is normal  No respiratory distress  Musculoskeletal:      Cervical back: Normal range of motion and neck supple  Left knee: No effusion  Comments: See Ortho exam   Skin:     General: Skin is warm and dry  Neurological:      Mental Status: She is alert and oriented to person, place, and time  Psychiatric:         Behavior: Behavior normal        This document was created using speech voice recognition software  Grammatical errors, random word insertions, pronoun errors, and incomplete sentences are an occasional consequence of this system due to software limitations, ambient noise, and hardware issues  Any formal questions or concerns about content, text, or information contained within the body of this dictation should be directly addressed to the provider for clarification

## 2023-02-24 ENCOUNTER — TELEPHONE (OUTPATIENT)
Dept: OBGYN CLINIC | Facility: CLINIC | Age: 58
End: 2023-02-24

## 2023-02-25 PROBLEM — R73.03 PREDIABETES: Status: RESOLVED | Noted: 2021-09-01 | Resolved: 2023-02-25

## 2023-02-25 PROBLEM — G89.29 CHRONIC PAIN OF RIGHT KNEE: Status: ACTIVE | Noted: 2023-02-25

## 2023-02-25 PROBLEM — B34.9 VIRAL INFECTION, UNSPECIFIED: Status: RESOLVED | Noted: 2022-02-09 | Resolved: 2023-02-25

## 2023-02-25 PROBLEM — M25.561 CHRONIC PAIN OF RIGHT KNEE: Status: ACTIVE | Noted: 2023-02-25

## 2023-02-27 ENCOUNTER — EVALUATION (OUTPATIENT)
Dept: PHYSICAL THERAPY | Facility: CLINIC | Age: 58
End: 2023-02-27

## 2023-02-27 ENCOUNTER — OFFICE VISIT (OUTPATIENT)
Dept: FAMILY MEDICINE CLINIC | Facility: CLINIC | Age: 58
End: 2023-02-27

## 2023-02-27 VITALS
HEART RATE: 93 BPM | OXYGEN SATURATION: 96 % | DIASTOLIC BLOOD PRESSURE: 84 MMHG | HEIGHT: 64 IN | BODY MASS INDEX: 36.7 KG/M2 | WEIGHT: 215 LBS | RESPIRATION RATE: 16 BRPM | TEMPERATURE: 99.7 F | SYSTOLIC BLOOD PRESSURE: 132 MMHG

## 2023-02-27 DIAGNOSIS — B00.2 HERPES GINGIVOSTOMATITIS: ICD-10-CM

## 2023-02-27 DIAGNOSIS — M25.561 CHRONIC PAIN OF RIGHT KNEE: Primary | ICD-10-CM

## 2023-02-27 DIAGNOSIS — J45.40 MODERATE PERSISTENT ASTHMA WITHOUT COMPLICATION: ICD-10-CM

## 2023-02-27 DIAGNOSIS — Z13.89 SCREENING FOR CARDIOVASCULAR, RESPIRATORY, AND GENITOURINARY DISEASES: ICD-10-CM

## 2023-02-27 DIAGNOSIS — M25.561 CHRONIC PAIN OF RIGHT KNEE: ICD-10-CM

## 2023-02-27 DIAGNOSIS — Z12.31 BREAST CANCER SCREENING BY MAMMOGRAM: ICD-10-CM

## 2023-02-27 DIAGNOSIS — M17.11 PRIMARY OSTEOARTHRITIS OF RIGHT KNEE: ICD-10-CM

## 2023-02-27 DIAGNOSIS — G89.29 CHRONIC PAIN OF RIGHT KNEE: Primary | ICD-10-CM

## 2023-02-27 DIAGNOSIS — Z13.83 SCREENING FOR CARDIOVASCULAR, RESPIRATORY, AND GENITOURINARY DISEASES: ICD-10-CM

## 2023-02-27 DIAGNOSIS — G89.29 CHRONIC PAIN OF RIGHT KNEE: ICD-10-CM

## 2023-02-27 DIAGNOSIS — Z01.818 PRE-OP EXAM: ICD-10-CM

## 2023-02-27 DIAGNOSIS — E66.01 MORBID OBESITY (HCC): ICD-10-CM

## 2023-02-27 DIAGNOSIS — Z01.818 PREOPERATIVE CLEARANCE: Primary | ICD-10-CM

## 2023-02-27 DIAGNOSIS — Z13.6 SCREENING FOR CARDIOVASCULAR, RESPIRATORY, AND GENITOURINARY DISEASES: ICD-10-CM

## 2023-02-27 RX ORDER — FLUTICASONE PROPIONATE AND SALMETEROL 500; 50 UG/1; UG/1
1 POWDER RESPIRATORY (INHALATION) 2 TIMES DAILY
Qty: 180 BLISTER | Refills: 1 | Status: SHIPPED | OUTPATIENT
Start: 2023-02-27 | End: 2024-02-22

## 2023-02-27 NOTE — PROGRESS NOTES
PT Evaluation   Today's date: 2023  Patient name: Amanda Roldan  : 1965  MRN: [id number]  Referring provider: Leonard Burrell DO  Dx:   Encounter Diagnosis     ICD-10-CM    1  Chronic pain of right knee  M25 [id number] Ambulatory referral to Physical Therapy    G89 [id number]       2  Primary osteoarthritis of right knee  M17 11 Ambulatory referral to Physical Therapy      3  Pre-op exam  Z01 [id number] Ambulatory referral to Physical Therapy            Assessment  Assessment details: Patient is a 65 y o  Female who presents to skilled outpatient PT for pre-operative evaluation prior to knee replacement scheduled for 3/6/23  Discussed DOS and patient’s questions were answered to patient’s satisfaction  Mobility/ROM results per above  Strength results per above  Balance/Gait (including Timed Up & Go) per above  Home Assessment was reviewed with patient  Home Preparation Checklist was reviewed with patient including identification of care partner and encouragement of single level set-up  Post-operative pain management expectations discussed to the patient’s satisfaction  Post-operative gait training for level ground, stairs, and car transfers was performed   Patient demonstrated competence with immediate post-operative home exercise program     Patient education performed during today's session included: Signs and symptoms of infection (including redness, warmth, drainage, swelling), Importance of keeping incision dry, per MD order, Home safety checklist, which was signed by both parties and uploaded into patient's chart and Topics of stair negotiation, ambulation with use of appropriate assistive device, and car transfers also reviewed    Impairments: Abnormal gait, Abnormal muscle tone, Abnormal or restricted ROM, Activity intolerance, Impaired physical strength, Lacks appropriate HEP and Weight-bearing intolerance  Understanding of Dx/Px/POC: Good  Prognosis: Good    Patient verbalized understanding of POC  Please contact me if you have any questions or recommendations  Thank you for the referral and the opportunity to share in [address] care          Plan  Patient would benefit from: Skilled PT  Planned modality interventions: Cryotherapy  Planned therapy interventions: Balance, Balance/WB training, Body mechanics training, Functional ROM exercises, Gait training, HEP, Joint mobilization, Manual therapy, Neuromuscular re-education, Patient education, Strengthening, Stretching, Therapeutic activities, Therapeutic exercises, and Therapeutic training  Frequency: 3x/week for 2 weeks, 2x/week for 4 weeks and more as needed  Duration in weeks: 10 weeks  Plan of Care beginning date: 23  Plan of Care expiration date: 5 15 23  Treatment plan discussed with: Patient     Goals  Short Term Goals (4 weeks):    - Patient will improve time on TUG by 2 9 seconds from 14 seconds to 11 1 seconds to facilitate improved safety in all ambulation  - Patient will be independent in basic HEP 2-3 weeks  - Patient will demonstrate >100 degrees of knee ROM for reciprocal stair negotiation  - Patient will have 0/10 pain at rest  - Patient will demonstrate >1/3 improvement in MMT grade as applicable    Long Term Goals (8 weeks):  - Patient will be independent in a comprehensive home exercise program  - Patient will ambulation with AD PRN  - Patient will independently ambulate >1000 feet (community ambulation)  - Patient will self-report >75% improvement in function  - Patient functional goal of normal stair navigation    Subjective    History of Present Illness  - Mechanism of injury: Patient is presenting pre op appointment for Left TKA scheduled for 3/6/23    - Primary AD: none  - Assist level at home:  and mother    Pain  - Current pain ratin/10  - At best pain ratin/10  - At worst pain rating: 10/10  - Quality: sharp with sleep disturbances  - Location: medial compartment  - Aggravating factors: stair navigation (step to pattern)driving long distances, walking, and squatting    Social Support  - Steps to enter house: 3 no railing  - Stairs in house: 0   - Bedroom/bathroom set up: 1st floor, bench in walk in shower, grab bars   - DME available: walker and cane  - Lives in: single story home  - Lives with:     Objective     LE MMT    L Hip Flexion: 4 R Hip Flexion: 4   L Hip Extension: 3+ R Hip Extension: 3+   L Knee Extension: 4- R Knee Extension: 3+   L Knee Flexion: 4 R Knee Flexion: 3+   L Ankle DF: 5 R Ankle DF: 5   L Ankle PF: 5  R Ankle PF: 5     LE ROM    L knee flexion: 122 degrees R knee flexion: 115 degrees   L knee extension: WNL degrees R knee extension: WNL degrees     Sensation  - Light touch: not performed    Skin Check  - not performed today    Knee Comments  - Gait Assessment:   - TU seconds without AD  - Stair Negotiation:  Step to gait pattern lead with LLE        Insurance:  AMA/CMS Eval/ Re-eval POC expires Chang Ernie #/ Referral # Total   {Visits/Units) Start date  Expiration date Extension  Visit limitation? PT only or  PT+OT? Co-Insurance   AMA  5 15 23                            AUTH #:  Date                Used                Remaining                     Precautions:   Past Medical History:   Diagnosis Date   • Allergic rhinitis    • Asthma    • Fibroids    • Physiological ovarian cysts    • Sleep apnea     denatl device worn     Patient provided verbal consent to treatment plan and recommended interventions  Date 2023        Visit Number IE                 ROM         Knee Flexion 118        Knee Extension 0                 TUG 14 sec                   Manual         Patellar mobs         STM                  Neuro Re-ed         Quad set  10        Glute set 10                                   TherEx         Knee/Hip PROM         Active w/u         Ankle pumps 10        SLR         Hip abduction 10 LAQ 10        Heel slides 10        SAQ 10                                                     TherAct         Patient education FB        Stair negotiation FB        Car transfer FB                          Gait Training         With AD         No AD                  Modalities         CP

## 2023-02-27 NOTE — PROGRESS NOTES
Assessment/Plan:    No problem-specific Assessment & Plan notes found for this encounter  Medically cleared for surgery    Asthma stable  bmi aware, lost wt preop  hsv labialis stable     Diagnoses and all orders for this visit:    Preoperative clearance    Moderate persistent asthma without complication  -     Fluticasone-Salmeterol (Advair Diskus) 500-50 mcg/dose inhaler; Inhale 1 puff 2 (two) times a day Rinse mouth after use  Morbid obesity (HCC)    Herpes gingivostomatitis    Chronic pain of right knee    Breast cancer screening by mammogram  -     Mammo screening bilateral w 3d & cad; Future    Screening for cardiovascular, respiratory, and genitourinary diseases  -     Lipid Panel with Direct LDL reflex; Future              Return if symptoms worsen or fail to improve  Subjective:      Patient ID: Brendon Ritter is a 65 y o  female  Chief Complaint   Patient presents with   • Pre-op Exam   • Knee Pain     Right       Sas/cma       HPI    Planned procedure:  Right total knee   Surgeon:  Dr Paul George  Date:  3/6/23  Anesthesia type:  unstated    Prior major surgeries:  Right wrist ORIF, b/l arthroscopy knees  Problems with anesthesia in past:  n    Can walk 4 blocks or 2 flights of stairs:  y except mechanically  History of excessive bleeding:  n  History of excessive clotting:  nn  Personal history of DVT or Pe:  n    Taking NSAIDS:  advil  Takings vitamins D or E or A or fish oil supplements:  Fish oil on hold    Usual am medications:  advair    PAT labs ok, cxr wnl, ekg unchanged    The following portions of the patient's history were reviewed and updated as appropriate: allergies, current medications, past family history, past medical history, past social history, past surgical history and problem list     Review of Systems   Constitutional: Negative for fever  Respiratory: Negative for shortness of breath            Current Outpatient Medications   Medication Sig Dispense Refill   • acetaminophen (TYLENOL) 325 mg tablet Take 650 mg by mouth every 6 (six) hours as needed for mild pain     • albuterol (2 5 mg/3 mL) 0 083 % nebulizer solution Take 1 vial (2 5 mg total) by nebulization every 6 (six) hours as needed for wheezing or shortness of breath 100 mL 0   • ascorbic acid (VITAMIN C) 500 MG tablet Take 1 tablet (500 mg total) by mouth 2 (two) times a day 60 tablet 0   • Calcium Carb-Cholecalciferol 600-500 MG-UNIT CAPS Take by mouth     • cholecalciferol (VITAMIN D3) 1,000 units tablet Take 1,000 Units by mouth daily     • cholecalciferol (VITAMIN D3) 1,000 units tablet Take 1 tablet (1,000 Units total) by mouth daily 30 tablet 0   • ferrous sulfate 324 (65 Fe) mg Take 1 tablet (324 mg total) by mouth daily before breakfast 30 tablet 0   • Fluticasone-Salmeterol (Advair Diskus) 500-50 mcg/dose inhaler Inhale 1 puff 2 (two) times a day Rinse mouth after use  180 blister 1   • folic acid (FOLVITE) 1 mg tablet Take 1 tablet (1 mg total) by mouth daily 30 tablet 0   • multivitamin (THERAGRAN) TABS Take 1 tablet by mouth daily     • Omega-3 Fatty Acids (fish oil) 1,000 mg Take 1,000 mg by mouth daily Last dose 12/28/21     • Respiratory Therapy Supplies (NEBULIZER/TUBING/MOUTHPIECE) KIT by Does not apply route 4 (four) times a day 1 each 0   • zinc sulfate (ZINCATE) 220 mg capsule Take 1 capsule (220 mg total) by mouth daily 30 capsule 0   • valACYclovir (VALTREX) 1,000 mg tablet 2 tabs at earliest onset of cold sore, repeat once in 12 hours  Take 500mg bid for 3 days for genital herpes flare up  40 tablet 5     No current facility-administered medications for this visit  Objective:    /84   Pulse 93   Temp 99 7 °F (37 6 °C)   Resp 16   Ht 5' 4" (1 626 m)   Wt 97 5 kg (215 lb)   LMP 04/28/2021   SpO2 96%   BMI 36 90 kg/m²        Physical Exam  Vitals and nursing note reviewed  Constitutional:       Appearance: She is well-developed  She is obese  She is not ill-appearing  HENT:      Head: Normocephalic  Eyes:      General: No scleral icterus  Conjunctiva/sclera: Conjunctivae normal    Cardiovascular:      Rate and Rhythm: Normal rate and regular rhythm  Pulmonary:      Effort: Pulmonary effort is normal  No respiratory distress  Breath sounds: No wheezing  Abdominal:      Palpations: Abdomen is soft  Musculoskeletal:         General: No deformity  Cervical back: Neck supple  Right lower leg: No edema  Left lower leg: No edema  Skin:     General: Skin is warm and dry  Coloration: Skin is not pale  Neurological:      Mental Status: She is alert  Gait: Gait abnormal    Psychiatric:         Mood and Affect: Mood normal          Behavior: Behavior normal          Thought Content:  Thought content normal                 Araseli Shine, DO

## 2023-02-28 NOTE — PRE-PROCEDURE INSTRUCTIONS
My Surgical Experience    The following information was developed to assist you to prepare for your operation  What do I need to do before coming to the hospital?  • Arrange for a responsible person to drive you to and from the hospital   • Arrange care for your children at home  Children are not allowed in the recovery areas of the hospital  • Plan to wear clothing that is easy to put on and take off  If you are having shoulder surgery, wear a shirt that buttons or zippers in the front  Bathing  o Shower the evening before and the morning of your surgery with an antibacterial soap  Please refer to the Pre Op Showering Instructions for Surgery Patients Sheet   o Remove nail polish and all body piercing jewelry  o Do not shave any body part for at least 24 hours before surgery-this includes face, arms, legs and upper body  Food  o Nothing to eat or drink after midnight the night before your surgery  This includes candy and chewing gum  o Exception: If your surgery is after 12:00pm (noon), you may have clear liquids such as 7-Up®, ginger ale, apple or cranberry juice, Jell-O®, water, or clear broth until 8:00 am  o Do not drink milk or juice with pulp on the morning before surgery  o Do not drink alcohol 24 hours before surgery  Medicine  o Follow instructions you received from your surgeon about which medicines you may take on the day of surgery  o If instructed to take medicine on the morning of surgery, take pills with just a small sip of water  Call your prescribing doctor for specific infroamtion on what to do if you take insulin    What should I bring to the hospital?    Bring:  • Crutches or a walker, if you have them, for foot or knee surgery  • A list of the daily medicines, vitamins, minerals, herbals and nutritional supplements you take   Include the dosages of medicines and the time you take them each day  • Glasses, dentures or hearing aids  • Minimal clothing; you will be wearing hospital sleepwear  • Photo ID; required to verify your identity  • If you have a Living Will or Power of , bring a copy of the documents  • If you have an ostomy, bring an extra pouch and any supplies you use    Do not bring  • Medicines or inhalers  • Money, valuables or jewelry    What other information should I know about the day of surgery? • Notify your surgeons if you develop a cold, sore throat, cough, fever, rash or any other illness  • Report to the Ambulatory Surgical/Same Day Surgery Unit  • You will be instructed to stop at Registration only if you have not been pre-registered  • Inform your  fi they do not stay that they will be asked by the staff to leave a phone number where they can be reached  • Be available to be reached before surgery  In the event the operating room schedule changes, you may be asked to come in earlier or later than expected    *It is important to tell your doctor and others involved in your health care if you are taking or have been taking any non-prescription drugs, vitamins, minerals, herbals or other nutritional supplements  Any of these may interact with some food or medicines and cause a reaction      Pre-Surgery Instructions:   Medication Instructions   • acetaminophen (TYLENOL) 325 mg tablet Hold day of surgery  • albuterol (2 5 mg/3 mL) 0 083 % nebulizer solution Hold day of surgery  • ascorbic acid (VITAMIN C) 500 MG tablet Hold day of surgery  • Calcium Carb-Cholecalciferol 600-500 MG-UNIT CAPS Hold day of surgery  • cholecalciferol (VITAMIN D3) 1,000 units tablet Hold day of surgery  • cholecalciferol (VITAMIN D3) 1,000 units tablet Hold day of surgery  • ferrous sulfate 324 (65 Fe) mg Take day of surgery  • Fluticasone-Salmeterol (Advair Diskus) 500-50 mcg/dose inhaler Hold day of surgery  • folic acid (FOLVITE) 1 mg tablet Hold day of surgery  • multivitamin (THERAGRAN) TABS Hold day of surgery     • Omega-3 Fatty Acids (fish oil) 1,000 mg Stop taking 7 days prior to surgery  • Respiratory Therapy Supplies (NEBULIZER/TUBING/MOUTHPIECE) KIT Uses PRN- OK to take day of surgery   • valACYclovir (VALTREX) 1,000 mg tablet Hold day of surgery  • zinc sulfate (ZINCATE) 220 mg capsule Hold day of surgery

## 2023-03-03 ENCOUNTER — ANESTHESIA EVENT (OUTPATIENT)
Dept: PERIOP | Facility: HOSPITAL | Age: 58
End: 2023-03-03

## 2023-03-06 ENCOUNTER — HOSPITAL ENCOUNTER (OUTPATIENT)
Facility: HOSPITAL | Age: 58
Setting detail: OUTPATIENT SURGERY
Discharge: HOME | End: 2023-03-06
Attending: ORTHOPAEDIC SURGERY | Admitting: ORTHOPAEDIC SURGERY

## 2023-03-06 ENCOUNTER — APPOINTMENT (OUTPATIENT)
Dept: RADIOLOGY | Facility: HOSPITAL | Age: 58
End: 2023-03-06

## 2023-03-06 ENCOUNTER — ANESTHESIA (OUTPATIENT)
Dept: PERIOP | Facility: HOSPITAL | Age: 58
End: 2023-03-06

## 2023-03-06 VITALS
OXYGEN SATURATION: 95 % | HEART RATE: 78 BPM | BODY MASS INDEX: 35.92 KG/M2 | HEIGHT: 64 IN | DIASTOLIC BLOOD PRESSURE: 78 MMHG | TEMPERATURE: 96.3 F | SYSTOLIC BLOOD PRESSURE: 124 MMHG | WEIGHT: 210.4 LBS | RESPIRATION RATE: 16 BRPM

## 2023-03-06 DIAGNOSIS — Z96.651 S/P TOTAL KNEE REPLACEMENT NOT USING CEMENT, RIGHT: Primary | ICD-10-CM

## 2023-03-06 LAB — GLUCOSE SERPL-MCNC: 122 MG/DL (ref 65–140)

## 2023-03-06 DEVICE — ATTUNE KNEE SYSTEM FEMORAL POROCOAT CRUCIATE RETAINING SIZE 4 RIGHT CEMENTLESS
Type: IMPLANTABLE DEVICE | Site: KNEE | Status: FUNCTIONAL
Brand: ATTUNE

## 2023-03-06 DEVICE — ATTUNE KNEE SYSTEM TIBIAL INSERT FIXED BEARING MEDIAL STABILIZED RIGHT AOX 4, 7MM
Type: IMPLANTABLE DEVICE | Site: KNEE | Status: FUNCTIONAL
Brand: ATTUNE

## 2023-03-06 DEVICE — ATTUNE KNEE SYSTEM TIBIAL BASE AFFIXIUM FIXED BEARING SIZE 4
Type: IMPLANTABLE DEVICE | Site: KNEE | Status: FUNCTIONAL
Brand: ATTUNE AFFIXIUM

## 2023-03-06 RX ORDER — ACETAMINOPHEN 325 MG/1
975 TABLET ORAL EVERY 8 HOURS
Status: DISCONTINUED | OUTPATIENT
Start: 2023-03-06 | End: 2023-03-06 | Stop reason: HOSPADM

## 2023-03-06 RX ORDER — ACETAMINOPHEN 325 MG/1
975 TABLET ORAL ONCE
Status: COMPLETED | OUTPATIENT
Start: 2023-03-06 | End: 2023-03-06

## 2023-03-06 RX ORDER — CELECOXIB 100 MG/1
100 CAPSULE ORAL 2 TIMES DAILY
Status: DISCONTINUED | OUTPATIENT
Start: 2023-03-06 | End: 2023-03-06 | Stop reason: HOSPADM

## 2023-03-06 RX ORDER — BUPIVACAINE HYDROCHLORIDE 2.5 MG/ML
INJECTION, SOLUTION EPIDURAL; INFILTRATION; INTRACAUDAL AS NEEDED
Status: DISCONTINUED | OUTPATIENT
Start: 2023-03-06 | End: 2023-03-06 | Stop reason: HOSPADM

## 2023-03-06 RX ORDER — BUPIVACAINE HYDROCHLORIDE 7.5 MG/ML
INJECTION, SOLUTION INTRASPINAL AS NEEDED
Status: DISCONTINUED | OUTPATIENT
Start: 2023-03-06 | End: 2023-03-06

## 2023-03-06 RX ORDER — ASPIRIN 325 MG
325 TABLET ORAL 2 TIMES DAILY
Qty: 84 TABLET | Refills: 0 | Status: SHIPPED | OUTPATIENT
Start: 2023-03-06 | End: 2023-04-17

## 2023-03-06 RX ORDER — DEXAMETHASONE SODIUM PHOSPHATE 4 MG/ML
INJECTION, SOLUTION INTRA-ARTICULAR; INTRALESIONAL; INTRAMUSCULAR; INTRAVENOUS; SOFT TISSUE AS NEEDED
Status: DISCONTINUED | OUTPATIENT
Start: 2023-03-06 | End: 2023-03-06

## 2023-03-06 RX ORDER — OXYCODONE HYDROCHLORIDE 5 MG/1
TABLET ORAL
Qty: 60 TABLET | Refills: 0 | Status: SHIPPED | OUTPATIENT
Start: 2023-03-06

## 2023-03-06 RX ORDER — CEFAZOLIN SODIUM 2 G/50ML
2000 SOLUTION INTRAVENOUS ONCE
Status: COMPLETED | OUTPATIENT
Start: 2023-03-06 | End: 2023-03-06

## 2023-03-06 RX ORDER — ONDANSETRON 2 MG/ML
4 INJECTION INTRAMUSCULAR; INTRAVENOUS EVERY 6 HOURS PRN
Status: DISCONTINUED | OUTPATIENT
Start: 2023-03-06 | End: 2023-03-06 | Stop reason: HOSPADM

## 2023-03-06 RX ORDER — FENTANYL CITRATE/PF 50 MCG/ML
25 SYRINGE (ML) INJECTION
Status: DISCONTINUED | OUTPATIENT
Start: 2023-03-06 | End: 2023-03-06 | Stop reason: HOSPADM

## 2023-03-06 RX ORDER — LIDOCAINE HYDROCHLORIDE 10 MG/ML
INJECTION, SOLUTION EPIDURAL; INFILTRATION; INTRACAUDAL; PERINEURAL AS NEEDED
Status: DISCONTINUED | OUTPATIENT
Start: 2023-03-06 | End: 2023-03-06

## 2023-03-06 RX ORDER — ONDANSETRON 2 MG/ML
INJECTION INTRAMUSCULAR; INTRAVENOUS AS NEEDED
Status: DISCONTINUED | OUTPATIENT
Start: 2023-03-06 | End: 2023-03-06

## 2023-03-06 RX ORDER — OXYCODONE HYDROCHLORIDE 5 MG/1
10 TABLET ORAL EVERY 4 HOURS PRN
Status: DISCONTINUED | OUTPATIENT
Start: 2023-03-06 | End: 2023-03-06 | Stop reason: HOSPADM

## 2023-03-06 RX ORDER — ALBUTEROL SULFATE 2.5 MG/3ML
2.5 SOLUTION RESPIRATORY (INHALATION) EVERY 6 HOURS PRN
Status: DISCONTINUED | OUTPATIENT
Start: 2023-03-06 | End: 2023-03-06 | Stop reason: HOSPADM

## 2023-03-06 RX ORDER — GABAPENTIN 100 MG/1
200 CAPSULE ORAL 2 TIMES DAILY
Status: DISCONTINUED | OUTPATIENT
Start: 2023-03-06 | End: 2023-03-06 | Stop reason: HOSPADM

## 2023-03-06 RX ORDER — HYDROMORPHONE HCL/PF 1 MG/ML
0.25 SYRINGE (ML) INJECTION
Status: DISCONTINUED | OUTPATIENT
Start: 2023-03-06 | End: 2023-03-06 | Stop reason: HOSPADM

## 2023-03-06 RX ORDER — PROPOFOL 10 MG/ML
INJECTION, EMULSION INTRAVENOUS CONTINUOUS PRN
Status: DISCONTINUED | OUTPATIENT
Start: 2023-03-06 | End: 2023-03-06

## 2023-03-06 RX ORDER — MAGNESIUM HYDROXIDE 1200 MG/15ML
LIQUID ORAL AS NEEDED
Status: DISCONTINUED | OUTPATIENT
Start: 2023-03-06 | End: 2023-03-06 | Stop reason: HOSPADM

## 2023-03-06 RX ORDER — SODIUM CHLORIDE, SODIUM LACTATE, POTASSIUM CHLORIDE, CALCIUM CHLORIDE 600; 310; 30; 20 MG/100ML; MG/100ML; MG/100ML; MG/100ML
75 INJECTION, SOLUTION INTRAVENOUS CONTINUOUS
Status: DISCONTINUED | OUTPATIENT
Start: 2023-03-06 | End: 2023-03-06 | Stop reason: HOSPADM

## 2023-03-06 RX ORDER — PANTOPRAZOLE SODIUM 40 MG/1
40 TABLET, DELAYED RELEASE ORAL DAILY
Status: DISCONTINUED | OUTPATIENT
Start: 2023-03-06 | End: 2023-03-06 | Stop reason: HOSPADM

## 2023-03-06 RX ORDER — OXYCODONE HYDROCHLORIDE 5 MG/1
5 TABLET ORAL EVERY 4 HOURS PRN
Status: DISCONTINUED | OUTPATIENT
Start: 2023-03-06 | End: 2023-03-06 | Stop reason: HOSPADM

## 2023-03-06 RX ORDER — SODIUM CHLORIDE, SODIUM LACTATE, POTASSIUM CHLORIDE, CALCIUM CHLORIDE 600; 310; 30; 20 MG/100ML; MG/100ML; MG/100ML; MG/100ML
125 INJECTION, SOLUTION INTRAVENOUS CONTINUOUS
Status: DISCONTINUED | OUTPATIENT
Start: 2023-03-06 | End: 2023-03-06

## 2023-03-06 RX ORDER — CHLORHEXIDINE GLUCONATE 0.12 MG/ML
15 RINSE ORAL ONCE
Status: COMPLETED | OUTPATIENT
Start: 2023-03-06 | End: 2023-03-06

## 2023-03-06 RX ORDER — GABAPENTIN 300 MG/1
300 CAPSULE ORAL ONCE
Status: COMPLETED | OUTPATIENT
Start: 2023-03-06 | End: 2023-03-06

## 2023-03-06 RX ORDER — PROPOFOL 10 MG/ML
INJECTION, EMULSION INTRAVENOUS AS NEEDED
Status: DISCONTINUED | OUTPATIENT
Start: 2023-03-06 | End: 2023-03-06

## 2023-03-06 RX ORDER — MIDAZOLAM HYDROCHLORIDE 2 MG/2ML
INJECTION, SOLUTION INTRAMUSCULAR; INTRAVENOUS AS NEEDED
Status: DISCONTINUED | OUTPATIENT
Start: 2023-03-06 | End: 2023-03-06

## 2023-03-06 RX ORDER — CEFAZOLIN SODIUM 2 G/50ML
2000 SOLUTION INTRAVENOUS EVERY 6 HOURS
Status: COMPLETED | OUTPATIENT
Start: 2023-03-06 | End: 2023-03-06

## 2023-03-06 RX ORDER — OXYCODONE HCL 10 MG/1
10 TABLET, FILM COATED, EXTENDED RELEASE ORAL ONCE
Status: COMPLETED | OUTPATIENT
Start: 2023-03-06 | End: 2023-03-06

## 2023-03-06 RX ORDER — DOCUSATE SODIUM 100 MG/1
100 CAPSULE, LIQUID FILLED ORAL 2 TIMES DAILY
Qty: 60 CAPSULE | Refills: 0 | Status: SHIPPED | OUTPATIENT
Start: 2023-03-06

## 2023-03-06 RX ORDER — ASPIRIN 325 MG
325 TABLET ORAL 2 TIMES DAILY
Status: DISCONTINUED | OUTPATIENT
Start: 2023-03-06 | End: 2023-03-06 | Stop reason: HOSPADM

## 2023-03-06 RX ORDER — ACETAMINOPHEN 325 MG/1
975 TABLET ORAL EVERY 8 HOURS
Refills: 0
Start: 2023-03-06

## 2023-03-06 RX ORDER — CELECOXIB 100 MG/1
100 CAPSULE ORAL 2 TIMES DAILY
Qty: 28 CAPSULE | Refills: 0 | Status: SHIPPED | OUTPATIENT
Start: 2023-03-06

## 2023-03-06 RX ORDER — ONDANSETRON 2 MG/ML
4 INJECTION INTRAMUSCULAR; INTRAVENOUS ONCE AS NEEDED
Status: DISCONTINUED | OUTPATIENT
Start: 2023-03-06 | End: 2023-03-06

## 2023-03-06 RX ORDER — HYDROMORPHONE HCL/PF 1 MG/ML
0.5 SYRINGE (ML) INJECTION EVERY 2 HOUR PRN
Status: DISCONTINUED | OUTPATIENT
Start: 2023-03-06 | End: 2023-03-06 | Stop reason: HOSPADM

## 2023-03-06 RX ORDER — DOCUSATE SODIUM 100 MG/1
100 CAPSULE, LIQUID FILLED ORAL 2 TIMES DAILY
Status: DISCONTINUED | OUTPATIENT
Start: 2023-03-06 | End: 2023-03-06 | Stop reason: HOSPADM

## 2023-03-06 RX ORDER — MAGNESIUM HYDROXIDE/ALUMINUM HYDROXICE/SIMETHICONE 120; 1200; 1200 MG/30ML; MG/30ML; MG/30ML
30 SUSPENSION ORAL EVERY 6 HOURS PRN
Status: DISCONTINUED | OUTPATIENT
Start: 2023-03-06 | End: 2023-03-06 | Stop reason: HOSPADM

## 2023-03-06 RX ADMIN — DEXAMETHASONE SODIUM PHOSPHATE 4 MG: 4 INJECTION, SOLUTION INTRA-ARTICULAR; INTRALESIONAL; INTRAMUSCULAR; INTRAVENOUS; SOFT TISSUE at 09:15

## 2023-03-06 RX ADMIN — CEFAZOLIN SODIUM 2000 MG: 2 SOLUTION INTRAVENOUS at 14:54

## 2023-03-06 RX ADMIN — MIDAZOLAM 2 MG: 1 INJECTION INTRAMUSCULAR; INTRAVENOUS at 09:00

## 2023-03-06 RX ADMIN — CHLORHEXIDINE GLUCONATE 15 ML: 1.2 SOLUTION ORAL at 07:33

## 2023-03-06 RX ADMIN — PROPOFOL 100 MCG/KG/MIN: 10 INJECTION, EMULSION INTRAVENOUS at 09:11

## 2023-03-06 RX ADMIN — ACETAMINOPHEN 975 MG: 325 TABLET, FILM COATED ORAL at 07:32

## 2023-03-06 RX ADMIN — GABAPENTIN 300 MG: 300 CAPSULE ORAL at 07:32

## 2023-03-06 RX ADMIN — FENTANYL CITRATE 25 MCG: 50 INJECTION, SOLUTION INTRAMUSCULAR; INTRAVENOUS at 11:44

## 2023-03-06 RX ADMIN — ONDANSETRON 4 MG: 2 INJECTION INTRAMUSCULAR; INTRAVENOUS at 10:20

## 2023-03-06 RX ADMIN — OXYCODONE HYDROCHLORIDE 10 MG: 10 TABLET, FILM COATED, EXTENDED RELEASE ORAL at 07:32

## 2023-03-06 RX ADMIN — SODIUM CHLORIDE, SODIUM LACTATE, POTASSIUM CHLORIDE, AND CALCIUM CHLORIDE: .6; .31; .03; .02 INJECTION, SOLUTION INTRAVENOUS at 09:00

## 2023-03-06 RX ADMIN — BUPIVACAINE HYDROCHLORIDE IN DEXTROSE 1.4 ML: 7.5 INJECTION, SOLUTION SUBARACHNOID at 09:09

## 2023-03-06 RX ADMIN — TRANEXAMIC ACID 50 ML: 1 INJECTION, SOLUTION INTRAVENOUS at 09:20

## 2023-03-06 RX ADMIN — PROPOFOL 50 MG: 10 INJECTION, EMULSION INTRAVENOUS at 09:11

## 2023-03-06 RX ADMIN — LIDOCAINE HYDROCHLORIDE 20 ML: 10 INJECTION, SOLUTION EPIDURAL; INFILTRATION; INTRACAUDAL; PERINEURAL at 09:11

## 2023-03-06 RX ADMIN — ACETAMINOPHEN 975 MG: 325 TABLET ORAL at 15:27

## 2023-03-06 RX ADMIN — OXYCODONE HYDROCHLORIDE 10 MG: 5 TABLET ORAL at 14:05

## 2023-03-06 RX ADMIN — CEFAZOLIN SODIUM 2000 MG: 2 SOLUTION INTRAVENOUS at 09:00

## 2023-03-06 NOTE — DISCHARGE INSTR - AVS FIRST PAGE
Total Knee Replacement     WHAT YOU SHOULD KNOW:   Total knee replacement is surgery to replace a knee joint damaged by wear, injury, or disease  It is also called a total knee arthroplasty  The knee joint is where your femur (thigh bone) and tibia (large lower leg bone or shin bone) meet  A small bone called the patella (kneecap) protects your knee joint  AFTER YOU LEAVE:     Medicines:   Pain medicine: You may be given a prescription medicine to decrease pain  Do not wait until the pain is severe before you take this medicine  We recommend that you take Tylenol 975mg every 8 hours for baseline pain control  Oxycodone can be used as needed, but no more than 1-2 tablets every 4 to 6 hours  NSAIDs:  These medicines decrease swelling, pain, and fever  NSAIDs are available without a doctor's order  Ask your primary healthcare provider which medicine is right for you  Ask how much to take and when to take it  Take as directed  NSAIDs can cause stomach bleeding and kidney problems if not taken correctly  You will be given Celebrex 100 mg to take twice a day for the first 2 weeks after surgery  Stool softeners  make it easier for you to have a bowel movement  You may need this medicine to treat or prevent constipation  You will be given Colace 100mg to take twice a day    Anticoagulants  are a type of blood thinner medicine that helps prevent clots  Clots can cause strokes, heart attacks, and death  These medicines may cause you to bleed or bruise more easily  You will be given aspirin 325 mg to take twice a day for 6 weeks after surgery  Watch for bleeding from your gums or nose  Watch for blood in your urine and bowel movements  Use a soft washcloth and a soft toothbrush  If you shave, use an electric razor  Avoid activities that can cause bruising or bleeding  Take your medicine as directed  Call your healthcare provider if you think your medicine is not helping or if you have side effects  Tell him if you are allergic to any medicine  Keep a list of the medicines, vitamins, and herbs you take  Include the amounts, and when and why you take them  Bring the list or the pill bottles to follow-up visits  Carry your medicine list with you in case of an emergency  Follow up with your orthopedist as directed: You may need to return to have your wound checked and stitches, staples, or drain removed  Write down your questions so you remember to ask them during your visits  Please make an appointment to see Dr Sarah Matthew 2 weeks postoperatively  Physical therapy:  A physical therapist teaches you exercises to help improve movement and strength, and to decrease pain  You will begin outpatient physical therapy later this week as planned  Self-care:   Wound Care:  Please leave the Mepilex dressing in place for 7 days after surgery  After 7 days, you may remove the dressing and leave the incision open to air  If the incision is uncomfortable under clothing after the Mepilex is removed, you may cover it with a a dry sterile dressing  Once the Mepilex dressing has been removed, please keep the incision dry for another week until your follow up appointment  Use ice:  Ice helps decrease swelling and pain  Ice may also help prevent tissue damage  Use an ice pack or put crushed ice in a plastic bag  Cover it with a towel, and place it on your knee for 15 to 20 minutes every hour as directed  Use a cane, walker, or crutches as directed: These devices will help decrease your risk of falling  Your therapist will guide you about transitioning from a walker to cane to no assistive device  Wear pressure stockings: These are long, tight stockings that put pressure on your legs to promote blood flow and prevent clots  You may remove the stocking on your non-operative leg when you get home  The stocking on your operative leg should remain on for 2-3 days   Afterwards, you may put the stocking on or off as needed for swelling  Contact your orthopedist if:  You have a fever over 101 0°F     You have trouble moving or bending your joint  You have increased pain and swelling in your joint, even after you take pain medicine  You have back pain or lower leg pain when you bend your foot upwards  You have questions or concerns about your condition or care  Blood soaks through/saturates your bandage  Your incision comes apart  Your incision is red, swollen, or draining pus  You cannot walk or move your joint, or the limb is numb  Your leg feels warm, tender, and painful  It may look swollen and red  Seek immediate care or call 911 if: You feel like you are going to faint  You have a seizure or feel confused  You feel lightheaded, short of breath, and have chest pain  You have chest pain when you take a deep breath or cough  You may cough up blood  © 2014 [address] is for End User's use only and may not be sold, redistributed or otherwise used for commercial purposes  All illustrations and images included in CareNotes® are the copyrighted property of [employer]  or Nestor Wilhelm  The above information is an  only  It is not intended as medical advice for individual conditions or treatments  Talk to your doctor, nurse or pharmacist before following any medical regimen to see if it is safe and effective for you

## 2023-03-06 NOTE — PERIOPERATIVE NURSING NOTE
Pain level 6/10 after working with PT, sitting OOB chair  Medicated for pain at this time  Voided large amount on commode, new thigh high curt stockings applied due to incontinence of urine in bed  Ate a full lunch

## 2023-03-06 NOTE — PERIOPERATIVE NURSING NOTE
Seen by Dr Timothy Morataya, complete discharge instructions reviewed by him with patient and   Discharge instructions given to patient and reviewed by myself, verbalized understanding of all

## 2023-03-06 NOTE — ANESTHESIA PREPROCEDURE EVALUATION
Procedure:  ARTHROPLASTY KNEE TOTAL W ROBOT - RIGHT - PRESS FIT - SAME DAY (Right: Knee)    Relevant Problems   GI/HEPATIC   (+) Hiatal hernia      NEURO/PSYCH   (+) Personal history of colonic polyps      PULMONARY   (+) Moderate persistent asthma without complication   (+) Sleep apnea      BMI 36    Physical Exam    Airway    Mallampati score: II  TM Distance: >3 FB  Neck ROM: full     Dental   No notable dental hx     Cardiovascular      Pulmonary      Other Findings        Anesthesia Plan  ASA Score- 2     Anesthesia Type- spinal with ASA Monitors  Additional Monitors:   Airway Plan:     Comment: Post-op adductor canal block with exparel  Plan Factors-    Chart reviewed  Patient summary reviewed  Induction- intravenous  Postoperative Plan-     Informed Consent- Anesthetic plan and risks discussed with patient  I personally reviewed this patient with the CRNA  Discussed and agreed on the Anesthesia Plan with the CRNA  Mayte Hurley

## 2023-03-06 NOTE — PERIOPERATIVE NURSING NOTE
Pain level decreased to #3/10  Ambulating in room with PT  Dressing over right knee remains dry, intact

## 2023-03-06 NOTE — PLAN OF CARE
Problem: PHYSICAL THERAPY ADULT  Goal: Performs mobility at highest level of function for planned discharge setting  See evaluation for individualized goals  Description: Treatment/Interventions: Elevations, LE strengthening/ROM, Functional transfer training, Patient/family training, Gait training, Equipment eval/education, Spoke to nursing  Equipment Recommended:  (pt has a cane and walker)       See flowsheet documentation for full assessment, interventions and recommendations  Note: Prognosis: Good  Problem List: Decreased strength, Decreased range of motion, Impaired balance, Decreased mobility, Pain  Assessment: Patient is an 65y o  year old female seen for Physical Therapy evaluation  Patient admitted with s/p R TKA  Comorbidities affecting patient's physical performance include: obesity  Personal factors affecting patient at time of initial evaluation include: steps to enter her home  Prior to admission, patient was independent with functional mobility without assistive device, independent with ADLS and works full time  Please find objective findings from Physical Therapy assessment regarding body systems outlined above with impairments and limitations including weakness, decreased ROM, gait deviations, pain, decreased activity tolerance, decreased functional mobility tolerance and fall risk  The Barthel Index was used as a functional outcome tool presenting with a score of Barthel Index Score: 40 today indicating marked limitations of functional mobility and ADLS  Patient's clinical presentation is currently unstable/unpredictable as seen in patient's presentation of changing level of pain, increased fall risk, new onset of impairment of functional mobility and new onset of weakness  Pt would benefit from continued Physical Therapy treatment to address deficits as defined above and maximize level of functional mobility   As demonstrated by objective findings, the assigned level of complexity for this evaluation is high  The patient's AM-PAC Basic Mobility Inpatient Short Form Raw Score is 15  A Raw score of less than or equal to 16 suggests the patient may benefit from discharge to post-acute rehabilitation services  PT Discharge Recommendation: Home with outpatient rehabilitation    See flowsheet documentation for full assessment

## 2023-03-06 NOTE — PERIOPERATIVE NURSING NOTE
Received from PACU, alert,   Right knee dressing dry, intact  Ice remains to sight  Positive pedal pulses , strong, right toes warm, trent pink, pain level at #4/10  Taking po fluids well, family at bedside

## 2023-03-06 NOTE — INTERVAL H&P NOTE
H&P reviewed  After examining the patient I find no changes in the patients condition since the H&P had been written  Patient denies any recent fever, chills, nausea, vomiting, headache, chest pain, trouble breathing  The patient has been seen and cleared by her medical subspecialist   She would be a good candidate for aspirin postoperatively for DVT prophylaxis  She will be a good candidate for outpatient physical therapy following her discharge from the hospital   All questions addressed this morning  Proceed the OR for robotic assisted right total knee arthroplasty        Vital signs will be reviewed prior to the case

## 2023-03-06 NOTE — OP NOTE
OPERATIVE REPORT  PATIENT NAME: Elizabeth Hernandez    :  1965  MRN: [id number]  Pt Location: [facility] OR ROOM 01    SURGERY DATE: 3/6/2023    Surgeon(s) and Role:     * Leslie Parr, DO - Primary     * Kalia Gaffney PA-C - Assisting, no qualified resident was available an assistant was needed for patient positioning, prepping and draping, soft tissue retraction, protection of vital structures throughout the case, exposure of the femur and tibia for robotic assisted resection implantation of final prosthesis, superficial closure, and to complete the case safely  Navya Quigley,  ATC/OTC - 2nd Assist    Preop Diagnosis:  Primary osteoarthritis of right knee [M17 11]  Chronic pain of right knee [M25 561, G89 29]    Postop diagnosis:  Primary osteoarthritis of right knee [M17 11]  Chronic pain of right knee [M25 561, G89 29]    Procedure(s):  Right - ARTHROPLASTY KNEE TOTAL W ROBOT - RIGHT - PRESS FIT - SAME DAY    Specimen(s):  * No specimens in log *    Estimated Blood Loss:   10 mL    Drains: None    Anesthesia Type:   Spinal sedation, postoperative abductor canal block with Exparel    Intravenous fluids: 600 cc    Antibiotics: Ancef 2 g    Urine output: No Mcconnell    Tourniquet time: 39 minutes at 250 mmHg    Implant Name Type Inv  Item Serial No   Lot No  LRB No  Used Action   BASEPLATE TIBIAL SZ 4 FX BRNG  ATTUNE - [id number]  BASEPLATE TIBIAL SZ 4 FX BRNG  ATTUNE  DEPUY [id number] Right 1 Implanted   COMPONENT FEM SZ 4 RT  CR ATTUNE - [id number]  COMPONENT FEM SZ 4 RT  CR ATTUNE  DEPUY [id number] Right 1 Implanted   INSERT TIB SZ 4 7MM RT MED STAB ATTUNE - [id number]  INSERT TIB SZ 4 7MM RT MED STAB ATTUNE  DEPUY [id number] Right 1 Implanted     Operative Indications:  Primary osteoarthritis of right knee [M17 11]  Chronic pain of right knee [M25 561, G89 29]  Patient is a very pleasant 81-year-old female known to me for treatment of her activity related right knee pain    The patient has attempted multiple forms of conservative measures of treatment and all have failed to provide long-lasting relief of her discomfort  With failed conservative treatment, persistent activity related pain, and severe osteoarthritis on x-ray I recommended that she undergo robotic assisted right total knee arthroplasty  She was agreeable to this  She was optimized by medical subspecialist and cleared for the procedure  She underwent robotic assisted right total knee arthroplasty on 3/6/2023  Operative Findings:  Intraoperatively the patient was found to have a preoperative range of motion from 5 degrees of flexion back to 120 degrees of flexion with a 5 degree varus deformity of her knee  There was grade 4 degenerative change in all 3 compartments of the knee  Robotic assisted right total knee arthroplasty was successfully performed without complication  The final construct had excellent range of motion from 0 to 130 degrees with excellent stability at 0 degrees 30 degrees 9 degrees  The limb was restored to 2 degrees of varus  The patella did not represent sufficient bone stock for resurfacing  The retained native patella tracked nicely with the implant in the midline and flat position  After closure of the arthrotomy range of motion, stability, patellar tracking were unchanged  Patient tolerated seizure well  There were no complications  Complications:   None    Procedure and Technique:  The patient was identified and marked in the preoperative holding area  Final questions were addressed  Consents were visualized for correct laterality and the intended procedure  Patient was taken back to the operating room where they were transferred to the operating room table and administered spinal anesthesia by the anesthesia staff  Tourniquet was then applied to the right thigh  The right lower extremity was prepped and draped in the standard sterile fashion with the addition of the knee positioner    The patient was administered 2 g of IV Ancef  Tranexamic acid  was administered by the anesthesia staff  A final timeout was performed and all members of the operating room staff were in agreement of the intended procedure and the correct laterality was confirmed  An anterior knee incision was marked over the anterior right knee and carried over the medial portion of the patella down medial to the tibial tubercle  The leg was exsanguinated and the tourniquet was inflated to 250 mmHg  An incision was made over the anterior knee using a scalpel, and sharp dissection was carried deep through Yessy's fascia creating full thickness flaps  The extensor mechanism was identified  A standard medial parapatellar arthrotomy was performed using a scalpel, and upon doing so benign-appearing yellow intra-articular fluid was expressed  The anterior horn of the medial and lateral meniscus was removed  50% of the patellar fat pad was removed for proper exposure  The distal arthrotomy was used to initiate a medial release around the proximal tibia at the joint line to the mid coronal plane  On inspection there was diffuse grade 4 degenerative change in the medial compartment, lateral compartment, and patellofemoral compartment  Preparations were made for robotic assisted total knee arthroplasty  The periarticular osteophytes were removed from around the distal femur, proximal tibia, and intercondylar notch  The remnants of the ACL and the PCL were removed electrocautery  The visualized portions of the medial and lateral meniscus were removed  The distal femur and proximal tibial arrays were placed without complication  The checkpoints were created the distal femur proximal tibia  The hip center was captured  Anatomic registration was carried out in sequence  Range of motion was evaluated and the knee was in 5 degrees of varus, and the range of motion was from 5 -120 degrees  The Proadjust graph was created    Through manipulating the position of the femoral and tibial implants a well-balanced Proadjust graph was created and this was excepted as the intraoperative plan  The robot was brought into the surgical field  The retractors were placed around the distal femur proximal tibia  Robotic assisted resection was performed of the distal femur in sequence without damage to the periarticular tissues  The tibia was subluxed anteriorly and the proximal tibia was resected without complication  All bony fragments were removed and resection appeared to be complete  The knee was brought out into the full extension in the posterior compartment was evaluated  The remnants of the medial and lateral meniscus were removed with electrocautery  Based on the intraoperative plan a size 4 femoral notch guide was placed and the trochlea was prepared  The size 4 right CR trial was placed upon the distal femur and a size 6 medial stabilized polyethylene insert was placed into the articulation  The knee was cycled through a range of motion and is range of motion was evaluated  There was some slight recurvatum present therefore the polyethylene was increased to a size 7 mm medial stabilized polyethylene trial   The knee was cycled through a range of motion again and the motion was evaluated  The overall alignment of the limb was restored to 2 degrees of varus, and range of motion was from 0-130 degrees  The knee had excellent stability at 0 degrees 30 degrees and 90 degrees  This was deemed to be the final construct  The arrays were removed from the distal femur proximal tibia without complication  The lug holes in the femur were drilled  The trials were removed and the tibia was subluxed anteriorly  The tibia was sized and was found that a size 4 base plate would be appropriate  This was aligned with the medial 1/3 of the proximal tibial tubercle and pinned into place    The tibia was then reamed, broached, and finished in sequence for press-fit prosthesis  The base plate was removed  Attention was then turned to the patella  The osteo synovial reflection was revealed using a Bovie  The patella height measured 19 millimeters prior to resection  This represented insufficient bone stock for resurfacing  The patella was denervated with electrocautery and the peripatellar osteophytes were removed  The soft tissues of the posterior capsule were cauterized using Bovie to ensure hemostasis  The posterior capsule and medial and lateral periarticular soft tissues were injected with a periarticular analgesic cocktail  The knee was again irrigated in preparation for plantation  The tibia was subluxed and all retractors were placed  The final press-fit fixed-bearing size 4 tibial component was impacted upon the proximal tibia down to its final position where it had excellent bony apposition  The final size 7 mm medial stabilized cruciate retaining AO X polyethylene was locked into the baseplate  The final size 4 press-fit CR right femoral prosthesis was impacted upon the distal femur down to its final position where it had excellent bony apposition     The periarticular pain cocktail was injected into the soft tissues around the knee  Irrigation then followed with IrriSept followed by normal saline solution  The joint was inspected for any residual loose bodies of cement and these were removed  The tourniquet was released after 39 minutes at 250 mmHg  The tracking and stability of the final components were assessed  The retained native patella tracked midline without tilt, and the knee was stable in both flexion and extension, coronal stability was unchanged, and the knee demonstrated range of motion from 0-130°  The knee was again irrigated and a very conservative partial synovectomy was performed  Hemostasis was achieved using electrocautery  Closure began using a #2 barbed bidirectional suture for the arthrotomy    After closure of the arthrotomy range of motion to gravity was tested and was found to be 0-130° of flexion  Quarter percent Marcaine plain was injected in the subcutaneous soft tissues  The deep subcutaneous tissues were reapproximated with undyed 0 Vicryl, the superficial subcutaneous tissues were reapproximated with undyed 2-0 Vicryl, the skin edges reapproximated with a running 3-0 bidirectional barbed Monocryl suture, and the skin edges were sealed with Exofin  After the  had dried a silver impregnated Mepilex dressing was placed over the incision  The drapes were removed and LORENA stockings were placed on the bilateral lower extremities  Patient was then awakened from anesthesia without difficulty, and transferred to PACU in stable condition        I was present for all critical portions of the procedure    Patient Disposition:  PACU         SIGNATURE: [place] Seth,   DATE: March 6, 2023  TIME: 3:11 PM

## 2023-03-06 NOTE — PROGRESS NOTES
Progress Note - Orthopedics   Jourdan Sanchez 65 y o  female MRN: [id number]  Unit/Bed#: [facility] Encounter: [id number]    Assessment:  1) POD#0 s/p RA R TKA    Plan:  Ancef 2g IV completed perioperatively  DVT prophylaxis:  p o  twice daily/SCD's/Ambulation  WBAT RLE  PT/OT- WBAT RLE  Analgesia PRN  Dressing- monitor for drainage  Discharge planning -patient seen and examined postoperatively  Pain controlled  Events of surgery discussed with the patient  The patient was progressed well with her nursing protocols as well as our PT protocols and deemed safe for discharge home  Discharge instructions were discussed at bedside with the patient and all questions were addressed  Patient be discharged home in stable condition today to start outpatient physical therapy later this week  I will see the back in 2 weeks time for continued postoperative care  Weight bearing: WBAT RLE    VTE Pharmacologic Prophylaxis:  p o  twice daily  VTE Mechanical Prophylaxis: sequential compression device    Subjective: Patient seen and examined operatively  Pain controlled  Events of surgery discussed with the patient and the patient's family  Vitals: Blood pressure 124/78, pulse 78, temperature (!) 96 3 °F (35 7 °C), temperature source Tympanic, resp  rate 16, height 5' 4" (1 626 m), weight 95 4 kg (210 lb 6 4 oz), last menstrual period 04/28/2021, SpO2 95 %, not currently breastfeeding  ,Body mass index is 36 12 kg/m²        Intake/Output Summary (Last 24 hours) at 3/6/2023 1522  Last data filed at 3/6/2023 1405  Gross per 24 hour   Intake 1200 ml   Output 10 ml   Net 1190 ml       Invasive Devices     Peripheral Intravenous Line  Duration           Peripheral IV 03/06/23 Left Wrist <1 day                Physical Exam: NAD  Ortho Exam: RLE: Dsg c/d/i, compartments soft, calf non-tender, +PF/DF/EHL, +DP/SP/Saph/Sural SILT, DP 2+, foot warm    Lab, Imaging and other studies: PO XR R knee: Reviewed and acceptable    Finn Neva D O    Division of Adult Reconstruction  Department of Orthopaedic Surgery  [facility]

## 2023-03-06 NOTE — PHYSICAL THERAPY NOTE
PHYSICAL THERAPY EVALUATION/TREATMENT     03/06/23 6515   Note Type   Note type Evaluation   Pain Assessment   Pain Assessment Tool 0-10   Pain Score 6   Pain Location/Orientation Orientation: Right;Location: Knee  (RN just gave pain meds)   Restrictions/Precautions   Weight Bearing Precautions Per Order Yes   RLE Weight Bearing Per Order WBAT   Other Precautions Pain; Fall Risk   Home Living   Type of [address] One level  (3 FELIX no rails)   Home Equipment Walker;Cane   Prior Function   Level of [place] Independent with functional mobility; Independent with ADLs   Lives With Spouse   Receives Help From Family   Vocational Full time employment  (desk job)   General   Additional Pertinent History Pt is POD zero s/p R TKA  Family/Caregiver Present Yes  (spouse, mother)   Cognition   Overall Cognitive Status WFL   Following Commands Follows all commands and directions without difficulty   Subjective   Subjective "I'm still numb but wet"   RLE Assessment   RLE Assessment   (ROM 0-90, MMT hip 2/5, knee 2+/5, ankel 3+/5)   LLE Assessment   LLE Assessment   (ROM WFL, MMT 4/5)   Bed Mobility   Supine to Sit 4  Minimal assistance   Additional items LE management   Transfers   Sit to Stand 4  Minimal assistance   Additional items   (with walker)   Stand to Sit 4  Minimal assistance   Stand pivot 4  Minimal assistance   Additional items Verbal cues  (walker)   Additional Comments additional activity deferred as pt incontinent of urine   Balance   Static Sitting Good   Static Standing Fair   Activity Tolerance   Activity Tolerance Patient limited by pain   Assessment   Prognosis Good   Problem List Decreased strength;Decreased range of motion; Impaired balance;Decreased mobility;Pain   Assessment Patient is an 65y o  year old female seen for Physical Therapy evaluation  Patient admitted with s/p R TKA  Comorbidities affecting patient's physical performance include: obesity    Personal factors affecting patient at time of initial evaluation include: steps to enter her home  Prior to admission, patient was independent with functional mobility without assistive device, independent with ADLS and works full time  Please find objective findings from Physical Therapy assessment regarding body systems outlined above with impairments and limitations including weakness, decreased ROM, gait deviations, pain, decreased activity tolerance, decreased functional mobility tolerance and fall risk  The Barthel Index was used as a functional outcome tool presenting with a score of Barthel Index Score: 40 today indicating marked limitations of functional mobility and ADLS  Patient's clinical presentation is currently unstable/unpredictable as seen in patient's presentation of changing level of pain, increased fall risk, new onset of impairment of functional mobility and new onset of weakness  Pt would benefit from continued Physical Therapy treatment to address deficits as defined above and maximize level of functional mobility  As demonstrated by objective findings, the assigned level of complexity for this evaluation is high  The patient's -[facility] Basic Mobility Inpatient Short Form Raw Score is 15  A Raw score of less than or equal to 16 suggests the patient may benefit from discharge to post-acute rehabilitation services     Goals   Patient Goals "go up and down the steps without pain, ride my bicycle"   STG Expiration Date 03/13/23   Short Term Goal #1 Independent bed mobility, independent transfers, independent ambulation with rolling walker indoor level surfaces 100 feet with a steady nonantalgic gait, independent up-and-down 3 steps of patient can enter and exit her home, independent with a home exercise program, improve right knee range of motion to at least 0-95   LTG Expiration Date 03/20/23   Long Term Goal #1 Improve right knee range of motion to at least 0-1 05, improve right lower extremity strength at least 4 out of 5, independent ambulation outdoor surfaces with least restrictive device 200 feet with a steady nonantalgic gait   Plan   Treatment/Interventions Elevations;LE strengthening/ROM; Functional transfer training;Patient/family training;Gait training;Equipment eval/education;Spoke to nursing   PT Frequency Twice a day   Recommendation   PT Discharge Recommendation Home with outpatient rehabilitation   Equipment Recommended   (pt has a cane and walker)   AM-PAC Basic Mobility Inpatient   Turning in Flat Bed Without Bedrails 3   Lying on Back to Sitting on Edge of Flat Bed Without Bedrails 3   Moving Bed to Chair 3   Standing Up From Chair Using Arms 3   Walk in Room 2   Climb 3-5 Stairs With Railing 1   Basic Mobility Inpatient Raw Score 15   Basic Mobility Standardized Score 36 97   Highest Level Of Mobility   -HL Goal 4: Move to chair/commode   -HL Achieved 4: Move to chair/commode   Barthel Index   Feeding 10   Bathing 0   Grooming Score 0   Dressing Score 5   Bladder Score 0   Bowels Score 10   Toilet Use Score 5   Transfers (Bed/Chair) Score 10   Mobility (Level Surface) Score 0   Stairs Score 0   Barthel Index Score 40   Additional Treatment Session   Start Time 1400   End Time 1415   Treatment Assessment S:  'I',m peeing again"  O:  Min assist to transfer commode to chair  Pt assisted to change TEDS/gown that got wet due to incontinence with help of RN  Reviewed HEP, handout issued, pt performed x 10 each ankle pumps and quad sets  A:  Pt's LE's still affected by anesthesia, however pt was able to transfer with min assist  P: Will return to pt in about 1 hour after pain meds effective and hopeful resolution of LE paresthesia  Plan for gait training in the mike with a walker and stairclimbing with a cane  End of Consult   Patient Position at End of Consult All needs within reach; Bedside chair; Other (comment)  (family and RN with pt)   Licensure   [place] License Number  Leonard [id number] [id number]

## 2023-03-06 NOTE — ANESTHESIA POSTPROCEDURE EVALUATION
Post-Op Assessment Note    CV Status:  Stable  Pain Score: 0    Pain management: adequate     Mental Status:  Alert and awake   Hydration Status:  Stable   PONV Controlled:  None   Airway Patency:  Patent      Post Op Vitals Reviewed: Yes      Staff: Anesthesiologist, CRNA         No notable events documented      BP      Temp      Pulse     Resp      SpO2

## 2023-03-06 NOTE — OCCUPATIONAL THERAPY NOTE
Occupational Therapy Evaluation/Treatment       03/06/23 1515   Note Type   Note type Evaluation   Pain Assessment   Pain Assessment Tool 0-10   Pain Score 4   Pain Location/Orientation Orientation: Right;Location: Knee  (and thigh)   Restrictions/Precautions   Weight Bearing Precautions Per Order Yes   RLE Weight Bearing Per Order WBAT   Other Precautions Fall Risk;Pain   Home Living   Type of [address] [place] [address] One level;Stairs to enter with rails  (3 FELIX)   Bathroom Shower/Tub Walk-in shower  (and Tub/shower; plans to use walk in)   Bathroom Toilet Standard   Bathroom Equipment Built-in shower seat   Home Equipment Walker;Cane   Prior Function   Level of [place] Independent with ADLs; Independent with functional mobility; Independent with IADLS   Lives With Spouse   Receives Help From Family  (Spouse and mother plan to assist patient)   IADLs Independent with driving; Independent with meal prep; Independent with medication management   Vocational Full time employment   Comments Patient reports PTA independent in all ADLs, iADLs, and mobility without AD   General   Additional Pertinent History Patient presents for OT evaluation POD #0 s/p elective R TKA   ADL   Eating Assistance 7  Independent   Grooming Assistance 5  Supervision/Setup   UB Bathing Assistance 5  Supervision/Setup   LB Bathing Assistance 4  Minimal Assistance   UB Dressing Assistance 5  Supervision/Setup   LB Dressing Assistance 4  Minimal Assistance   Toileting Assistance  1  Total Assistance   Toileting Deficit   (Incontinent of urine due to decreased sensation from anesthesia)   Bed Mobility   Additional Comments Not assessed, patient received seated OOB in recliner chair at start of session   Transfers   Sit to Stand 4  Minimal assistance   Additional items Assist x 1;Verbal cues   Stand to Sit 4  Minimal assistance   Additional items Assist x 1;Verbal cues   Functional Mobility   Functional Mobility 4  Minimal assistance   Additional Comments Ambulated few feet in room with RW   Balance   Static Sitting Good   Dynamic Sitting Fair +   Static Standing Fair   Dynamic Standing Fair   Activity Tolerance   Activity Tolerance Patient limited by pain   RUE Assessment   RUE Assessment WFL   LUE Assessment   LUE Assessment WFL   Cognition   Overall Cognitive Status WFL   Arousal/Participation Alert; Cooperative   Attention Within functional limits   Orientation Level Oriented X4   Following Commands Follows all commands and directions without difficulty   Assessment   Limitation Decreased ADL status; Decreased UE strength;Decreased Safe judgement during ADL;Decreased endurance;Decreased self-care trans;Decreased high-level ADLs   Prognosis Good   Assessment Patient evaluated by Occupational Therapy  Patient admitted with S/P total knee replacement not using cement, right  The patients occupational profile, medical and therapy history includes a extensive additional review of physical, cognitive, or psychosocial history related to current functional performance  Comorbidities affecting functional mobility and ADLS include: asthma, fibroids, sleep apnea, arthritis  Prior to admission, patient was independent with functional mobility without assistive device, independent with ADLS and independent with IADLS  The evaluation identifies the following performance deficits: weakness, impaired balance, decreased endurance, increased fall risk, new onset of impairment of functional mobility, decreased ADLS, decreased IADLS, decreased activity tolerance, decreased safety awareness, impaired judgement and decreased strength, that result in activity limitations and/or participation restrictions  This evaluation requires clinical decision making of high complexity, because the patient presents with comorbidites that affect occupational performance and required significant modification of tasks or assistance with consideration of multiple treatment options    The Barthel Index was used as a functional outcome tool presenting with a score of Barthel Index Score: 45, indicating marked limitations of functional mobility and ADLS  The patient's raw score on the -PAC Daily Activity Inpatient Short Form is 19  A raw score of greater than or equal to 19 suggests the patient may benefit from discharge to home  Please refer to the recommendation of the Occupational Therapist for safe discharge planning  Please refer to the recommendation of the Occupational Therapist for safe DC planning  Patient will benefit from skilled Occupational Therapy services to address above deficits and facilitate a safe return to prior level of function  Goals   Patient Goals 'Be able to go home today'   STG Time Frame 1-3   Short Term Goal  Goals established to promote Patient Goals: 'Be able to go home today': Grooming: independent seated; Bathing: supervision; Upper Body Dressing independent; Lower Body Dressing: supervision; Toileting: supervision; Patient will increase ambulatory standard toilet transfer to supervision with rolling walker to increase performance and safety with ADLS and functional mobility; Patient will increase standing tolerance to 10 minutes during ADL task to decrease assistance level and decrease fall risk; Patient will increase bed mobility to supervision in preparation for ADLS and transfers;  Patient will increase functional mobility to and from bathroom with rolling walker with supervision to increase performance with ADLS and to use a toilet; Patient will tolerate 10 minutes of UE ROM/strengthening to increase general activity tolerance and performance in ADLS/IADLS; Patient will improve functional activity tolerance to 10 minutes of sustained functional tasks to increase participation in basic self-care and decrease assistance level;  Patient will be able to to verbalize understanding and perform energy conservation/proper body mechanics during ADLS and functional mobility at least 75% of the time with minimal cueing to decrease signs of fatigue and increase stamina to return to prior level of function; Patient will increase static/dynamic sitting balance to fair+ to improve the ability to sit at edge of bed or on a chair for ADLS;  Patient will increase static/dynamic standing balance to fair+ to improve postural stability and decrease fall risk during standing ADLS and transfers  LTG Time Frame 3-7   Long Term Goal Grooming: independent standing at sink; Bathing: independent; Lower Body Dressing: independent; Toileting: independent; Patient will increase ambulatory standard toilet transfer to independent with rolling walker to increase performance and safety with ADLS and functional mobility; Patient will increase standing tolerance to 15 minutes during ADL task to decrease assistance level and decrease fall risk; Patient will increase bed mobility to independent in preparation for ADLS and transfers;  Patient will increase functional mobility to and from bathroom with rolling walker independently to increase performance with ADLS and to use a toilet; Patient will tolerate 15 minutes of UE ROM/strengthening to increase general activity tolerance and performance in ADLS/IADLS; Patient will improve functional activity tolerance to 15 minutes of sustained functional tasks to increase participation in basic self-care and decrease assistance level;  Patient will be able to to verbalize understanding and perform energy conservation/proper body mechanics during ADLS and functional mobility at least 90% of the time with no cueing to decrease signs of fatigue and increase stamina to return to prior level of function; Patient will increase static/dynamic sitting balance to good to improve the ability to sit at edge of bed or on a chair for ADLS;  Patient will increase static/dynamic standing balance to good to improve postural stability and decrease fall risk during standing ADLS and transfers  Pt will score >/= 24/24 on AM-PAC Daily Activity Inpatient scale to promote safe independence with ADLs and functional mobility; Pt will score >/= 100/100 on Barthel Index in order to decrease caregiver assistance needed and increase ability to perform ADLs and functional mobility  Plan   Treatment Interventions ADL retraining;Functional transfer training;UE strengthening/ROM; Endurance training;Patient/family training;Equipment evaluation/education; Compensatory technique education;Continued evaluation; Energy conservation; Activityengagement   Goal Expiration Date 03/20/23   OT Frequency 3-5x/wk   Recommendation   OT Discharge Recommendation No rehabilitation needs   Additional Comments  Plans for outpatient PT later this week, no OT discharge needs   AM-[facility] Daily Activity Inpatient   Lower Body Dressing 3   Bathing 3   Toileting 1   Upper Body Dressing 4   Grooming 4   Eating 4   Daily Activity Raw Score 19   Daily Activity Standardized Score (Calc for Raw Score >=11) 40 22   AM-PAC Applied Cognition Inpatient   Following a Speech/Presentation 4   Understanding Ordinary Conversation 4   Taking Medications 4   Remembering Where Things Are Placed or Put Away 4   Remembering List of 4-5 Errands 4   Taking Care of Complicated Tasks 4   Applied Cognition Raw Score 24   Applied Cognition Standardized Score 62 21   Barthel Index   Feeding 10   Bathing 0   Grooming Score 0   Dressing Score 5   Bladder Score 0   Bowels Score 10   Toilet Use Score 5   Transfers (Bed/Chair) Score 10   Mobility (Level Surface) Score 0   Stairs Score 5   Barthel Index Score 45   Additional Treatment Session   Start Time 1505   End Time 1515   Treatment Assessment S: "it feels so weird to still be numb"    O: Patient performed lower body dressing while seated in chair: Patient with urinary incontinence due to residual numbness from anesthesia, able to don underwear with pad with supervision;  Don scrub pants with supervision; Don/doff bilateral socks with min assist; STS from recliner with supervision ; Ambulated short household distance to/from bathroom with RW and supervision; Ambulatory transfer to/from standard toilet with RW and supervision; Toilet hygiene and clothing management completed with supervision; Stand to sit to recliner chair with supervision; A: Patient limited by continued numbness; dressed in hospital underwear and scrub pants due to incontinence ; Patient's spouse and mother present for OT session and able to assist patient with ADLs as needed upon discharge home;   Educated patient on safe car transfer technique, safe tub/shower transfer technique via demonstration/explanation by therapist; patient verbalized good understanding  Patient is OK from an OT standpoint for discharge today  At end of session seated in chair with all needs met         P: Home with family assist; no OT discharge needs, plans to attend outpatient PT upon discharge   Licensure   [place] License Number  [place], [place] [id number]

## 2023-03-06 NOTE — PHYSICAL THERAPY NOTE
PT TREATMENT     03/06/23 9280   Pain Assessment   Pain Assessment Tool 0-10   Pain Score 4   Pain Location/Orientation Orientation: Right;Location: Knee   Restrictions/Precautions   Weight Bearing Precautions Per Order Yes   RLE Weight Bearing Per Order WBAT   Other Precautions Pain; Fall Risk   General   Chart Reviewed Yes   Cognition   Overall Cognitive Status WFL   Subjective   Subjective "I feel sleepy"   Transfers   Sit to Stand 5  Supervision   Stand to Sit 5  Supervision   Stand pivot 5  Supervision   Additional items   (with walker)   Ambulation/Elevation   Gait pattern   (mildly antalgic R LE)   Gait Assistance 5  Supervision   Assistive Device Rolling walker   Distance 2x100 feet   Stair Management Assistance 4  Minimal assist   Additional items Verbal cues   Stair Management Technique With cane  (and min assist as pt has no rails)   Balance   Static Sitting Good   Static Standing Fair -   Activity Tolerance   Activity Tolerance Patient tolerated treatment well;Patient limited by fatigue   Assessment   Prognosis Good   Problem List Decreased strength;Decreased range of motion; Impaired balance;Decreased mobility;Pain   Assessment Pt demonstrates good progress toward all goals POD zero s/p R TKA  Pt is able to ambulate with a rolling walker on level surfaces and negotiate the stairs with a rail and a cane  Pt demonstrates at least 0-90 degrees of R knee ROM and at least 3/5 strength  Pt is ready to go home from the PT point of view with family assist to start OP PT later this week  The patient's AM-PAC Basic Mobility Inpatient Short Form Raw Score is 18  A Raw score of greater than 16 suggests the patient may benefit from discharge to home  Plan   Treatment/Interventions Therapeutic exercise;Elevations;LE strengthening/ROM; Functional transfer training;Gait training;Bed mobility; Patient/family training;Equipment eval/education   Progress Progressing toward goals   PT Frequency Twice a day Recommendation   PT Discharge Recommendation Home with outpatient rehabilitation   Equipment Recommended   (pt has a cane and a walker)   AM-PAC Basic Mobility Inpatient   Turning in Flat Bed Without Bedrails 3   Lying on Back to Sitting on Edge of Flat Bed Without Bedrails 3   Moving Bed to Chair 3   Standing Up From Chair Using Arms 3   Walk in Room 3   Climb 3-5 Stairs With Railing 3   Basic Mobility Inpatient Raw Score 18   Basic Mobility Standardized Score 41 05   Highest Level Of Mobility   JH-HLM Goal 6: Walk 10 steps or more   JH-HLM Achieved 7: Walk 25 feet or more   End of Consult   Patient Position at End of Consult All needs within reach; Bedside chair  (RN and family with pt)   Licensure   [place] License Number  Leonard BAIG [id number]

## 2023-03-06 NOTE — ANESTHESIA PROCEDURE NOTES
Spinal Block    Patient location during procedure: OR  Start time: 3/6/2023 9:09 AM  Reason for block: procedure for pain and at surgeon's request  Staffing  Performed: Anesthesiologist   Anesthesiologist: Alex Weston MD  Resident/CRNA: Madison Cardenas CRNA  Preanesthetic Checklist  Completed: patient identified, IV checked, site marked, risks and benefits discussed, surgical consent, monitors and equipment checked, pre-op evaluation and timeout performed  Spinal Block  Patient position: sitting  Prep: ChloraPrep and site prepped and draped  Patient monitoring: cardiac monitor, frequent blood pressure checks, continuous pulse ox and heart rate  Approach: midline  Location: L3-4  Injection technique: single-shot  Needle  Needle type: pencil-tip   Needle gauge: 25 G  Needle length: 10 cm  Assessment  Sensory level: T4  Events: cerebrospinal fluid  Injection Assessment:  negative aspiration for heme, no paresthesia on injection and positive aspiration for clear CSF    Post-procedure:  adhesive bandage applied, pressure dressing applied, secured with tape, site cleaned and sterile dressing applied

## 2023-03-09 ENCOUNTER — OFFICE VISIT (OUTPATIENT)
Dept: PHYSICAL THERAPY | Facility: CLINIC | Age: 58
End: 2023-03-09

## 2023-03-09 ENCOUNTER — TELEPHONE (OUTPATIENT)
Dept: OBGYN CLINIC | Facility: HOSPITAL | Age: 58
End: 2023-03-09

## 2023-03-09 DIAGNOSIS — M17.11 PRIMARY OSTEOARTHRITIS OF RIGHT KNEE: Primary | ICD-10-CM

## 2023-03-09 DIAGNOSIS — G89.29 CHRONIC PAIN OF RIGHT KNEE: ICD-10-CM

## 2023-03-09 DIAGNOSIS — M25.561 CHRONIC PAIN OF RIGHT KNEE: ICD-10-CM

## 2023-03-09 NOTE — PROGRESS NOTES
PT-daily note  Today's date: 3/9/2023  Patient name: Janina Avalos  : 1965  MRN: [id number]  Referring provider: Becky Kaur DO  Dx:   Encounter Diagnosis     ICD-10-CM    1  Primary osteoarthritis of right knee  M17 11       2  Chronic pain of right knee  M25 561     G89 29             Assessment  Assessment details:   Janina Avalos is a 65 y o  female who presents 3 days post op right knee replacement  Patient was same day discharge  Patient is presenting with decreased gait speed with RW, limited knee ROM and LE strength leading to limitations with walking, standing, stair navigation, transfers, and sleeping  Patient denies calf pain at this time and has been compliant with her home medication and usage of ice  Positive prognostic indicators include positive attitude toward recovery  Please contact me if you have any questions or recommendations  Thank you for the opportunity to share in [address] [place] [address]  Patient education performed during today's session included: Signs and symptoms of infection (including redness, warmth, drainage, swelling), Importance of keeping incision dry, per MD order, Home safety checklist, which was signed by both parties and uploaded into patient's chart and Topics of stair negotiation, ambulation with use of appropriate assistive device, and car transfers also reviewed    Impairments: Abnormal gait, Abnormal muscle tone, Abnormal or restricted ROM, Activity intolerance, Impaired physical strength, Lacks appropriate HEP and Weight-bearing intolerance  Understanding of Dx/Px/POC: Good  Prognosis: Good    Patient verbalized understanding of POC      Plan  Patient would benefit from: Skilled PT  Planned modality interventions: Cryotherapy  Planned therapy interventions: Balance, Balance/WB training, Body mechanics training, Functional ROM exercises, Gait training, HEP, Joint mobilization, Manual therapy, Neuromuscular re-education, Patient education, Strengthening, Stretching, Therapeutic activities, Therapeutic exercises, and Therapeutic training  Frequency: 3x/week for 2 weeks, 2x/week for 4 weeks and more as needed  Duration in weeks: 10 weeks  Plan of Care beginning date: 23  Plan of Care expiration date: 5 15 23  Treatment plan discussed with: Patient     Goals  Short Term Goals (4 weeks):    - Patient will improve time on TUG by 2 9 seconds from 14 seconds to 11 1 seconds to facilitate improved safety in all ambulation  - Patient will be independent in basic HEP 2-3 weeks  - Patient will demonstrate >100 degrees of knee ROM for reciprocal stair negotiation  - Patient will have 0/10 pain at rest  - Patient will demonstrate >1/3 improvement in MMT grade as applicable    Long Term Goals (8 weeks):  - Patient will be independent in a comprehensive home exercise program  - Patient will ambulation with AD PRN  - Patient will independently ambulate >1000 feet (community ambulation)  - Patient will self-report >75% improvement in function  - Patient functional goal of normal stair navigation    Subjective    History of Present Illness  - Mechanism of injury: Patient is presenting post op SILVER 3/6/23  Patient reports moderate knee pain at this time controlled with medication and ice  Does use SPC with stair navigation to when entering into home     - Primary AD: none  - Assist level at home:  and mother    Pain  - Current pain ratin/10  - At best pain ratin/10  - At worst pain rating: 10/10  - Quality: sharp with sleep disturbances  - Location: thigh region and medial aspect of knee  - Aggravating factors: stair navigation (step to pattern)driving long distances, walking, and squatting    Social Support  - Steps to enter house: 3 no railing  - Stairs in house: 0   - Bedroom/bathroom set up: 1st floor, bench in walk in shower, grab bars   - DME available: walker and cane  - Lives in: single story home  - Lives with:     Objective     LE MMT    L Hip Flexion: 4 R Hip Flexion: 3   L Hip Extension: 3+ R Hip Extension: 3+   L Knee Extension: 4- R Knee Extension: 2   L Knee Flexion: 4 R Knee Flexion: 3+   L Ankle DF: 5 R Ankle DF: 4 pain   L Ankle PF: 5  R Ankle PF: 5     LE ROM    L knee flexion: 122 degrees R knee flexion: 92 degrees   L knee extension: WNL degrees R knee extension: WNL degrees     Sensation  - Light touch: not performed    Skin Check  - not performed today    Knee Comments  - Gait Assessment: step through gait pattern with RW  - TU second with RW  - Stair Negotiation:  Step to gait pattern lead with LLE        Insurance:  AMA/CMS Eval/ Re-eval POC expires Ursula Garcia #/ Referral # Total   {Visits/Units) Start date  Expiration date Extension  Visit limitation? PT only or  PT+OT? Co-Insurance   AMA 2 27 5 15 23   50v                         AUTH #:  Date 2 27 3 9              Used 1 2              Remaining  49 48                  Precautions:   Past Medical History:   Diagnosis Date   • Allergic rhinitis    • Asthma    • Fibroids    • Physiological ovarian cysts    • Sleep apnea     denatl device worn     Patient provided verbal consent to treatment plan and recommended interventions  Date  3 9       Visit Number IE 2                PROM         Knee Flexion 118 94 deg       Knee Extension 0 0                TUG 14 sec  23 sec RW                Manual         Patellar mobs         STM                  Neuro Re-ed         Quad set  10 10       Glute set 10 HEP                                  TherEx         Knee/Hip PROM         Active w/u  8' lvl 1 nu step       Ankle pumps 10 10       SLR         Hip abduction 10 Stand 2*10 ea         LAQ 10 2*10       Heel slides 10        SAQ 10        Calf raise  3*10       Mini squat  2*10       Stand hip flexion  2*10 RLE       Supine heel slides  2*10 peanut                TherAct         Patient education FB FB       Stair negotiation FB        Car transfer FB                          Gait Training         With AD         No AD                  Modalities         CP  8'

## 2023-03-10 NOTE — TELEPHONE ENCOUNTER
Patient contacted for a postoperative follow up assessment  Patient reports 6-7/10 pain when sitting and 5/10 when walking with RW  Patient states “I have aching pain, numbness on side of leg, and stabbing pain ” Patient confirmed post-op PT appointment, 3/13 at 8:45AM      Patient is taking Oxycodone 5mg every 4 hours or PRN (at night), Tylenol 975mg every 8 hours, Celebrex 100mg BID, ASA 325mg BID  Patient is taking OTC stool softeners  Patient has had a small BM and is passing gas  Patient denies increase in swelling but dressing is clean, dry and intact  Patient is icing the site regularly  Patient denies nausea, vomiting, abdominal pain, chest pain, shortness of breath, fever, dizziness and calf pain   Patient confirmed post-op appointment with surgeon on 3/20 at [facility]  Patient does not have any other questions or concerns at this time

## 2023-03-13 ENCOUNTER — OFFICE VISIT (OUTPATIENT)
Dept: PHYSICAL THERAPY | Facility: CLINIC | Age: 58
End: 2023-03-13

## 2023-03-13 DIAGNOSIS — M25.561 CHRONIC PAIN OF RIGHT KNEE: ICD-10-CM

## 2023-03-13 DIAGNOSIS — M17.11 PRIMARY OSTEOARTHRITIS OF RIGHT KNEE: Primary | ICD-10-CM

## 2023-03-13 DIAGNOSIS — G89.29 CHRONIC PAIN OF RIGHT KNEE: ICD-10-CM

## 2023-03-13 NOTE — PROGRESS NOTES
Daily Note     Today's date: 3/13/2023  Patient name: Rakesh Fitzpatrick  : 1965  MRN: [id number]  Referring provider: Nichole Medina DO  Dx:   Encounter Diagnosis     ICD-10-CM    1  Primary osteoarthritis of right knee  M17 11       2  Chronic pain of right knee  M25 561     G89 29                    Subjective: Patient reports having a hard time sleeping last night and is reporting increased stiffness this morning in quad region and lateral knee  Patient denies calf pain  Objective: See treatment diary below    Nu step performed to increase knee ROM    Assessment: Tolerated treatment well  Patient demonstrated improved ROM with LAQ being able to achieve near full AROM against gravity  Swelling continues to be present  Bandage removed with good incision noted  No drainage or redness present  Plan: Continue per plan of care  Insurance:  AMA/CMS Eval/ Re-eval POC expires Iris Bidding #/ Referral # Total   {Visits/Units) Start date  Expiration date Extension  Visit limitation? PT only or  PT+OT? Co-Insurance   AMA 2 27 5 15 23     50v                                            AUTH #:  Date 2 27 3 9  3 13                       Used 1 2  3                       Remaining  52 54  47                           Precautions:   Medical History        Past Medical History:   Diagnosis Date   • Allergic rhinitis     • Asthma     • Fibroids     • Physiological ovarian cysts     • Sleep apnea       denatl device worn         Patient provided verbal consent to treatment plan and recommended interventions      Date 2 27 3 9 3 13         Visit Number IE 2  3                         PROM               Knee Flexion 118 94 deg  106 deg          Knee Extension 0 0  0                         TUG 14 sec   23 sec RW                           Manual               Patellar mobs               STM                               Neuro Re-ed               Quad set  10 10  10x         Glute set 10 HEP                                         TherEx               Knee PROM      FB         Active w/u   8' lvl 1 nu step  8' lvl 2 nu step         Ankle pumps 10 10  10         Hip abduction 10 Stand 2*10 ea   stand 2*10 ea          LAQ 10 2*10  2*10         Heel slides 10    HEP         SAQ 10    nv         Calf raise   3*10  HEP         Mini squat   2*10  2*10         Stand hip flexion   2*10 RLE  2*10 RLE         Supine heel slides   2*10 peanut  2*10 peanut         Stand calf stretch   5*10''      Knee flexion on step   2*10, 5" , 6" step      Step up   2*8, 4" RLE      Bandage removal   FB                      TherAct               Patient education FB FB           Stair negotiation FB             Car transfer FB                                             Gait Training               With AD               No AD                               Modalities               CP   8'

## 2023-03-15 ENCOUNTER — OFFICE VISIT (OUTPATIENT)
Dept: PHYSICAL THERAPY | Facility: CLINIC | Age: 58
End: 2023-03-15

## 2023-03-15 DIAGNOSIS — M17.11 PRIMARY OSTEOARTHRITIS OF RIGHT KNEE: Primary | ICD-10-CM

## 2023-03-15 DIAGNOSIS — M25.561 CHRONIC PAIN OF RIGHT KNEE: ICD-10-CM

## 2023-03-15 DIAGNOSIS — G89.29 CHRONIC PAIN OF RIGHT KNEE: ICD-10-CM

## 2023-03-15 NOTE — PROGRESS NOTES
Daily Note     Today's date: 3/15/2023  Patient name: Marlene Palumbo  : 1965  MRN: [id number]  Referring provider: Emma Gant DO  Dx:   Encounter Diagnosis     ICD-10-CM    1  Primary osteoarthritis of right knee  M17 11       2  Chronic pain of right knee  M25 561     G89 29           Start Time: 0840  Stop Time: 0935  Total time in clinic (min): 55 minutes  Subjective: Patient reports not having taken pain medication over the past 2 days  Trouble finding comfortable position with sleeping  Objective: See treatment diary below    Nu step performed to increase knee ROM    Assessment: Tolerated treatment well  Patient required tactile/manual assist for completing full AROM with seated LAQ  Improved quad control noted with decreased pulling when stepping up onto step  Plan: Continue per plan of care  Insurance:  AMA/CMS Eval/ Re-eval POC expires Tim Singh #/ Referral # Total   {Visits/Units) Start date  Expiration date Extension  Visit limitation? PT only or  PT+OT? Co-Insurance   AMA 2 27 5 15 23     50v                                            AUTH #:  Date 2 27 3 9  3 13  3 15               Used 1 2  3  4               Remaining  49 48  47 Lizette Blancas                   Precautions:   Medical History        Past Medical History:   Diagnosis Date   • Allergic rhinitis     • Asthma     • Fibroids     • Physiological ovarian cysts     • Sleep apnea       denatl device worn         Patient provided verbal consent to treatment plan and recommended interventions  HEP: glute set, quad set, SAQ, LAQ, ankle pumps, mini squat, heel slides     Date 2 27 3 9 3 13  3 15       Visit Number IE 2  3  4                       PROM               Knee Flexion 118 94 deg  106 deg   110 deg        Knee Extension 0 0  0                         TUG 14 sec   23 sec RW                           Manual               Patellar mobs               STM                               Neuro Re-ed               Quad set  10 10  10x 3*10, 3"       Glute set 10 HEP                                           TherEx               Knee PROM      FB         Active w/u   8' lvl 1 nu step  8' lvl 2 nu step  8' lvl 2 nu step       Ankle pumps 10 10  10         Hip abduction 10 Stand 2*10 ea   stand 2*10 ea   2*10 ea  stand       LAQ 10 2*10  2*10  2*10 PT assist for full ROM       Heel slides 10    HEP         SAQ 10    nv         Calf raise   3*10  HEP         Mini squat   2*10  2*10  2*10 LLE elevated on airex       Stand hip flexion   2*10 RLE  2*10 RLE 2*10 RLE       Supine heel slides   2*10 peanut  2*10 peanut  3*10 peanut       Stand calf stretch   5*10'' 5*10''     Knee flexion on step   2*10, 5" , 6" step 2*10, 5" , 6" step     Step up   2*8, 4" RLE 3*10, 4" RLE     Bandage removal   FB                      TherAct               Patient education FB FB           Stair negotiation FB             Car transfer FB                                             Gait Training               With AD               No AD                               Modalities               CP   8'   8'

## 2023-03-17 ENCOUNTER — OFFICE VISIT (OUTPATIENT)
Dept: PHYSICAL THERAPY | Facility: CLINIC | Age: 58
End: 2023-03-17

## 2023-03-17 DIAGNOSIS — M25.561 CHRONIC PAIN OF RIGHT KNEE: Primary | ICD-10-CM

## 2023-03-17 DIAGNOSIS — M17.11 PRIMARY OSTEOARTHRITIS OF RIGHT KNEE: ICD-10-CM

## 2023-03-17 DIAGNOSIS — G89.29 CHRONIC PAIN OF RIGHT KNEE: Primary | ICD-10-CM

## 2023-03-17 NOTE — PROGRESS NOTES
Daily Note    Today's date: 3/17/2023  Patient name: Elizabeth Hernandez  : 1965  MRN: [id number]  Referring provider: Janyce Bence, DO  Dx:   Encounter Diagnosis     ICD-10-CM    1  Chronic pain of right knee  M25 561     G89 29       2  Primary osteoarthritis of right knee  M17 11                    Subjective: Patient reports having spasm in thigh this morning when waking and having posterior knee and medial knee pain yesterday  Has not tried using SPC at this time  1 on 1 with PT for 23 minutes  Remaining time independent fitness program        Objective: See treatment diary below  Nu step performed to increase knee ROM    Assessment: Tolerated treatment well  Patient demonstrated improved quad activation with progression of exercises today  Educated patient on need to continue with quad strengthening at this time  Good gait pattern demonstrated with SPC and patient to use this at home  Plan: Continue to progress strengthening  Insurance:  AMA/CMS Eval/ Re-eval POC expires Ursula Radha #/ Referral # Total   {Visits/Units) Start date  Expiration date Extension  Visit limitation? PT only or  PT+OT? Co-Insurance   AMA 2 27 5 15 23     50v                                            AUTH #:  Date  3 9  3 13  3 15  3 17             Used 1 2  3  4  5             Remaining  49 48  47 Elsi Cargo  45                 Precautions:   Medical History        Past Medical History:   Diagnosis Date   • Allergic rhinitis     • Asthma     • Fibroids     • Physiological ovarian cysts     • Sleep apnea       denatl device worn         Patient provided verbal consent to treatment plan and recommended interventions  HEP: glute set, quad set, SAQ, LAQ, ankle pumps, mini squat, heel slides     Date  3 9 3 13  3 15  3 17     Visit Number IE 2  3  4  5                     PROM               Knee Flexion 118 94 deg  106 deg   110 deg   110 deg      Knee Extension 0 0  0                         TUG 14 sec   23 sec RW                           Manual               Patellar mobs                               Neuro Re-ed               Quad set  10 10  10x 3*10, 3"  3*10, 3"                                     TherEx               Knee PROM      FB         Active w/u   8' lvl 1 nu step  8' lvl 2 nu step  8' lvl 2 nu step  8' lvl 2 nu step     Hip abduction 10 Stand 2*10 ea   stand 2*10 ea   2*10 ea  stand       LAQ 10 2*10  2*10  2*10 PT assist for full ROM  3*10     Heel slides 10    HEP         SAQ 10    nv    3*10 bolster     Mini squat   2*10  2*10  2*10 LLE elevated on airex  3*10     Stand hip flexion   2*10 RLE  2*10 RLE 2*10 RLE  3*10 RLE     Supine heel slides   2*10 peanut  2*10 peanut  3*10 peanut  3*10 peanut     Stand calf stretch   5*10'' 5*10'' 5*10''    Knee flexion on step   2*10, 5" , 6" step 2*10, 5" , 6" step 2*10, 5", 6" step    Step up   2*8, 4" RLE 3*10, 4" RLE 3*10, 4" RLE    Bandage removal   FB                      TherAct               Patient education FB FB           Stair negotiation FB             Car transfer FB                                             Gait Training               With AD          100' SPC      No AD                               Modalities               CP   8'   8'  8'        Skin check pre and post modality

## 2023-03-20 ENCOUNTER — OFFICE VISIT (OUTPATIENT)
Dept: PHYSICAL THERAPY | Facility: CLINIC | Age: 58
End: 2023-03-20

## 2023-03-20 DIAGNOSIS — G89.29 CHRONIC PAIN OF RIGHT KNEE: Primary | ICD-10-CM

## 2023-03-20 DIAGNOSIS — M17.11 PRIMARY OSTEOARTHRITIS OF RIGHT KNEE: ICD-10-CM

## 2023-03-20 DIAGNOSIS — M25.561 CHRONIC PAIN OF RIGHT KNEE: Primary | ICD-10-CM

## 2023-03-20 NOTE — PROGRESS NOTES
Daily Note    Today's date: 3/20/2023  Patient name: Raoul Mckee  : 1965  MRN: [id number]  Referring provider: Edgar Fonseca DO  Dx:   Encounter Diagnosis     ICD-10-CM    1  Chronic pain of right knee  M25 561     G89 29       2  Primary osteoarthritis of right knee  M17 11           Start Time: 0845  Stop Time: 0930  Total time in clinic (min): 45 minutes    Subjective: Patient reports 8/10 pain today, she states "it was bad all weekend"       Objective: See treatment diary below      Assessment: Tolerated treatment well  Patient demo good tolerance today, decreased reps due to pain upon arrival      Plan: Continue to progress strengthening  Insurance:  AMA/CMS Eval/ Re-eval POC expires Frebertha Reyes #/ Referral # Total   {Visits/Units) Start date  Expiration date Extension  Visit limitation? PT only or  PT+OT? Co-Insurance   AMA 2 27 5 15 23     50v                                            AUTH #:  Date 2 27 3 9  3 13  3 15  3 17             Used 1 2  3  4  5             Remaining  49 48  47 Enda Schwalbe  45                 Precautions:   Medical History        Past Medical History:   Diagnosis Date   • Allergic rhinitis     • Asthma     • Fibroids     • Physiological ovarian cysts     • Sleep apnea       denatl device worn         Patient provided verbal consent to treatment plan and recommended interventions  HEP: glute set, quad set, SAQ, LAQ, ankle pumps, mini squat, heel slides     Date 2 27 3 9 3 13  3 15  3 17  3 20   Visit Number IE 2  3  4  5  6                   PROM               Knee Flexion 118 94 deg  106 deg   110 deg   110 deg      Knee Extension 0 0  0                         TUG 14 sec   23 sec RW                           Manual               Patellar mobs                               Neuro Re-ed               Quad set  10 10  10x 3*10, 3"  3*10, 3" 3x10 3"                                   TherEx               Knee PROM      FB         Active w/u   8' lvl 1 nu step  8' lvl 2 nu step  8' lvl 2 nu step  8' lvl 2 nu step  8' lvl 2 NS   Hip abduction 10 Stand 2*10 ea   stand 2*10 ea   2*10 ea  stand    2x10 standing   LAQ 10 2*10  2*10  2*10 PT assist for full ROM  3*10 3x10   Heel slides 10    HEP         SAQ 10    nv    3*10 bolster  3x10 bolster   Mini squat   2*10  2*10  2*10 LLE elevated on airex  3*10  2x10   Stand hip flexion   2*10 RLE  2*10 RLE 2*10 RLE  3*10 RLE  3x10 R   Supine heel slides   2*10 peanut  2*10 peanut  3*10 peanut  3*10 peanut  3*10 peanut   Stand calf stretch   5*10'' 5*10'' 5*10'' On step 5x10s   Knee flexion on step   2*10, 5" , 6" step 2*10, 5" , 6" step 2*10, 5", 6" step 2x10 6" step   Step up   2*8, 4" RLE 3*10, 4" RLE 3*10, 4" RLE --   Bandage removal   FB                      TherAct               Patient education FB FB           Stair negotiation FB             Car transfer FB                                             Gait Training               With AD          100' SPC      No AD                               Modalities               CP   8'   6'  8'  8'      Skin check pre and post modality

## 2023-03-22 ENCOUNTER — OFFICE VISIT (OUTPATIENT)
Dept: OBGYN CLINIC | Facility: CLINIC | Age: 58
End: 2023-03-22

## 2023-03-22 ENCOUNTER — OFFICE VISIT (OUTPATIENT)
Dept: PHYSICAL THERAPY | Facility: CLINIC | Age: 58
End: 2023-03-22

## 2023-03-22 ENCOUNTER — APPOINTMENT (OUTPATIENT)
Dept: RADIOLOGY | Facility: CLINIC | Age: 58
End: 2023-03-22

## 2023-03-22 VITALS
DIASTOLIC BLOOD PRESSURE: 80 MMHG | TEMPERATURE: 98 F | BODY MASS INDEX: 36.19 KG/M2 | HEART RATE: 90 BPM | SYSTOLIC BLOOD PRESSURE: 130 MMHG | HEIGHT: 64 IN | WEIGHT: 212 LBS

## 2023-03-22 DIAGNOSIS — M25.561 CHRONIC PAIN OF RIGHT KNEE: Primary | ICD-10-CM

## 2023-03-22 DIAGNOSIS — Z96.651 AFTERCARE FOLLOWING RIGHT KNEE JOINT REPLACEMENT SURGERY: ICD-10-CM

## 2023-03-22 DIAGNOSIS — Z96.651 S/P TOTAL KNEE REPLACEMENT NOT USING CEMENT, RIGHT: Primary | ICD-10-CM

## 2023-03-22 DIAGNOSIS — Z96.651 S/P TOTAL KNEE REPLACEMENT NOT USING CEMENT, RIGHT: ICD-10-CM

## 2023-03-22 DIAGNOSIS — G89.29 CHRONIC PAIN OF RIGHT KNEE: Primary | ICD-10-CM

## 2023-03-22 DIAGNOSIS — M17.11 PRIMARY OSTEOARTHRITIS OF RIGHT KNEE: ICD-10-CM

## 2023-03-22 DIAGNOSIS — Z47.1 AFTERCARE FOLLOWING RIGHT KNEE JOINT REPLACEMENT SURGERY: ICD-10-CM

## 2023-03-22 NOTE — PROGRESS NOTES
Assessment/Plan:  1  S/P total knee replacement not using cement, right  XR knee 3 vw right non injury      2  Aftercare following right knee joint replacement surgery          Scribe Attestation    I,:  Nemo Martins am acting as a scribe while in the presence of the attending physician :       I,:  Juana Chew, DO personally performed the services described in this documentation    as scribed in my presence :         Lennox Omalley is a pleasant 63 y o  woman presenting today for follow-up 2 weeks after robotic assisted right total knee arthroplasty  Her incision is healing well, no signs of infection  She may get wet in the shower, pat dry do not scrub, do not soak  Prosthesis is stable on exam and review of imaging  Continue taking aspirin for DVT prophylaxis for 4 more weeks  Recommend patient cut prescribed oxycodone in half due to drossiness  If this does not help call the office, we discussed use of tramadol at that point  I did discuss with the patient that the numbness she is feeling on the lateral aspect of her knee is completely normal  Continue physical therapy, transitioning from walker to cane as tolerated  All her questions were addressed, she will follow up in 4 weeks  Subjective: 2 week s/p right total knee replacement    Patient ID: Ki Simon is a 63 y o  female 2 weeks status post right total knee arthroplasty  DOS 3/6/2023  Patient presents today with use of walker for ambulation assistance  She has started physical therapy and been compliant with aspirin for DVT prophylactics  Patient states she has tried to avoid prescription Oxycodone because of the way it makes her feel, but Tylenol is not controlling her pain well  She notes intermittent aching pain generalized through out her knee that changes in severity  She denies any fever or chills  Review of Systems   Constitutional: Negative for chills and fever  HENT: Negative for ear pain and sore throat      Eyes: Negative for pain and visual disturbance  Respiratory: Negative for cough and shortness of breath  Cardiovascular: Negative for chest pain and palpitations  Gastrointestinal: Negative for abdominal pain and vomiting  Genitourinary: Negative for dysuria and hematuria  Musculoskeletal: Negative for arthralgias and back pain  Skin: Negative for color change and rash  Neurological: Negative for seizures and syncope  All other systems reviewed and are negative  Past Medical History:   Diagnosis Date   • Allergic rhinitis    • Asthma    • Fibroids    • Physiological ovarian cysts    • Sleep apnea     denatl device worn       Past Surgical History:   Procedure Laterality Date   • COLONOSCOPY  2016    dr Jessi Centeno   • KNEE ARTHROSCOPY Left 2018    meniscectomy x2, first done in 2015   • MYOMECTOMY     • WA ARTHRP KNE CONDYLE&PLATU MEDIAL&LAT COMPARTMENTS Right 3/6/2023    Procedure: ARTHROPLASTY KNEE TOTAL W ROBOT - RIGHT - PRESS FIT - SAME DAY; Surgeon: Lesa Marina DO;  Location: [address] [place] [address];  Service: Orthopedics   • WA ARTHRS KNE SURG W/MENISCECTOMY MED/LAT W/SHVG Right 1/6/2022    Procedure: KNEE ARTHROSCOPY MENISCECTOMY MEDIAL;  Surgeon: Mar Kauffman MD;  Location: [address] [place] [address];  Service: Orthopedics   • WA COLONOSCOPY FLX DX W/COLLJ SPEC WHEN PFRMD N/A 9/13/2018    Procedure: COLONOSCOPY;  Surgeon: Carlos Alva MD;  Location: [facility];   Service: Gastroenterology   • WRIST SURGERY Right        Family History   Problem Relation Age of Onset   • Osteoporosis Mother    • Thyroid disease Mother    • Colon cancer Mother 79   • Colon polyps Mother    • Cancer Mother         colon   • Alzheimer's disease Father    • Hypertension Father    • Dementia Father    • Heart disease Brother    • No Known Problems Daughter    • No Known Problems Daughter    • No Known Problems Maternal Aunt    • No Known Problems Maternal Aunt    • No Known Problems Paternal Aunt    • No Known Problems Paternal Aunt • No Known Problems Paternal Aunt    • No Known Problems Paternal Aunt    • No Known Problems Paternal Aunt    • No Known Problems Paternal Aunt    • No Known Problems Paternal Aunt    • No Known Problems Paternal Aunt        Social History     Occupational History   • Not on file   Tobacco Use   • Smoking status: Never   • Smokeless tobacco: Never   Vaping Use   • Vaping Use: Never used   Substance and Sexual Activity   • Alcohol use: Yes     Comment: social - wine 2 x month   • Drug use: Never   • Sexual activity: Yes     Partners: Male     Birth control/protection: Condom Male         Current Outpatient Medications:   •  acetaminophen (TYLENOL) 325 mg tablet, Take 3 tablets (975 mg total) by mouth every 8 (eight) hours, Disp: , Rfl: 0  •  albuterol (2 5 mg/3 mL) 0 083 % nebulizer solution, Take 1 vial (2 5 mg total) by nebulization every 6 (six) hours as needed for wheezing or shortness of breath, Disp: 100 mL, Rfl: 0  •  aspirin 325 mg tablet, Take 1 tablet (325 mg total) by mouth 2 (two) times a day for 84 doses, Disp: 84 tablet, Rfl: 0  •  Calcium Carb-Cholecalciferol 600-500 MG-UNIT CAPS, Take by mouth, Disp: , Rfl:   •  docusate sodium (COLACE) 100 mg capsule, Take 1 capsule (100 mg total) by mouth 2 (two) times a day, Disp: 60 capsule, Rfl: 0  •  Fluticasone-Salmeterol (Advair Diskus) 500-50 mcg/dose inhaler, Inhale 1 puff 2 (two) times a day Rinse mouth after use , Disp: 180 blister, Rfl: 1  •  multivitamin (THERAGRAN) TABS, Take 1 tablet by mouth daily, Disp: , Rfl:   •  Respiratory Therapy Supplies (NEBULIZER/TUBING/MOUTHPIECE) KIT, by Does not apply route 4 (four) times a day, Disp: 1 each, Rfl: 0  •  oxyCODONE (Roxicodone) 5 immediate release tablet, Take 1-2 tablets by mouth every 4-6 hours as needed for pain (Patient not taking: Reported on 3/22/2023), Disp: 60 tablet, Rfl: 0  •  valACYclovir (VALTREX) 1,000 mg tablet, 2 tabs at earliest onset of cold sore, repeat once in 12 hours   Take 500mg bid for 3 days for genital herpes flare up , Disp: 40 tablet, Rfl: 5    No Known Allergies    Objective:  Vitals:    03/22/23 1758   BP: 130/80   Pulse: 90   Temp: 98 °F (36 7 °C)       Body mass index is 36 39 kg/m²  Right Knee Exam     Range of Motion   Extension: 0   Flexion: 110     Tests   Varus: negative Valgus: negative    Other   Erythema: absent  Sensation: normal  Pulse: present  Swelling: none  Effusion: no effusion present    Comments:  Anterior incision healing well without erythema, warmth, drainage, or dehiscence  No sign of infection  Prosthesis stable to stressing at 0, 30, and 90  Patella tracks midline and flat   Thigh and calf soft and nontender  Grossly distally neurovascularly intact            Observations     Right Knee   Negative for effusion  Physical Exam  Vitals and nursing note reviewed  Constitutional:       Appearance: Normal appearance  HENT:      Head: Normocephalic  Right Ear: External ear normal       Left Ear: External ear normal    Eyes:      Extraocular Movements: Extraocular movements intact  Conjunctiva/sclera: Conjunctivae normal    Cardiovascular:      Rate and Rhythm: Normal rate  Pulses: Normal pulses  Pulmonary:      Effort: Pulmonary effort is normal       Breath sounds: Normal breath sounds  Abdominal:      General: Abdomen is flat  Musculoskeletal:         General: Normal range of motion  Cervical back: Normal range of motion and neck supple  Right knee: No effusion  Skin:     General: Skin is warm and dry  Neurological:      General: No focal deficit present  Mental Status: She is alert  Psychiatric:         Mood and Affect: Mood normal          Behavior: Behavior normal          I have personally reviewed pertinent films in PACS      x-rays of the right knee taken 3/22/2023 demonstrate a well-aligned, stable pressfit total knee arthroplasty with no evidence of loosening, periprosthetic fracture, or other interval complication  This document was created using speech voice recognition software  Grammatical errors, random word insertions, pronoun errors, and incomplete sentences are an occasional consequence of this system due to software limitations, ambient noise, and hardware issues  Any formal questions or concerns about content, text, or information contained within the body of this dictation should be directly addressed to the provider for clarification

## 2023-03-22 NOTE — PROGRESS NOTES
Daily Note    Today's date: 3/22/2023  Patient name: Cody Umana  : 1965  MRN: [id number]  Referring provider: Lorenza Watkins DO  Dx:   Encounter Diagnosis     ICD-10-CM    1  Chronic pain of right knee  M25 561     G89 29       2  Primary osteoarthritis of right knee  M17 11                        Patient co-treated by Physical Therapy Student, Noris Mathews, under my direct supervision  Subjective: Patient reports 6/10 pain in medial aspect of knee  Reports doing a decent amount of walking yesterday and also quadriceps pain  Patient using SPC in home  Patient now taking only tylenol at this time but still having hard time sleeping throughout the night  Objective: See treatment diary below    Assessment: Tolerated treatment well  Patient educated to use towel roll under heel/ankle when lying supine for 10 minutes with ice on knee multiple times per day  Educated at this time point on discussing use of Advil with referring provider as she has completed her use of Celebrex  Still taking blood thinner as instructed  Improved knee ROM noted compared to prior sessions and good tolerance to addition of bike with fatigue reported with this  Added stand terminal knee extension to HEP  Plan: Continue to progress strengthening  Insurance:  AMA/CMS Eval/ Re-eval POC expires Therisa Gallus #/ Referral # Total   {Visits/Units) Start date  Expiration date Extension  Visit limitation? PT only or  PT+OT?  Co-Insurance   AMA 2 27 5 15 23     50v                                            AUTH #:  Date 2 27 3 9  3 13  3 15  3 17  3 20  3 22         Used 1 2  3  4  5  6  7         Remaining  49 48  47  46  45  44  43             Precautions:   Medical History        Past Medical History:   Diagnosis Date   • Allergic rhinitis     • Asthma     • Fibroids     • Physiological ovarian cysts     • Sleep apnea       denatl device worn         Patient provided verbal consent to treatment plan and recommended interventions  HEP: glute set, quad set, SAQ, LAQ, ankle pumps, mini squat, heel slides     Date 3 13  3 15  3 17  3 20 3 22   Visit Number  3  4  5  6 7                PROM            Knee Flexion  106 deg   110 deg   110 deg    117 deg   passive   Knee Extension  0       -3 Passive                TUG                         Manual            Patellar mobs                         Neuro Re-ed            Quad set   10x 3*10, 3"  3*10, 3" 3x10 3" Stand terminal knee extn 2*10                             TherEx            Knee PROM  FB       JM   Active w/u  8' lvl 2 nu step  8' lvl 2 nu step  8' lvl 2 nu step  8' lvl 2 NS 5' rec  bike   Hip abduction  stand 2*10 ea   2*10 ea  stand    2x10 standing 2*10 ea  standing   LAQ  2*10  2*10 PT assist for full ROM  3*10 3x10 2*10   SAQ  nv    3*10 bolster  3x10 bolster 3*10 bolster   Mini squat  2*10  2*10 LLE elevated on airex  3*10  2x10 2*10   Stand hip flexion  2*10 RLE 2*10 RLE  3*10 RLE  3x10 R 3*10 RLE   Supine heel slides  2*10 peanut  3*10 peanut  3*10 peanut  3*10 peanut 3*10 peanut   Stand calf stretch 5*10'' 5*10'' 5*10'' On step 5x10s    Knee flexion on step 2*10, 5" , 6" step 2*10, 5" , 6" step 2*10, 5", 6" step 2x10 6" step 2*10, 12'' step   Step up 2*8, 4" RLE 3*10, 4" RLE 3*10, 4" RLE --    Bandage removal FB                    TherAct            Patient education            Stair negotiation            Car transfer                                      Gait Training            With AD      100' SPC       No AD                         Modalities            CP   6'  8'  8'       Skin check pre and post modality

## 2023-03-24 ENCOUNTER — APPOINTMENT (OUTPATIENT)
Dept: PHYSICAL THERAPY | Facility: CLINIC | Age: 58
End: 2023-03-24

## 2023-03-24 DIAGNOSIS — M17.11 PRIMARY OSTEOARTHRITIS OF RIGHT KNEE: ICD-10-CM

## 2023-03-24 DIAGNOSIS — M25.561 CHRONIC PAIN OF RIGHT KNEE: Primary | ICD-10-CM

## 2023-03-24 DIAGNOSIS — G89.29 CHRONIC PAIN OF RIGHT KNEE: Primary | ICD-10-CM

## 2023-03-24 NOTE — PROGRESS NOTES
Daily Note    Today's date: 3/24/2023  Patient name: Brennon Oseguera  : 1965  MRN: [id number]  Referring provider: Tracy Caballero DO  Dx:   Encounter Diagnosis     ICD-10-CM    1  Chronic pain of right knee  M25 561     G89 29       2  Primary osteoarthritis of right knee  M17 11                        Patient co-treated by Physical Therapy Student, Elvie Tristan, under my direct supervision  Subjective: Patient reports 6/10 pain in medial aspect of knee  Reports doing a decent amount of walking yesterday and also quadriceps pain  Patient using SPC in home  Patient now taking only tylenol at this time but still having hard time sleeping throughout the night  Objective: See treatment diary below    Assessment: Tolerated treatment well  Patient educated to use towel roll under heel/ankle when lying supine for 10 minutes with ice on knee multiple times per day  Educated at this time point on discussing use of Advil with referring provider as she has completed her use of Celebrex  Still taking blood thinner as instructed  Improved knee ROM noted compared to prior sessions and good tolerance to addition of bike with fatigue reported with this  Added stand terminal knee extension to HEP  Plan: Continue to progress strengthening  Insurance:  AMA/CMS Eval/ Re-eval POC expires Jennifer eDnnis #/ Referral # Total   {Visits/Units) Start date  Expiration date Extension  Visit limitation? PT only or  PT+OT?  Co-Insurance   AMA 2 27 5 15 23     50v                                            AUTH #:  Date 2 27 3 9  3 13  3 15  3 17  3 20  3 22         Used 1 2  3  4  5  6  7         Remaining  49 48  47  46  45  44  43             Precautions:   Medical History        Past Medical History:   Diagnosis Date   • Allergic rhinitis     • Asthma     • Fibroids     • Physiological ovarian cysts     • Sleep apnea       denatl device worn         Patient provided verbal consent to treatment plan and recommended "interventions  HEP: glute set, quad set, SAQ, LAQ, ankle pumps, mini squat, heel slides     Date  3 15  3 17  3 20 3 22 3 24   Visit Number  4  5  6 7 8               PROM           Knee Flexion  110 deg   110 deg    117 deg   passive    Knee Extension       -3 Passive                TUG                       Manual           Patellar mobs                       Neuro Re-ed           Quad set  3*10, 3\"  3*10, 3\" 3x10 3\" Stand terminal knee extn 2*10                TherEx           Knee PROM       JM    Active w/u  8' lvl 2 nu step  8' lvl 2 nu step  8' lvl 2 NS 5' rec  bike    Hip abduction  2*10 ea  stand    2x10 standing 2*10 ea  standing    LAQ  2*10 PT assist for full ROM  3*10 3x10 2*10    SAQ    3*10 bolster  3x10 bolster 3*10 bolster    Mini squat  2*10 LLE elevated on airex  3*10  2x10 2*10    Stand hip flexion 2*10 RLE  3*10 RLE  3x10 R 3*10 RLE    Supine heel slides  3*10 peanut  3*10 peanut  3*10 peanut 3*10 peanut    Stand calf stretch 5*10'' 5*10'' On step 5x10s     Knee flexion on step 2*10, 5\" , 6\" step 2*10, 5\", 6\" step 2x10 6\" step 2*10, 12'' step    Step up 3*10, 4\" RLE 3*10, 4\" RLE --     Bandage removal                    TherAct           Patient education           Stair negotiation           Car transfer                                   Gait Training           With AD    100' SPC        No AD                       Modalities           CP 6'  8'  8'        Skin check pre and post modality  "

## 2023-03-27 ENCOUNTER — OFFICE VISIT (OUTPATIENT)
Dept: PHYSICAL THERAPY | Facility: CLINIC | Age: 58
End: 2023-03-27

## 2023-03-27 DIAGNOSIS — M17.11 PRIMARY OSTEOARTHRITIS OF RIGHT KNEE: ICD-10-CM

## 2023-03-27 DIAGNOSIS — G89.29 CHRONIC PAIN OF RIGHT KNEE: Primary | ICD-10-CM

## 2023-03-27 DIAGNOSIS — M25.561 CHRONIC PAIN OF RIGHT KNEE: Primary | ICD-10-CM

## 2023-03-27 NOTE — PROGRESS NOTES
Daily Note      Today's date: 3/27/2023  Patient name: Brennon Oseguera  : 1965  MRN: [id number]  Referring provider: Tracy Caballero DO  Dx:   Encounter Diagnosis     ICD-10-CM    1  Chronic pain of right knee  M25 561     G89 29       2  Primary osteoarthritis of right knee  M17 11           Subjective: Pt reports that her knee feels stiffer today after attending a bridal shower that was one hour away  Pt states that sitting in the car likely contributed to more knee stiffness  Pt reports /10 pain  Pt states that she followed up with Dr Esteban Bhatt and received a good report  Pt confirmed that she is cleared to shower, but not scrub the incision  Objective: See treatment diary below; SAQ billed as neuromuscular re-ed (68315)    Assessment: Pt tolerated treatment well  Pt noted increased tension in posterior medial right knee while performing quad set exercise  Pt introduced to HS stretching and noted difficulty with returning to starting position  Pt performed exercises with decreased tempo today, likely due to increased pain and stiffness with ROM exercises  Plan: Continue per plan of care  Progress treatment as tolerated  Insurance:  AMA/CMS Eval/ Re-eval POC expires Jennifer Dennis #/ Referral # Total   {Visits/Units) Start date  Expiration date Extension  Visit limitation? PT only or  PT+OT? Co-Insurance   AMA 2 27 5 15 23     50v                                            AUTH #:  Date 2 27 3 9  3 13  3 15  3 17  3 20  3 22  3 27       Used 1 2  3  4  5  6  7  8       Remaining  49 48  47  46  45  44  43  42           Precautions:   Medical History        Past Medical History:   Diagnosis Date   • Allergic rhinitis     • Asthma     • Fibroids     • Physiological ovarian cysts     • Sleep apnea       denatl device worn         Patient provided verbal consent to treatment plan and recommended interventions      HEP: glute set, quad set, SAQ, LAQ, ankle pumps, mini squat, heel "slides     Date 3 13  3 15  3 17  3 20 3 22 3 27   Visit Number  3  4  5  6 7 8                 PROM             Knee Flexion  106 deg   110 deg   110 deg    117 deg   passive 112° PROM   Knee Extension  0       -3 Passive -2° PROM                 TUG                           Manual             Patellar mobs                           Neuro Re-ed             Quad set   10x 3*10, 3\"  3*10, 3\" 3x10 3\" Stand terminal knee extn 2*10 QS 2x10 (5s)                                TherEx             Knee PROM  FB       JM DJA   Active w/u  8' lvl 2 nu step  8' lvl 2 nu step  8' lvl 2 nu step  8' lvl 2 NS 5' rec  bike Nustep 8' lvl 2   Hip abduction  stand 2*10 ea   2*10 ea  stand    2x10 standing 2*10 ea  standing    LAQ  2*10  2*10 PT assist for full ROM  3*10 3x10 2*10    SAQ  nv    3*10 bolster  3x10 bolster 3*10 bolster 3x10 bolster   Mini squat  2*10  2*10 LLE elevated on airex  3*10  2x10 2*10    Stand hip flexion  2*10 RLE 2*10 RLE  3*10 RLE  3x10 R 3*10 RLE    Supine heel slides  2*10 peanut  3*10 peanut  3*10 peanut  3*10 peanut 3*10 peanut 2x10 peanut   Stand calf stretch 5*10'' 5*10'' 5*10'' On step 5x10s     Knee flexion on step 2*10, 5\" , 6\" step 2*10, 5\" , 6\" step 2*10, 5\", 6\" step 2x10 6\" step 2*10, 12'' step 2x10, 12\" step    Step up 2*8, 4\" RLE 3*10, 4\" RLE 3*10, 4\" RLE --     Bandage removal FB        HS stretch          5x10s w/ SOS   TherAct             Patient education             Stair negotiation             Car transfer                                         Gait Training             With AD      80' SPC        No AD                           Modalities             CP   6'  8'  8'        Skin check pre and post modality         "

## 2023-03-29 ENCOUNTER — OFFICE VISIT (OUTPATIENT)
Dept: PHYSICAL THERAPY | Facility: CLINIC | Age: 58
End: 2023-03-29

## 2023-03-29 DIAGNOSIS — M17.11 PRIMARY OSTEOARTHRITIS OF RIGHT KNEE: ICD-10-CM

## 2023-03-29 DIAGNOSIS — G89.29 CHRONIC PAIN OF RIGHT KNEE: Primary | ICD-10-CM

## 2023-03-29 DIAGNOSIS — M25.561 CHRONIC PAIN OF RIGHT KNEE: Primary | ICD-10-CM

## 2023-03-29 NOTE — PROGRESS NOTES
"Daily Note     Today's date: 3/29/2023  Patient name: Ольга Dorado  : 1965  MRN: [id number]  Referring provider: Tarik Fowler DO  Dx:   Encounter Diagnosis     ICD-10-CM    1  Chronic pain of right knee  M25 561     G89 29       2  Primary osteoarthritis of right knee  M17 11           Start Time: 845  Stop Time:   Total time in clinic (min): 40 minutes    Patient was co-treated by a physical therapy student, Lakesha Koch, under my direct supervision  Subjective: Pt reports \"stabbing\" pain like someone is \"sticking a knife\" in her knee  This pain is reported in the R knee at the joint line and inferior to the distal end of the incision  She is able to reduce this pain by walking, however, the pain is random and intermittent and is not caused by anything in particular that she can report  The pain will wake her up at night and she goes for a walk to relieve the pain  She does continue to use ice 4-5 times per day  Objective: See treatment diary below    Assessment: Tolerated treatment well  Patient educated to strike with heel first during ambulation which slightly improved symptoms  Continues to lack full active knee extension that is leading to impaired gait mechanics and limited functional mobility  Focus on improving terminal knee extension during functional exercises to improve ADL performance  Plan: Continue per plan of care  Insurance:  AMA/CMS Eval/ Re-eval POC expires Eunice Coelho #/ Referral # Total   {Visits/Units) Start date  Expiration date Extension  Visit limitation? PT only or  PT+OT?  Co-Insurance   AMA 2 27 5 15 23     50v                                            AUTH #:  Date 2 27 3 9  3 13  3 15  3 17  3 20  3 22  3 27       Used 1 2  3  4  5  6  7  8       Remaining  49 48  47  46  45  44  43  42           Precautions:   Medical History        Past Medical History:   Diagnosis Date   • Allergic rhinitis     • Asthma     • Fibroids     • Physiological ovarian " "cysts     • Sleep apnea       denatl device worn         Patient provided verbal consent to treatment plan and recommended interventions  HEP: glute set, quad set, SAQ, LAQ, ankle pumps, mini squat, heel slides     Date  3 17  3 20 3 22 3 27 3 29   Visit Number  5  6 7 8 9              PROM          Knee Flexion  110 deg    117 deg   passive 112° PROM resume   Knee Extension     -3 Passive -2° PROM resume              TUG                     Manual          Patellar mobs                     Neuro Re-ed          Quad set   3*10, 3\" 3x10 3\" Stand terminal knee extn 2*10 QS 2x10 (5s)  QS 2x10 (5s)                          TherEx          Knee PROM     JM DJA    Active w/u  8' lvl 2 nu step  8' lvl 2 NS 5' rec  bike Nustep 8' lvl 2 5' rec  bike   Hip abduction    2x10 standing 2*10 ea  standing     LAQ  3*10 3x10 2*10     SAQ  3*10 bolster  3x10 bolster 3*10 bolster 3x10 bolster 2x10 bolster   Mini squat  3*10  2x10 2*10  STS 5x2   Stand hip flexion  3*10 RLE  3x10 R 3*10 RLE  2*10 RLE   Supine heel slides  3*10 peanut  3*10 peanut 3*10 peanut 2x10 peanut    Stand calf stretch 5*10'' On step 5x10s      Knee flexion on step 2*10, 5\", 6\" step 2x10 6\" step 2*10, 12'' step 2x10, 12\" step  2x10, 8\" step    Step up 3*10, 4\" RLE --      Bandage removal        HS stretch      5x10s w/ SOS    TherAct          Patient education       Education provided regarding knee motion at initial contact, knee positioning, gait speed   Stair negotiation          Car transfer                                Gait Training          With AD  100' SPC      With RW focusing on terminal knee extension at initial contact to promote normalized gait mechanics 100'x2   No AD                     Modalities          CP  8'  8'         Skin check pre and post modality          "

## 2023-04-03 ENCOUNTER — OFFICE VISIT (OUTPATIENT)
Dept: PHYSICAL THERAPY | Facility: CLINIC | Age: 58
End: 2023-04-03

## 2023-04-03 DIAGNOSIS — M17.11 PRIMARY OSTEOARTHRITIS OF RIGHT KNEE: Primary | ICD-10-CM

## 2023-04-03 DIAGNOSIS — G89.29 CHRONIC PAIN OF RIGHT KNEE: ICD-10-CM

## 2023-04-03 DIAGNOSIS — M25.561 CHRONIC PAIN OF RIGHT KNEE: ICD-10-CM

## 2023-04-03 NOTE — PROGRESS NOTES
"Daily Note     Today's date: 4/3/2023  Patient name: Fernanda Castañeda  : 1965  MRN: [id number]  Referring provider: Ellen Do DO  Dx:   Encounter Diagnosis     ICD-10-CM    1  Primary osteoarthritis of right knee  M17 11       2  Chronic pain of right knee  M25 561     G89 29           Start Time: 0840  Stop Time: 9979  Total time in clinic (min): 45 minutes    Subjective: Pt reports that stabbing sensation has improved but yesterday her knee \"went sideways\" when performing standing calf raises  Reports before this her knee was doing better  She has been walking at times without use of cane in the house  Objective: See treatment diary below  Bike performed to increased knee ROM    Assessment: Tolerated treatment well  Patient reported that her knee felt better after session today  Improved heel strike noted with ambulation around clinic  Improved quad activation with addition of SLR flexion  Plan: Continue per plan of care  Insurance:  AMA/CMS Eval/ Re-eval POC expires Rody Lakisha #/ Referral # Total   {Visits/Units) Start date  Expiration date Extension  Visit limitation? PT only or  PT+OT? Co-Insurance   AMA 2 27 5 15 23     50v                                            AUTH #:  Date 2 27 3 9  3 13  3 15  3 17  3 20  3 22  3 27  3 29 4 3     Used 1 2  3  4  5  6  7  8  9 10     Remaining  49 48  47  46  45  44  43  42  41 40         Precautions:   Medical History        Past Medical History:   Diagnosis Date   • Allergic rhinitis     • Asthma     • Fibroids     • Physiological ovarian cysts     • Sleep apnea       denatl device worn         Patient provided verbal consent to treatment plan and recommended interventions  HEP: glute set, quad set, SAQ, LAQ, ankle pumps, mini squat, heel slides     Date  3 20 3 22 3 27 3 29 4 3   Visit Number  6 7 8 9 10             PROM         Knee Flexion   117 deg   passive 112° PROM resume 115 PROM   Knee Extension   -3 Passive -2° PROM resume -2 " "PROM             TUG                   Manual         Patellar mobs         Term   Knee extn     2*15             Neuro Re-ed         Quad set  3x10 3\" Stand terminal knee extn 2*10 QS 2x10 (5s)  QS 2x10 (5s)      tandem stance      5*10''             TherEx         Knee PROM   JM DJA     Active w/u  8' lvl 2 NS 5' rec  bike Nustep 8' lvl 2 5' rec  bike 7' rec bike   Hip abduction  2x10 standing 2*10 ea  standing      LAQ 3x10 2*10   2*10   SAQ  3x10 bolster 3*10 bolster 3x10 bolster 2x10 bolster 3*10 bolster   Mini squat  2x10 2*10  STS 5x2 2*10   Stand hip flexion  3x10 R 3*10 RLE  2*10 RLE 2*10 RLE   Supine heel slides  3*10 peanut 3*10 peanut 2x10 peanut     Knee flexion on step 2x10 6\" step 2*10, 12'' step 2x10, 12\" step  2x10, 8\" step  2*10, 8'' step   Step up --       HS stretch    5x10s w/ SOS     SLR flexion     2*8 PT assist           TherAct         Patient education     Education provided regarding knee motion at initial contact, knee positioning, gait speed    Stair negotiation         Car transfer                             Gait Training         With AD     With RW focusing on terminal knee extension at initial contact to promote normalized gait mechanics 100'x2 Not performed   No AD                   Modalities         CP  8'          Skin check pre and post modality          "

## 2023-04-05 ENCOUNTER — OFFICE VISIT (OUTPATIENT)
Dept: PHYSICAL THERAPY | Facility: CLINIC | Age: 58
End: 2023-04-05

## 2023-04-05 DIAGNOSIS — G89.29 CHRONIC PAIN OF RIGHT KNEE: ICD-10-CM

## 2023-04-05 DIAGNOSIS — M25.561 CHRONIC PAIN OF RIGHT KNEE: ICD-10-CM

## 2023-04-05 DIAGNOSIS — M17.11 PRIMARY OSTEOARTHRITIS OF RIGHT KNEE: Primary | ICD-10-CM

## 2023-04-05 NOTE — PROGRESS NOTES
Daily Note     Today's date: 2023  Patient name: Kait Hull  : 1965  MRN: [id number]  Referring provider: Shy Castillo DO  Dx:   Encounter Diagnosis     ICD-10-CM    1  Primary osteoarthritis of right knee  M17 11       2  Chronic pain of right knee  M25 561     G89 29                      Subjective: Pt reports that has been walking more at home without her rolling walker  Reports tibial plateau pain when ambulating for periods of time and that her leg will quickly stiffen when sitting > 10 minutes  Objective: See treatment diary below  Bike performed to increased knee ROM    Assessment: Tolerated treatment well  Patient demonstrates better gait pattern without use of AD  Reported leg fatigue with treatment progression today  Educated patient to try ambulating long distances with SPC and short distances without SPC  Plan: Continue per plan of care  Insurance:  AMA/CMS Eval/ Re-eval POC expires Prashant Monet #/ Referral # Total   {Visits/Units) Start date  Expiration date Extension  Visit limitation? PT only or  PT+OT? Co-Insurance   AMA 2 27 5 15 23     50v                                            AUTH #:  Date 2 27 3 9  3 13  3 15  3 17  3 20  3 22  3 27  3 29 4 3 4 5     Used 1 2  3  4  5  6  7  8  9 10 11     Remaining  49 48  47  46  45  44  43  42  41 40 39         Precautions:   Medical History        Past Medical History:   Diagnosis Date   • Allergic rhinitis     • Asthma     • Fibroids     • Physiological ovarian cysts     • Sleep apnea       denatl device worn         Patient provided verbal consent to treatment plan and recommended interventions  HEP: glute set, quad set, SAQ, LAQ, ankle pumps, mini squat, heel slides     Date 3 22 3 27 3 29 4 3 4 5   Visit Number 7 8 9 10 11            PROM        Knee Flexion 117 deg   passive 112° PROM resume 115 PROM 116 PROM   Knee Extension -3 Passive -2° PROM resume -2 PROM -1 PROM            TUG                 Manual "  Patellar mobs        Term   Knee extn    2*15 2*15            Neuro Re-ed        Quad set  Stand terminal knee extn 2*10 QS 2x10 (5s)  QS 2x10 (5s)       tandem stance    5*10'' 3*30\"            TherEx        Knee PROM JM DJA      Active w/u 5' rec  bike Nustep 8' lvl 2 5' rec  bike 7' rec bike 7' rec bike   Hip abduction 2*10 ea  standing       LAQ 2*10   2*10 2*10   SAQ 3*10 bolster 3x10 bolster 2x10 bolster 3*10 bolster 2*10 bolster   Mini squat 2*10  STS 5x2 2*10 3*10   Stand hip flexion 3*10 RLE  2*10 RLE 2*10 RLE 3*10 RLE   Supine heel slides 3*10 peanut 2x10 peanut   2*10 peanut   Knee flexion on step 2*10, 12'' step 2x10, 12\" step  2x10, 8\" step  2*10, 8'' step 2*10, 8'' step   Step up        HS stretch  5x10s w/ SOS      SLR flexion    2*8 PT assist 3*10            TherAct        Patient education   Education provided regarding knee motion at initial contact, knee positioning, gait speed     Stair negotiation        Car transfer                          Gait Training        With AD   With RW focusing on terminal knee extension at initial contact to promote normalized gait mechanics 100'x2 Not performed 200' no AD   No AD                 Modalities        CP           Skin check pre and post modality          "

## 2023-04-24 ENCOUNTER — OFFICE VISIT (OUTPATIENT)
Dept: PHYSICAL THERAPY | Facility: CLINIC | Age: 58
End: 2023-04-24

## 2023-04-24 DIAGNOSIS — M25.561 CHRONIC PAIN OF RIGHT KNEE: ICD-10-CM

## 2023-04-24 DIAGNOSIS — G89.29 CHRONIC PAIN OF RIGHT KNEE: ICD-10-CM

## 2023-04-24 DIAGNOSIS — M17.11 PRIMARY OSTEOARTHRITIS OF RIGHT KNEE: Primary | ICD-10-CM

## 2023-04-27 ENCOUNTER — OFFICE VISIT (OUTPATIENT)
Dept: PHYSICAL THERAPY | Facility: CLINIC | Age: 58
End: 2023-04-27

## 2023-04-27 DIAGNOSIS — M25.561 CHRONIC PAIN OF RIGHT KNEE: ICD-10-CM

## 2023-04-27 DIAGNOSIS — G89.29 CHRONIC PAIN OF RIGHT KNEE: ICD-10-CM

## 2023-04-27 DIAGNOSIS — M17.11 PRIMARY OSTEOARTHRITIS OF RIGHT KNEE: Primary | ICD-10-CM

## 2023-04-27 NOTE — PROGRESS NOTES
Daily Note     Today's date: 2023  Patient name: Doug Hernandez  : 1965  MRN: [id number]  Referring provider: Winston Wilson DO  Dx:   Encounter Diagnosis     ICD-10-CM    1  Primary osteoarthritis of right knee  M17 11       2  Chronic pain of right knee  M25 561     G89 29           Start Time: 1145  Stop Time: 1230  Total time in clinic (min): 45 minutes      Subjective: Patient reports being able to complete more ADLs without limitations  Slight ache reported with stair navigation  Reports tightness distal thigh entering session today  Left knee is also bothering her  Objective: See treatment diary below    Warmup NuStep 10’   Circuit 1, 2 rounds, 30 seconds on, 30 seconds off  • TRX squats    • Wall sit with trunk rotation   • Step ups   Circuit 2, 2 rounds, 30 seconds on, 30 seconds off  • Sled push/pull  • KB squats    • Lateral lunges on bosu   Circuit 3, 2 rounds, 30 seconds on, 30 seconds off  • Squat with banded rows  • Hip burners  • Alternating lunges to possible half kneel if applicable   Circuit 4, 2 rounds, 30 seconds on, 30 seconds off  • Squat with overhead press   • Hurdles lateral   • Step downs   Circuit 5, 3 rounds, 30 seconds on, 30 seconds off   • SLB on foam     Assessment: Tolerated treatment well  Patient reported left knee discomfort when performing leg press exercise and modified to be performed with single leg  Challenged with single leg balance exercise today  Will be partaking in progressed exercises next session  Plan: Continue per plan of care  Progress treatment as tolerated  Insurance:  AMA/CMS Eval/ Re-eval POC expires Darwin Graham #/ Referral # Total   {Visits/Units) Start date  Expiration date Extension  Visit limitation? PT only or  PT+OT?  Co-Insurance   AMA 2 27 5 15 23     50v                                            AUTH #:  Date  3 20  3 22  3 27  3 29 4 3 4 5 4 10 4 12 4 17 4 19 4 27     Used  6  7  8  9 10 11 12 13 14 15 16     Remaining " 61  56  42  57 51 44 38 37 36 35 34       Precautions:   Medical History        Past Medical History:   Diagnosis Date   • Allergic rhinitis     • Asthma     • Fibroids     • Physiological ovarian cysts     • Sleep apnea       denatl device worn         Patient provided verbal consent to treatment plan and recommended interventions  HEP: glute set, quad set, SAQ, LAQ, ankle pumps, mini squat, heel slides     Date 4 10 4 12 4 17 4 19 4 24 4 27   Visit Number 12 13 14 15 16 17   IASTM quad     FB              PROM         Knee Flexion 117 PROM 119 PROM 119 PROM 115 PROM  See objective above   Knee Extension -1 PROM 0 PROM 0 0 deg                TUG < 14 sec                   Manual         Patellar mobs         Term   Knee extn performed by patient at home                  Neuro Re-ed          tandem stance         SLS 5*10'' 5*10''   5*15''              TherEx         Active w/u 7' bike, lvl 2 7' bike, lvl 2 8' bike lvl 2 8' bike lvl 2 8' bike, lvl 2    Mini squat 3*10, 9# KB 3*10, 9# KB TRX 3*12 TRX 3*12     Stand hip flexion 3*10 RLE 3*10 RLE np      Supine heel slides 2*10 peanut 2*10 peanut with slight overpressure 3*10 peanut with slight overpressure 3*10 peanut with slight overpressure 3*10 peanut with slight overpressure    Knee flexion on step 20x, 3\" hold, 12'' step 20x, 3\" hold, 12'' step np      Step up 3*10, 6\" 2*10, 6\" single UE support 3*10, 8\" single UE support 3*10, 8\" no UE support  3*10, 8\" no UE support     SLR flexion 3*10 3*10 3*10 3*10 3*10    Leg press 3*10, 55# 3*10, 65# 3*10, 85# 3*10, 85# 2*10, 105#    Leg press RLE     2*10, 55#    lunge   Pain LLE      Lateral step up   2*10, RLE onto BOSU 2*10, RLE onto BOSU 2*10, RLE onto BOSU    Side stepping   5 laps 12' at bar  5 laps 12' at bar             [place]         Patient education         Stair negotiation         Car transfer                             Gait Training         With AD         No AD                   Modalities         CP   "    Skin check pre and post modality

## 2023-05-01 ENCOUNTER — APPOINTMENT (OUTPATIENT)
Dept: PHYSICAL THERAPY | Facility: CLINIC | Age: 58
End: 2023-05-01
Payer: COMMERCIAL

## 2023-05-03 ENCOUNTER — OFFICE VISIT (OUTPATIENT)
Dept: PHYSICAL THERAPY | Facility: CLINIC | Age: 58
End: 2023-05-03

## 2023-05-03 DIAGNOSIS — G89.29 CHRONIC PAIN OF RIGHT KNEE: ICD-10-CM

## 2023-05-03 DIAGNOSIS — M25.561 CHRONIC PAIN OF RIGHT KNEE: ICD-10-CM

## 2023-05-03 DIAGNOSIS — M17.11 PRIMARY OSTEOARTHRITIS OF RIGHT KNEE: Primary | ICD-10-CM

## 2023-05-03 NOTE — PROGRESS NOTES
Daily Note     Today's date: 5/3/2023  Patient name: Elvia Reeves  : 1965  MRN: [id number]  Referring provider: Gosia Valero DO  Dx:   Encounter Diagnosis     ICD-10-CM    1  Primary osteoarthritis of right knee  M17 11       2  Chronic pain of right knee  M25 561     G89 29           Start Time: 09  Stop Time: 1015  Total time in clinic (min): 50 minutes      Subjective: Patient notes increased soreness of her R knee today  She has had pain in her R knee since the last session and is unsure if this is due to the weather  Objective: See treatment diary below    Assessment: Tolerated treatment well  Regressed exercises today due to complaints of increased pain and soreness  She is progressing well at this time  Minimal pain noted in closed chain activities today  Will progress functional activities next visit  Plan: Continue per plan of care  Progress treatment as tolerated  Insurance:  AMA/CMS Eval/ Re-eval POC expires Erskin Bound #/ Referral # Total   {Visits/Units) Start date  Expiration date Extension  Visit limitation? PT only or  PT+OT? Co-Insurance   AMA 2 27 5 15 23     50v                                            AUTH #:  Date  3 20  3 22  3 27  3 29 4 3 4 5 4 10 4 12 4 17 4 19 4 27 5 3     Used  6  7  8  9 10 11 12 13 14 15 16 17     Remaining   44  43  42  41 40 39 38 37 36 35 34 33       Precautions:   Medical History        Past Medical History:   Diagnosis Date    Allergic rhinitis      Asthma      Fibroids      Physiological ovarian cysts      Sleep apnea       denatl device worn         Patient provided verbal consent to treatment plan and recommended interventions      HEP: glute set, quad set, SAQ, LAQ, ankle pumps, mini squat, heel slides     Date 4 10 4 12 4 17 4 19 4 24 4 27 5 3   Visit Number 12 13 14 15 16 17 18   IASTM quad     FB                PROM          Knee Flexion 117 PROM 119 PROM 119 PROM 115 PROM  See objective above    Knee Extension -1 PROM "0 PROM 0 0 deg                  TUG < 14 sec                     Manual          Patellar mobs          Term   Knee extn performed by patient at home                    Neuro Re-ed           tandem stance          SLS 5*10'' 5*10''   5*15''  5*15''              TherEx          Active w/u 7' bike, lvl 2 7' bike, lvl 2 8' bike lvl 2 8' bike lvl 2 8' bike, lvl 2  8' bike, lvl 2   Mini squat 3*10, 9# KB 3*10, 9# KB TRX 3*12 TRX 3*12   TRX 2x10    Stand hip flexion 3*10 RLE 3*10 RLE np       Supine heel slides 2*10 peanut 2*10 peanut with slight overpressure 3*10 peanut with slight overpressure 3*10 peanut with slight overpressure 3*10 peanut with slight overpressure  3*10 peanut with slight overpressure   Knee flexion on step 20x, 3\" hold, 12'' step 20x, 3\" hold, 12'' step np       Step up 3*10, 6\" 2*10, 6\" single UE support 3*10, 8\" single UE support 3*10, 8\" no UE support  3*10, 8\" no UE support   2*10, 8\" no UE support    SLR flexion 3*10 3*10 3*10 3*10 3*10  2x10   Leg press 3*10, 55# 3*10, 65# 3*10, 85# 3*10, 85# 2*10, 105#  2*10, 105#   Leg press RLE     2*10, 55#     lunge   Pain LLE       Lateral step up   2*10, RLE onto BOSU 2*10, RLE onto BOSU 2*10, RLE onto BOSU  2x10 R LE only   Side stepping   5 laps 12' at bar  5 laps 12' at bar  Hooklying clamshells 2x10 GTB   LAQ       2x10   TherAct          Patient education          Stair negotiation          Car transfer                                Gait Training          With AD          No AD                     Modalities          CP             Skin check pre and post modality            "

## 2023-05-05 ENCOUNTER — OFFICE VISIT (OUTPATIENT)
Dept: PHYSICAL THERAPY | Facility: CLINIC | Age: 58
End: 2023-05-05

## 2023-05-05 DIAGNOSIS — M25.561 CHRONIC PAIN OF RIGHT KNEE: ICD-10-CM

## 2023-05-05 DIAGNOSIS — G89.29 CHRONIC PAIN OF RIGHT KNEE: ICD-10-CM

## 2023-05-05 DIAGNOSIS — M17.11 PRIMARY OSTEOARTHRITIS OF RIGHT KNEE: Primary | ICD-10-CM

## 2023-05-05 NOTE — PROGRESS NOTES
Daily Note     Today's date: 2023  Patient name: Timbo Bermudez  : 1965  MRN: [id number]  Referring provider: Juan Carlos Lynn DO  Dx:   Encounter Diagnosis     ICD-10-CM    1  Primary osteoarthritis of right knee  M17 11       2  Chronic pain of right knee  M25 561     G89 29           Start Time: 1045  Stop Time: 1145  Total time in clinic (min): 60 minutes      Subjective: Patient has more pain in her L knee today  She denies pain in her R knee today  Objective: See treatment diary below    Warmup NuStep 10   Circuit 1, 3 rounds, 30 seconds on, 30 seconds off   TRX split squats    Hip circumduction around yocasta  Circuit 2, 3 rounds, 30 seconds on, 30 seconds off   Sled push/pull   Paloff press alphabet in squat position   Circuit 3, 3 rounds, 30 seconds on, 30 seconds off   Side stepping with loop   Alternating marches with loop   Circuit 4, 2 rounds, 30 seconds on, 30 seconds off   Repeated hip extension on chair    Medball rainbows  Circuit 5, 3 rounds, 30 seconds on, 30 seconds off    Sliders lateral    Sliders adduction     Assessment: Tolerated treatment well  Patient continues to note difficulties with performing floor to stand transfers  Performed floor to stand transfers today with B UE support  Patient able to perform with no difficulties, but requires UE support  Plan: Continue per plan of care  Progress treatment as tolerated  Insurance:  AMA/CMS Eval/ Re-eval POC expires Debbie Parent #/ Referral # Total   {Visits/Units) Start date  Expiration date Extension  Visit limitation? PT only or  PT+OT?  Co-Insurance   AMA 2 27 5 15 23     50v                                            AUTH #:  Date  3 20  3 22  3 27  3 29 4 3 4 5 4 10 4 12 4 17 4 19 4 27 5 3 5 5     Used  6  7  8  9 10 11 12 13 14 15 16 17 18     Remaining   44  43  42  41 40 39 38 37 36 35 34 33 32       Precautions:   Medical History        Past Medical History:   Diagnosis Date    Allergic rhinitis "     Asthma      Fibroids      Physiological ovarian cysts      Sleep apnea       denatl device worn         Patient provided verbal consent to treatment plan and recommended interventions  HEP: glute set, quad set, SAQ, LAQ, ankle pumps, mini squat, heel slides     Date 4 10 4 12 4 17 4 19 4 24 4 27 5 3   Visit Number 12 13 14 15 16 17 18   IASTM quad     FB                PROM          Knee Flexion 117 PROM 119 PROM 119 PROM 115 PROM  See objective above    Knee Extension -1 PROM 0 PROM 0 0 deg                  TUG < 14 sec                     Manual          Patellar mobs          Term   Knee extn performed by patient at home                    Neuro Re-ed           tandem stance          SLS 5*10'' 5*10''   5*15''  5*15''              TherEx          Active w/u 7' bike, lvl 2 7' bike, lvl 2 8' bike lvl 2 8' bike lvl 2 8' bike, lvl 2  8' bike, lvl 2   Mini squat 3*10, 9# KB 3*10, 9# KB TRX 3*12 TRX 3*12   TRX 2x10    Stand hip flexion 3*10 RLE 3*10 RLE np       Supine heel slides 2*10 peanut 2*10 peanut with slight overpressure 3*10 peanut with slight overpressure 3*10 peanut with slight overpressure 3*10 peanut with slight overpressure  3*10 peanut with slight overpressure   Knee flexion on step 20x, 3\" hold, 12'' step 20x, 3\" hold, 12'' step np       Step up 3*10, 6\" 2*10, 6\" single UE support 3*10, 8\" single UE support 3*10, 8\" no UE support  3*10, 8\" no UE support   2*10, 8\" no UE support    SLR flexion 3*10 3*10 3*10 3*10 3*10  2x10   Leg press 3*10, 55# 3*10, 65# 3*10, 85# 3*10, 85# 2*10, 105#  2*10, 105#   Leg press RLE     2*10, 55#     lunge   Pain LLE       Lateral step up   2*10, RLE onto BOSU 2*10, RLE onto BOSU 2*10, RLE onto BOSU  2x10 R LE only   Side stepping   5 laps 12' at bar  5 laps 12' at bar  Hooklying clamshells 2x10 GTB   LAQ       2x10   TherAct          Patient education          Stair negotiation          Car transfer                                Gait Training          With AD   " No AD                     Modalities          CP             Skin check pre and post modality

## 2023-05-12 ENCOUNTER — OFFICE VISIT (OUTPATIENT)
Dept: PHYSICAL THERAPY | Facility: CLINIC | Age: 58
End: 2023-05-12

## 2023-05-12 DIAGNOSIS — M17.11 PRIMARY OSTEOARTHRITIS OF RIGHT KNEE: Primary | ICD-10-CM

## 2023-05-12 DIAGNOSIS — G89.29 CHRONIC PAIN OF RIGHT KNEE: ICD-10-CM

## 2023-05-12 DIAGNOSIS — M25.561 CHRONIC PAIN OF RIGHT KNEE: ICD-10-CM

## 2023-05-12 NOTE — PROGRESS NOTES
Daily Note     Today's date: 2023  Patient name: Joy Andre  : 1965  MRN: [id number]  Referring provider: Pepito Sellers DO  Dx:   Encounter Diagnosis     ICD-10-CM    1  Primary osteoarthritis of right knee  M17 11       2  Chronic pain of right knee  M25 561     G89 29           Start Time: 0845  Stop Time: 0930  Total time in clinic (min): 45 minutes      Subjective: Patient has more pain in her L knee today  She denies pain in her R knee today  She reports increased L knee soreness with eccentric activities  Objective: See treatment diary below    Warmup NuStep 10’   Circuit 1, 2 rounds, 30 seconds on, 30 seconds off  • TRX squats   • Step downs   • Suitcase carries   Circuit 2, 2 rounds, 30 seconds on, 30 seconds off  • Wall squat with med ball twist   • TRX lateral lunges   • SLB on bosu   Circuit 3, 2 rounds, 30 seconds on, 30 seconds off  • Forward hurdles   • Heel raises with KB  • Sit to stands with KB   Circuit 4, 2 rounds, 30 seconds on, 30 seconds off  • Lateral step overs bosu   • Side step with squat   • TRX retro lunges     Assessment: Tolerated treatment well  Patient continues to note difficulties performing bilateral activities secondary to increased Lt knee pain  She had difficulty performing activities on BOSU requiring contact guarding to ensure safety and confidence with activity  Plan: Continue per plan of care  Progress treatment as tolerated  Insurance:  AMA/CMS Eval/ Re-eval POC expires Estivensergei Mccainavan #/ Referral # Total   {Visits/Units) Start date  Expiration date Extension  Visit limitation? PT only or  PT+OT?  Co-Insurance   AMA 2 27 5 15 23     50v                                            AUTH #:  Date  3 20  3 22  3 27  3 29 4 3 4 5 4 10 4 12 4 17 4 19 4 27 5 3 5 5     Used  6  7  8  9 10 11 12 13 14 15 16 17 18     Remaining   44  43  42  41 40 39 38 37 36 35 34 33 32       Precautions:   Medical History        Past Medical History:   Diagnosis Date "  • Allergic rhinitis     • Asthma     • Fibroids     • Physiological ovarian cysts     • Sleep apnea       denatl device worn         Patient provided verbal consent to treatment plan and recommended interventions  HEP: glute set, quad set, SAQ, LAQ, ankle pumps, mini squat, heel slides     Date 4 10 4 12 4 17 4 19 4 24 4 27 5 3   Visit Number 12 13 14 15 16 17 18   IASTM quad     FB                PROM          Knee Flexion 117 PROM 119 PROM 119 PROM 115 PROM  See objective above    Knee Extension -1 PROM 0 PROM 0 0 deg                  TUG < 14 sec                     Manual          Patellar mobs          Term   Knee extn performed by patient at home                    Neuro Re-ed           tandem stance          SLS 5*10'' 5*10''   5*15''  5*15''              TherEx          Active w/u 7' bike, lvl 2 7' bike, lvl 2 8' bike lvl 2 8' bike lvl 2 8' bike, lvl 2  8' bike, lvl 2   Mini squat 3*10, 9# KB 3*10, 9# KB TRX 3*12 TRX 3*12   TRX 2x10    Stand hip flexion 3*10 RLE 3*10 RLE np       Supine heel slides 2*10 peanut 2*10 peanut with slight overpressure 3*10 peanut with slight overpressure 3*10 peanut with slight overpressure 3*10 peanut with slight overpressure  3*10 peanut with slight overpressure   Knee flexion on step 20x, 3\" hold, 12'' step 20x, 3\" hold, 12'' step np       Step up 3*10, 6\" 2*10, 6\" single UE support 3*10, 8\" single UE support 3*10, 8\" no UE support  3*10, 8\" no UE support   2*10, 8\" no UE support    SLR flexion 3*10 3*10 3*10 3*10 3*10  2x10   Leg press 3*10, 55# 3*10, 65# 3*10, 85# 3*10, 85# 2*10, 105#  2*10, 105#   Leg press RLE     2*10, 55#     lunge   Pain LLE       Lateral step up   2*10, RLE onto BOSU 2*10, RLE onto BOSU 2*10, RLE onto BOSU  2x10 R LE only   Side stepping   5 laps 12' at bar  5 laps 12' at bar  Josselyn velez 2x10 GTB   LAQ       2x10   TherAct          Patient education          Stair negotiation          Car transfer                                Gait " Training          With AD          No AD                     Modalities          CP             Skin check pre and post modality

## 2023-05-15 ENCOUNTER — OFFICE VISIT (OUTPATIENT)
Dept: PHYSICAL THERAPY | Facility: CLINIC | Age: 58
End: 2023-05-15

## 2023-05-15 DIAGNOSIS — G89.29 CHRONIC PAIN OF RIGHT KNEE: ICD-10-CM

## 2023-05-15 DIAGNOSIS — M17.11 PRIMARY OSTEOARTHRITIS OF RIGHT KNEE: Primary | ICD-10-CM

## 2023-05-15 DIAGNOSIS — M25.561 CHRONIC PAIN OF RIGHT KNEE: ICD-10-CM

## 2023-05-15 NOTE — PROGRESS NOTES
Daily Note     Today's date: 5/15/2023  Patient name: Kerry Frye  : 1965  MRN: [id number]  Referring provider: Mallory Atkinson DO  Dx:   Encounter Diagnosis     ICD-10-CM    1  Primary osteoarthritis of right knee  M17 11       2  Chronic pain of right knee  M25 561     G89 29           Start Time: 0845  Stop Time: 0930  Total time in clinic (min): 45 minutes      Subjective: Patient reports that she has increased bilateral knee soreness today secondary to mowing her lawn this weekend  Objective: See treatment diary below    Warmup NuStep 10’   Circuit 1, 2 rounds, 30 seconds on, 30 seconds off  • KB squats   • TKE with ball on wall   • Step ups with alternating hip drive   Circuit 2, 2 rounds, 30 seconds on, 30 seconds off  • Wall squat with alternating leg extension   • SLB on foam   • High marching with tidal tank   Circuit 3, 2 rounds, 30 seconds on, 30 seconds off  • Lateral hurdles   • Sled push/pull   • Med ball toss at wall    Circuit 4, 2 rounds, 30 seconds on, 30 seconds off  • TRX squats   • Hip burners    • Lateral heel taps     Assessment: Tolerated treatment well  Patient demonstrated difficulty with performance of wall squat with alternating knee extensions requiring single UE use  She would benefit from continued PT to improve functional mobility and strength in order to return to PLOF  Plan: Continue per plan of care  Progress treatment as tolerated  Insurance:  AMA/CMS Eval/ Re-eval POC expires Brenda Washington #/ Referral # Total   {Visits/Units) Start date  Expiration date Extension  Visit limitation? PT only or  PT+OT?  Co-Insurance   AMA 2 27 5 15 23     50v                                            AUTH #:  Date  3 20  3 22  3 27  3 29 4 3 4 5 4 10 4 12 4 17 4 19 4 27 5 3 5 5     Used  6  7  8  9 10 11 12 13 14 15 16 17 18     Remaining   44  43  42  41 40 39 38 37 36 35 34 33 32       AUTH #:  Date 5-12 5-15                Used 19 20                Remaining  31 30 "                 Precautions:   Medical History        Past Medical History:   Diagnosis Date   • Allergic rhinitis     • Asthma     • Fibroids     • Physiological ovarian cysts     • Sleep apnea       denatl device worn         Patient provided verbal consent to treatment plan and recommended interventions  HEP: glute set, quad set, SAQ, LAQ, ankle pumps, mini squat, heel slides     Date 4 10 4 12 4 17 4 19 4 24 4 27 5 3   Visit Number 12 13 14 15 16 17 18   IASTM quad     FB                PROM          Knee Flexion 117 PROM 119 PROM 119 PROM 115 PROM  See objective above    Knee Extension -1 PROM 0 PROM 0 0 deg                  TUG < 14 sec                     Manual          Patellar mobs          Term   Knee extn performed by patient at home                    Neuro Re-ed           tandem stance          SLS 5*10'' 5*10''   5*15''  5*15''              TherEx          Active w/u 7' bike, lvl 2 7' bike, lvl 2 8' bike lvl 2 8' bike lvl 2 8' bike, lvl 2  8' bike, lvl 2   Mini squat 3*10, 9# KB 3*10, 9# KB TRX 3*12 TRX 3*12   TRX 2x10    Stand hip flexion 3*10 RLE 3*10 RLE np       Supine heel slides 2*10 peanut 2*10 peanut with slight overpressure 3*10 peanut with slight overpressure 3*10 peanut with slight overpressure 3*10 peanut with slight overpressure  3*10 peanut with slight overpressure   Knee flexion on step 20x, 3\" hold, 12'' step 20x, 3\" hold, 12'' step np       Step up 3*10, 6\" 2*10, 6\" single UE support 3*10, 8\" single UE support 3*10, 8\" no UE support  3*10, 8\" no UE support   2*10, 8\" no UE support    SLR flexion 3*10 3*10 3*10 3*10 3*10  2x10   Leg press 3*10, 55# 3*10, 65# 3*10, 85# 3*10, 85# 2*10, 105#  2*10, 105#   Leg press RLE     2*10, 55#     lunge   Pain LLE       Lateral step up   2*10, RLE onto BOSU 2*10, RLE onto BOSU 2*10, RLE onto BOSU  2x10 R LE only   Side stepping   5 laps 12' at bar  5 laps 12' at bar  Hooklying clamshells 2x10 GTB   LAQ       2x10   TherAct          Patient " education          Stair negotiation          Car transfer                                Gait Training          With AD          No AD                     Modalities          CP             Skin check pre and post modality

## 2023-05-25 ENCOUNTER — OFFICE VISIT (OUTPATIENT)
Dept: PHYSICAL THERAPY | Facility: CLINIC | Age: 58
End: 2023-05-25

## 2023-05-25 DIAGNOSIS — G89.29 CHRONIC PAIN OF RIGHT KNEE: ICD-10-CM

## 2023-05-25 DIAGNOSIS — M25.561 CHRONIC PAIN OF RIGHT KNEE: ICD-10-CM

## 2023-05-25 DIAGNOSIS — M17.11 PRIMARY OSTEOARTHRITIS OF RIGHT KNEE: Primary | ICD-10-CM

## 2023-05-25 NOTE — PROGRESS NOTES
Daily Note     Today's date: 2023  Patient name: Jeromy Story  : 1965  MRN: [id number]  Referring provider: Neeraj Calixto DO  Dx:   Encounter Diagnosis     ICD-10-CM    1  Primary osteoarthritis of right knee  M17 11       2  Chronic pain of right knee  M25 561     G89 29           Start Time: 1800  Stop Time: 1845  Total time in clinic (min): 45 minutes      Subjective: Patient reports that she has some posterior right knee soreness following swimming this past weekend  Objective: See treatment diary below    Warmup NuStep 10’   Circuit 1, 2 rounds, 30 seconds on, 30 seconds off  • Wall squats with ball  • UL weighted KB walks  • MB slams  Circuit 2, 2 rounds, 30 seconds on, 30 seconds off  • Forward hurdles  • Sled push/pull   • Banded lateral walks  Circuit 3, 2 rounds, 30 seconds on, 30 seconds off  • Lunges to foam  • Anterior eccentric heel taps  • RDLs  Circuit 4, 2 rounds, 30 seconds on, 30 seconds off  • BOSU lateral step overs  • Squats to bench + KB  • Static marches with TT    Assessment: Tolerated treatment well  Patient demonstrated improvements in functional endurance and strength  She expressed interest in resuming bootcamp style fitness classes to supplement efforts in PT  She would benefit from continued PT to improve functional mobility and strength in order to return to PLOF  Plan: Continue per plan of care  Progress treatment as tolerated  Insurance:  AMA/CMS Eval/ Re-eval POC expires Kayode Moore #/ Referral # Total   {Visits/Units) Start date  Expiration date Extension  Visit limitation? PT only or  PT+OT?  Co-Insurance   AMA 2 27 5 15 23     50v                                            AUTH #:  Date  3 20  3 22  3 27  3 29 4 3 4 5 4 10 4 12 4 17 4 19 4 27 5 3 5 5     Used  6  7  8  9 10 11 12 13 14 15 16 17 18     Remaining   44  43  42  41 40 39 38 37 36 35 34 33 32       AUTH #:  Date 5-12 5-15                Used 19 20                Remaining  31 30 "               Precautions:   Medical History        Past Medical History:   Diagnosis Date   • Allergic rhinitis     • Asthma     • Fibroids     • Physiological ovarian cysts     • Sleep apnea       denatl device worn         Patient provided verbal consent to treatment plan and recommended interventions  HEP: glute set, quad set, SAQ, LAQ, ankle pumps, mini squat, heel slides     Date 4 10 4 12 4 17 4 19 4 24 4 27 5 3   Visit Number 12 13 14 15 16 17 18   IASTM quad     FB                PROM          Knee Flexion 117 PROM 119 PROM 119 PROM 115 PROM  See objective above    Knee Extension -1 PROM 0 PROM 0 0 deg                  TUG < 14 sec                     Manual          Patellar mobs          Term   Knee extn performed by patient at home                    Neuro Re-ed           tandem stance          SLS 5*10'' 5*10''   5*15''  5*15''              TherEx          Active w/u 7' bike, lvl 2 7' bike, lvl 2 8' bike lvl 2 8' bike lvl 2 8' bike, lvl 2  8' bike, lvl 2   Mini squat 3*10, 9# KB 3*10, 9# KB TRX 3*12 TRX 3*12   TRX 2x10    Stand hip flexion 3*10 RLE 3*10 RLE np       Supine heel slides 2*10 peanut 2*10 peanut with slight overpressure 3*10 peanut with slight overpressure 3*10 peanut with slight overpressure 3*10 peanut with slight overpressure  3*10 peanut with slight overpressure   Knee flexion on step 20x, 3\" hold, 12'' step 20x, 3\" hold, 12'' step np       Step up 3*10, 6\" 2*10, 6\" single UE support 3*10, 8\" single UE support 3*10, 8\" no UE support  3*10, 8\" no UE support   2*10, 8\" no UE support    SLR flexion 3*10 3*10 3*10 3*10 3*10  2x10   Leg press 3*10, 55# 3*10, 65# 3*10, 85# 3*10, 85# 2*10, 105#  2*10, 105#   Leg press RLE     2*10, 55#     lunge   Pain LLE       Lateral step up   2*10, RLE onto BOSU 2*10, RLE onto BOSU 2*10, RLE onto BOSU  2x10 R LE only   Side stepping   5 laps 12' at bar  5 laps 12' at bar  Hooklying clamshells 2x10 GTB   LAQ       2x10   TherAct          Patient " education          Stair negotiation          Car transfer                                Gait Training          With AD          No AD                     Modalities          CP             Skin check pre and post modality

## 2023-05-31 ENCOUNTER — OFFICE VISIT (OUTPATIENT)
Dept: PHYSICAL THERAPY | Facility: CLINIC | Age: 58
End: 2023-05-31

## 2023-05-31 DIAGNOSIS — M25.561 CHRONIC PAIN OF RIGHT KNEE: ICD-10-CM

## 2023-05-31 DIAGNOSIS — G89.29 CHRONIC PAIN OF RIGHT KNEE: ICD-10-CM

## 2023-05-31 DIAGNOSIS — M17.11 PRIMARY OSTEOARTHRITIS OF RIGHT KNEE: Primary | ICD-10-CM

## 2023-05-31 NOTE — PROGRESS NOTES
Daily Note     Today's date: 2023  Patient name: Juanjose Rhoades  : 1965  MRN: [id number]  Referring provider: Quinten Herdnon DO  Dx:   Encounter Diagnosis     ICD-10-CM    1  Primary osteoarthritis of right knee  M17 11       2  Chronic pain of right knee  M25 561     G89 29           Start Time: 1630  Stop Time: 1715  Total time in clinic (min): 45 minutes      Subjective: Patient has no increased pain of her R knee at this time  Objective: See treatment diary below    Warmup NuStep 10’   Circuit 1, 2 rounds, 40 seconds on, 30 seconds off  • TRX squats   • SLB on bosu     • Good mornings   Circuit 2, 2 rounds, 40 seconds on, 30 seconds off  • TRX lateral lunges   • Wall squats with ball   • Hip burners   Circuit 3, 2 rounds, 40 seconds on, 30 seconds off  • Lateral step ups with tidal tank  • HR with KB in B UE   • Step ups with alternating hip drive on bosu  Circuit 4, 2 rounds, 40 seconds on, 30 seconds off  • Farmer’s carry     • Sit to stands on 18” box  • Retro lunges alternating      Assessment: Tolerated treatment well  Patient demonstrates difficulties lunging due to loss of balance  Only able to perform with UE support  She would benefit from continued PT to improve functional mobility and strength in order to return to PLOF  Plan: Continue per plan of care  Progress treatment as tolerated  Insurance:  AMA/CMS Eval/ Re-eval POC expires Joelle Patterson #/ Referral # Total   {Visits/Units) Start date  Expiration date Extension  Visit limitation? PT only or  PT+OT?  Co-Insurance   AMA 2 27 5 15 23     50v                                            AUTH #:  Date  3 20  3 22  3 27  3 29 4 3 4 5 4 10 4 12 4 17 4 19 4 27 5 3 5 5     Used  6  7  8  9 10 11 12 13 14 15 16 17 18     Remaining   44  43  42  41 40 39 38 37 36 35 34 33 32       AUTH #:  Date 5-12 5-15 5/31               Used 19 20 21               Remaining  31 30 29                   Precautions:   Medical History "  Past Medical History:   Diagnosis Date   • Allergic rhinitis     • Asthma     • Fibroids     • Physiological ovarian cysts     • Sleep apnea       denatl device worn         Patient provided verbal consent to treatment plan and recommended interventions  HEP: glute set, quad set, SAQ, LAQ, ankle pumps, mini squat, heel slides     Date 4 10 4 12 4 17 4 19 4 24 4 27 5 3   Visit Number 12 13 14 15 16 17 18   IASTM quad     FB                PROM          Knee Flexion 117 PROM 119 PROM 119 PROM 115 PROM  See objective above    Knee Extension -1 PROM 0 PROM 0 0 deg                  TUG < 14 sec                     Manual          Patellar mobs          Term   Knee extn performed by patient at home                    Neuro Re-ed           tandem stance          SLS 5*10'' 5*10''   5*15''  5*15''              TherEx          Active w/u 7' bike, lvl 2 7' bike, lvl 2 8' bike lvl 2 8' bike lvl 2 8' bike, lvl 2  8' bike, lvl 2   Mini squat 3*10, 9# KB 3*10, 9# KB TRX 3*12 TRX 3*12   TRX 2x10    Stand hip flexion 3*10 RLE 3*10 RLE np       Supine heel slides 2*10 peanut 2*10 peanut with slight overpressure 3*10 peanut with slight overpressure 3*10 peanut with slight overpressure 3*10 peanut with slight overpressure  3*10 peanut with slight overpressure   Knee flexion on step 20x, 3\" hold, 12'' step 20x, 3\" hold, 12'' step np       Step up 3*10, 6\" 2*10, 6\" single UE support 3*10, 8\" single UE support 3*10, 8\" no UE support  3*10, 8\" no UE support   2*10, 8\" no UE support    SLR flexion 3*10 3*10 3*10 3*10 3*10  2x10   Leg press 3*10, 55# 3*10, 65# 3*10, 85# 3*10, 85# 2*10, 105#  2*10, 105#   Leg press RLE     2*10, 55#     lunge   Pain LLE       Lateral step up   2*10, RLE onto BOSU 2*10, RLE onto BOSU 2*10, RLE onto BOSU  2x10 R LE only   Side stepping   5 laps 12' at bar  5 laps 12' at bar  Hookmargarette velez 2x10 GTB   LAQ       2x10   TherAct          Patient education          Stair negotiation          Car transfer  "                       Gait Training          With AD          No AD                     Modalities          CP             Skin check pre and post modality

## 2023-06-01 ENCOUNTER — OFFICE VISIT (OUTPATIENT)
Dept: OBGYN CLINIC | Facility: CLINIC | Age: 58
End: 2023-06-01

## 2023-06-01 ENCOUNTER — APPOINTMENT (OUTPATIENT)
Dept: RADIOLOGY | Facility: CLINIC | Age: 58
End: 2023-06-01
Payer: COMMERCIAL

## 2023-06-01 VITALS
DIASTOLIC BLOOD PRESSURE: 98 MMHG | WEIGHT: 214 LBS | BODY MASS INDEX: 36.54 KG/M2 | SYSTOLIC BLOOD PRESSURE: 135 MMHG | HEIGHT: 64 IN | HEART RATE: 81 BPM

## 2023-06-01 DIAGNOSIS — Z47.1 AFTERCARE FOLLOWING RIGHT KNEE JOINT REPLACEMENT SURGERY: ICD-10-CM

## 2023-06-01 DIAGNOSIS — Z96.651 S/P TOTAL KNEE REPLACEMENT NOT USING CEMENT, RIGHT: Primary | ICD-10-CM

## 2023-06-01 DIAGNOSIS — Z96.651 AFTERCARE FOLLOWING RIGHT KNEE JOINT REPLACEMENT SURGERY: ICD-10-CM

## 2023-06-01 DIAGNOSIS — Z96.651 S/P TOTAL KNEE REPLACEMENT NOT USING CEMENT, RIGHT: ICD-10-CM

## 2023-06-01 PROCEDURE — 73562 X-RAY EXAM OF KNEE 3: CPT

## 2023-06-01 NOTE — PROGRESS NOTES
Assessment/Plan:  1  S/P total knee replacement not using cement, right  XR knee 3 vw right non injury      2  Aftercare following right knee joint replacement surgery          Scribe Attestation    I,:  Mikel Kelly am acting as a scribe while in the presence of the attending physician :       I,:  Mat Olivarez, DO personally performed the services described in this documentation    as scribed in my presence :         Raya Freitas is a pleasant 64-year-old female who returns today for follow-up evaluation 3 months status post right total knee arthroplasty  I am very pleased with her imaging and her clinical presentation today in the office  She may continue with activity to her tolerance  She understands she requires prophylactic antibiotics prior to any dental procedure moving forward  All of her questions and concerns were addressed today  With respect to her left knee, she has demonstrated failure of all conservative treatment  She plans to return to the office when she is ready to pursue left total knee arthroplasty  We will see her back for her right knee in 9 months for her anniversary visit with x-ray on arrival     Subjective: Follow-up evaluation 3 months status post right total knee arthroplasty    Patient ID: Harry Palencia is a 63 y o  female who returns today for follow-up evaluation 3 months status post right total knee arthroplasty  She reports that she has been doing well  She has been participating in physical therapy  She has been returning to activity, including the gym without limitation or significant pain  She does complain of occasional clicking about the knee, mostly when rising from a seated position  She denies any new injury or trauma  She does complain of persistent pain in her left knee despite corticosteroid and viscosupplementation injection in the past     Review of Systems   Constitutional: Positive for activity change   Negative for chills, fever and unexpected weight change  HENT: Negative for hearing loss, nosebleeds and sore throat  Eyes: Negative for pain, redness and visual disturbance  Respiratory: Negative for cough, shortness of breath and wheezing  Cardiovascular: Negative for chest pain, palpitations and leg swelling  Gastrointestinal: Negative for abdominal pain, nausea and vomiting  Endocrine: Negative for polydipsia and polyuria  Genitourinary: Negative for dysuria and hematuria  Musculoskeletal: Negative for arthralgias, joint swelling and myalgias  See HPI   Skin: Negative for rash and wound  Neurological: Negative for dizziness, numbness and headaches  Psychiatric/Behavioral: Negative for decreased concentration and suicidal ideas  The patient is not nervous/anxious  Past Medical History:   Diagnosis Date   • Allergic rhinitis    • Asthma    • Fibroids    • Physiological ovarian cysts    • Sleep apnea     denatl device worn       Past Surgical History:   Procedure Laterality Date   • COLONOSCOPY  2016    dr Reina Martinez   • KNEE ARTHROSCOPY Left 2018    meniscectomy x2, first done in 2015   • MYOMECTOMY     • OH ARTHRP KNE CONDYLE&PLATU MEDIAL&LAT COMPARTMENTS Right 3/6/2023    Procedure: ARTHROPLASTY KNEE TOTAL W ROBOT - RIGHT - PRESS FIT - SAME DAY; Surgeon: Tammy Persaud DO;  Location: [address] [place] [address];  Service: Orthopedics   • OH ARTHRS KNE SURG W/MENISCECTOMY MED/LAT W/SHVG Right 1/6/2022    Procedure: KNEE ARTHROSCOPY MENISCECTOMY MEDIAL;  Surgeon: Sahil Disla MD;  Location: [address] [place] [address];  Service: Orthopedics   • OH COLONOSCOPY FLX DX W/COLLJ SPEC WHEN PFRMD N/A 9/13/2018    Procedure: COLONOSCOPY;  Surgeon: Tiny Hung MD;  Location: [facility];   Service: Gastroenterology   • WRIST SURGERY Right        Family History   Problem Relation Age of Onset   • Osteoporosis Mother    • Thyroid disease Mother    • Colon cancer Mother 79   • Colon polyps Mother    • Cancer Mother         colon   • Alzheimer's disease Father    • Hypertension Father    • Dementia Father    • Heart disease Brother    • No Known Problems Daughter    • No Known Problems Daughter    • No Known Problems Maternal Aunt    • No Known Problems Maternal Aunt    • No Known Problems Paternal Aunt    • No Known Problems Paternal Aunt    • No Known Problems Paternal Aunt    • No Known Problems Paternal Aunt    • No Known Problems Paternal Aunt    • No Known Problems Paternal Aunt    • No Known Problems Paternal Aunt    • No Known Problems Paternal Aunt        Social History     Occupational History   • Not on file   Tobacco Use   • Smoking status: Never   • Smokeless tobacco: Never   Vaping Use   • Vaping Use: Never used   Substance and Sexual Activity   • Alcohol use: Yes     Comment: social - wine 2 x month   • Drug use: Never   • Sexual activity: Yes     Partners: Male     Birth control/protection: Condom Male         Current Outpatient Medications:   •  acetaminophen (TYLENOL) 325 mg tablet, Take 3 tablets (975 mg total) by mouth every 8 (eight) hours, Disp: , Rfl: 0  •  albuterol (2 5 mg/3 mL) 0 083 % nebulizer solution, Take 1 vial (2 5 mg total) by nebulization every 6 (six) hours as needed for wheezing or shortness of breath, Disp: 100 mL, Rfl: 0  •  Calcium Carb-Cholecalciferol 600-500 MG-UNIT CAPS, Take by mouth, Disp: , Rfl:   •  Fluticasone-Salmeterol (Advair Diskus) 500-50 mcg/dose inhaler, Inhale 1 puff 2 (two) times a day Rinse mouth after use , Disp: 180 blister, Rfl: 1  •  multivitamin (THERAGRAN) TABS, Take 1 tablet by mouth daily, Disp: , Rfl:   •  Respiratory Therapy Supplies (NEBULIZER/TUBING/MOUTHPIECE) KIT, by Does not apply route 4 (four) times a day, Disp: 1 each, Rfl: 0  •  valACYclovir (VALTREX) 1,000 mg tablet, 2 tabs at earliest onset of cold sore, repeat once in 12 hours   Take 500mg bid for 3 days for genital herpes flare up , Disp: 40 tablet, Rfl: 5    No Known Allergies    Objective:  Vitals:    06/01/23 1134   BP: 135/98   Pulse: 81       Body mass index is 36 73 kg/m²  Right Knee Exam     Muscle Strength   The patient has normal right knee strength  Tenderness   The patient is experiencing no tenderness  Range of Motion   Extension:  0 normal   Flexion:  120 normal     Tests   Varus: negative Valgus: negative  Drawer:  Anterior - negative    Posterior - negative    Other   Erythema: absent  Scars: present  Sensation: normal  Pulse: present  Swelling: none  Effusion: no effusion present    Comments:  Anterior incision well-healed  Stable at 0, 30, and 90  Patella tracks midline and flat without crepitance  Grossly distally neurovascularly intact            Observations     Right Knee   Negative for effusion  Physical Exam  Vitals and nursing note reviewed  Constitutional:       Appearance: Normal appearance  She is well-developed  HENT:      Head: Normocephalic and atraumatic  Right Ear: External ear normal       Left Ear: External ear normal    Eyes:      General: No scleral icterus  Extraocular Movements: Extraocular movements intact  Conjunctiva/sclera: Conjunctivae normal    Cardiovascular:      Rate and Rhythm: Normal rate  Pulmonary:      Effort: Pulmonary effort is normal  No respiratory distress  Musculoskeletal:      Cervical back: Normal range of motion and neck supple  Right knee: No effusion  Comments: See Ortho exam   Skin:     General: Skin is warm and dry  Neurological:      Mental Status: She is alert and oriented to person, place, and time  Psychiatric:         Behavior: Behavior normal          I have personally reviewed pertinent films in PACS  X-ray of the right knee obtained on 6/1/2023 reviewed demonstrating a well-positioned and aligned press-fit total knee arthroplasty without evidence of failure  There is a native patella  There is no new fracture, dislocation, lytic or blastic lesion      This document was created using speech voice recognition software  Grammatical errors, random word insertions, pronoun errors, and incomplete sentences are an occasional consequence of this system due to software limitations, ambient noise, and hardware issues  Any formal questions or concerns about content, text, or information contained within the body of this dictation should be directly addressed to the provider for clarification

## 2023-06-05 ENCOUNTER — OFFICE VISIT (OUTPATIENT)
Dept: PHYSICAL THERAPY | Facility: CLINIC | Age: 58
End: 2023-06-05
Payer: COMMERCIAL

## 2023-06-05 DIAGNOSIS — M17.11 PRIMARY OSTEOARTHRITIS OF RIGHT KNEE: Primary | ICD-10-CM

## 2023-06-05 DIAGNOSIS — M25.561 CHRONIC PAIN OF RIGHT KNEE: ICD-10-CM

## 2023-06-05 DIAGNOSIS — G89.29 CHRONIC PAIN OF RIGHT KNEE: ICD-10-CM

## 2023-06-05 PROCEDURE — 97110 THERAPEUTIC EXERCISES: CPT

## 2023-06-05 NOTE — PROGRESS NOTES
Daily Note     Today's date: 2023  Patient name: Estefania Torrez  : 1965  MRN: [id number]  Referring provider: Judd Reynolds DO  Dx:   Encounter Diagnosis     ICD-10-CM    1  Primary osteoarthritis of right knee  M17 11       2  Chronic pain of right knee  M25 561     G89 29           Start Time: 0845  Stop Time: 0930  Total time in clinic (min): 45 minutes      Subjective: Patient reports she attended a class at the gym before coming to PT today  She would like to DC to gym program     Objective: See treatment diary below    Warmup NuStep 10’   Circuit 1, 2 rounds, 30 seconds on, 30 seconds off  TRX squats   Sled pushes     BOSU step up with march  Circuit 2, 2 rounds, 30 seconds on, 30 seconds off  TRX lateral lunges   TKE into ball on wall  One arm snatches  Circuit 3, 2 rounds, 30 seconds on, 30 seconds off  Alternating arm briones ropes  Four way step-over hurdles   Lateral eccentric step down  Circuit 4, 2 rounds, 30 seconds on, 30 seconds off  Farmer’s carry     Sit to stands on 18” box with KB  Split lunges to foam    Assessment: Tolerated treatment well  Goals have been met, HEP provided and reviewed  Patient is discharged from skilled therapy at this time         Goals  Short Term Goals (4 weeks):    - Patient will improve time on TUG by 2 9 seconds from 14 seconds to 11 1 seconds to facilitate improved safety in all ambulation  - Patient will be independent in basic HEP 2-3 weeks  - Patient will demonstrate >100 degrees of knee ROM for reciprocal stair negotiation  - Patient will have 0/10 pain at rest  - Patient will demonstrate >1/3 improvement in MMT grade as applicable    Long Term Goals (8 weeks):  - Patient will be independent in a comprehensive home exercise program  - Patient will ambulation with AD PRN  - Patient will independently ambulate >1000 feet (community ambulation)  - Patient will self-report >75% improvement in function  - Patient functional goal of normal stair "navigation    Plan: discharged      Insurance:  AMA/CMS Eval/ Re-eval POC expires Carvel Kayser #/ Referral # Total   {Visits/Units) Start date  Expiration date Extension  Visit limitation? PT only or  PT+OT? Co-Insurance   AMA 2 27 5 15 23     50v                                            AUTH #:  Date  3 20  3 22  3 27  3 29 4 3 4 5 4 10 4 12 4 17 4 19 4 27 5 3 5 5     Used  6  7  8  9 10 11 12 13 15 15 16 17 18     Remaining   44  43  42  41 36 39 45 37 39 35 29 33 28       AUTH #:  Date 5-12 5-15 5/25 5/31              Used 19 20 21 22              Remaining  31 30 29 28                  Precautions:   Medical History        Past Medical History:   Diagnosis Date   • Allergic rhinitis     • Asthma     • Fibroids     • Physiological ovarian cysts     • Sleep apnea       denatl device worn         Patient provided verbal consent to treatment plan and recommended interventions  HEP: glute set, quad set, SAQ, LAQ, ankle pumps, mini squat, heel slides     Date 4 10 4 12 4 17 4 19 4 24 4 27 5 3   Visit Number 12 13 14 15 16 17 18   IASTM quad     FB                PROM          Knee Flexion 117 PROM 119 PROM 119 PROM 115 PROM  See objective above    Knee Extension -1 PROM 0 PROM 0 0 deg                  TUG < 14 sec                     Manual          Patellar mobs          Term   Knee extn performed by patient at home                    Neuro Re-ed           tandem stance          SLS 5*10'' 5*10''   5*15''  5*15''              TherEx          Active w/u 7' bike, lvl 2 7' bike, lvl 2 8' bike lvl 2 8' bike lvl 2 8' bike, lvl 2  8' bike, lvl 2   Mini squat 3*10, 9# KB 3*10, 9# KB TRX 3*12 TRX 3*12   TRX 2x10    Stand hip flexion 3*10 RLE 3*10 RLE np       Supine heel slides 2*10 peanut 2*10 peanut with slight overpressure 3*10 peanut with slight overpressure 3*10 peanut with slight overpressure 3*10 peanut with slight overpressure  3*10 peanut with slight overpressure   Knee flexion on step 20x, 3\" hold, 12'' step 20x, " "3\" hold, 12'' step np       Step up 3*10, 6\" 2*10, 6\" single UE support 3*10, 8\" single UE support 3*10, 8\" no UE support  3*10, 8\" no UE support   2*10, 8\" no UE support    SLR flexion 3*10 3*10 3*10 3*10 3*10  2x10   Leg press 3*10, 55# 3*10, 65# 3*10, 85# 3*10, 85# 2*10, 105#  2*10, 105#   Leg press RLE     2*10, 55#     lunge   Pain LLE       Lateral step up   2*10, RLE onto BOSU 2*10, RLE onto BOSU 2*10, RLE onto BOSU  2x10 R LE only   Side stepping   5 laps 12' at bar  5 laps 12' at bar  Hooklying clamshells 2x10 GTB   LAQ       2x10   TherAct          Patient education          Stair negotiation          Car transfer                                Gait Training          With AD          No AD                     Modalities          CP             Skin check pre and post modality            " Home

## 2023-08-07 ENCOUNTER — ANNUAL EXAM (OUTPATIENT)
Dept: OBGYN CLINIC | Facility: CLINIC | Age: 58
End: 2023-08-07
Payer: COMMERCIAL

## 2023-08-07 VITALS — DIASTOLIC BLOOD PRESSURE: 90 MMHG | BODY MASS INDEX: 36.56 KG/M2 | SYSTOLIC BLOOD PRESSURE: 130 MMHG | WEIGHT: 213 LBS

## 2023-08-07 DIAGNOSIS — Z01.419 WOMEN'S ANNUAL ROUTINE GYNECOLOGICAL EXAMINATION: Primary | ICD-10-CM

## 2023-08-07 PROCEDURE — G0476 HPV COMBO ASSAY CA SCREEN: HCPCS | Performed by: NURSE PRACTITIONER

## 2023-08-07 PROCEDURE — 99396 PREV VISIT EST AGE 40-64: CPT | Performed by: NURSE PRACTITIONER

## 2023-08-07 PROCEDURE — G0145 SCR C/V CYTO,THINLAYER,RESCR: HCPCS | Performed by: NURSE PRACTITIONER

## 2023-08-07 NOTE — PROGRESS NOTES
Subjective    HPI:     Erick Hudson is a 63 y.o. postmenopausal female. She experiencing mild night sweats, states they are manageable. She takes magnesium, fish oil, calcium and vitamin D. She is a [place] 2 Para 2, with  x 2. She denies issues with intimacy. She has mild urinary stress incontinence. She denies GI and Gyn complaints. She feels safe at home. She denies depression/anxiety. Medical, surgical and family history reviewed. Her dental care is up-to-date. She tries to eats a healthy diet and exercises regularly. She is has loss 20 lbs since January. Visit Vitals  /90   Wt 96.6 kg (213 lb)   LMP 2021   BMI 36.56 kg/m²   OB Status Postmenopausal   Smoking Status Never   BSA 2.01 m²       Gynecologic History    Patient's last menstrual period was 2021. Last Pap: 10/31/19 Results were: normal  Last mammogram: 21. Results were: normal  Colonoscopy: 10/8/21 - repeat in 5 years - hx of polyps    Obstetric and Medical History    OB History    Para Term  AB Living   2 2 2     2   SAB IAB Ectopic Multiple Live Births           2      # Outcome Date GA Lbr Scotty/2nd Weight Sex Delivery Anes PTL Lv   2 Term 94    F Vag-Spont   FERNY   1 Term 89    F Vag-Spont   FERNY       Past Medical History:   Diagnosis Date   • Allergic rhinitis    • Asthma    • Fibroids    • Physiological ovarian cysts    • Sleep apnea     denatl device worn       Past Surgical History:   Procedure Laterality Date   • COLONOSCOPY      dr Tino Perez   • KNEE ARTHROSCOPY Left 2018    meniscectomy x2, first done in    • MYOMECTOMY     • [place] ARTHRP KNE CONDYLE&PLATU MEDIAL&LAT COMPARTMENTS Right 3/6/2023    Procedure: ARTHROPLASTY KNEE TOTAL W ROBOT - RIGHT - PRESS FIT - SAME DAY;   Surgeon: Donal Marquez DO;  Location: [facility];  Service: Orthopedics   • PA ARTHRS KNE SURG W/MENISCECTOMY MED/LAT W/SHVG Right 2022    Procedure: KNEE ARTHROSCOPY MENISCECTOMY MEDIAL;  Surgeon: Jose M Walsh MD;  Location: [facility];  Service: Orthopedics   • [place] COLONOSCOPY FLX DX W/COLLJ SPEC WHEN PFRMD N/A 9/13/2018    Procedure: COLONOSCOPY;  Surgeon: Yony Gonzales MD;  Location[facility]; Service: Gastroenterology   • WRIST SURGERY Right        The following portions of the patient's history were reviewed and updated as appropriate: allergies, current medications, past family history, past medical history, past social history, past surgical history and problem list.    Review of Systems    Pertinent items are noted in HPI. Objective    Physical Exam  Constitutional:       Appearance: Normal appearance. She is well-developed. Genitourinary:      Vulva, bladder and urethral meatus normal.      No lesions in the vagina. Right Labia: No rash, tenderness, lesions, skin changes or Bartholin's cyst.     Left Labia: No tenderness, lesions, skin changes, Bartholin's cyst or rash. No labial fusion noted. No inguinal adenopathy present in the right or left side. No vaginal discharge, erythema, tenderness, bleeding or granulation tissue. No vaginal prolapse present. No vaginal atrophy present. Right Adnexa: not tender, not full and no mass present. Left Adnexa: not tender, not full and no mass present. Cervix is parous. No cervical motion tenderness, discharge, friability, lesion, polyp or nabothian cyst.      Uterus is not enlarged, tender, irregular or prolapsed. No uterine mass detected. Uterus is anteverted. Pelvic exam was performed with patient in the lithotomy position. Breasts:     Breasts are symmetrical.      Right: No inverted nipple, mass, nipple discharge, skin change or tenderness. Left: No inverted nipple, mass, nipple discharge, skin change or tenderness. HENT:      Head: Normocephalic and atraumatic. Neck:      Thyroid: No thyromegaly.    Cardiovascular:      Rate and Rhythm: Normal rate and regular rhythm. Heart sounds: Normal heart sounds, S1 normal and S2 normal.   Pulmonary:      Effort: Pulmonary effort is normal.      Breath sounds: Normal breath sounds. Abdominal:      General: Bowel sounds are normal. There is no distension. Palpations: Abdomen is soft. There is no mass. Tenderness: There is no abdominal tenderness. There is no guarding. Hernia: There is no hernia in the left inguinal area or right inguinal area. Musculoskeletal:      Cervical back: Neck supple. Lymphadenopathy:      Cervical: No cervical adenopathy. Upper Body:      Right upper body: No supraclavicular or axillary adenopathy. Left upper body: No supraclavicular or axillary adenopathy. Lower Body: No right inguinal adenopathy. No left inguinal adenopathy. Neurological:      Mental Status: She is alert. Skin:     General: Skin is warm and dry. Findings: No rash. Psychiatric:         Attention and Perception: Attention and perception normal.         Mood and Affect: Mood and affect normal.         Speech: Speech normal.         Behavior: Behavior is cooperative. Thought Content: Thought content normal.         Cognition and Memory: Cognition and memory normal.         Judgment: Judgment normal.   Vitals and nursing note reviewed. Assessment and Plan    Maria Luisa England was seen today for gynecologic exam.    Diagnoses and all orders for this visit:    Women's annual routine gynecological examination      Patient informed of a Stable GYN exam. A pap smear was performed. I have discussed the importance of exercise and healthy diet as well as adequate intake of calcium and vitamin D. The current ASCCP guidelines were reviewed. The low risk patient will receive pap smear screening every 3 years until the age of 34 and then every 3 to 5 years with HPV co-testing from the ages of 32-69.  I emphasized the importance of an annual pelvic and breast exam. A yearly mammogram is overdue. She has script that was provided by her pcp in February 2023. Patient encouraged to schedule her screening. Results will be released to [facility], if abnormal will call to review and discuss treatment plan. All questions have been answered to her satisfaction. Follow up in: 1 year or sooner if needed.

## 2023-08-17 DIAGNOSIS — N76.0 BV (BACTERIAL VAGINOSIS): Primary | ICD-10-CM

## 2023-08-17 DIAGNOSIS — B96.89 BV (BACTERIAL VAGINOSIS): Primary | ICD-10-CM

## 2023-08-17 LAB
LAB AP GYN PRIMARY INTERPRETATION: NORMAL
Lab: NORMAL
PATH INTERP SPEC-IMP: NORMAL

## 2023-08-17 RX ORDER — METRONIDAZOLE 500 MG/1
500 TABLET ORAL EVERY 12 HOURS SCHEDULED
Qty: 14 TABLET | Refills: 0 | Status: SHIPPED | OUTPATIENT
Start: 2023-08-17 | End: 2023-08-24

## 2023-08-31 ENCOUNTER — APPOINTMENT (OUTPATIENT)
Dept: RADIOLOGY | Facility: CLINIC | Age: 58
End: 2023-08-31
Payer: COMMERCIAL

## 2023-08-31 ENCOUNTER — OFFICE VISIT (OUTPATIENT)
Dept: OBGYN CLINIC | Facility: CLINIC | Age: 58
End: 2023-08-31
Payer: COMMERCIAL

## 2023-08-31 VITALS
SYSTOLIC BLOOD PRESSURE: 150 MMHG | DIASTOLIC BLOOD PRESSURE: 80 MMHG | WEIGHT: 213 LBS | BODY MASS INDEX: 36.37 KG/M2 | HEART RATE: 93 BPM | HEIGHT: 64 IN

## 2023-08-31 DIAGNOSIS — M17.12 PRIMARY OSTEOARTHRITIS OF LEFT KNEE: Primary | ICD-10-CM

## 2023-08-31 DIAGNOSIS — Z01.89 ENCOUNTER FOR OTHER SPECIFIED SPECIAL EXAMINATIONS: ICD-10-CM

## 2023-08-31 DIAGNOSIS — M17.12 PRIMARY OSTEOARTHRITIS OF LEFT KNEE: ICD-10-CM

## 2023-08-31 PROCEDURE — 99214 OFFICE O/P EST MOD 30 MIN: CPT | Performed by: ORTHOPAEDIC SURGERY

## 2023-08-31 PROCEDURE — 73562 X-RAY EXAM OF KNEE 3: CPT

## 2023-08-31 PROCEDURE — 73560 X-RAY EXAM OF KNEE 1 OR 2: CPT

## 2023-08-31 NOTE — PROGRESS NOTES
Assessment/Plan:  1. Primary osteoarthritis of left knee  XR knee 3 vw left non injury    Injection Procedure Prior Authorization        Scribe Attestation    I,:  [facility] am acting as a scribe while in the presence of the attending physician.:       I,:   March, DO personally performed the services described in this documentation    as scribed in my presence.:            Deliah Heimlich is a 63 y.o female here for a follow up evaluation of her left knee osteoarthritis. Since she has not benefited from conservative treatment and corticosteroid injection we discussed surgical intervention of left knee total arthoplasty or visco supplementation. Since she states her daughter is getting  in October she would like visco supplementation as she would like to enjoy the wedding. We discussed we would complete visco injection series of the left knee. If she finds that these are ineffective for her at the time of her daughter's wedding I agreed that I will also provide medication for temporary relief while she is at the wedding. Subjective: Follow up evaluation of her left knee osteoarthritis     Patient ID: Becca Centeno is a 63 y.o. female here for follow-up evaluation of her left knee osteoarthritis. She was last seen by me 6/1/2023 for a post op visit of her right knee. During this visit she discussed that she would like to pursue left total knee arthroplasty as she demonstrates failure of all conservative treatment. Today she reports that her left knee pain has worsened and her left knee feels unstable at times. She reports 7/10 pain along the medial and lateral joint line. She states when she got her corticosteroid injection on 2/23/2023 it provided no relief. She explains that her daughter is getting  in October and does not wish to have surgery at this time. Review of Systems   Constitutional: Positive for activity change.  Negative for chills, fever and unexpected weight change. HENT: Negative for hearing loss, nosebleeds and sore throat. Eyes: Negative for pain, redness and visual disturbance. Respiratory: Negative for cough, shortness of breath and wheezing. Cardiovascular: Negative for chest pain, palpitations and leg swelling. Gastrointestinal: Negative for abdominal pain, nausea and vomiting. Endocrine: Negative for polydipsia and polyuria. Genitourinary: Negative for dysuria and hematuria. Musculoskeletal: Positive for arthralgias. See HPI   Skin: Negative for rash and wound. Neurological: Negative for dizziness, numbness and headaches. Psychiatric/Behavioral: Negative for decreased concentration and suicidal ideas. The patient is not nervous/anxious. Past Medical History:   Diagnosis Date   • Allergic rhinitis    • Asthma    • Fibroids    • Physiological ovarian cysts    • Sleep apnea     denatl device worn       Past Surgical History:   Procedure Laterality Date   • COLONOSCOPY  2016    dr Radha Pizarro   • KNEE ARTHROSCOPY Left 2018    meniscectomy x2, first done in 2015   • MYOMECTOMY     • [place] ARTHRP KNE CONDYLE&PLATU MEDIAL&LAT COMPARTMENTS Right 3/6/2023    Procedure: ARTHROPLASTY KNEE TOTAL W ROBOT - RIGHT - PRESS FIT - SAME DAY; Surgeon:  March, DO;  Location: [facility];  Service: Orthopedics   • [place] ARTHRS KNE SURG W/MENISCECTOMY MED/LAT W/SHVG Right 1/6/2022    Procedure: KNEE ARTHROSCOPY MENISCECTOMY MEDIAL;  Surgeon: Namrata Orozco MD;  Location: [facility];  Service: Orthopedics   • [place] COLONOSCOPY FLX DX W/COLLJ SPEC WHEN PFRMD N/A 9/13/2018    Procedure: COLONOSCOPY;  Surgeon: Kary Valles MD;  Location: [address] [place] [address] GI LAB;   Service: Gastroenterology   • WRIST SURGERY Right        Family History   Problem Relation Age of Onset   • Osteoporosis Mother    • Thyroid disease Mother    • Colon cancer Mother 79   • Colon polyps Mother    • Cancer Mother         colon   • Alzheimer's disease Father    • Hypertension Father • Dementia Father    • Heart disease Brother    • No Known Problems Daughter    • No Known Problems Daughter    • No Known Problems Maternal Aunt    • No Known Problems Maternal Aunt    • No Known Problems Paternal Aunt    • No Known Problems Paternal Aunt    • No Known Problems Paternal Aunt    • No Known Problems Paternal Aunt    • No Known Problems Paternal Aunt    • No Known Problems Paternal Aunt    • No Known Problems Paternal Aunt    • No Known Problems Paternal Aunt        Social History     Occupational History   • Not on file   Tobacco Use   • Smoking status: Never   • Smokeless tobacco: Never   Vaping Use   • Vaping Use: Never used   Substance and Sexual Activity   • Alcohol use: Yes     Comment: social - wine 2 x month   • Drug use: Never   • Sexual activity: Yes     Partners: Male     Birth control/protection: Condom Male         Current Outpatient Medications:   •  acetaminophen (TYLENOL) 325 mg tablet, Take 3 tablets (975 mg total) by mouth every 8 (eight) hours, Disp: , Rfl: 0  •  albuterol (2.5 mg/3 mL) 0.083 % nebulizer solution, Take 1 vial (2.5 mg total) by nebulization every 6 (six) hours as needed for wheezing or shortness of breath, Disp: 100 mL, Rfl: 0  •  Calcium Carb-Cholecalciferol 600-500 MG-UNIT CAPS, Take by mouth, Disp: , Rfl:   •  Fluticasone-Salmeterol (Advair Diskus) 500-50 mcg/dose inhaler, Inhale 1 puff 2 (two) times a day Rinse mouth after use., Disp: 180 blister, Rfl: 1  •  multivitamin (THERAGRAN) TABS, Take 1 tablet by mouth daily, Disp: , Rfl:   •  Respiratory Therapy Supplies (NEBULIZER/TUBING/MOUTHPIECE) KIT, by Does not apply route 4 (four) times a day, Disp: 1 each, Rfl: 0  •  valACYclovir (VALTREX) 1,000 mg tablet, 2 tabs at earliest onset of cold sore, repeat once in 12 hours.  Take 500mg bid for 3 days for genital herpes flare up., Disp: 40 tablet, Rfl: 5    No Known Allergies    Objective:  Vitals:    08/31/23 1307   BP: 150/80   Pulse: 93       Body mass index is 36.56 kg/m². Left Ankle Exam     Comments:  Stable       Left Knee Exam     Range of Motion   The patient has normal left knee ROM. Extension: 0   Flexion: 120     Tests   Varus: negative Valgus: negative  Drawer:  Anterior - negative         Other   Erythema: absent  Scars: absent  Sensation: normal  Pulse: present  Swelling: none  Effusion: no effusion present    Comments:  Stable   Neurovascular enacted             Observations   Left Knee   Negative for effusion. Physical Exam  Vitals and nursing note reviewed. Constitutional:       Appearance: Normal appearance. She is well-developed. HENT:      Head: Normocephalic and atraumatic. Right Ear: External ear normal.      Left Ear: External ear normal.   Eyes:      General: No scleral icterus. Extraocular Movements: Extraocular movements intact. Conjunctiva/sclera: Conjunctivae normal.   Cardiovascular:      Rate and Rhythm: Normal rate. Pulmonary:      Effort: Pulmonary effort is normal. No respiratory distress. Musculoskeletal:      Cervical back: Normal range of motion and neck supple. Left knee: No effusion. Comments: See Ortho exam   Skin:     General: Skin is warm and dry. Neurological:      General: No focal deficit present. Mental Status: She is alert and oriented to person, place, and time. Psychiatric:         Behavior: Behavior normal.         I have personally reviewed pertinent films in PACS. X-rays obtained here in the office today demonstrate end-stage osteoarthritis of the left knee with bone-on-bone contact, sclerosis, and peripheral osteophyte formation. No lytic or blastic lesion. No fracture appreciated. This document was created using speech voice recognition software. Grammatical errors, random word insertions, pronoun errors, and incomplete sentences are an occasional consequence of this system due to software limitations, ambient noise, and hardware issues.    Any formal questions or concerns about content, text, or information contained within the body of this dictation should be directly addressed to the provider for clarification.

## 2023-09-27 ENCOUNTER — PROCEDURE VISIT (OUTPATIENT)
Dept: OBGYN CLINIC | Facility: CLINIC | Age: 58
End: 2023-09-27
Payer: COMMERCIAL

## 2023-09-27 DIAGNOSIS — G89.29 CHRONIC PAIN OF LEFT KNEE: ICD-10-CM

## 2023-09-27 DIAGNOSIS — M25.562 CHRONIC PAIN OF LEFT KNEE: ICD-10-CM

## 2023-09-27 DIAGNOSIS — M17.12 PRIMARY OSTEOARTHRITIS OF LEFT KNEE: Primary | ICD-10-CM

## 2023-09-27 PROCEDURE — 20610 DRAIN/INJ JOINT/BURSA W/O US: CPT | Performed by: PHYSICIAN ASSISTANT

## 2023-09-27 RX ORDER — HYALURONATE SODIUM 10 MG/ML
20 SYRINGE (ML) INTRAARTICULAR
Status: COMPLETED | OUTPATIENT
Start: 2023-09-27 | End: 2023-09-27

## 2023-09-27 RX ADMIN — Medication 20 MG: at 14:00

## 2023-10-04 ENCOUNTER — PROCEDURE VISIT (OUTPATIENT)
Dept: OBGYN CLINIC | Facility: CLINIC | Age: 58
End: 2023-10-04
Payer: COMMERCIAL

## 2023-10-04 DIAGNOSIS — G89.29 CHRONIC PAIN OF LEFT KNEE: ICD-10-CM

## 2023-10-04 DIAGNOSIS — M25.562 CHRONIC PAIN OF LEFT KNEE: ICD-10-CM

## 2023-10-04 DIAGNOSIS — M17.12 PRIMARY OSTEOARTHRITIS OF LEFT KNEE: Primary | ICD-10-CM

## 2023-10-04 PROCEDURE — 20610 DRAIN/INJ JOINT/BURSA W/O US: CPT | Performed by: PHYSICIAN ASSISTANT

## 2023-10-04 RX ORDER — HYALURONATE SODIUM 10 MG/ML
20 SYRINGE (ML) INTRAARTICULAR
Status: COMPLETED | OUTPATIENT
Start: 2023-10-04 | End: 2023-10-04

## 2023-10-04 RX ADMIN — Medication 20 MG: at 14:00

## 2023-10-04 NOTE — PROGRESS NOTES
Assessment/Plan:  1. Primary osteoarthritis of left knee  Large joint arthrocentesis      2. Chronic pain of left knee  Large joint arthrocentesis        Geoff Esposito is a pleasant 79-year-old female presenting today for the 2nd of 3 Euflexxa injections for activity related left knee pain due to her underlying osteoarthritis. She consented to and underwent the injection as detailed below, which she tolerated well without difficulty or complication. Postinjection instructions were provided. We will plan to see her back next week to complete the series. All questions addressed    Large joint arthrocentesis: L knee  Universal Protocol:  Consent: Verbal consent obtained. Risks and benefits: risks, benefits and alternatives were discussed  Consent given by: patient  Time out: Immediately prior to procedure a "time out" was called to verify the correct patient, procedure, equipment, support staff and site/side marked as required. Timeout called at: 10/4/2023 1:59 PM.  Site marked: the operative site was marked  Patient identity confirmed: verbally with patient    Supporting Documentation  Indications: pain   Procedure Details  Location: knee - L knee  Preparation: Patient was prepped and draped in the usual sterile fashion  Needle size: 20 G  Ultrasound guidance: no  Approach: anterolateral  Medications administered: 20 mg Sodium Hyaluronate (Viscosup) 20 MG/2ML  Specialty Pharmacy Supplied: received medications from pharmacy  Patient tolerance: patient tolerated the procedure well with no immediate complications  Dressing:  Sterile dressing applied        Subjective: Left knee Euflexxa #2    Patient ID: Almita Ortiz is a 63 y.o. female presenting today for the aforementioned injection.  She denies new injury    Review of Systems      Past Medical History:   Diagnosis Date   • Allergic rhinitis    • Asthma    • Fibroids    • Physiological ovarian cysts    • Sleep apnea     denatl device worn       Past Surgical History:   Procedure Laterality Date   • COLONOSCOPY  2016    dr Iron Guaman   • KNEE ARTHROSCOPY Left 2018    meniscectomy x2, first done in 2015   • MYOMECTOMY     • [place] ARTHRP KNE CONDYLE&PLATU MEDIAL&LAT COMPARTMENTS Right 3/6/2023    Procedure: ARTHROPLASTY KNEE TOTAL W ROBOT - RIGHT - PRESS FIT - SAME DAY; Surgeon: Germaine Diaz DO;  Location: [facility];  Service: Orthopedics   • [place] ARTHRS KNE SURG W/MENISCECTOMY MED/LAT W/SHVG Right 1/6/2022    Procedure: KNEE ARTHROSCOPY MENISCECTOMY MEDIAL;  Surgeon: Ramon Corado MD;  Location: [facility];  Service: Orthopedics   • [place] COLONOSCOPY FLX DX W/COLLJ SPEC WHEN PFRMD N/A 9/13/2018    Procedure: COLONOSCOPY;  Surgeon: Angela Sellers MD;  Location: [address] [place] [address] [facility];   Service: Gastroenterology   • WRIST SURGERY Right        Family History   Problem Relation Age of Onset   • Osteoporosis Mother    • Thyroid disease Mother    • Colon cancer Mother 79   • Colon polyps Mother    • Cancer Mother         colon   • Alzheimer's disease Father    • Hypertension Father    • Dementia Father    • Heart disease Brother    • No Known Problems Daughter    • No Known Problems Daughter    • No Known Problems Maternal Aunt    • No Known Problems Maternal Aunt    • No Known Problems Paternal Aunt    • No Known Problems Paternal Aunt    • No Known Problems Paternal Aunt    • No Known Problems Paternal Aunt    • No Known Problems Paternal Aunt    • No Known Problems Paternal Aunt    • No Known Problems Paternal Aunt    • No Known Problems Paternal Aunt        Social History     Occupational History   • Not on file   Tobacco Use   • Smoking status: Never   • Smokeless tobacco: Never   Vaping Use   • Vaping Use: Never used   Substance and Sexual Activity   • Alcohol use: Yes     Comment: social - wine 2 x month   • Drug use: Never   • Sexual activity: Yes     Partners: Male     Birth control/protection: Condom Male         Current Outpatient Medications:   •  acetaminophen (TYLENOL) 325 mg tablet, Take 3 tablets (975 mg total) by mouth every 8 (eight) hours, Disp: , Rfl: 0  •  albuterol (2.5 mg/3 mL) 0.083 % nebulizer solution, Take 1 vial (2.5 mg total) by nebulization every 6 (six) hours as needed for wheezing or shortness of breath, Disp: 100 mL, Rfl: 0  •  Calcium Carb-Cholecalciferol 600-500 MG-UNIT CAPS, Take by mouth, Disp: , Rfl:   •  Fluticasone-Salmeterol (Advair Diskus) 500-50 mcg/dose inhaler, Inhale 1 puff 2 (two) times a day Rinse mouth after use., Disp: 180 blister, Rfl: 1  •  multivitamin (THERAGRAN) TABS, Take 1 tablet by mouth daily, Disp: , Rfl:   •  Respiratory Therapy Supplies (NEBULIZER/TUBING/MOUTHPIECE) KIT, by Does not apply route 4 (four) times a day, Disp: 1 each, Rfl: 0  •  valACYclovir (VALTREX) 1,000 mg tablet, 2 tabs at earliest onset of cold sore, repeat once in 12 hours. Take 500mg bid for 3 days for genital herpes flare up., Disp: 40 tablet, Rfl: 5    No Known Allergies    Objective: There were no vitals filed for this visit. There is no height or weight on file to calculate BMI. Ortho Exam    Physical Exam    This document was created using speech voice recognition software. Grammatical errors, random word insertions, pronoun errors, and incomplete sentences are an occasional consequence of this system due to software limitations, ambient noise, and hardware issues. Any formal questions or concerns about content, text, or information contained within the body of this dictation should be directly addressed to the provider for clarification.

## 2023-10-11 ENCOUNTER — PROCEDURE VISIT (OUTPATIENT)
Dept: OBGYN CLINIC | Facility: CLINIC | Age: 58
End: 2023-10-11
Payer: COMMERCIAL

## 2023-10-11 DIAGNOSIS — M17.12 PRIMARY OSTEOARTHRITIS OF LEFT KNEE: Primary | ICD-10-CM

## 2023-10-11 DIAGNOSIS — M25.562 CHRONIC PAIN OF LEFT KNEE: ICD-10-CM

## 2023-10-11 DIAGNOSIS — G89.29 CHRONIC PAIN OF LEFT KNEE: ICD-10-CM

## 2023-10-11 PROCEDURE — 20610 DRAIN/INJ JOINT/BURSA W/O US: CPT

## 2023-10-11 RX ORDER — HYALURONATE SODIUM 10 MG/ML
20 SYRINGE (ML) INTRAARTICULAR
Status: COMPLETED | OUTPATIENT
Start: 2023-10-11 | End: 2023-10-11

## 2023-10-11 RX ADMIN — Medication 20 MG: at 14:00

## 2023-10-11 NOTE — PROGRESS NOTES
Assessment/Plan:  1. Primary osteoarthritis of left knee  Large joint arthrocentesis: L knee      2. Chronic pain of left knee  Large joint arthrocentesis: L knee        Geoff Esposito is a pleasant 60-year-old female presenting today for the third of 3 Euflexxa injections for activity related left knee pain due to her underlying osteoarthritis. She consented to and underwent the injection as detailed below, which she tolerated well without difficulty or complication. Postinjection instructions were provided. She can plan to follow up as needed. The patient states she will likely consider a knee replacement after her daughter's upcoming wedding and the holidays. All questions were addressed today. Large joint arthrocentesis: L knee  Universal Protocol:  Consent: Verbal consent obtained. Risks and benefits: risks, benefits and alternatives were discussed  Consent given by: patient  Timeout called at: 10/11/2023 2:20 PM.  Patient understanding: patient states understanding of the procedure being performed  Patient consent: the patient's understanding of the procedure matches consent given  Site marked: the operative site was marked  Radiology Images displayed and confirmed. If images not available, report reviewed: imaging studies available  Patient identity confirmed: verbally with patient  Supporting Documentation  Indications: pain   Procedure Details  Location: knee - L knee  Needle size: 20 G  Ultrasound guidance: no  Approach: anterolateral  Medications administered: 20 mg Sodium Hyaluronate (Viscosup) 20 MG/2ML    Patient tolerance: patient tolerated the procedure well with no immediate complications  Dressing:  Sterile dressing applied        Subjective: Left knee Euflexxa #3    Patient ID: Almita Ortiz is a 63 y.o. female presenting today for the third of 3 Euflexxa injections. Unfortunately, the patient does not note great symptomatic benefit from the Euflexxa injections so far.  She states that this is her fourth series and the third and current series have been minimally effective, although she is trying to get through her daughter's wedding and the holidays before considering a knee replacement. Today, her pain is 8/10. She denies new injury or trauma, swelling, locking episodes, instability, weakness, or distal paresthesias. Review of Systems      Past Medical History:   Diagnosis Date    Allergic rhinitis     Asthma     Fibroids     Physiological ovarian cysts     Sleep apnea     denatl device worn       Past Surgical History:   Procedure Laterality Date    COLONOSCOPY  2016    dr Rolanda Gonzalez ARTHROSCOPY Left 2018    meniscectomy x2, first done in 2015    MYOMECTOMY      [place] ARTHRP KNE CONDYLE&PLATU MEDIAL&LAT COMPARTMENTS Right 3/6/2023    Procedure: ARTHROPLASTY KNEE TOTAL W ROBOT - RIGHT - PRESS FIT - SAME DAY; Surgeon: Fiona Whyte DO;  Location: [facility];  Service: Orthopedics    [place] ARTHRS KNE SURG W/MENISCECTOMY MED/LAT W/SHVG Right 1/6/2022    Procedure: KNEE ARTHROSCOPY MENISCECTOMY MEDIAL;  Surgeon: Magdiel Zamudio MD;  Location: [facility];  Service: Orthopedics    [place] COLONOSCOPY FLX DX W/COLLJ SPEC WHEN PFRMD N/A 9/13/2018    Procedure: COLONOSCOPY;  Surgeon: Ki Finley MD;  Location: [address] [place] [address] GI LAB;   Service: Gastroenterology    WRIST SURGERY Right        Family History   Problem Relation Age of Onset    Osteoporosis Mother     Thyroid disease Mother     Colon cancer Mother 79    Colon polyps Mother     Cancer Mother         colon    Alzheimer's disease Father     Hypertension Father     Dementia Father     Heart disease Brother     No Known Problems Daughter     No Known Problems Daughter     No Known Problems Maternal Aunt     No Known Problems Maternal Aunt     No Known Problems Paternal Aunt     No Known Problems Paternal Aunt     No Known Problems Paternal Aunt     No Known Problems Paternal Aunt     No Known Problems Paternal Aunt     No Known Problems Paternal Aunt No Known Problems Paternal Aunt     No Known Problems Paternal Aunt        Social History     Occupational History    Not on file   Tobacco Use    Smoking status: Never    Smokeless tobacco: Never   Vaping Use    Vaping Use: Never used   Substance and Sexual Activity    Alcohol use: Yes     Comment: social - wine 2 x month    Drug use: Never    Sexual activity: Yes     Partners: Male     Birth control/protection: Condom Male         Current Outpatient Medications:     acetaminophen (TYLENOL) 325 mg tablet, Take 3 tablets (975 mg total) by mouth every 8 (eight) hours, Disp: , Rfl: 0    albuterol (2.5 mg/3 mL) 0.083 % nebulizer solution, Take 1 vial (2.5 mg total) by nebulization every 6 (six) hours as needed for wheezing or shortness of breath, Disp: 100 mL, Rfl: 0    Calcium Carb-Cholecalciferol 600-500 MG-UNIT CAPS, Take by mouth, Disp: , Rfl:     Fluticasone-Salmeterol (Advair Diskus) 500-50 mcg/dose inhaler, Inhale 1 puff 2 (two) times a day Rinse mouth after use., Disp: 180 blister, Rfl: 1    multivitamin (THERAGRAN) TABS, Take 1 tablet by mouth daily, Disp: , Rfl:     Respiratory Therapy Supplies (NEBULIZER/TUBING/MOUTHPIECE) KIT, by Does not apply route 4 (four) times a day, Disp: 1 each, Rfl: 0    valACYclovir (VALTREX) 1,000 mg tablet, 2 tabs at earliest onset of cold sore, repeat once in 12 hours. Take 500mg bid for 3 days for genital herpes flare up., Disp: 40 tablet, Rfl: 5    No Known Allergies    Objective: There were no vitals filed for this visit. There is no height or weight on file to calculate BMI.     Ortho Exam    Physical Exam

## 2023-11-13 ENCOUNTER — OFFICE VISIT (OUTPATIENT)
Dept: URGENT CARE | Facility: CLINIC | Age: 58
End: 2023-11-13
Payer: COMMERCIAL

## 2023-11-13 VITALS
HEIGHT: 63 IN | RESPIRATION RATE: 20 BRPM | WEIGHT: 221 LBS | TEMPERATURE: 96.7 F | HEART RATE: 91 BPM | OXYGEN SATURATION: 98 % | BODY MASS INDEX: 39.16 KG/M2

## 2023-11-13 DIAGNOSIS — J40 BRONCHITIS: Primary | ICD-10-CM

## 2023-11-13 PROCEDURE — 99203 OFFICE O/P NEW LOW 30 MIN: CPT | Performed by: PHYSICIAN ASSISTANT

## 2023-11-13 RX ORDER — BENZONATATE 200 MG/1
200 CAPSULE ORAL 3 TIMES DAILY PRN
Qty: 20 CAPSULE | Refills: 0 | Status: SHIPPED | OUTPATIENT
Start: 2023-11-13

## 2023-11-13 RX ORDER — PREDNISONE 20 MG/1
20 TABLET ORAL DAILY
Qty: 4 TABLET | Refills: 0 | Status: SHIPPED | OUTPATIENT
Start: 2023-11-13 | End: 2023-11-17

## 2023-11-13 NOTE — PROGRESS NOTES
[facility]        NAME: Fito Vo is a 63 y.o. female  : 1965    MRN: [id number]  DATE: 2023  TIME: 7:05 PM    Assessment and Plan   Bronchitis [J40]  1. Bronchitis  benzonatate (TESSALON) 200 MG capsule    predniSONE 20 mg tablet        Offered neb here but pt declined and stated she would do it at home. Stay hydrated. Otc meds for symptomatic relief. Discussed strict return to care precautions as well as red flag symptoms which should prompt immediate ED referral. Pt verbalized understanding and is in agreement with plan. Please follow up with your primary care provider within the next week. Please remember that your visit today was with an urgent care provider and should not replace follow up with your primary care provider for chronic medical issues or annual physicals. Patient Instructions       Follow up with PCP in 3-5 days. Proceed to  ER if symptoms worsen. Chief Complaint     Chief Complaint   Patient presents with    Cough     Nasal congestion began Thursday now has asthmatic ccough using inhaler and nebulizer  this am 2 neg covid tests         History of Present Illness       Pt is a 62 yo female with pmh asthma, seasonal allergies pw congestion and cough x 4 days, worsened yesterday.  had URI symptoms last week. Last dose of inhaler was almost 12 hours ago. Similar to previous episodes of bronchitis. Review of Systems   Review of Systems   Constitutional:  Negative for chills, diaphoresis, fatigue and fever. HENT:  Positive for congestion, sinus pressure and sore throat (with coughing only). Negative for ear pain, postnasal drip, rhinorrhea, sinus pain, sneezing and trouble swallowing. Eyes:  Negative for pain and redness. Respiratory:  Positive for cough, chest tightness and shortness of breath. Negative for wheezing. Cardiovascular:  Negative for chest pain (with coughing only) and leg swelling.    Gastrointestinal:  Negative for diarrhea, nausea and vomiting. Musculoskeletal:  Negative for myalgias. Neurological:  Negative for dizziness, weakness and headaches. Current Medications       Current Outpatient Medications:     acetaminophen (TYLENOL) 325 mg tablet, Take 3 tablets (975 mg total) by mouth every 8 (eight) hours, Disp: , Rfl: 0    albuterol (2.5 mg/3 mL) 0.083 % nebulizer solution, Take 1 vial (2.5 mg total) by nebulization every 6 (six) hours as needed for wheezing or shortness of breath, Disp: 100 mL, Rfl: 0    benzonatate (TESSALON) 200 MG capsule, Take 1 capsule (200 mg total) by mouth 3 (three) times a day as needed for cough, Disp: 20 capsule, Rfl: 0    Calcium Carb-Cholecalciferol 600-500 MG-UNIT CAPS, Take by mouth, Disp: , Rfl:     Fluticasone-Salmeterol (Advair Diskus) 500-50 mcg/dose inhaler, Inhale 1 puff 2 (two) times a day Rinse mouth after use., Disp: 180 blister, Rfl: 1    multivitamin (THERAGRAN) TABS, Take 1 tablet by mouth daily, Disp: , Rfl:     predniSONE 20 mg tablet, Take 1 tablet (20 mg total) by mouth daily for 4 days, Disp: 4 tablet, Rfl: 0    Respiratory Therapy Supplies (NEBULIZER/TUBING/MOUTHPIECE) KIT, by Does not apply route 4 (four) times a day, Disp: 1 each, Rfl: 0    valACYclovir (VALTREX) 1,000 mg tablet, 2 tabs at earliest onset of cold sore, repeat once in 12 hours.  Take 500mg bid for 3 days for genital herpes flare up., Disp: 40 tablet, Rfl: 5    Current Allergies     Allergies as of 11/13/2023    (No Known Allergies)            The following portions of the patient's history were reviewed and updated as appropriate: allergies, current medications, past family history, past medical history, past social history, past surgical history and problem list.     Past Medical History:   Diagnosis Date    Allergic rhinitis     Asthma     Fibroids     Physiological ovarian cysts     Sleep apnea     denatl device worn       Past Surgical History:   Procedure Laterality Date    COLONOSCOPY 2016    dr Jessica Corbett ARTHROSCOPY Left 2018    meniscectomy x2, first done in 2015    MYOMECTOMY      MT ARTHRP KNE CONDYLE&PLATU MEDIAL&LAT COMPARTMENTS Right 3/6/2023    Procedure: ARTHROPLASTY KNEE TOTAL W ROBOT - RIGHT - PRESS FIT - SAME DAY; Surgeon: Abdoulaye Burch DO;  Location: [facility];  Service: Orthopedics    MT ARTHRS KNE SURG W/MENISCECTOMY MED/LAT W/SHVG Right 1/6/2022    Procedure: KNEE ARTHROSCOPY MENISCECTOMY MEDIAL;  Surgeon: Pricilla Cavazos MD;  Location: [facility];  Service: Orthopedics    MT COLONOSCOPY FLX DX W/COLLJ SPEC WHEN PFRMD N/A 9/13/2018    Procedure: COLONOSCOPY;  Surgeon: Mery Escobedo MD;  Location: [address] [place] [address] GI LAB; Service: Gastroenterology    WRIST SURGERY Right        Family History   Problem Relation Age of Onset    Osteoporosis Mother     Thyroid disease Mother     Colon cancer Mother 79    Colon polyps Mother     Cancer Mother         colon    Alzheimer's disease Father     Hypertension Father     Dementia Father     Heart disease Brother     No Known Problems Daughter     No Known Problems Daughter     No Known Problems Maternal Aunt     No Known Problems Maternal Aunt     No Known Problems Paternal Aunt     No Known Problems Paternal Aunt     No Known Problems Paternal Aunt     No Known Problems Paternal Aunt     No Known Problems Paternal Aunt     No Known Problems Paternal Aunt     No Known Problems Paternal Aunt     No Known Problems Paternal Aunt          Medications have been verified. Objective   Pulse 91   Temp (!) 96.7 °F (35.9 °C)   Resp 20   Ht 5' 3" (1.6 m)   Wt 100 kg (221 lb)   LMP 04/28/2021   SpO2 98%   BMI 39.15 kg/m²        Physical Exam     Physical Exam  Vitals and nursing note reviewed. Constitutional:       General: She is not in acute distress. Appearance: Normal appearance. She is not toxic-appearing. HENT:      Head: Normocephalic and atraumatic.       Right Ear: Tympanic membrane, ear canal and external ear normal.      Left Ear: Tympanic membrane, ear canal and external ear normal.      Nose: No congestion. Mouth/Throat:      Mouth: Mucous membranes are moist.      Pharynx: Oropharynx is clear. No oropharyngeal exudate or posterior oropharyngeal erythema. Eyes:      Conjunctiva/sclera: Conjunctivae normal.      Pupils: Pupils are equal, round, and reactive to light. Cardiovascular:      Rate and Rhythm: Normal rate and regular rhythm. Heart sounds: Normal heart sounds. Pulmonary:      Effort: Pulmonary effort is normal. No respiratory distress. Breath sounds: Examination of the right-upper field reveals wheezing. Examination of the left-upper field reveals wheezing. Wheezing present. No rhonchi or rales. Comments: Harsh cough noted consistently  Musculoskeletal:      Cervical back: Normal range of motion and neck supple. Skin:     General: Skin is warm and dry. Capillary Refill: Capillary refill takes less than 2 seconds. Neurological:      Mental Status: She is alert and oriented to person, place, and time.    Psychiatric:         Behavior: Behavior normal.

## 2023-11-30 ENCOUNTER — TELEPHONE (OUTPATIENT)
Age: 58
End: 2023-11-30

## 2023-11-30 DIAGNOSIS — Z96.651 S/P TOTAL KNEE REPLACEMENT NOT USING CEMENT, RIGHT: Primary | ICD-10-CM

## 2023-11-30 RX ORDER — AMOXICILLIN 500 MG/1
2000 TABLET, FILM COATED ORAL
Qty: 4 TABLET | Refills: 2 | Status: SHIPPED | OUTPATIENT
Start: 2023-11-30 | End: 2024-02-28

## 2023-11-30 NOTE — TELEPHONE ENCOUNTER
Caller: Patient    Doctor: Dr. Lilia Quinteros    Reason for call: Patient calling asking if a prescription for antibiotics can be sent to the pharmacy below. She is having dental cleaning in 2 weeks.     [address] [place] [address] [phone]     Call back#: [phone]

## 2024-02-01 ENCOUNTER — HOSPITAL ENCOUNTER (OUTPATIENT)
Dept: RADIOLOGY | Facility: HOSPITAL | Age: 59
Discharge: HOME | End: 2024-02-01
Payer: COMMERCIAL

## 2024-02-01 VITALS — HEIGHT: 63 IN | BODY MASS INDEX: 38.62 KG/M2 | WEIGHT: 218 LBS

## 2024-02-01 DIAGNOSIS — Z12.31 BREAST CANCER SCREENING BY MAMMOGRAM: ICD-10-CM

## 2024-02-01 PROCEDURE — 77067 SCR MAMMO BI INCL CAD: CPT

## 2024-02-01 PROCEDURE — 77063 BREAST TOMOSYNTHESIS BI: CPT

## 2024-02-09 ENCOUNTER — OFFICE VISIT (OUTPATIENT)
Dept: FAMILY MEDICINE CLINIC | Facility: CLINIC | Age: 59
End: 2024-02-09
Payer: COMMERCIAL

## 2024-02-09 VITALS
TEMPERATURE: 97.4 F | RESPIRATION RATE: 20 BRPM | DIASTOLIC BLOOD PRESSURE: 84 MMHG | BODY MASS INDEX: 39.16 KG/M2 | HEIGHT: 63 IN | SYSTOLIC BLOOD PRESSURE: 142 MMHG | HEART RATE: 101 BPM | WEIGHT: 221 LBS

## 2024-02-09 DIAGNOSIS — H81.13 BENIGN PAROXYSMAL POSITIONAL VERTIGO DUE TO BILATERAL VESTIBULAR DISORDER: Primary | ICD-10-CM

## 2024-02-09 PROCEDURE — 99213 OFFICE O/P EST LOW 20 MIN: CPT | Performed by: NURSE PRACTITIONER

## 2024-02-09 RX ORDER — MECLIZINE HYDROCHLORIDE 25 MG/1
25 TABLET ORAL 3 TIMES DAILY PRN
Qty: 30 TABLET | Refills: 0 | Status: SHIPPED | OUTPATIENT
Start: 2024-02-09

## 2024-02-09 NOTE — PROGRESS NOTES
Assessment/Plan:    Reviewed Epley maneuvers to be performed at home and Meclizine prn.  To call for referral to balance center if needed.      1. Benign paroxysmal positional vertigo due to bilateral vestibular disorder  -     meclizine (ANTIVERT) 25 mg tablet; Take 1 tablet (25 mg total) by mouth 3 (three) times a day as needed for dizziness          Patient Instructions   Epley manuevers at home for vertigo  Call office Monday for referral for PT if no improvement    Return if symptoms worsen or fail to improve.    Subjective:      Patient ID: Colette Sweeney is a 58 y.o. female.    Chief Complaint   Patient presents with   • Dizziness     Dizzy/lightheaded/nauseas for the past 3 days lw cma   • Anxiety     No depression/ just some anxiety lw cma       Here today with complaints of dizziness and nauseas for the past 3 days. Symptoms have been intermittent, but now more constant. Room spins while laying flat at night.  Gets a head rush sensation when standing.  Reports mild headache.   No recent URI, sinus issues or allergies.    No vomiting, appetite normal.    No fevers, body aches, or chills.  No urinary s/s.   Has been under some increased stress, thinks symptoms may be anxiety related.         The following portions of the patient's history were reviewed and updated as appropriate: allergies, current medications, past family history, past medical history, past social history, past surgical history and problem list.    Review of Systems   Constitutional: Negative.    Respiratory: Negative.     Cardiovascular:  Negative for chest pain, palpitations and leg swelling.   Gastrointestinal:  Positive for nausea.   Neurological:  Positive for dizziness and headaches.   Psychiatric/Behavioral:          See HPI         Current Outpatient Medications   Medication Sig Dispense Refill   • acetaminophen (TYLENOL) 325 mg tablet Take 3 tablets (975 mg total) by mouth every 8 (eight) hours  0   • albuterol (2.5 mg/3 mL)  "0.083 % nebulizer solution Take 1 vial (2.5 mg total) by nebulization every 6 (six) hours as needed for wheezing or shortness of breath 100 mL 0   • amoxicillin (AMOXIL) 500 MG tablet Take 4 tablets (2,000 mg total) by mouth 60 minutes pre-procedure 4 tablet 2   • Calcium Carb-Cholecalciferol 600-500 MG-UNIT CAPS Take by mouth     • Fluticasone-Salmeterol (Advair Diskus) 500-50 mcg/dose inhaler Inhale 1 puff 2 (two) times a day Rinse mouth after use. 180 blister 1   • meclizine (ANTIVERT) 25 mg tablet Take 1 tablet (25 mg total) by mouth 3 (three) times a day as needed for dizziness 30 tablet 0   • multivitamin (THERAGRAN) TABS Take 1 tablet by mouth daily     • Respiratory Therapy Supplies (NEBULIZER/TUBING/MOUTHPIECE) KIT by Does not apply route 4 (four) times a day 1 each 0   • valACYclovir (VALTREX) 1,000 mg tablet 2 tabs at earliest onset of cold sore, repeat once in 12 hours. Take 500mg bid for 3 days for genital herpes flare up. 40 tablet 5   • benzonatate (TESSALON) 200 MG capsule Take 1 capsule (200 mg total) by mouth 3 (three) times a day as needed for cough (Patient not taking: Reported on 2/9/2024) 20 capsule 0     No current facility-administered medications for this visit.       Objective:    /84   Pulse 101   Temp (!) 97.4 °F (36.3 °C) (Temporal)   Resp 20   Ht 5' 3\" (1.6 m)   Wt 100 kg (221 lb)   LMP 04/28/2021   BMI 39.15 kg/m²        Physical Exam  Vitals and nursing note reviewed.   Constitutional:       Appearance: Normal appearance. She is well-developed.   HENT:      Right Ear: Tympanic membrane normal.      Left Ear: Tympanic membrane normal.      Nose: Nose normal.      Mouth/Throat:      Pharynx: Oropharynx is clear.   Eyes:      Extraocular Movements: Extraocular movements intact.      Conjunctiva/sclera: Conjunctivae normal.      Pupils: Pupils are equal, round, and reactive to light.   Cardiovascular:      Rate and Rhythm: Normal rate and regular rhythm.      Pulses: Normal " pulses.      Heart sounds: Normal heart sounds. No murmur heard.  Pulmonary:      Effort: Pulmonary effort is normal.      Breath sounds: Normal breath sounds.   Skin:     General: Skin is warm and dry.   Neurological:      Mental Status: She is alert.      Comments: Dizziness provoked with turning head to both sides while lying down.  No nystagmus.    Psychiatric:         Mood and Affect: Mood normal.         Behavior: Behavior normal.                ARTEM Barkley

## 2024-02-21 PROBLEM — Z13.6 SCREENING FOR CARDIOVASCULAR, RESPIRATORY, AND GENITOURINARY DISEASES: Status: RESOLVED | Noted: 2019-07-30 | Resolved: 2024-02-21

## 2024-02-21 PROBLEM — Z13.83 SCREENING FOR CARDIOVASCULAR, RESPIRATORY, AND GENITOURINARY DISEASES: Status: RESOLVED | Noted: 2019-07-30 | Resolved: 2024-02-21

## 2024-02-21 PROBLEM — Z13.89 SCREENING FOR CARDIOVASCULAR, RESPIRATORY, AND GENITOURINARY DISEASES: Status: RESOLVED | Noted: 2019-07-30 | Resolved: 2024-02-21

## 2024-02-21 PROBLEM — Z13.1 SCREENING FOR DIABETES MELLITUS (DM): Status: RESOLVED | Noted: 2019-07-30 | Resolved: 2024-02-21

## 2024-02-22 ENCOUNTER — APPOINTMENT (EMERGENCY)
Dept: RADIOLOGY | Facility: HOSPITAL | Age: 59
End: 2024-02-22
Payer: COMMERCIAL

## 2024-02-22 ENCOUNTER — HOSPITAL ENCOUNTER (EMERGENCY)
Facility: HOSPITAL | Age: 59
Discharge: HOME | End: 2024-02-22
Attending: EMERGENCY MEDICINE
Payer: COMMERCIAL

## 2024-02-22 VITALS
RESPIRATION RATE: 19 BRPM | SYSTOLIC BLOOD PRESSURE: 169 MMHG | TEMPERATURE: 97.5 F | DIASTOLIC BLOOD PRESSURE: 94 MMHG | OXYGEN SATURATION: 95 % | HEART RATE: 88 BPM

## 2024-02-22 DIAGNOSIS — S52.509A DISTAL RADIUS FRACTURE: Primary | ICD-10-CM

## 2024-02-22 PROCEDURE — 29125 APPL SHORT ARM SPLINT STATIC: CPT | Performed by: PHYSICIAN ASSISTANT

## 2024-02-22 PROCEDURE — 73090 X-RAY EXAM OF FOREARM: CPT

## 2024-02-22 PROCEDURE — 73110 X-RAY EXAM OF WRIST: CPT

## 2024-02-22 PROCEDURE — 99284 EMERGENCY DEPT VISIT MOD MDM: CPT | Performed by: PHYSICIAN ASSISTANT

## 2024-02-22 PROCEDURE — 99283 EMERGENCY DEPT VISIT LOW MDM: CPT

## 2024-02-22 RX ORDER — OXYCODONE HYDROCHLORIDE AND ACETAMINOPHEN 5; 325 MG/1; MG/1
1 TABLET ORAL ONCE
Status: COMPLETED | OUTPATIENT
Start: 2024-02-22 | End: 2024-02-22

## 2024-02-22 RX ORDER — OXYCODONE HYDROCHLORIDE AND ACETAMINOPHEN 5; 325 MG/1; MG/1
1 TABLET ORAL EVERY 6 HOURS PRN
Qty: 12 TABLET | Refills: 0 | Status: SHIPPED | OUTPATIENT
Start: 2024-02-22 | End: 2024-02-25

## 2024-02-22 RX ADMIN — OXYCODONE HYDROCHLORIDE AND ACETAMINOPHEN 1 TABLET: 5; 325 TABLET ORAL at 09:10

## 2024-02-22 NOTE — ED PROVIDER NOTES
History  Chief Complaint   Patient presents with    Wrist Pain     Patient c/o left wrist pain radiating up to her left elbow.  States she slipped on black ice this morning (~30 gustavo PTA), landing on her butt and right hand.  Had Ibuprofen 600 mg at 0515 this AM (trigger finger surgery on right hand yesterday).     58-year-old female presenting today with left-sided wrist pain that began prior to arrival after she had a slip and fall on ice.  Did not hit her head or lose consciousness.  Notes swelling to the left wrist.  Denies numbness, paresthesias or open injuries.  She is on no blood thinners.  She just recently this past week had a right-sided trigger finger release.  She does see orthopedics out of [facility].        Prior to Admission Medications   Prescriptions Last Dose Informant Patient Reported? Taking?   Calcium Carb-Cholecalciferol 600-500 MG-UNIT CAPS   Yes No   Sig: Take by mouth   Fluticasone-Salmeterol (Advair Diskus) 500-50 mcg/dose inhaler   No No   Sig: Inhale 1 puff 2 (two) times a day Rinse mouth after use.   Respiratory Therapy Supplies (NEBULIZER/TUBING/MOUTHPIECE) KIT   No No   Sig: by Does not apply route 4 (four) times a day   acetaminophen (TYLENOL) 325 mg tablet   No No   Sig: Take 3 tablets (975 mg total) by mouth every 8 (eight) hours   albuterol (2.5 mg/3 mL) 0.083 % nebulizer solution  Self No No   Sig: Take 1 vial (2.5 mg total) by nebulization every 6 (six) hours as needed for wheezing or shortness of breath   amoxicillin (AMOXIL) 500 MG tablet   No No   Sig: Take 4 tablets (2,000 mg total) by mouth 60 minutes pre-procedure   benzonatate (TESSALON) 200 MG capsule   No No   Sig: Take 1 capsule (200 mg total) by mouth 3 (three) times a day as needed for cough   Patient not taking: Reported on 2/9/2024   meclizine (ANTIVERT) 25 mg tablet   No No   Sig: Take 1 tablet (25 mg total) by mouth 3 (three) times a day as needed for dizziness   multivitamin (THERAGRAN) TABS   Yes No    Sig: Take 1 tablet by mouth daily   valACYclovir (VALTREX) 1,000 mg tablet  Self No No   Si tabs at earliest onset of cold sore, repeat once in 12 hours. Take 500mg bid for 3 days for genital herpes flare up.      Facility-Administered Medications: None       Past Medical History:   Diagnosis Date    Allergic rhinitis     Asthma     Fibroids     Physiological ovarian cysts     Sleep apnea     denatl device worn       Past Surgical History:   Procedure Laterality Date    COLONOSCOPY  2016    dr garcia    KNEE ARTHROSCOPY Left 2018    meniscectomy x2, first done in     MYOMECTOMY      [place] ARTHRP KNE CONDYLE&PLATU MEDIAL&LAT COMPARTMENTS Right 3/6/2023    Procedure: ARTHROPLASTY KNEE TOTAL W ROBOT - RIGHT - PRESS FIT - SAME DAY;  Surgeon: Trey Fountain DO;  Location: [facility];  Service: Orthopedics    [place] ARTHRS KNE SURG W/MENISCECTOMY MED/LAT W/SHVG Right 2022    Procedure: KNEE ARTHROSCOPY MENISCECTOMY MEDIAL;  Surgeon: Sachin Grove MD;  Location: [facility];  Service: Orthopedics    [place] COLONOSCOPY FLX DX W/COLLJ SPEC WHEN PFRMD N/A 2018    Procedure: COLONOSCOPY;  Surgeon: Sergo Martinez MD;  Location: [facility];  Service: Gastroenterology    WRIST SURGERY Right        Family History   Problem Relation Age of Onset    Osteoporosis Mother     Thyroid disease Mother     Colon cancer Mother 67    Colon polyps Mother     Cancer Mother         colon    Alzheimer's disease Father     Hypertension Father     Dementia Father     No Known Problems Daughter     No Known Problems Daughter     Ovarian cancer Maternal Grandmother     Heart disease Brother     No Known Problems Maternal Aunt     No Known Problems Maternal Aunt     No Known Problems Paternal Aunt     No Known Problems Paternal Aunt     No Known Problems Paternal Aunt     No Known Problems Paternal Aunt     No Known Problems Paternal Aunt     No Known Problems Paternal Aunt     No Known Problems Paternal Aunt     No Known  Problems Paternal Aunt      I have reviewed and agree with the history as documented.    E-Cigarette/Vaping    E-Cigarette Use Never User      E-Cigarette/Vaping Substances    Nicotine No     THC No     CBD No     Flavoring No     Other No     Unknown No      Social History     Tobacco Use    Smoking status: Never     Passive exposure: Never    Smokeless tobacco: Never   Vaping Use    Vaping status: Never Used   Substance Use Topics    Alcohol use: Yes     Comment: social - wine 2 x month    Drug use: Never       Review of Systems   Constitutional: Negative.  Negative for chills, fatigue and fever.   HENT: Negative.  Negative for congestion, postnasal drip, rhinorrhea and sore throat.    Eyes: Negative.    Respiratory: Negative.  Negative for cough, shortness of breath and wheezing.    Cardiovascular: Negative.    Gastrointestinal: Negative.  Negative for abdominal pain, diarrhea, nausea and vomiting.   Endocrine: Negative.    Genitourinary: Negative.    Musculoskeletal:  Positive for arthralgias and joint swelling. Negative for back pain, gait problem, myalgias, neck pain and neck stiffness.   Skin: Negative.    Neurological: Negative.    Hematological: Negative.    Psychiatric/Behavioral: Negative.     All other systems reviewed and are negative.      Physical Exam  Physical Exam  Vitals and nursing note reviewed.   Constitutional:       Appearance: Normal appearance.   HENT:      Head: Normocephalic and atraumatic.      Right Ear: External ear normal.      Left Ear: External ear normal.      Nose: Nose normal.   Eyes:      Conjunctiva/sclera: Conjunctivae normal.   Cardiovascular:      Rate and Rhythm: Normal rate.      Pulses: Normal pulses.   Pulmonary:      Effort: Pulmonary effort is normal.   Abdominal:      General: There is no distension.   Musculoskeletal:         General: No deformity. Normal range of motion.        Arms:       Cervical back: Normal range of motion.   Skin:     General: Skin is warm and  "dry.      Capillary Refill: Capillary refill takes less than 2 seconds.      Findings: No rash.   Neurological:      General: No focal deficit present.      Mental Status: She is alert and oriented to person, place, and time. Mental status is at baseline.   Psychiatric:         Mood and Affect: Mood normal.         Behavior: Behavior normal.         Thought Content: Thought content normal.         Judgment: Judgment normal.         Vital Signs  ED Triage Vitals [02/22/24 0822]   Temperature Pulse Respirations Blood Pressure SpO2   97.5 °F (36.4 °C) 88 19 169/94 95 %      Temp Source Heart Rate Source Patient Position - Orthostatic VS BP Location FiO2 (%)   Tympanic Monitor Sitting Right arm --      Pain Score       10 - Worst Possible Pain           Vitals:    02/22/24 0822   BP: 169/94   Pulse: 88   Patient Position - Orthostatic VS: Sitting         Visual Acuity      ED Medications  Medications   oxyCODONE-acetaminophen (PERCOCET) 5-325 mg per tablet 1 tablet (1 tablet Oral Given 2/22/24 0910)       Diagnostic Studies  Results Reviewed       None                   XR wrist 3+ views LEFT    (Results Pending)   XR forearm 2 views LEFT    (Results Pending)              Procedures  Splint application    Date/Time: 2/22/2024 10:39 AM    Performed by: Vanessa Lewis PA-C  Authorized by: Vanessa Lewis PA-C  Universal Protocol:  Consent: Verbal consent obtained.  Risks and benefits: risks, benefits and alternatives were discussed  Consent given by: patient  Time out: Immediately prior to procedure a \"time out\" was called to verify the correct patient, procedure, equipment, support staff and site/side marked as required.  Timeout called at: 2/22/2024 10:39 AM.  Patient understanding: patient states understanding of the procedure being performed  Patient consent: the patient's understanding of the procedure matches consent given  Procedure consent: procedure consent matches procedure scheduled  Relevant " documents: relevant documents present and verified  Test results: test results available and properly labeled  Site marked: the operative site was marked  Radiology Images displayed and confirmed. If images not available, report reviewed: imaging studies available  Required items: required blood products, implants, devices, and special equipment available  Patient identity confirmed: verbally with patient    Pre-procedure details:     Sensation:  Normal  Procedure details:     Laterality:  Left    Location:  Wrist    Wrist:  L wrist    Splint type:  Volar short arm    Supplies:  Cotton padding, elastic bandage and Ortho-Glass  Post-procedure details:     Pain:  Improved    Sensation:  Normal    Patient tolerance of procedure:  Tolerated well, no immediate complications           ED Course                                             Medical Decision Making  Patient was placed in a volar splint assessed by me with good neurovascular exam before and after.  Patient has tolerated Percocet well before in the past, informed not to drive or operate heavy machinery until cleared by orthopedics.  Patient has an orthopedic for which she will be calling today to schedule follow-up appointment.  Discussed with patient the distal radius intra-articular fracture without a slightly displaced as read by myself.  Discussed that there may be a need for potential surgery.  Paper prescription for Percocet was printed for patient as authenticator deanne was not functioning.  Patient was also given a CD with burned images.     Patient is informed to return to the emergency department for worsening of symptoms and was given proper education regarding their diagnosis and symptoms. Otherwise the patient is informed to follow up with their orthopedic for re-evaluation. The patient verbalizes understanding and agrees with above assessment and plan. All questions were answered.          Amount and/or Complexity of Data Reviewed  Radiology:  ordered.    Risk  Prescription drug management.             Disposition  Final diagnoses:   Distal radius fracture     Time reflects when diagnosis was documented in both MDM as applicable and the Disposition within this note       Time User Action Codes Description Comment    2/22/2024  9:07 AM Vanessa Lewis Add [S52.509A] Distal radius fracture           ED Disposition       ED Disposition   Discharge    Condition   Stable    Date/Time   Thu Feb 22, 2024  9:06 AM    Comment   Colette Sweeney discharge to home/self care.                   Follow-up Information       Follow up With Specialties Details Why Contact Info Additional Information    On license of [facility] Emergency Department Emergency Medicine Go to  If symptoms worsen, otherwise please follow up with your orthopedics [address] [place] [id number] [phone] [facility], [address], [place], [address]            Discharge Medication List as of 2/22/2024  9:35 AM        START taking these medications    Details   oxyCODONE-acetaminophen (Percocet) 5-325 mg per tablet Take 1 tablet by mouth every 6 (six) hours as needed for severe pain for up to 3 days Max Daily Amount: 4 tablets, Starting Thu 2/22/2024, Until Sun 2/25/2024 at 2359, Print           CONTINUE these medications which have NOT CHANGED    Details   acetaminophen (TYLENOL) 325 mg tablet Take 3 tablets (975 mg total) by mouth every 8 (eight) hours, Starting Mon 3/6/2023, No Print      albuterol (2.5 mg/3 mL) 0.083 % nebulizer solution Take 1 vial (2.5 mg total) by nebulization every 6 (six) hours as needed for wheezing or shortness of breath, Starting Fri 3/22/2019, Normal      amoxicillin (AMOXIL) 500 MG tablet Take 4 tablets (2,000 mg total) by mouth 60 minutes pre-procedure, Starting Thu 11/30/2023, Until Wed 2/28/2024 at 2359, Normal      benzonatate (TESSALON) 200 MG capsule Take 1 capsule (200 mg total)  by mouth 3 (three) times a day as needed for cough, Starting Mon 11/13/2023, Normal      Calcium Carb-Cholecalciferol 600-500 MG-UNIT CAPS Take by mouth, Historical Med      Fluticasone-Salmeterol (Advair Diskus) 500-50 mcg/dose inhaler Inhale 1 puff 2 (two) times a day Rinse mouth after use., Starting Mon 2/27/2023, Until Thu 2/22/2024, Normal      meclizine (ANTIVERT) 25 mg tablet Take 1 tablet (25 mg total) by mouth 3 (three) times a day as needed for dizziness, Starting Fri 2/9/2024, Normal      multivitamin (THERAGRAN) TABS Take 1 tablet by mouth daily, Historical Med      Respiratory Therapy Supplies (NEBULIZER/TUBING/MOUTHPIECE) KIT by Does not apply route 4 (four) times a day, Starting Fri 3/22/2019, Normal      valACYclovir (VALTREX) 1,000 mg tablet 2 tabs at earliest onset of cold sore, repeat once in 12 hours. Take 500mg bid for 3 days for genital herpes flare up., Normal             No discharge procedures on file.    PDMP Review         Value Time User    PDMP Reviewed  Yes 3/6/2023  7:41 AM Ben Ayala PA-C            ED Provider  Electronically Signed by             Vanessa Lewis PA-C  02/22/24 4859

## 2024-02-22 NOTE — Clinical Note
Colette Sweeney was seen and treated in our emergency department on 2/22/2024.                Diagnosis:     Colette  may return to work on return date.    She may return on this date: 02/26/2024         If you have any questions or concerns, please don't hesitate to call.      Vanessa Lewis PA-C    ______________________________           _______________          _______________  Hospital Representative                              Date                                Time

## 2024-02-23 ENCOUNTER — OFFICE VISIT (OUTPATIENT)
Dept: FAMILY MEDICINE CLINIC | Facility: CLINIC | Age: 59
End: 2024-02-23
Payer: COMMERCIAL

## 2024-02-23 VITALS
RESPIRATION RATE: 16 BRPM | WEIGHT: 218 LBS | HEIGHT: 64 IN | HEART RATE: 81 BPM | DIASTOLIC BLOOD PRESSURE: 80 MMHG | SYSTOLIC BLOOD PRESSURE: 130 MMHG | BODY MASS INDEX: 37.22 KG/M2 | TEMPERATURE: 98.7 F

## 2024-02-23 DIAGNOSIS — Z01.818 PRE-OP EXAMINATION: Primary | ICD-10-CM

## 2024-02-23 DIAGNOSIS — G47.30 SLEEP APNEA, UNSPECIFIED TYPE: ICD-10-CM

## 2024-02-23 DIAGNOSIS — J45.40 MODERATE PERSISTENT ASTHMA WITHOUT COMPLICATION: ICD-10-CM

## 2024-02-23 DIAGNOSIS — S52.599A OTHER CLOSED FRACTURE OF DISTAL END OF RADIUS, UNSPECIFIED LATERALITY, INITIAL ENCOUNTER: ICD-10-CM

## 2024-02-23 PROCEDURE — 99213 OFFICE O/P EST LOW 20 MIN: CPT | Performed by: NURSE PRACTITIONER

## 2024-02-23 NOTE — PATIENT INSTRUCTIONS
Medication Management/Recommendations:   - Patient has been instructed to avoid herbs or non-directed vitamins the week prior to surgery to ensure no drug interactions with perioperative surgical and anesthetic medications.  - Patient has been instructed to avoid aspirin containing medications or non-steroidal anti-inflammatory drugs for the week preceding surgery.

## 2024-03-13 ENCOUNTER — OFFICE VISIT (OUTPATIENT)
Dept: OBGYN CLINIC | Facility: CLINIC | Age: 59
End: 2024-03-13
Payer: COMMERCIAL

## 2024-03-13 ENCOUNTER — APPOINTMENT (OUTPATIENT)
Dept: RADIOLOGY | Facility: CLINIC | Age: 59
End: 2024-03-13
Payer: COMMERCIAL

## 2024-03-13 VITALS
HEIGHT: 64 IN | SYSTOLIC BLOOD PRESSURE: 138 MMHG | WEIGHT: 220.2 LBS | HEART RATE: 73 BPM | BODY MASS INDEX: 37.59 KG/M2 | DIASTOLIC BLOOD PRESSURE: 95 MMHG

## 2024-03-13 DIAGNOSIS — S52.599A OTHER CLOSED FRACTURE OF DISTAL END OF RADIUS, UNSPECIFIED LATERALITY, INITIAL ENCOUNTER: ICD-10-CM

## 2024-03-13 DIAGNOSIS — Z96.651 STATUS POST RIGHT KNEE REPLACEMENT: Primary | ICD-10-CM

## 2024-03-13 DIAGNOSIS — Z96.651 AFTERCARE FOLLOWING RIGHT KNEE JOINT REPLACEMENT SURGERY: ICD-10-CM

## 2024-03-13 DIAGNOSIS — Z47.1 AFTERCARE FOLLOWING RIGHT KNEE JOINT REPLACEMENT SURGERY: ICD-10-CM

## 2024-03-13 PROCEDURE — 73562 X-RAY EXAM OF KNEE 3: CPT

## 2024-03-13 PROCEDURE — 99214 OFFICE O/P EST MOD 30 MIN: CPT | Performed by: ORTHOPAEDIC SURGERY

## 2024-03-13 RX ORDER — IBUPROFEN 600 MG/1
TABLET ORAL
COMMUNITY
Start: 2024-02-28

## 2024-03-13 NOTE — PROGRESS NOTES
Assessment/Plan:  1. Status post right knee replacement  XR knee 3 vw right non injury      2. Aftercare following right knee joint replacement surgery          Scribe Attestation      I,:  Jose J Romero am acting as a scribe while in the presence of the attending physician.:       I,:  Trey Fountain, DO personally performed the services described in this documentation    as scribed in my presence.:           Colette is a pleasant 59-year-old female who returns today for follow-up evaluation 1 year status post right total knee arthroplasty.  I am very pleased with her imaging and her clinical presentation today in the office.  She may continue with all activity to her tolerance.  She understands she requires prophylactic antibiotics prior to any dental procedure moving forward.  All of her questions and concerns were addressed today.  We will see her back as needed for her right knee.    Subjective: Follow-up evaluation 1 year status post right total knee arthroplasty    Patient ID: Colette Sweeney is a 59 y.o. female who returns today for follow-up evaluation 1 year status post right total knee arthroplasty. She reports she has been doing very well. She has been participating in all desired activity without limitation. She does have some discomfort descending stairs at times but this is not significant for her. She also reports non-painful clicking. She did recently suffer a left forearm fracture and is recovering from this. She is pleased with the results of her knee surgery.     Review of Systems   Constitutional:  Positive for activity change. Negative for chills, fever and unexpected weight change.   HENT:  Negative for hearing loss, nosebleeds and sore throat.    Eyes:  Negative for pain, redness and visual disturbance.   Respiratory:  Negative for cough, shortness of breath and wheezing.    Cardiovascular:  Negative for chest pain, palpitations and leg swelling.   Gastrointestinal:  Negative for  abdominal pain, nausea and vomiting.   Endocrine: Negative for polydipsia and polyuria.   Genitourinary:  Negative for dysuria and hematuria.   Musculoskeletal:  Negative for arthralgias, joint swelling and myalgias.        See HPI   Skin:  Negative for rash and wound.   Neurological:  Negative for dizziness, numbness and headaches.   Psychiatric/Behavioral:  Negative for decreased concentration and suicidal ideas. The patient is not nervous/anxious.          Past Medical History:   Diagnosis Date    Allergic rhinitis     Asthma     Fibroids     Physiological ovarian cysts     Sleep apnea     denatl device worn       Past Surgical History:   Procedure Laterality Date    COLONOSCOPY  2016    dr garcia    KNEE ARTHROSCOPY Left 2018    meniscectomy x2, first done in 2015    MYOMECTOMY      [place] ARTHRP KNE CONDYLE&PLATU MEDIAL&LAT COMPARTMENTS Right 3/6/2023    Procedure: ARTHROPLASTY KNEE TOTAL W ROBOT - RIGHT - PRESS FIT - SAME DAY;  Surgeon: Trey Fountain DO;  Location: [facility];  Service: Orthopedics    AR ARTHRS KNE SURG W/MENISCECTOMY MED/LAT W/SHVG Right 1/6/2022    Procedure: KNEE ARTHROSCOPY MENISCECTOMY MEDIAL;  Surgeon: Sachin Grove MD;  Location: [facility] OR;  Service: Orthopedics    AR COLONOSCOPY FLX DX W/COLLJ SPEC WHEN PFRMD N/A 9/13/2018    Procedure: COLONOSCOPY;  Surgeon: Sergo Martinez MD;  Location: [facility];  Service: Gastroenterology    WRIST SURGERY Right        Family History   Problem Relation Age of Onset    Osteoporosis Mother     Thyroid disease Mother     Colon cancer Mother 67    Colon polyps Mother     Cancer Mother         colon    Alzheimer's disease Father     Hypertension Father     Dementia Father     No Known Problems Daughter     No Known Problems Daughter     Ovarian cancer Maternal Grandmother     Heart disease Brother     No Known Problems Maternal Aunt     No Known Problems Maternal Aunt     No Known Problems Paternal Aunt     No Known Problems Paternal Aunt      No Known Problems Paternal Aunt     No Known Problems Paternal Aunt     No Known Problems Paternal Aunt     No Known Problems Paternal Aunt     No Known Problems Paternal Aunt     No Known Problems Paternal Aunt        Social History     Occupational History    Not on file   Tobacco Use    Smoking status: Never     Passive exposure: Never    Smokeless tobacco: Never   Vaping Use    Vaping status: Never Used   Substance and Sexual Activity    Alcohol use: Yes     Comment: social - wine 2 x month    Drug use: Never    Sexual activity: Yes     Partners: Male     Birth control/protection: Condom Male         Current Outpatient Medications:     acetaminophen (TYLENOL) 325 mg tablet, Take 3 tablets (975 mg total) by mouth every 8 (eight) hours, Disp: , Rfl: 0    albuterol (2.5 mg/3 mL) 0.083 % nebulizer solution, Take 1 vial (2.5 mg total) by nebulization every 6 (six) hours as needed for wheezing or shortness of breath, Disp: 100 mL, Rfl: 0    Calcium Carb-Cholecalciferol 600-500 MG-UNIT CAPS, Take by mouth, Disp: , Rfl:     ibuprofen (MOTRIN) 600 mg tablet, , Disp: , Rfl:     multivitamin (THERAGRAN) TABS, Take 1 tablet by mouth daily, Disp: , Rfl:     Respiratory Therapy Supplies (NEBULIZER/TUBING/MOUTHPIECE) KIT, by Does not apply route 4 (four) times a day, Disp: 1 each, Rfl: 0    benzonatate (TESSALON) 200 MG capsule, Take 1 capsule (200 mg total) by mouth 3 (three) times a day as needed for cough (Patient not taking: Reported on 2/9/2024), Disp: 20 capsule, Rfl: 0    Fluticasone-Salmeterol (Advair Diskus) 500-50 mcg/dose inhaler, Inhale 1 puff 2 (two) times a day Rinse mouth after use., Disp: 180 blister, Rfl: 1    meclizine (ANTIVERT) 25 mg tablet, Take 1 tablet (25 mg total) by mouth 3 (three) times a day as needed for dizziness (Patient not taking: Reported on 2/23/2024), Disp: 30 tablet, Rfl: 0    valACYclovir (VALTREX) 1,000 mg tablet, 2 tabs at earliest onset of cold sore, repeat once in 12 hours. Take  500mg bid for 3 days for genital herpes flare up., Disp: 40 tablet, Rfl: 5    No Known Allergies    Objective:  Vitals:    03/13/24 1656   BP: 138/95   Pulse: 73       Body mass index is 37.8 kg/m².    Right Knee Exam     Muscle Strength   The patient has normal right knee strength.    Tenderness   The patient is experiencing no tenderness.     Range of Motion   Extension:  0 normal   Flexion:  120 normal     Tests   Varus: negative Valgus: negative  Drawer:  Anterior - negative    Posterior - negative    Other   Erythema: absent  Scars: present  Sensation: normal  Pulse: present  Swelling: none  Effusion: no effusion present    Comments:  Anterior incision well-healed  Stable at 0, 30, and 90  Patella tracks midline and flat without crepitance  Grossly distally neurovascularly intact            Observations     Right Knee   Negative for effusion.       Physical Exam  Vitals and nursing note reviewed.   Constitutional:       Appearance: Normal appearance. She is well-developed.   HENT:      Head: Normocephalic and atraumatic.      Right Ear: External ear normal.      Left Ear: External ear normal.   Eyes:      General: No scleral icterus.     Extraocular Movements: Extraocular movements intact.      Conjunctiva/sclera: Conjunctivae normal.   Cardiovascular:      Rate and Rhythm: Normal rate.   Pulmonary:      Effort: Pulmonary effort is normal. No respiratory distress.   Musculoskeletal:      Cervical back: Normal range of motion and neck supple.      Right knee: No effusion.      Comments: See Ortho exam   Skin:     General: Skin is warm and dry.   Neurological:      General: No focal deficit present.      Mental Status: She is alert and oriented to person, place, and time.   Psychiatric:         Behavior: Behavior normal.         I have personally reviewed pertinent films in PACS.    X-ray of the right knee obtained on 3/13/2024 reviewed demonstrating a well-positioned and aligned press-fit total knee arthroplasty  without evidence of failure.  There is a native patella.  There is no new fracture, dislocation, lytic or blastic lesion.    This document was created using speech voice recognition software.   Grammatical errors, random word insertions, pronoun errors, and incomplete sentences are an occasional consequence of this system due to software limitations, ambient noise, and hardware issues.   Any formal questions or concerns about content, text, or information contained within the body of this dictation should be directly addressed to the provider for clarification.

## 2024-03-14 ENCOUNTER — EVALUATION (OUTPATIENT)
Dept: OCCUPATIONAL THERAPY | Facility: CLINIC | Age: 59
End: 2024-03-14
Payer: COMMERCIAL

## 2024-03-14 DIAGNOSIS — S52.602D CLOSED FRACTURE OF LEFT DISTAL RADIUS AND ULNA, WITH ROUTINE HEALING, SUBSEQUENT ENCOUNTER: Primary | ICD-10-CM

## 2024-03-14 DIAGNOSIS — S52.502D CLOSED FRACTURE OF LEFT DISTAL RADIUS AND ULNA, WITH ROUTINE HEALING, SUBSEQUENT ENCOUNTER: Primary | ICD-10-CM

## 2024-03-14 DIAGNOSIS — M65.341 TRIGGER RING FINGER OF RIGHT HAND: ICD-10-CM

## 2024-03-14 PROCEDURE — 97166 OT EVAL MOD COMPLEX 45 MIN: CPT

## 2024-03-14 NOTE — LETTER
2024    Eva Tellez MD  [address] [place] [address], [address]  [facility] [address]    Patient: Colette Sweeney   YOB: 1965   Date of Visit: 3/14/2024     Encounter Diagnosis     ICD-10-CM    1. Closed fracture of left distal radius and ulna, with routine healing, subsequent encounter  S52.502D     S52.602D       2. Trigger ring finger of right hand  M65.341           Dear Dr. Tellez:    Thank you for your recent referral of Colette Sweeney. Please review the attached evaluation summary from Colette's recent visit.     Please verify that you agree with the plan of care by signing the attached order.     If you have any questions or concerns, please do not hesitate to call.     I sincerely appreciate the opportunity to share in the care of one of your patients and hope to have another opportunity to work with you in the near future.     Sincerely,    Ellie James, OT      Referring Provider:     I certify that I have read the below Plan of Care and certify the need for these services furnished under this plan of treatment while under my care.                    Eva Tellez MD  [address] [place] [address], [address]  [facility] [address]  Via Fax: [phone]        OT Evaluation     Today's date: 3/14/2024  Patient name: Colette Sweeney  : 1965  MRN: [id number]  Referring provider: Eva Tellez MD  Dx:   Encounter Diagnosis     ICD-10-CM    1. Closed fracture of left distal radius and ulna, with routine healing, subsequent encounter  S52.502D     S52.602D       2. Trigger ring finger of right hand  M65.341           Start Time: 1415  Stop Time: 1500  Total time in clinic (min): 45 minutes    Assessment  Assessment details: Patient is a 59 y.o. female who presents for OT IE and treatment for L wrist fx and TF of ring finger on R hand. Patient reports she went for a walk and slipped on black ice, she went to the ED and Xrays (+) for fx on 2024 and had surgery on 2024. Patient  has a spanning plate in her L wrist. Patient referred by Dr. Tellez to initiate treatment including hand therapy.    Impairments: activity intolerance, impaired physical strength, lacks appropriate home exercise program, pain with function and weight-bearing intolerance    Symptom irritability: moderateUnderstanding of Dx/Px/POC: good  Goals  Short Term Goals by 2 - 4 weeks:    Establish HEP to increase performance with daily activities.     Patient will increase supination ROM of LUE by at least 10 degrees to complete ADLs.     Patient will decrease pain rate of UE by at least 2 points per the VAS scale.     Decreased Edema by at least 1 cm to assist in completing ADLs.         Long Term Goals by discharge:    Establish final home exercise program to enhance maximal functional level with ADLs.    Achieve functional active range of motion of LUE for full return to household chores.                             Achieve functional strength of LUE for full return to high level ADLs.     Plan  Plan details: Focus on HEP, A/AA/PROM, strengthening, edema control, scar massage, TE, TA, manual therapy, modalities PRN, ortho fit and train to improve ability to complete daily activites with ease.  POC 3/14/2024 - 4/25/2024.    Patient would benefit from: OT eval, custom splinting, skilled occupational therapy and orthotics  Planned modality interventions: electrical stimulation/Russian stimulation, high voltage pulsed current: pain management, ultrasound, manual electrical stimulation and thermotherapy: hydrocollator packs  Planned therapy interventions: IASTM, joint mobilization, kinesiology taping, manual therapy, activity modification, ADL training, ADL retraining, balance/weight bearing training, behavior modification, body mechanics training, coordination, compression, fine motor coordination training, flexibility, functional ROM exercises, graded exercise, graded activity, graded motor, home exercise program, IADL  "retraining, therapeutic training, therapeutic exercise, therapeutic activities, stretching, strengthening, sensory integrative techniques, self care, patient education, postural training, orthotic management and training, orthotic fitting/training, neuromuscular re-education and nerve gliding  Frequency: 2x week  Duration in visits: 12  Duration in weeks: 6  Plan of Care beginning date: 3/14/2024  Plan of Care expiration date: 2024  Treatment plan discussed with: patient      Subjective Evaluation    History of Present Illness  Date of surgery: 2024  Mechanism of injury: surgery  Mechanism of injury: Patient is a 59 y.o. female who presents for OT IE and treatment for L wrist fx and TF of ring finger on R hand. Patient reports she went for a walk and slipped on black ice, she went to the ED and Xrays (+) for fx on 2024 and had surgery on 2024. Patient has a spanning plate in her L wrist. Patient referred by Dr. Tellez to initiate treatment including hand therapy.      Quality of life: good    Patient Goals  Patient goals for therapy: increased strength, independence with ADLs/IADLs, return to sport/leisure activities, increased motion, decreased pain and decreased edema    Pain  Current pain ratin  At best pain ratin  At worst pain rating: 10  Quality: tight, radiating, burning, pulling and discomfort (\"stiff\" \"shooting feeling into the wrist crease\")  Relieving factors: ice, rest and support  Aggravating factors: keyboarding (driving, gripping objects, vacumming)    Social Support  Lives with: spouse (1 cat)    Employment status: working ( for [employer])  Hand dominance: right        Objective     Observations     Left Wrist/Hand   Positive for incision.     Additional Observation Details  L Dorsum of the forearm: 7cm  L Dorsum of the hand: 6cm     Active Range of Motion     Left Elbow   Forearm supination: 46 degrees with pain  Forearm pronation: 76 degrees with pain    Right " Elbow   Forearm supination: 58 degrees   Forearm pronation: 81 degrees     Right Wrist   Wrist flexion: 66 degrees   Wrist extension: 66 degrees   Radial deviation: 15 degrees   Ulnar deviation: 31 degrees     Left Thumb   Flexion     MP: 42 degrees    DIP: 64 degrees  Radial abduction    CMC: 34 degrees  Kapandji score: 3 degrees      Right Thumb   Flexion     MP: 56    DIP: 70  Radial Abduction    CMC: 41  Kapandji score: 10 degrees    Right Digits   Flexion   Index     MCP: 40    PIP: 94    DIP: 54  Middle     MCP: 42    PIP: 82    DIP: 60  Ring     MCP: 38    PIP: 80    DIP: 64  Little     MCP: 34    PIP: 84    DIP: 62    Additional Active Range of Motion Details  R hand able to make a fist, but had increased tightness with Ring finger     Strength/Myotome Testing     Right Wrist/Hand      (2nd hand position)     Trial 1: 40    Thumb Strength   Key/Lateral Pinch     Trial 1: 15  Tip/Two-Point Pinch     Trial 1: 11  Palmar/Three-Point Pinch     Trial 1: 10    Swelling     Left Wrist/Hand   Circumference wrist: 21 cm    Right Wrist/Hand   Circumference wrist: 18.5 cm               Auth Tracker - NO AUTH REQ BOMN  Auth Status Total   Visits  Expiration date Co-Insurance   pending                                  Visit Tracker  Date IE  3/14                                                                                    PMHx:   has a past medical history of Allergic rhinitis, Asthma, Fibroids, Physiological ovarian cysts, and Sleep apnea.      Manuals HEP 3/14/2024                       Ther Ex     Education on HEP and dx  x5min   AROM tendon glides x x10   AROM supination/pronation  x x10   Thumb opposition  x x5   Scar massage/edema control x X5 min                        Ther Act                     Modalities     MHP            Assessment:   Patient tolerated session well. Patient demonstrates increased edema at L wrist crease, decreased supination/pronation and digital ROM upon assessment today.  and  pinch strength not assessed secondary to date of procedure. Patient session focused on patient education on anatomy and physiology concerning current dx, techniques for decreasing deficits through HEP, and appropriate use of modalities. Patient educated on HEP to include AROM tendon glides, edema control and scar massage with verbal instructions and handouts for patient reference. Patient educated on treatment plan at this time. Patient benefiting from skilled hand therapy OT to reduce deficits to improve independence with daily activities.      Plan:   Focus on HEP, A/AA/PROM, strengthening, edema control, scar massage, TE, TA, manual therapy, modalities PRN, ortho fit and train to improve ability to complete daily activites with ease.  POC 3/14/2024 - 4/25/2024.

## 2024-03-14 NOTE — PROGRESS NOTES
OT Evaluation     Today's date: 3/14/2024  Patient name: Colette Sweeney  : 1965  MRN: [id number]  Referring provider: Eva Tellez MD  Dx:   Encounter Diagnosis     ICD-10-CM    1. Closed fracture of left distal radius and ulna, with routine healing, subsequent encounter  S52.502D     S52.602D       2. Trigger ring finger of right hand  M65.341           Start Time: 1415  Stop Time: 1500  Total time in clinic (min): 45 minutes    Assessment  Assessment details: Patient is a 59 y.o. female who presents for OT IE and treatment for L wrist fx and TF of ring finger on R hand. Patient reports she went for a walk and slipped on black ice, she went to the ED and Xrays (+) for fx on 2024 and had surgery on 2024. Patient has a spanning plate in her L wrist. Patient referred by Dr. Tellez to initiate treatment including hand therapy.    Impairments: activity intolerance, impaired physical strength, lacks appropriate home exercise program, pain with function and weight-bearing intolerance    Symptom irritability: moderateUnderstanding of Dx/Px/POC: good  Goals  Short Term Goals by 2 - 4 weeks:    Establish HEP to increase performance with daily activities.     Patient will increase supination ROM of LUE by at least 10 degrees to complete ADLs.     Patient will decrease pain rate of UE by at least 2 points per the VAS scale.     Decreased Edema by at least 1 cm to assist in completing ADLs.         Long Term Goals by discharge:    Establish final home exercise program to enhance maximal functional level with ADLs.    Achieve functional active range of motion of LUE for full return to household chores.                             Achieve functional strength of LUE for full return to high level ADLs.     Plan  Plan details: Focus on HEP, A/AA/PROM, strengthening, edema control, scar massage, TE, TA, manual therapy, modalities PRN, ortho fit and train to improve ability to complete daily activites with  ease.  POC 3/14/2024 - 4/25/2024.    Patient would benefit from: OT eval, custom splinting, skilled occupational therapy and orthotics  Planned modality interventions: electrical stimulation/Russian stimulation, high voltage pulsed current: pain management, ultrasound, manual electrical stimulation and thermotherapy: hydrocollator packs  Planned therapy interventions: IASTM, joint mobilization, kinesiology taping, manual therapy, activity modification, ADL training, ADL retraining, balance/weight bearing training, behavior modification, body mechanics training, coordination, compression, fine motor coordination training, flexibility, functional ROM exercises, graded exercise, graded activity, graded motor, home exercise program, IADL retraining, therapeutic training, therapeutic exercise, therapeutic activities, stretching, strengthening, sensory integrative techniques, self care, patient education, postural training, orthotic management and training, orthotic fitting/training, neuromuscular re-education and nerve gliding  Frequency: 2x week  Duration in visits: 12  Duration in weeks: 6  Plan of Care beginning date: 3/14/2024  Plan of Care expiration date: 4/25/2024  Treatment plan discussed with: patient      Subjective Evaluation    History of Present Illness  Date of surgery: 2/29/2024  Mechanism of injury: surgery  Mechanism of injury: Patient is a 59 y.o. female who presents for OT IE and treatment for L wrist fx and TF of ring finger on R hand. Patient reports she went for a walk and slipped on black ice, she went to the ED and Xrays (+) for fx on 2/22/2024 and had surgery on 2/29/2024. Patient has a spanning plate in her L wrist. Patient referred by Dr. Tellez to initiate treatment including hand therapy.      Quality of life: good    Patient Goals  Patient goals for therapy: increased strength, independence with ADLs/IADLs, return to sport/leisure activities, increased motion, decreased pain and decreased  "edema    Pain  Current pain ratin  At best pain ratin  At worst pain rating: 10  Quality: tight, radiating, burning, pulling and discomfort (\"stiff\" \"shooting feeling into the wrist crease\")  Relieving factors: ice, rest and support  Aggravating factors: keyboarding (driving, gripping objects, vacumming)    Social Support  Lives with: spouse (1 cat)    Employment status: working ( for [employer])  Hand dominance: right        Objective     Observations     Left Wrist/Hand   Positive for incision.     Additional Observation Details  L Dorsum of the forearm: 7cm  L Dorsum of the hand: 6cm     Active Range of Motion     Left Elbow   Forearm supination: 46 degrees with pain  Forearm pronation: 76 degrees with pain    Right Elbow   Forearm supination: 58 degrees   Forearm pronation: 81 degrees     Right Wrist   Wrist flexion: 66 degrees   Wrist extension: 66 degrees   Radial deviation: 15 degrees   Ulnar deviation: 31 degrees     Left Thumb   Flexion     MP: 42 degrees    DIP: 64 degrees  Radial abduction    CMC: 34 degrees  Kapandji score: 3 degrees      Right Thumb   Flexion     MP: 56    DIP: 70  Radial Abduction    CMC: 41  Kapandji score: 10 degrees    Right Digits   Flexion   Index     MCP: 40    PIP: 94    DIP: 54  Middle     MCP: 42    PIP: 82    DIP: 60  Ring     MCP: 38    PIP: 80    DIP: 64  Little     MCP: 34    PIP: 84    DIP: 62    Additional Active Range of Motion Details  R hand able to make a fist, but had increased tightness with Ring finger     Strength/Myotome Testing     Right Wrist/Hand      (2nd hand position)     Trial 1: 40    Thumb Strength   Key/Lateral Pinch     Trial 1: 15  Tip/Two-Point Pinch     Trial 1: 11  Palmar/Three-Point Pinch     Trial 1: 10    Swelling     Left Wrist/Hand   Circumference wrist: 21 cm    Right Wrist/Hand   Circumference wrist: 18.5 cm               Auth Tracker - NO AUTH REQ BOMN  Auth Status Total   Visits  Expiration date Co-Insurance   pending  "                                 Visit Tracker  Date IE  3/14                                                                                    PMHx:   has a past medical history of Allergic rhinitis, Asthma, Fibroids, Physiological ovarian cysts, and Sleep apnea.      Manuals HEP 3/14/2024                       Ther Ex     Education on HEP and dx  x5min   AROM tendon glides x x10   AROM supination/pronation  x x10   Thumb opposition  x x5   Scar massage/edema control x X5 min                        Ther Act                     Modalities     MHP            Assessment:   Patient tolerated session well. Patient demonstrates increased edema at L wrist crease, decreased supination/pronation and digital ROM upon assessment today.  and pinch strength not assessed secondary to date of procedure. Patient session focused on patient education on anatomy and physiology concerning current dx, techniques for decreasing deficits through HEP, and appropriate use of modalities. Patient educated on HEP to include AROM tendon glides, edema control and scar massage with verbal instructions and handouts for patient reference. Patient educated on treatment plan at this time. Patient benefiting from skilled hand therapy OT to reduce deficits to improve independence with daily activities.      Plan:   Focus on HEP, A/AA/PROM, strengthening, edema control, scar massage, TE, TA, manual therapy, modalities PRN, ortho fit and train to improve ability to complete daily activites with ease.  POC 3/14/2024 - 4/25/2024.

## 2024-03-18 ENCOUNTER — OFFICE VISIT (OUTPATIENT)
Dept: OCCUPATIONAL THERAPY | Facility: CLINIC | Age: 59
End: 2024-03-18
Payer: COMMERCIAL

## 2024-03-18 DIAGNOSIS — M65.341 TRIGGER RING FINGER OF RIGHT HAND: ICD-10-CM

## 2024-03-18 DIAGNOSIS — S52.502D CLOSED FRACTURE OF LEFT DISTAL RADIUS AND ULNA, WITH ROUTINE HEALING, SUBSEQUENT ENCOUNTER: Primary | ICD-10-CM

## 2024-03-18 DIAGNOSIS — S52.602D CLOSED FRACTURE OF LEFT DISTAL RADIUS AND ULNA, WITH ROUTINE HEALING, SUBSEQUENT ENCOUNTER: Primary | ICD-10-CM

## 2024-03-18 PROCEDURE — 97110 THERAPEUTIC EXERCISES: CPT

## 2024-03-18 PROCEDURE — 97530 THERAPEUTIC ACTIVITIES: CPT

## 2024-03-18 NOTE — PROGRESS NOTES
Daily Note     Today's date: 3/18/2024  Patient name: Colette Sweeney  : 1965  MRN: [id number]  Referring provider: Eva Tellez MD  Dx:   Encounter Diagnosis     ICD-10-CM    1. Closed fracture of left distal radius and ulna, with routine healing, subsequent encounter  S52.502D     S52.602D       2. Trigger ring finger of right hand  M65.341                      Subjective: Patient reports compliance with HEP, 7/10 on verbal pain scale report.      Objective: See treatment diary below    Precautions: L wrist surgery on 2024       Auth Tracker - NO AUTH REQ BOMN  Auth Status Total   Visits  Expiration date Co-Insurance   pending                                  Visit Tracker  Date IE  3/14 3/18                                                                                   PMHx:   has a past medical history of Allergic rhinitis, Asthma, Fibroids, Physiological ovarian cysts, and Sleep apnea.      Manuals HEP 3/18/2024                       Ther Ex     Education on HEP and dx  x5min   AROM tendon glides x 2x10   AROM supination/pronation  x 2x10   Thumb opposition  x x5   Scar massage/IASTM/edema control x X8 min    Therapist assisted PROM  X5 min   Elbow flexion   2x10   R RF blocking PIP/DIP  x10   Fist pumps  x10        Ther Act      Towel scrunches  x5   Dice w/ alt digits opposition  Whole jar                  Modalities     MHP  X5 min           Assessment:   Patient tolerated session well with good activity tolerance requiring minimal rest breaks secondary to muscle fatigue. Patient session focused on patient education on anatomy and physiology concerning current dx, techniques for decreasing deficits through HEP, and appropriate use of modalities. Therapist reviewed HEP to include AROM tendon glides, edema control and scar massage with verbal instructions. Patient educated on treatment plan at this time. Patient benefiting from skilled hand therapy OT to reduce deficits to improve  independence with daily activities.      Plan:   Focus on HEP, A/AA/PROM, strengthening, edema control, scar massage, TE, TA, manual therapy, modalities PRN, ortho fit and train to improve ability to complete daily activites with ease.  POC 3/14/2024 - 4/25/2024.

## 2024-03-21 ENCOUNTER — OFFICE VISIT (OUTPATIENT)
Dept: OCCUPATIONAL THERAPY | Facility: CLINIC | Age: 59
End: 2024-03-21
Payer: COMMERCIAL

## 2024-03-21 DIAGNOSIS — M65.341 TRIGGER RING FINGER OF RIGHT HAND: ICD-10-CM

## 2024-03-21 DIAGNOSIS — S52.602D CLOSED FRACTURE OF LEFT DISTAL RADIUS AND ULNA, WITH ROUTINE HEALING, SUBSEQUENT ENCOUNTER: Primary | ICD-10-CM

## 2024-03-21 DIAGNOSIS — S52.502D CLOSED FRACTURE OF LEFT DISTAL RADIUS AND ULNA, WITH ROUTINE HEALING, SUBSEQUENT ENCOUNTER: Primary | ICD-10-CM

## 2024-03-21 PROCEDURE — 97110 THERAPEUTIC EXERCISES: CPT

## 2024-03-21 PROCEDURE — 97530 THERAPEUTIC ACTIVITIES: CPT

## 2024-03-21 NOTE — PROGRESS NOTES
Daily Note     Today's date: 3/21/2024  Patient name: Colette Sweeney  : 1965  MRN: [id number]  Referring provider: Eva Tellez MD  Dx:   Encounter Diagnosis     ICD-10-CM    1. Closed fracture of left distal radius and ulna, with routine healing, subsequent encounter  S52.502D     S52.602D       2. Trigger ring finger of right hand  M65.341           Start Time: 1750  Stop Time: 1845  Total time in clinic (min): 55 minutes    Subjective: Patient reports 6/10 on verbal pain scale report.       Objective: See treatment diary below    Precautions: L wrist surgery on 2024       Auth Tracker - NO AUTH REQ BOMN  Auth Status Total   Visits  Expiration date Co-Insurance   pending                                  Visit Tracker  Date IE  3/14 3/18 3/21                                                                                  PMHx:   has a past medical history of Allergic rhinitis, Asthma, Fibroids, Physiological ovarian cysts, and Sleep apnea.      Manuals HEP 3/21/2024   Scar massage/IASTM/edema control  x8min                  Ther Ex     Education on HEP and dx  x5min   AROM tendon glides x 2x10   AROM supination/pronation x 2x10   Thumb opposition  x x5   Therapist assisted PROM  x10min   Elbow flexion   2x10   R RF blocking PIP/DIP  x10   Fist pumps  x10        Ther Act      Towel scrunches  x10   Dice w/ alt digits opposition x Whole jar   Thumb palmar and radial abduction ROM x 2 sets  x10   Finger abd/add ROM x 2 sets  x10   Towel slides on table for elbow flexion & extension/shoulder x x10   Flexor tendon glides x x10        Modalities     MHP  X5 min           Assessment:   Patient tolerated session well with good activity tolerance requiring minimal rest breaks secondary to muscle fatigue. Session focused on HEP education, edema management (Tubigrip size F), scar tissue massage, manual release, range of motion, and patient education to improve functional task performance with daily  activities.  Patient progressing well towards goals. Patient benefiting from skilled hand therapy OT to reduce deficits to improve independence with daily activities.          Plan:   Focus on HEP, A/AA/PROM, strengthening, edema control, scar massage, TE, TA, manual therapy, modalities PRN, ortho fit and train to improve ability to complete daily activites with ease.    POC 3/14/2024 - 4/25/2024.

## 2024-03-25 ENCOUNTER — OFFICE VISIT (OUTPATIENT)
Dept: OCCUPATIONAL THERAPY | Facility: CLINIC | Age: 59
End: 2024-03-25
Payer: COMMERCIAL

## 2024-03-25 DIAGNOSIS — M65.341 TRIGGER RING FINGER OF RIGHT HAND: ICD-10-CM

## 2024-03-25 DIAGNOSIS — S52.602D CLOSED FRACTURE OF LEFT DISTAL RADIUS AND ULNA, WITH ROUTINE HEALING, SUBSEQUENT ENCOUNTER: Primary | ICD-10-CM

## 2024-03-25 DIAGNOSIS — S52.502D CLOSED FRACTURE OF LEFT DISTAL RADIUS AND ULNA, WITH ROUTINE HEALING, SUBSEQUENT ENCOUNTER: Primary | ICD-10-CM

## 2024-03-25 PROCEDURE — 97110 THERAPEUTIC EXERCISES: CPT

## 2024-03-25 PROCEDURE — 97530 THERAPEUTIC ACTIVITIES: CPT

## 2024-03-25 NOTE — PROGRESS NOTES
Daily Note     Today's date: 3/25/2024  Patient name: Colette Sweeney  : 1965  MRN: [id number]  Referring provider: Eva Tellez MD  Dx:   Encounter Diagnosis     ICD-10-CM    1. Closed fracture of left distal radius and ulna, with routine healing, subsequent encounter  S52.502D     S52.602D       2. Trigger ring finger of right hand  M65.341                        Subjective: Patient reports her R hand 4th digit trigger finger is doing much better but overall fingers are very stiff, Tubigrip has been helping to reduce swelling at night.       Objective: See treatment diary below    Precautions: L wrist surgery on 2024       Auth Tracker - NO AUTH REQ BOMN  Auth Status Total   Visits  Expiration date Co-Insurance   pending                                  Visit Tracker  Date IE  3/14 3/18 3/21 3/25                                                                                 PMHx:   has a past medical history of Allergic rhinitis, Asthma, Fibroids, Physiological ovarian cysts, and Sleep apnea.      Manuals HEP 3/25/2024   Scar massage/IASTM/edema control  x8min                  Ther Ex     Education on HEP and dx  x5min   AROM tendon glides x 2x10   AROM supination/pronation x 2x10   Thumb opposition  x x5   Therapist assisted PROM  x10min   Elbow flexion   2x10   R RF blocking PIP/DIP  x10   Fist pumps  x10             Ther Act      Towel scrunches  x10   Dice w/ alt digits opposition x Whole jar   Thumb palmar and radial abduction ROM x 2 sets  x10   Finger abd/add ROM x 2 sets  x10   Towel slides on table for elbow flexion & extension/shoulder x x10   Chime balls  CW/CCW x1min   Towel scrunches  x3        Modalities     MHP  X5 min           Assessment:   Patient tolerated session well with good activity tolerance, able to perform all TA/TE with minimal complaints of pain, but required minimal rest breaks secondary to muscle fatigue. Patient progressing well towards goals. Patient benefiting  from skilled hand therapy OT to reduce deficits to improve independence with daily activities.          Plan:   Focus on HEP, A/AA/PROM, strengthening, edema control, scar massage, TE, TA, manual therapy, modalities PRN, ortho fit and train to improve ability to complete daily activites with ease.    POC 3/14/2024 - 4/25/2024.

## 2024-03-28 ENCOUNTER — OFFICE VISIT (OUTPATIENT)
Dept: OCCUPATIONAL THERAPY | Facility: CLINIC | Age: 59
End: 2024-03-28
Payer: COMMERCIAL

## 2024-03-28 DIAGNOSIS — M65.341 TRIGGER RING FINGER OF RIGHT HAND: ICD-10-CM

## 2024-03-28 DIAGNOSIS — S52.602D CLOSED FRACTURE OF LEFT DISTAL RADIUS AND ULNA, WITH ROUTINE HEALING, SUBSEQUENT ENCOUNTER: Primary | ICD-10-CM

## 2024-03-28 DIAGNOSIS — S52.502D CLOSED FRACTURE OF LEFT DISTAL RADIUS AND ULNA, WITH ROUTINE HEALING, SUBSEQUENT ENCOUNTER: Primary | ICD-10-CM

## 2024-03-28 PROCEDURE — 97110 THERAPEUTIC EXERCISES: CPT

## 2024-03-28 PROCEDURE — 97140 MANUAL THERAPY 1/> REGIONS: CPT

## 2024-03-28 PROCEDURE — 97530 THERAPEUTIC ACTIVITIES: CPT

## 2024-03-28 NOTE — PROGRESS NOTES
Daily Note     Today's date: 3/28/2024  Patient name: Colette Sweeney  : 1965  MRN: [id number]  Referring provider: Eva Tellez MD  Dx:   Encounter Diagnosis     ICD-10-CM    1. Closed fracture of left distal radius and ulna, with routine healing, subsequent encounter  S52.502D     S52.602D       2. Trigger ring finger of right hand  M65.341             Start Time: 1710  Stop Time: 1800  Total time in clinic (min): 50 minutes    Subjective: Patient reports increased soreness localized to the elbow post HEP. Patient notes having an appointment on 2024 for a follow up with referring provider regarding setting up surgery date for removal of spanning plate.         Objective: See treatment diary below.          Precautions: L wrist surgery on 2024       Auth Tracker - NO AUTH REQ BOMN. 50 PCY OT Only  Auth Status Total   Visits  Expiration date Co-Insurance   Approved                                  Visit Tracker  Date IE  3/14 3/18 3/21 3/25 3/28                                                                                PMHx:   has a past medical history of Allergic rhinitis, Asthma, Fibroids, Physiological ovarian cysts, and Sleep apnea.      Manuals HEP 3/28/2024   Scar massage/IASTM/edema control/cupping  x8min                  Ther Ex     Education on HEP and dx  x5min   AROM tendon glides x 2x10   AROM supination/pronation x 2x10   Thumb opposition  x x5   Therapist assisted PROM   x10min   Elbow flexion   2x10   R RF blocking PIP/DIP  x10   Fist pumps  x10   Thumb radial and palmar abduction x [id number]        Ther Act      Towel scrunches  x10   Dice w/ alt digits opposition x Whole jar   Thumb palmar and radial abduction ROM x 2 sets  x10   Finger abd/add ROM x 2 sets  x10   Towel slides on table for elbow flexion & extension/shoulder x x10   Chime balls  CW/CCW x1min   Marker crawls x 2x10   Marker hops x 2x10        Modalities     MHP  X5 min           Assessment:   Patient  tolerated session well with good activity tolerance, able to perform all TA/TE with minimal complaints of pain, but required minimal rest breaks secondary to muscle fatigue. Patient progressing well towards goals. Patient benefiting from skilled hand therapy OT to reduce deficits to improve independence with daily activities.          Plan:   Focus on HEP, A/AA/PROM, strengthening, edema control, scar massage, TE, TA, manual therapy, modalities PRN, ortho fit and train to improve ability to complete daily activites with ease.    POC 3/14/2024 - 4/25/2024.

## 2024-04-01 ENCOUNTER — OFFICE VISIT (OUTPATIENT)
Dept: OCCUPATIONAL THERAPY | Facility: CLINIC | Age: 59
End: 2024-04-01
Payer: COMMERCIAL

## 2024-04-01 DIAGNOSIS — S52.502D CLOSED FRACTURE OF LEFT DISTAL RADIUS AND ULNA, WITH ROUTINE HEALING, SUBSEQUENT ENCOUNTER: Primary | ICD-10-CM

## 2024-04-01 DIAGNOSIS — M65.341 TRIGGER RING FINGER OF RIGHT HAND: ICD-10-CM

## 2024-04-01 DIAGNOSIS — S52.602D CLOSED FRACTURE OF LEFT DISTAL RADIUS AND ULNA, WITH ROUTINE HEALING, SUBSEQUENT ENCOUNTER: Primary | ICD-10-CM

## 2024-04-01 PROCEDURE — 97140 MANUAL THERAPY 1/> REGIONS: CPT

## 2024-04-01 PROCEDURE — 97110 THERAPEUTIC EXERCISES: CPT

## 2024-04-01 PROCEDURE — 97530 THERAPEUTIC ACTIVITIES: CPT

## 2024-04-01 NOTE — PROGRESS NOTES
Daily Note     Today's date: 2024  Patient name: Colette Sweeney  : 1965  MRN: [id number]  Referring provider: Eva Tellez MD  Dx:   Encounter Diagnosis     ICD-10-CM    1. Closed fracture of left distal radius and ulna, with routine healing, subsequent encounter  S52.502D     S52.602D       2. Trigger ring finger of right hand  M65.341                      Subjective: Patient reports increased soreness and pain on  morning post Saturday cooking a lot for Easter, but states this morning she felt much better.            Objective: See treatment diary below.          Precautions: L wrist surgery on 2024       Auth Tracker - NO AUTH REQ BOMN. 50 PCY OT Only  Auth Status Total   Visits  Expiration date Co-Insurance   Approved                                  Visit Tracker  Date IE  3/14 3/18 3/21 3/25 3/28 4  F                                                                               PMHx:   has a past medical history of Allergic rhinitis, Asthma, Fibroids, Physiological ovarian cysts, and Sleep apnea.      Manuals HEP 2024   Scar massage/IASTM/edema control/cupping  x8min                  Ther Ex     Education on HEP and dx  x5min   AROM tendon glides x 2x10   AROM supination/pronation x 2x10   Thumb opposition  x x5   Therapist assisted PROM   x10min   Elbow flexion   2x10   R RF blocking PIP/DIP  x10   Fist pumps  x10   Thumb radial and palmar abduction x [id number]        Ther Act      Towel scrunches  x10   Dice w/ alt digits opposition x Whole jar   Thumb palmar and radial abduction ROM x 2 sets  x10   Finger abd/add ROM x 2 sets  x10   Towel slides on table for elbow flexion & extension/shoulder x x10   Chime balls  CW/CCW x1min   Marker crawls x 2x10   Marker hops x 2x10        Modalities     MHP  X5 min           Assessment:   Patient tolerated session well with good activity tolerance, able to perform all TA/TE with minimal complaints of pain, but required minimal rest  breaks secondary to muscle fatigue. Patient progressing well towards goals. Patient benefiting from skilled hand therapy OT to reduce deficits to improve independence with daily activities.          Plan:   Focus on HEP, A/AA/PROM, strengthening, edema control, scar massage, TE, TA, manual therapy, modalities PRN, ortho fit and train to improve ability to complete daily activites with ease.    POC 3/14/2024 - 4/25/2024.

## 2024-04-04 ENCOUNTER — OFFICE VISIT (OUTPATIENT)
Dept: OCCUPATIONAL THERAPY | Facility: CLINIC | Age: 59
End: 2024-04-04
Payer: COMMERCIAL

## 2024-04-04 DIAGNOSIS — M65.341 TRIGGER RING FINGER OF RIGHT HAND: ICD-10-CM

## 2024-04-04 DIAGNOSIS — S52.502D CLOSED FRACTURE OF LEFT DISTAL RADIUS AND ULNA, WITH ROUTINE HEALING, SUBSEQUENT ENCOUNTER: Primary | ICD-10-CM

## 2024-04-04 DIAGNOSIS — S52.602D CLOSED FRACTURE OF LEFT DISTAL RADIUS AND ULNA, WITH ROUTINE HEALING, SUBSEQUENT ENCOUNTER: Primary | ICD-10-CM

## 2024-04-04 PROCEDURE — 97110 THERAPEUTIC EXERCISES: CPT

## 2024-04-04 PROCEDURE — 97530 THERAPEUTIC ACTIVITIES: CPT

## 2024-04-04 PROCEDURE — 97140 MANUAL THERAPY 1/> REGIONS: CPT

## 2024-04-04 NOTE — PROGRESS NOTES
"Daily Note     Today's date: 2024  Patient name: Colette Sweeney  : 1965  MRN: [id number]  Referring provider: Eva Tellez MD  Dx:   Encounter Diagnosis     ICD-10-CM    1. Closed fracture of left distal radius and ulna, with routine healing, subsequent encounter  S52.502D     S52.602D       2. Trigger ring finger of right hand  M65.341                    Subjective: Patient reports increased soreness on ulnar side of L hand as well as dorsum of the 3-5 digits. \"I have been lifting more things though\".            Objective: See treatment diary below.          Precautions: L wrist surgery on 2024       Auth Tracker - NO AUTH REQ BOMN. 50 PCY OT Only  Auth Status Total   Visits  Expiration date Co-Insurance   Approved                                  Visit Tracker  Date IE  3/14 3/18 3/21 3/25 3/28 4/1  F                                                                               PMHx:   has a past medical history of Allergic rhinitis, Asthma, Fibroids, Physiological ovarian cysts, and Sleep apnea.      Manuals HEP 2024   Scar massage/IASTM/edema control/cupping  x8min                  Ther Ex     Education on HEP and dx  x5min   AROM tendon glides x 2x10   AROM supination/pronation x 2x10   Thumb opposition  x x5   Therapist assisted PROM   x10min   Elbow flexion   2x10   R RF blocking PIP/DIP  x10   Fist pumps  x10   Thumb radial and palmar abduction x [id number]   Digiflex  2 x 10 Red        Ther Act      Towel scrunches  x10   Dice w/ alt digits opposition x Whole jar   Finger abd/add ROM x 2 sets  x10   Towel slides on table for elbow flexion & extension/shoulder x x10   Chime balls  CW/CCW x1min   Marker crawls x 2x10   Marker hops x 2x10        Modalities     MHP  X5 min           Assessment:   Patient tolerated session well with good activity tolerance, able to perform all TA/TE with minimal complaints of pain, but required minimal rest breaks secondary to muscle fatigue. Patient " progressing well towards goals. Patient benefiting from skilled hand therapy OT to reduce deficits to improve independence with daily activities.          Plan:   Focus on HEP, A/AA/PROM, strengthening, edema control, scar massage, TE, TA, manual therapy, modalities PRN, ortho fit and train to improve ability to complete daily activites with ease.    POC 3/14/2024 - 4/25/2024.

## 2024-04-08 ENCOUNTER — OFFICE VISIT (OUTPATIENT)
Dept: OCCUPATIONAL THERAPY | Facility: CLINIC | Age: 59
End: 2024-04-08
Payer: COMMERCIAL

## 2024-04-08 DIAGNOSIS — S52.502D CLOSED FRACTURE OF LEFT DISTAL RADIUS AND ULNA, WITH ROUTINE HEALING, SUBSEQUENT ENCOUNTER: Primary | ICD-10-CM

## 2024-04-08 DIAGNOSIS — M65.341 TRIGGER RING FINGER OF RIGHT HAND: ICD-10-CM

## 2024-04-08 DIAGNOSIS — S52.602D CLOSED FRACTURE OF LEFT DISTAL RADIUS AND ULNA, WITH ROUTINE HEALING, SUBSEQUENT ENCOUNTER: Primary | ICD-10-CM

## 2024-04-08 PROCEDURE — 97530 THERAPEUTIC ACTIVITIES: CPT

## 2024-04-08 PROCEDURE — 97140 MANUAL THERAPY 1/> REGIONS: CPT

## 2024-04-08 PROCEDURE — 97110 THERAPEUTIC EXERCISES: CPT

## 2024-04-08 NOTE — PROGRESS NOTES
Daily Note     Today's date: 2024  Patient name: Colette Sweeney  : 1965  MRN: [id number]  Referring provider: Eva Tellez MD  Dx:   Encounter Diagnosis     ICD-10-CM    1. Closed fracture of left distal radius and ulna, with routine healing, subsequent encounter  S52.502D     S52.602D       2. Trigger ring finger of right hand  M65.341                    Subjective: Patient reports increased soreness on ulnar side of L hand as well as new numbness that comes and goes on the dorsum of 2nd digit L hand.            Objective: See treatment diary below.          Precautions: L wrist surgery on 2024       Auth Tracker - NO AUTH REQ BOMN. 50 PCY OT Only  Auth Status Total   Visits  Expiration date Co-Insurance   Approved                                  Visit Tracker  Date IE  3/14 3/18 3/21 3/25 3/28 4/1  F                                                                              PMHx:   has a past medical history of Allergic rhinitis, Asthma, Fibroids, Physiological ovarian cysts, and Sleep apnea.      Manuals HEP 2024   Scar massage/IASTM/edema control/cupping  x8min                  Ther Ex     Education on HEP and dx  x5min   AROM tendon glides x 2x10   AROM supination/pronation x 2x10   Thumb opposition  x x5   Elbow flexion   2x10   R RF blocking PIP/DIP  x10   Fist pumps  x10   Thumb radial and palmar abduction x [id number]   Digiflex  2 x 10 Red        Ther Act      Towel scrunches  x10   Dice w/ alt digits opposition x Whole jar   Finger abd/add ROM x 2 sets  x10   Towel slides on table for elbow flexion & extension/shoulder x x10   Chime balls  CW/CCW x1min   Marker crawls x 2x10   Marker hops x 2x10   Putting marbles with cone in   Whole container        Modalities     MHP  X5 min           Assessment:   Patient tolerated session well with good activity tolerance, able to perform all TA/TE with minimal complaints of pain, but required minimal rest breaks secondary to  [facility]  Met with pt and spouse to discuss plans for HC.  Pt to be discharged home likely 2/7  and has agreed to have [facility] follow with services of SN, PT and OT. Patient care support center processing referral.  Pt and spouse verbalized understanding that initial visit is scheduled for 1 to 2 days after discharge.    Pt has 24 hour phone number for [facility] for any questions or concerns.      Thank you  Debora Cade RN, BSN  [facility] Liaison  [facility] [place]  [phone]   muscle fatigue. Patient progressing well towards goals. Patient benefiting from skilled hand therapy OT to reduce deficits to improve independence with daily activities.          Plan:   Focus on HEP, A/AA/PROM, strengthening, edema control, scar massage, TE, TA, manual therapy, modalities PRN, ortho fit and train to improve ability to complete daily activites with ease.    POC 3/14/2024 - 4/25/2024.

## 2024-04-11 ENCOUNTER — OFFICE VISIT (OUTPATIENT)
Dept: OCCUPATIONAL THERAPY | Facility: CLINIC | Age: 59
End: 2024-04-11
Payer: COMMERCIAL

## 2024-04-11 DIAGNOSIS — S52.502D CLOSED FRACTURE OF LEFT DISTAL RADIUS AND ULNA, WITH ROUTINE HEALING, SUBSEQUENT ENCOUNTER: Primary | ICD-10-CM

## 2024-04-11 DIAGNOSIS — M65.341 TRIGGER RING FINGER OF RIGHT HAND: ICD-10-CM

## 2024-04-11 DIAGNOSIS — S52.602D CLOSED FRACTURE OF LEFT DISTAL RADIUS AND ULNA, WITH ROUTINE HEALING, SUBSEQUENT ENCOUNTER: Primary | ICD-10-CM

## 2024-04-11 PROCEDURE — 97110 THERAPEUTIC EXERCISES: CPT

## 2024-04-11 PROCEDURE — 97140 MANUAL THERAPY 1/> REGIONS: CPT

## 2024-04-11 NOTE — PROGRESS NOTES
Daily Note     Today's date: 2024  Patient name: Colette Sweeney  : 1965  MRN: [id number]  Referring provider: Eva Tellez MD  Dx:   Encounter Diagnosis     ICD-10-CM    1. Closed fracture of left distal radius and ulna, with routine healing, subsequent encounter  S52.502D     S52.602D       2. Trigger ring finger of right hand  M65.341         Start Time: 1715  Stop Time: 1745  Total time in clinic (min): 30 minutes    Subjective: Patient had follow up with Dr. Tellez for evaluation of current function and to schedule surgery. Patient reports having set up surgery day of removal of spanning plate hardware for May 21, 2024. Patient notes that she will be pausing therapy after today's session till after hardware removal surgery.           Objective: See treatment diary below.          Precautions: L wrist surgery on 2024       Auth Tracker - NO AUTH REQ BOMN. 50 PCY OT Only  Auth Status Total   Visits  Expiration date Co-Insurance   Approved                                  Visit Tracker  Date IE  3/14 3/18 3/21 3/25 3/28 4/1  F                                                                             PMHx:   has a past medical history of Allergic rhinitis, Asthma, Fibroids, Physiological ovarian cysts, and Sleep apnea.      Manuals HEP 2024   Scar massage/IASTM/edema control/cupping  x8min                  Ther Ex     Education on HEP and dx  x5min   AROM tendon glides x 2x10   AROM supination/pronation x 2x10   Thumb opposition  x x5   Elbow flexion   2x10   R RF blocking PIP/DIP  x10   Fist pumps  x10   Thumb radial and palmar abduction x [id number]   Digiflex  2 x 10 Red        Ther Act      Towel scrunches  x10   Dice w/ alt digits opposition x Whole jar   Finger abd/add ROM x 2 sets  x10   Towel slides on table for elbow flexion & extension/shoulder x x10   Chime balls  CW/CCW x1min   Marker crawls x 2x10   Marker hops x 2x10   Putting marbles with cone in      Thumb  radial and palmar abduction with rubber band x 2x10  yellow        Modalities     MHP  X5 min           Assessment:   Patient tolerated session well with good activity tolerance, able to perform all TA/TE with minimal complaints of pain, but required minimal rest breaks secondary to muscle fatigue. Patient progressing well towards goals. Patient to be discharged to [facility] at this time till hardware removal surgery per recommendation of referring physician.             Plan:   D/C to [facility]. Pausing therapy till completion of hardware removal surgery.    POC 3/14/2024 - 4/25/2024.

## 2024-04-15 ENCOUNTER — APPOINTMENT (OUTPATIENT)
Dept: OCCUPATIONAL THERAPY | Facility: CLINIC | Age: 59
End: 2024-04-15
Payer: COMMERCIAL

## 2024-04-18 ENCOUNTER — APPOINTMENT (OUTPATIENT)
Dept: OCCUPATIONAL THERAPY | Facility: CLINIC | Age: 59
End: 2024-04-18
Payer: COMMERCIAL

## 2024-04-22 ENCOUNTER — APPOINTMENT (OUTPATIENT)
Dept: OCCUPATIONAL THERAPY | Facility: CLINIC | Age: 59
End: 2024-04-22
Payer: COMMERCIAL

## 2024-04-25 ENCOUNTER — APPOINTMENT (OUTPATIENT)
Dept: OCCUPATIONAL THERAPY | Facility: CLINIC | Age: 59
End: 2024-04-25
Payer: COMMERCIAL

## 2024-04-29 ENCOUNTER — APPOINTMENT (OUTPATIENT)
Dept: OCCUPATIONAL THERAPY | Facility: CLINIC | Age: 59
End: 2024-04-29
Payer: COMMERCIAL

## 2024-05-13 ENCOUNTER — CONSULT (OUTPATIENT)
Dept: FAMILY MEDICINE CLINIC | Facility: CLINIC | Age: 59
End: 2024-05-13
Payer: COMMERCIAL

## 2024-05-13 ENCOUNTER — TELEPHONE (OUTPATIENT)
Dept: FAMILY MEDICINE CLINIC | Facility: CLINIC | Age: 59
End: 2024-05-13

## 2024-05-13 VITALS
DIASTOLIC BLOOD PRESSURE: 86 MMHG | HEIGHT: 64 IN | TEMPERATURE: 98.3 F | RESPIRATION RATE: 18 BRPM | BODY MASS INDEX: 37.15 KG/M2 | HEART RATE: 68 BPM | WEIGHT: 217.6 LBS | SYSTOLIC BLOOD PRESSURE: 112 MMHG

## 2024-05-13 DIAGNOSIS — B00.2 HERPES GINGIVOSTOMATITIS: ICD-10-CM

## 2024-05-13 DIAGNOSIS — E66.9 OBESITY (BMI 30-39.9): ICD-10-CM

## 2024-05-13 DIAGNOSIS — J45.40 MODERATE PERSISTENT ASTHMA WITHOUT COMPLICATION: ICD-10-CM

## 2024-05-13 DIAGNOSIS — Z01.818 PREOPERATIVE CLEARANCE: Primary | ICD-10-CM

## 2024-05-13 PROCEDURE — 99213 OFFICE O/P EST LOW 20 MIN: CPT | Performed by: FAMILY MEDICINE

## 2024-05-13 NOTE — PROGRESS NOTES
Assessment/Plan:    No problem-specific Assessment & Plan notes found for this encounter.    Medically cleared for surgery    Asthma stable, f/u q6m  Adacel in future  HSV labialis stable with valtrex prn     Diagnoses and all orders for this visit:    Preoperative clearance    Moderate persistent asthma without complication    Herpes gingivostomatitis    Obesity (BMI 30-39.9)        Return in about 6 months (around 11/13/2024).    Subjective:      Patient ID: Colette Sweeney is a 59 y.o. female.    Chief Complaint   Patient presents with    Pre-op Exam     Lizeth Schmidt CMA        HPI    Slipped on black ice Feb 2024  ORIF done in [place]  Removal of hardware planned from left wrist  RHD    Planned procedure:  removal hardware left wrist  Surgeon:  Dr Tellez, fax [phone]  Date:  5/22/24  Anesthesia type:  regional with sedation    Prior major surgeries:  Right wrist ORIF, right TKR. Left wrist ORIG  Problems with anesthesia in past:  n    Can walk 4 blocks or 2 flights of stairs:  y  History of excessive bleeding:  n  History of excessive clotting:  n  Personal history of DVT or Pe:  n    Taking NSAIDS:  n  Takings vitamins D or E or A or fish oil supplements:  D and fish oil on hold    Usual am medications:  advair    Some PAT done this am  CBC ok  Coags wnl  Fbs 102, rest of bmp ok    The following portions of the patient's history were reviewed and updated as appropriate: allergies, current medications, past family history, past medical history, past social history, past surgical history and problem list.    Review of Systems   Constitutional:  Negative for chills and fever.         Current Outpatient Medications   Medication Sig Dispense Refill    acetaminophen (TYLENOL) 325 mg tablet Take 3 tablets (975 mg total) by mouth every 8 (eight) hours  0    albuterol (2.5 mg/3 mL) 0.083 % nebulizer solution Take 1 vial (2.5 mg total) by nebulization every 6 (six) hours as needed for wheezing or shortness of  "breath 100 mL 0    Calcium Carb-Cholecalciferol 600-500 MG-UNIT CAPS Take by mouth      Fluticasone-Salmeterol (Advair Diskus) 500-50 mcg/dose inhaler Inhale 1 puff 2 (two) times a day Rinse mouth after use. 180 blister 1    multivitamin (THERAGRAN) TABS Take 1 tablet by mouth daily      Respiratory Therapy Supplies (NEBULIZER/TUBING/MOUTHPIECE) KIT by Does not apply route 4 (four) times a day 1 each 0    valACYclovir (VALTREX) 1,000 mg tablet 2 tabs at earliest onset of cold sore, repeat once in 12 hours. Take 500mg bid for 3 days for genital herpes flare up. 40 tablet 5     No current facility-administered medications for this visit.       Objective:    /86   Pulse 68   Temp 98.3 °F (36.8 °C)   Resp 18   Ht 5' 4\" (1.626 m)   Wt 98.7 kg (217 lb 9.6 oz)   LMP 04/28/2021   BMI 37.35 kg/m²        Physical Exam  Vitals and nursing note reviewed.   Constitutional:       General: She is not in acute distress.     Appearance: She is well-developed.   HENT:      Head: Normocephalic.      Right Ear: Tympanic membrane normal.      Left Ear: Tympanic membrane normal.   Eyes:      General: No scleral icterus.     Conjunctiva/sclera: Conjunctivae normal.   Cardiovascular:      Rate and Rhythm: Normal rate and regular rhythm.   Pulmonary:      Effort: Pulmonary effort is normal. No respiratory distress.      Breath sounds: No wheezing or rales.   Abdominal:      General: There is no distension.      Palpations: Abdomen is soft.      Tenderness: There is no abdominal tenderness.   Musculoskeletal:         General: No deformity.      Cervical back: Neck supple.      Right lower leg: No edema.      Left lower leg: No edema.      Comments: Finger rom wnl  Left hand NVI  Dav test neg   Skin:     General: Skin is warm and dry.      Coloration: Skin is not pale.   Neurological:      Mental Status: She is alert.      Motor: No weakness.      Gait: Gait normal.   Psychiatric:         Mood and Affect: Mood normal.         " Behavior: Behavior normal.         Thought Content: Thought content normal.                Luis Carolina, DO

## 2024-05-14 NOTE — TELEPHONE ENCOUNTER
Please print and fax office preop clearance note from 5/13/24 to:    Surgeon:  Dr Tellez, fax [phone]

## 2024-05-22 ENCOUNTER — OFFICE VISIT (OUTPATIENT)
Dept: URGENT CARE | Facility: CLINIC | Age: 59
End: 2024-05-22
Payer: COMMERCIAL

## 2024-05-22 VITALS
DIASTOLIC BLOOD PRESSURE: 82 MMHG | SYSTOLIC BLOOD PRESSURE: 148 MMHG | WEIGHT: 218 LBS | HEIGHT: 64 IN | BODY MASS INDEX: 37.22 KG/M2 | RESPIRATION RATE: 18 BRPM | HEART RATE: 72 BPM | OXYGEN SATURATION: 99 % | TEMPERATURE: 97.5 F

## 2024-05-22 DIAGNOSIS — L25.5 RHUS DERMATITIS: Primary | ICD-10-CM

## 2024-05-22 PROCEDURE — 99213 OFFICE O/P EST LOW 20 MIN: CPT

## 2024-05-22 RX ORDER — PREDNISONE 20 MG/1
TABLET ORAL
Qty: 14 TABLET | Refills: 0 | Status: SHIPPED | OUTPATIENT
Start: 2024-05-22 | End: 2024-06-03

## 2024-05-22 NOTE — PROGRESS NOTES
[facility]        NAME: Colette Sweeney is a 59 y.o. female  : 1965    MRN: [id number]  DATE: May 22, 2024  TIME: 8:29 AM    Assessment and Plan   Rhus dermatitis [L25.5]  1. Rhus dermatitis          Physical exam consistent with Rhus dermatitis, prednisone as directed. No airway involvement present on exam. VSS in clinic, appears in no acute distress. Educated on use of OTC products for additional relief of symptoms. Advised close follow-up with PCP or to report to the ER if symptoms worsen. Patient verbalizes understanding and agreeable to plan.     Patient Instructions     Take steroids as directed for the next 12 days, complete entire course of therapy even if symptoms improve. May take benadryl every 6-8 hours as needed for additional relief of symptoms. Follow-up with PCP in 3-5 days if no improvement of symptoms. Report to the ER sooner if symptoms worsen or develop difficulty breathing.      Chief Complaint     Chief Complaint   Patient presents with    Rash     Pt has poison ivy on her left arm, back, legs, and back that started 4 days ago.          History of Present Illness       59 year old female presents for evaluation of rash to her left forearm, right forearm, and right side of her upper posterior chest for the past 4 days. She relates her cat was outside and believes came into contact with poison ivy. She denies any other known triggers but is allergic to poison ivy. She reports she was to have surgery on her left wrist today but was advised by her surgeon she could not have her surgery until the rash clears. She reports the rash is itchy but denies fevers, discharge, pain, or difficulty breathing. She relates the rash seems to be spreading. She has tried calamine lotion with minimal relief.     Rash  This is a new problem. The current episode started 1 to 4 weeks ago. The problem is unchanged. The affected locations include the chest, back, right arm and left arm. The problem  is mild. The rash is characterized by redness, itchiness and dryness. She was exposed to poison ivy/oak. The rash first occurred at home. Associated symptoms include itching. Pertinent negatives include no anorexia, congestion, cough, decreased physical activity, decreased responsiveness, decreased sleep, drinking less, diarrhea, facial edema, fatigue, fever, joint pain, rhinorrhea, shortness of breath, sore throat or vomiting. Past treatments include anti-itch cream. The treatment provided mild relief. Her past medical history is significant for allergies. There is no history of asthma, eczema or varicella. There were sick contacts at home.       Review of Systems   Review of Systems   Constitutional:  Negative for activity change, appetite change, chills, decreased responsiveness, fatigue and fever.   HENT:  Negative for congestion, rhinorrhea and sore throat.    Eyes:  Negative for visual disturbance.   Respiratory:  Negative for cough, chest tightness and shortness of breath.    Cardiovascular:  Negative for chest pain and palpitations.   Gastrointestinal:  Negative for abdominal pain, anorexia, constipation, diarrhea, nausea and vomiting.   Musculoskeletal:  Negative for arthralgias, back pain, joint pain, myalgias and neck pain.   Skin:  Positive for itching and rash. Negative for color change and pallor.   Allergic/Immunologic: Negative for environmental allergies and food allergies.   Neurological:  Negative for dizziness, light-headedness and headaches.         Current Medications       Current Outpatient Medications:     acetaminophen (TYLENOL) 325 mg tablet, Take 3 tablets (975 mg total) by mouth every 8 (eight) hours, Disp: , Rfl: 0    albuterol (2.5 mg/3 mL) 0.083 % nebulizer solution, Take 1 vial (2.5 mg total) by nebulization every 6 (six) hours as needed for wheezing or shortness of breath, Disp: 100 mL, Rfl: 0    Calcium Carb-Cholecalciferol 600-500 MG-UNIT CAPS, Take by mouth, Disp: , Rfl:      multivitamin (THERAGRAN) TABS, Take 1 tablet by mouth daily, Disp: , Rfl:     Respiratory Therapy Supplies (NEBULIZER/TUBING/MOUTHPIECE) KIT, by Does not apply route 4 (four) times a day, Disp: 1 each, Rfl: 0    Fluticasone-Salmeterol (Advair Diskus) 500-50 mcg/dose inhaler, Inhale 1 puff 2 (two) times a day Rinse mouth after use., Disp: 180 blister, Rfl: 1    valACYclovir (VALTREX) 1,000 mg tablet, 2 tabs at earliest onset of cold sore, repeat once in 12 hours. Take 500mg bid for 3 days for genital herpes flare up., Disp: 40 tablet, Rfl: 5    Current Allergies     Allergies as of 05/22/2024    (No Known Allergies)            The following portions of the patient's history were reviewed and updated as appropriate: allergies, current medications, past family history, past medical history, past social history, past surgical history and problem list.     Past Medical History:   Diagnosis Date    Allergic rhinitis     Asthma     Fibroids     Physiological ovarian cysts     Sleep apnea     denatl device worn       Past Surgical History:   Procedure Laterality Date    COLONOSCOPY  2016    dr garcia    KNEE ARTHROSCOPY Left 2018    meniscectomy x2, first done in 2015    MYOMECTOMY      GA ARTHRP KNE CONDYLE&PLATU MEDIAL&LAT COMPARTMENTS Right 3/6/2023    Procedure: ARTHROPLASTY KNEE TOTAL W ROBOT - RIGHT - PRESS FIT - SAME DAY;  Surgeon: Trey Fountain DO;  Location: [facility];  Service: Orthopedics    GA ARTHRS KNE SURG W/MENISCECTOMY MED/LAT W/SHVG Right 1/6/2022    Procedure: KNEE ARTHROSCOPY MENISCECTOMY MEDIAL;  Surgeon: Sachin Grove MD;  Location: [facility];  Service: Orthopedics    GA COLONOSCOPY FLX DX W/COLLJ SPEC WHEN PFRMD N/A 9/13/2018    Procedure: COLONOSCOPY;  Surgeon: Sergo Martinez MD;  Location: [facility];  Service: Gastroenterology    WRIST SURGERY Right        Family History   Problem Relation Age of Onset    Osteoporosis Mother     Thyroid disease Mother     Colon cancer Mother 67     "Colon polyps Mother     Cancer Mother         colon    Alzheimer's disease Father     Hypertension Father     Dementia Father     No Known Problems Daughter     No Known Problems Daughter     Ovarian cancer Maternal Grandmother     Heart disease Brother     No Known Problems Maternal Aunt     No Known Problems Maternal Aunt     No Known Problems Paternal Aunt     No Known Problems Paternal Aunt     No Known Problems Paternal Aunt     No Known Problems Paternal Aunt     No Known Problems Paternal Aunt     No Known Problems Paternal Aunt     No Known Problems Paternal Aunt     No Known Problems Paternal Aunt          Medications have been verified.        Objective   /82   Pulse 72   Temp 97.5 °F (36.4 °C)   Resp 18   Ht 5' 4\" (1.626 m)   Wt 98.9 kg (218 lb)   LMP 04/28/2021   SpO2 99%   BMI 37.42 kg/m²        Physical Exam     Physical Exam  Vitals and nursing note reviewed.   Constitutional:       General: She is awake. She is not in acute distress.     Appearance: Normal appearance. She is well-developed and normal weight.   HENT:      Head: Normocephalic and atraumatic.      Right Ear: Hearing normal.      Left Ear: Hearing normal.      Nose: No congestion or rhinorrhea.      Mouth/Throat:      Lips: Pink.      Mouth: Mucous membranes are moist.      Pharynx: Oropharynx is clear. Uvula midline.      Tonsils: No tonsillar exudate or tonsillar abscesses. 2+ on the right. 2+ on the left.   Eyes:      Conjunctiva/sclera: Conjunctivae normal.   Cardiovascular:      Rate and Rhythm: Normal rate and regular rhythm.      Pulses: Normal pulses.      Heart sounds: Normal heart sounds.   Pulmonary:      Effort: Pulmonary effort is normal.      Breath sounds: Normal breath sounds.   Musculoskeletal:      Cervical back: Normal range of motion and neck supple.   Skin:     General: Skin is warm and dry.      Findings: Rash present. Rash is urticarial and vesicular.      Comments: Vesicular rash in clusters to left " arm, right arm, and right upper posterior chest. Mild surrounding erythema. No discharge present.    Neurological:      General: No focal deficit present.      Mental Status: She is alert and oriented to person, place, and time.      GCS: GCS eye subscore is 4. GCS verbal subscore is 5. GCS motor subscore is 6.   Psychiatric:         Mood and Affect: Mood normal.         Behavior: Behavior normal. Behavior is cooperative.         Thought Content: Thought content normal.         Judgment: Judgment normal.

## 2024-05-22 NOTE — PATIENT INSTRUCTIONS
Take steroids as directed for the next 12 days, complete entire course of therapy even if symptoms improve. May take benadryl every 6-8 hours as needed for additional relief of symptoms. Follow-up with PCP in 3-5 days if no improvement of symptoms. Report to the ER sooner if symptoms worsen or develop difficulty breathing.

## 2024-06-10 ENCOUNTER — APPOINTMENT (OUTPATIENT)
Dept: OCCUPATIONAL THERAPY | Facility: CLINIC | Age: 59
End: 2024-06-10
Payer: COMMERCIAL

## 2024-06-17 ENCOUNTER — EVALUATION (OUTPATIENT)
Dept: OCCUPATIONAL THERAPY | Facility: CLINIC | Age: 59
End: 2024-06-17
Payer: COMMERCIAL

## 2024-06-17 DIAGNOSIS — Z98.890 HX OF HAND SURGERY: Primary | ICD-10-CM

## 2024-06-17 PROCEDURE — 97166 OT EVAL MOD COMPLEX 45 MIN: CPT

## 2024-06-17 PROCEDURE — 97140 MANUAL THERAPY 1/> REGIONS: CPT

## 2024-06-17 NOTE — PROGRESS NOTES
OT Evaluation     Today's date: 2024  Patient name: Colette Sweeney  : 1965  MRN: [id number]  Referring provider: Eva Tellez MD  Dx:   Encounter Diagnosis     ICD-10-CM    1. Hx of hand surgery  Z98.890           Start Time: 1715  Stop Time: 1800  Total time in clinic (min): 45 minutes    Assessment  Impairments: activity intolerance, impaired physical strength, lacks appropriate home exercise program, pain with function, safety issue and weight-bearing intolerance  Symptom irritability: moderate    Assessment details: Patient is a 59 y.o. female who presents for OT IE and treatment for L spanning plate hardware removal. Patient reports she went for a walk and slipped on black ice, she went to the ED and Xrays (+) for fx on 2024 and had surgery on 2024. Patient had a spanning plate in her L wrist and was seen for occupational therapy services and now is returning for an IE post having hardware removal sx. Patient referred by Dr. Tellez to initiate treatment including hand therapy.      Understanding of Dx/Px/POC: good     Prognosis: excellent    Goals  Short Term Goals by 2 - 4 weeks:    Establish HEP to increase performance with daily activities.     Patient will increase L  strength by at least 10 lbs to assist in completing ADLs.     Patient will increase wrist AROM of LUE by at least 10 degrees to complete ADLs.         Long Term Goals by discharge:    Establish final home exercise program to enhance maximal functional level with ADLs.    Achieve functional strength of LUE for full return to high level ADLs.       Plan  Patient would benefit from: skilled occupational therapy, OT eval, custom splinting and orthotics  Planned modality interventions: electrical stimulation/Turkish stimulation, ultrasound, TENS, high voltage pulsed current: pain management and thermotherapy: hydrocollator packs    Planned therapy interventions: IASTM, joint mobilization, kinesiology taping, manual  therapy, ADL retraining, activity modification, ADL training, balance/weight bearing training, behavior modification, coordination, compression, fine motor coordination training, flexibility, functional ROM exercises, graded exercise, graded activity, graded motor, home exercise program, IADL retraining, therapeutic training, therapeutic exercise, therapeutic activities, stretching, strengthening, sensory integrative techniques, self care, patient/caregiver education, orthotic management and training, orthotic fitting/training, neuromuscular re-education and nerve gliding    Frequency: 2x week  Duration in weeks: 8  Plan of Care beginning date: 2024  Plan of Care expiration date: 2024  Treatment plan discussed with: patient  Plan details: Focus on HEP, A/AA/PROM, TE, TA, strengthening, manual therapy, modalities PRN to improve ability to complete daily activites with ease.  POC 2024 - 2024     Subjective Evaluation    History of Present Illness  Date of surgery: 2024  Mechanism of injury: surgery  Mechanism of injury: Patient is a 59 y.o. female who presents for OT IE and treatment for L spanning plate hardware removal. Patient reports she went for a walk and slipped on black ice, she went to the ED and Xrays (+) for fx on 2024 and had surgery on 2024. Patient had a spanning plate in her L wrist and was seen for occupational therapy services and now is returning for an IE post having hardware removal sx. Patient referred by Dr. Tellez to initiate treatment including hand therapy.      Quality of life: good    Patient Goals  Patient goals for therapy: increased strength, independence with ADLs/IADLs, decreased pain, increased motion and return to sport/leisure activities    Pain  Current pain ratin  At best pain ratin  At worst pain ratin  Quality: sharp, throbbing, dull ache and discomfort  Relieving factors: heat  Aggravating factors: lifting (hooking/unhooking  garments, pulling pinching underwear etc.)  Progression: improved    Social Support  Lives with: spouse (1 cat)    Employment status: working  Hand dominance: right    Treatments  Current treatment: occupational therapy    Objective     Observations     Left Wrist/Hand   Positive for incision.     Additional Observation Details  One incision on dorsal aspect of forearm, second incision on dorsal aspect of L hand     Active Range of Motion     Left Wrist   Wrist flexion: 30 degrees with pain  Wrist extension: 44 degrees with pain  Radial deviation: 15 degrees with pain  Ulnar deviation: 11 degrees with pain      Right Wrist   Wrist flexion: 63 degrees   Wrist extension: 66 degrees   Radial deviation: 15 degrees   Ulnar deviation: 33 degrees     Left Thumb   Kapandji score: 9 degrees      Right Thumb   Kapandji score: 9 degrees    Strength/Myotome Testing     Left Wrist/Hand      (2nd hand position)     Trial 1: 5    Thumb Strength  Key/Lateral Pinch     Trial 1: 7  Palmar/Three-Point Pinch     Trial 1: 6    Right Wrist/Hand      (2nd hand position)     Trial 1: 50    Thumb Strength   Key/Lateral Pinch     Trial 1: 15  Palmar/Three-Point Pinch     Trial 1: 16             Precautions: Wrist spanning plate removal on 5/31/2024        Auth Tracker  Auth Status Total   Visits  Expiration date Co-Insurance   pending                                  Visit Tracker  Date IE  6/17                                                                                    PMHx:   has a past medical history of Allergic rhinitis, Asthma, Fibroids, Physiological ovarian cysts, and Sleep apnea.       Manuals HEP 6/17/2024                       Ther Ex     Education on HEP and dx x x5min   Wrist tenodesis/RD/UD x x10   Scar massage/cupping x X 8 min    Green sponge squeeze x X10                             Ther Act                     Modalities     MHP  X 5 min           Assessment:   Patient tolerated session well. Patient  demonstrates decreased wrist AROM as well as decreased  and pinch strength upon assessment today. Patient session focused on patient education on anatomy and physiology concerning current dx, techniques for decreasing deficits through HEP, and appropriate use of modalities. Patient educated on HEP to include wrist AROM with verbal instructions and handouts for patient reference. Patient educated on treatment plan at this time. Patient benefiting from skilled hand therapy OT to reduce deficits to improve independence with daily activities.      Plan:   Focus on HEP, A/AA/PROM, TE, TA, strengthening, manual therapy, modalities PRN to improve ability to complete daily activites with ease.  POC 6/17/2024 - 8/12/2024

## 2024-06-17 NOTE — LETTER
2024    Eva Tellez MD  [address] [place] [address], [address]  [facility] [address]    Patient: Colette Sweeney   YOB: 1965   Date of Visit: 2024     Encounter Diagnosis     ICD-10-CM    1. Hx of hand surgery  Z98.890           Dear Dr. Tellez:    Thank you for your recent referral of Colette Sweeney. Please review the attached evaluation summary from Colette's recent visit.     Please verify that you agree with the plan of care by signing the attached order.     If you have any questions or concerns, please do not hesitate to call.     I sincerely appreciate the opportunity to share in the care of one of your patients and hope to have another opportunity to work with you in the near future.     Sincerely,    Ellie James, OT      Referring Provider:     I certify that I have read the below Plan of Care and certify the need for these services furnished under this plan of treatment while under my care.                    Eva Tellez MD  [address] [place] [address], [address]  [facility] [address]  Via Fax: [phone]        OT Evaluation     Today's date: 2024  Patient name: Colette Sweeney  : 1965  MRN: [id number]  Referring provider: Eva Tellez MD  Dx:   Encounter Diagnosis     ICD-10-CM    1. Hx of hand surgery  Z98.890           Start Time: 1715  Stop Time: 1800  Total time in clinic (min): 45 minutes    Assessment  Impairments: activity intolerance, impaired physical strength, lacks appropriate home exercise program, pain with function, safety issue and weight-bearing intolerance  Symptom irritability: moderate    Assessment details: Patient is a 59 y.o. female who presents for OT IE and treatment for L spanning plate hardware removal. Patient reports she went for a walk and slipped on black ice, she went to the ED and Xrays (+) for fx on 2024 and had surgery on 2024. Patient had a spanning plate in her L wrist and was seen for occupational therapy services  and now is returning for an IE post having hardware removal sx. Patient referred by Dr. Tlelez to initiate treatment including hand therapy.      Understanding of Dx/Px/POC: good     Prognosis: excellent    Goals  Short Term Goals by 2 - 4 weeks:    Establish HEP to increase performance with daily activities.     Patient will increase L  strength by at least 10 lbs to assist in completing ADLs.     Patient will increase wrist AROM of LUE by at least 10 degrees to complete ADLs.         Long Term Goals by discharge:    Establish final home exercise program to enhance maximal functional level with ADLs.    Achieve functional strength of LUE for full return to high level ADLs.       Plan  Patient would benefit from: skilled occupational therapy, OT eval, custom splinting and orthotics  Planned modality interventions: electrical stimulation/Martiniquais stimulation, ultrasound, TENS, high voltage pulsed current: pain management and thermotherapy: hydrocollator packs    Planned therapy interventions: IASTM, joint mobilization, kinesiology taping, manual therapy, ADL retraining, activity modification, ADL training, balance/weight bearing training, behavior modification, coordination, compression, fine motor coordination training, flexibility, functional ROM exercises, graded exercise, graded activity, graded motor, home exercise program, IADL retraining, therapeutic training, therapeutic exercise, therapeutic activities, stretching, strengthening, sensory integrative techniques, self care, patient/caregiver education, orthotic management and training, orthotic fitting/training, neuromuscular re-education and nerve gliding    Frequency: 2x week  Duration in weeks: 8  Plan of Care beginning date: 6/17/2024  Plan of Care expiration date: 8/12/2024  Treatment plan discussed with: patient  Plan details: Focus on HEP, A/AA/PROM, TE, TA, strengthening, manual therapy, modalities PRN to improve ability to complete daily activites  with ease.  POC 2024 - 2024     Subjective Evaluation    History of Present Illness  Date of surgery: 2024  Mechanism of injury: surgery  Mechanism of injury: Patient is a 59 y.o. female who presents for OT IE and treatment for L spanning plate hardware removal. Patient reports she went for a walk and slipped on black ice, she went to the ED and Xrays (+) for fx on 2024 and had surgery on 2024. Patient had a spanning plate in her L wrist and was seen for occupational therapy services and now is returning for an IE post having hardware removal sx. Patient referred by Dr. Tellez to initiate treatment including hand therapy.      Quality of life: good    Patient Goals  Patient goals for therapy: increased strength, independence with ADLs/IADLs, decreased pain, increased motion and return to sport/leisure activities    Pain  Current pain ratin  At best pain ratin  At worst pain ratin  Quality: sharp, throbbing, dull ache and discomfort  Relieving factors: heat  Aggravating factors: lifting (hooking/unhooking garments, pulling pinching underwear etc.)  Progression: improved    Social Support  Lives with: spouse (1 cat)    Employment status: working  Hand dominance: right    Treatments  Current treatment: occupational therapy    Objective     Observations     Left Wrist/Hand   Positive for incision.     Additional Observation Details  One incision on dorsal aspect of forearm, second incision on dorsal aspect of L hand     Active Range of Motion     Left Wrist   Wrist flexion: 30 degrees with pain  Wrist extension: 44 degrees with pain  Radial deviation: 15 degrees with pain  Ulnar deviation: 11 degrees with pain      Right Wrist   Wrist flexion: 63 degrees   Wrist extension: 66 degrees   Radial deviation: 15 degrees   Ulnar deviation: 33 degrees     Left Thumb   Kapandji score: 9 degrees      Right Thumb   Kapandji score: 9 degrees    Strength/Myotome Testing     Left Wrist/Hand       (2nd hand position)     Trial 1: 5    Thumb Strength  Key/Lateral Pinch     Trial 1: 7  Palmar/Three-Point Pinch     Trial 1: 6    Right Wrist/Hand      (2nd hand position)     Trial 1: 50    Thumb Strength   Key/Lateral Pinch     Trial 1: 15  Palmar/Three-Point Pinch     Trial 1: 16             Precautions: Wrist spanning plate removal on 5/31/2024        Auth Tracker  Auth Status Total   Visits  Expiration date Co-Insurance   pending                                  Visit Tracker  Date IE  6/17                                                                                    PMHx:   has a past medical history of Allergic rhinitis, Asthma, Fibroids, Physiological ovarian cysts, and Sleep apnea.       Manuals HEP 6/17/2024                       Ther Ex     Education on HEP and dx x x5min   Wrist tenodesis/RD/UD x x10   Scar massage/cupping x X 8 min    Green sponge squeeze x X10                             Ther Act                     Modalities     MHP  X 5 min           Assessment:   Patient tolerated session well. Patient demonstrates decreased wrist AROM as well as decreased  and pinch strength upon assessment today. Patient session focused on patient education on anatomy and physiology concerning current dx, techniques for decreasing deficits through HEP, and appropriate use of modalities. Patient educated on HEP to include wrist AROM with verbal instructions and handouts for patient reference. Patient educated on treatment plan at this time. Patient benefiting from skilled hand therapy OT to reduce deficits to improve independence with daily activities.      Plan:   Focus on HEP, A/AA/PROM, TE, TA, strengthening, manual therapy, modalities PRN to improve ability to complete daily activites with ease.  POC 6/17/2024 - 8/12/2024

## 2024-06-20 ENCOUNTER — OFFICE VISIT (OUTPATIENT)
Dept: OCCUPATIONAL THERAPY | Facility: CLINIC | Age: 59
End: 2024-06-20
Payer: COMMERCIAL

## 2024-06-20 DIAGNOSIS — Z98.890 HX OF HAND SURGERY: Primary | ICD-10-CM

## 2024-06-20 PROCEDURE — 97110 THERAPEUTIC EXERCISES: CPT

## 2024-06-20 NOTE — PROGRESS NOTES
Daily Note     Today's date: 2024  Patient name: Colette Sweeney  : 1965  MRN: [id number]  Referring provider: Eva Tellez MD  Dx:   Encounter Diagnosis     ICD-10-CM    1. Hx of hand surgery  Z98.890                      Subjective: Patient states increased soreness at start of session, was gardening over the past few days.      Objective: See treatment diary below       Precautions: Wrist spanning plate removal on 2024        Auth Tracker  Auth Status Total   Visits  Expiration date Co-Insurance   pending                                  Visit Tracker  Date IE                                                                                     PMHx:   has a past medical history of Allergic rhinitis, Asthma, Fibroids, Physiological ovarian cysts, and Sleep apnea.       Manuals HEP 2024                       Ther Ex     Education on HEP and dx x x5min   Wrist tenodesis/RD/UD x x10   Scar massage/cupping x X 8 min    Green sponge squeeze x X10    Supination/pronation with flexbar  Red x10                        Ther Act      Wrist Rotator  X10 Forwards/backwards   Wrist Maze  x5   Dice with clothespin   Whole jar with red clothespin                   Modalities     MHP  X 5 min           Assessment:   Patient tolerated session well with improved ability to perform all TE and TA with rest breaks secondary to muscle soreness. Patient session focused on patient education on anatomy and physiology concerning current dx, techniques for decreasing deficits through HEP, and appropriate use of modalities. Therapist reviewed HEP to include wrist AROM with verbal instructions and handouts for patient reference. Patient educated on treatment plan at this time. Patient benefiting from skilled hand therapy OT to reduce deficits to improve independence with daily activities.      Plan:   Focus on HEP, A/AA/PROM, TE, TA, strengthening, manual therapy, modalities PRN to improve ability to  complete daily activites with ease.  POC 6/17/2024 - 8/12/2024

## 2024-06-24 ENCOUNTER — OFFICE VISIT (OUTPATIENT)
Dept: OCCUPATIONAL THERAPY | Facility: CLINIC | Age: 59
End: 2024-06-24
Payer: COMMERCIAL

## 2024-06-24 DIAGNOSIS — Z98.890 HX OF HAND SURGERY: Primary | ICD-10-CM

## 2024-06-24 PROCEDURE — 97110 THERAPEUTIC EXERCISES: CPT

## 2024-06-24 NOTE — PROGRESS NOTES
Daily Note     Today's date: 2024  Patient name: Colette Sweeney  : 1965  MRN: [id number]  Referring provider: Eva Tellez MD  Dx:   Encounter Diagnosis     ICD-10-CM    1. Hx of hand surgery  Z98.890                    Subjective: Patient states increased soreness at start of session, was raking a lot of fallen branches yesterday, needed to take tylenol.     Objective: See treatment diary below       Precautions: Wrist spanning plate removal on 2024, 3 weeks post op as of 2024        Auth Tracker  Auth Status Total   Visits  Expiration date Co-Insurance   pending                                  Visit Tracker  Date IE                                                                                    PMHx:   has a past medical history of Allergic rhinitis, Asthma, Fibroids, Physiological ovarian cysts, and Sleep apnea.       Manuals HEP 2024                       Ther Ex     Education on HEP and dx x x5min   Wrist tenodesis/RD/UD x x10   Scar massage/cupping x X 8 min    Green sponge squeeze x X10    Supination/pronation with flexbar  Red x10    Digiflex  Red x10   Prayer stretch   5 x10 sec             Ther Act      Wrist Rotator  X10 Forwards/backwards   Wrist Maze  x5   Dice with clothespin   Whole jar with red clothespin    Towel scrunches  x   Sponges grasp and release with wrist circles  Whole bin groups of 5             Modalities     MHP  X 5 min           Assessment:   Patient tolerated session well with improved ability to perform all TE and TA with rest breaks secondary to muscle soreness. Patient session focused on patient education on anatomy and physiology concerning current dx, techniques for decreasing deficits through HEP, and appropriate use of modalities. Therapist updated HEP to include wrist AROM with verbal instructions and handouts for patient reference. Patient educated on treatment plan at this time. Patient benefiting from skilled hand  therapy OT to reduce deficits to improve independence with daily activities.      Plan:   Focus on HEP, A/AA/PROM, TE, TA, strengthening, manual therapy, modalities PRN to improve ability to complete daily activites with ease.  POC 6/17/2024 - 8/12/2024

## 2024-06-27 ENCOUNTER — OFFICE VISIT (OUTPATIENT)
Dept: OCCUPATIONAL THERAPY | Facility: CLINIC | Age: 59
End: 2024-06-27
Payer: COMMERCIAL

## 2024-06-27 DIAGNOSIS — Z98.890 HX OF HAND SURGERY: Primary | ICD-10-CM

## 2024-06-27 PROCEDURE — 97110 THERAPEUTIC EXERCISES: CPT

## 2024-06-27 NOTE — PROGRESS NOTES
Daily Note     Today's date: 2024  Patient name: Colette Sweeney  : 1965  MRN: [id number]  Referring provider: Eva Tellez MD  Dx:   Encounter Diagnosis     ICD-10-CM    1. Hx of hand surgery  Z98.890                    Subjective: Patient states no functional changes at start of session.       Objective: See treatment diary below         Precautions: Wrist spanning plate removal on 2024, 4 weeks post op as of 2024        Auth Tracker  Auth Status Total   Visits  Expiration date Co-Insurance   pending                                  Visit Tracker  Date IE                                                                                   PMHx:   has a past medical history of Allergic rhinitis, Asthma, Fibroids, Physiological ovarian cysts, and Sleep apnea.       Manuals HEP 2024                       Ther Ex     Education on HEP and dx x x5min   Wrist tenodesis/RD/UD x x10   Scar massage/cupping x X 8 min    Green sponge squeeze x X10    Flexbar N's/U's  Red x10    Digiflex  Green x10   Prayer stretch   5 x10 sec             Ther Act      Wrist Rotator  X10 Forwards/backwards   Wrist Maze  x5   Dice with clothespin   Whole jar with red reverse clothespin    Towel scrunches  x   Sponges whole bin  lvl 1 gripper             Modalities     MHP  X 5 min           Assessment:   Patient tolerated session well with improved ability to perform all TE and TA with rest breaks secondary to muscle soreness. Patient session focused on patient education on anatomy and physiology concerning current dx, techniques for decreasing deficits through HEP, and appropriate use of modalities. Patient educated on treatment plan at this time. Patient benefiting from skilled hand therapy OT to reduce deficits to improve independence with daily activities.      Plan:   Focus on HEP, A/AA/PROM, TE, TA, strengthening, manual therapy, modalities PRN to improve ability to complete daily  activites with ease.  POC 6/17/2024 - 8/12/2024

## 2024-07-01 ENCOUNTER — OFFICE VISIT (OUTPATIENT)
Dept: OCCUPATIONAL THERAPY | Facility: CLINIC | Age: 59
End: 2024-07-01
Payer: COMMERCIAL

## 2024-07-01 DIAGNOSIS — Z98.890 HX OF HAND SURGERY: Primary | ICD-10-CM

## 2024-07-01 PROCEDURE — 97530 THERAPEUTIC ACTIVITIES: CPT

## 2024-07-01 PROCEDURE — 97110 THERAPEUTIC EXERCISES: CPT

## 2024-07-01 NOTE — PROGRESS NOTES
Daily Note     Today's date: 2024  Patient name: Colette Sweeney  : 1965  MRN: [id number]  Referring provider: Eva Tellez MD  Dx:   Encounter Diagnosis     ICD-10-CM    1. Hx of hand surgery  Z98.890                  Subjective: Patient states no functional changes at start of session.         Objective: See treatment diary below         Precautions: Wrist spanning plate removal on 2024, 4 weeks post op as of 2024        Auth Tracker  Auth Status Total   Visits  Expiration date Co-Insurance   pending                                  Visit Tracker  Date IE                                                                                  PMHx:   has a past medical history of Allergic rhinitis, Asthma, Fibroids, Physiological ovarian cysts, and Sleep apnea.       Manuals HEP 2024                       Ther Ex     Education on HEP and dx x x5min   Wrist tenodesis/RD/UD x x10   Scar massage/cupping x X 8 min    Green sponge squeeze x X10    Flexbar N's/U's  Red x10    Digiflex  Green x10   Prayer stretch   5 x10 sec   Supination/pronations  Hammer 1 wts. X10         Ther Act      Wrist Rotator  X10 Forwards/backwards   Wrist Maze  x5   Dice with clothespin   Whole jar with green clothespin    Towel scrunches  x   Sponges whole bin  lvl 2 gripper             Modalities     MHP  X 5 min           Assessment:   Patient tolerated session well with improved ability to perform all TE and TA with rest breaks secondary to muscle soreness. Patient session focused on patient education on anatomy and physiology concerning current dx, techniques for decreasing deficits through HEP, and appropriate use of modalities. Patient educated on treatment plan at this time. Patient benefiting from skilled hand therapy OT to reduce deficits to improve independence with daily activities.      Plan:   Focus on HEP, A/AA/PROM, TE, TA, strengthening, manual therapy, modalities PRN to improve  ability to complete daily activites with ease.  POC 6/17/2024 - 8/12/2024

## 2024-07-08 ENCOUNTER — OFFICE VISIT (OUTPATIENT)
Dept: OCCUPATIONAL THERAPY | Facility: CLINIC | Age: 59
End: 2024-07-08
Payer: COMMERCIAL

## 2024-07-08 DIAGNOSIS — Z98.890 HX OF HAND SURGERY: Primary | ICD-10-CM

## 2024-07-08 PROCEDURE — 97110 THERAPEUTIC EXERCISES: CPT

## 2024-07-08 NOTE — PROGRESS NOTES
Daily Note     Today's date: 2024  Patient name: Colette Sweeney  : 1965  MRN: [id number]  Referring provider: Eva Tellez MD  Dx:   Encounter Diagnosis     ICD-10-CM    1. Hx of hand surgery  Z98.890                    Subjective: Patient states no functional changes at start of session.         Objective: See treatment diary below         Precautions: Wrist spanning plate removal on 2024, 5 weeks post op as of 2024        Auth Tracker- 50 visits PCY - 15 have already been used   Auth Status Total   Visits  Expiration date Co-Insurance   pending                                  Visit Tracker  Date IE        RE                                                                         PMHx:   has a past medical history of Allergic rhinitis, Asthma, Fibroids, Physiological ovarian cysts, and Sleep apnea.       Manuals HEP 2024                       Ther Ex     Education on HEP and dx x x5min   Wrist tenodesis/RD/UD x x10   Scar massage/cupping x X 8 min    Green sponge squeeze x X10    Flexbar N's/U's  Red x10    Digiflex  Green x10   Prayer stretch   5 x10 sec   Supination/pronations  Hammer 2 wts. X10         Ther Act      Wrist Maze  x5   Dice with clothespin   Whole jar with reverse green clothespin    Towel scrunches  x   Sponges whole bin  lvl 3 gripper             Modalities     MHP  X 5 min             Assessment:   Patient tolerated session well with improved ability to perform all TE and TA with decreased rest breaks secondary to muscle soreness compared to previous session and able to tolerate all increases in resistive activities and exercises. Patient session focused on patient education on anatomy and physiology concerning current dx, techniques for decreasing deficits through HEP, and appropriate use of modalities. Patient educated on treatment plan at this time. Patient benefiting from skilled hand therapy OT to reduce deficits to improve  independence with daily activities.          Plan:   Focus on HEP, A/AA/PROM, TE, TA, strengthening, manual therapy, modalities PRN to improve ability to complete daily activites with ease.  POC 6/17/2024 - 8/12/2024

## 2024-07-11 ENCOUNTER — OFFICE VISIT (OUTPATIENT)
Dept: OCCUPATIONAL THERAPY | Facility: CLINIC | Age: 59
End: 2024-07-11
Payer: COMMERCIAL

## 2024-07-11 DIAGNOSIS — Z98.890 HX OF HAND SURGERY: Primary | ICD-10-CM

## 2024-07-11 PROCEDURE — 97110 THERAPEUTIC EXERCISES: CPT

## 2024-07-11 NOTE — PROGRESS NOTES
Daily Note     Today's date: 2024  Patient name: Colette Sweeney  : 1965  MRN: [id number]  Referring provider: Eva Tellez MD  Dx:   Encounter Diagnosis     ICD-10-CM    1. Hx of hand surgery  Z98.890                    Subjective: Patient states no functional changes this date.          Objective: See treatment diary below         Precautions: Wrist spanning plate removal on 2024, 5 weeks post op as of 2024        Auth Tracker- 50 visits PCY - 15 have already been used   Auth Status Total   Visits  Expiration date Co-Insurance   pending                                  Visit Tracker  Date IE    RE                                                                         PMHx:   has a past medical history of Allergic rhinitis, Asthma, Fibroids, Physiological ovarian cysts, and Sleep apnea.       Manuals HEP 2024                       Ther Ex     Education on HEP and dx x x5min   Wrist tenodesis/RD/UD x x10   Scar massage/cupping x X 8 min    Green sponge squeeze x X10    Flexbar N's/U's  Red x10    Digiflex  Green x10   Prayer stretch   5 x10 sec   Supination/pronations  Hammer 2 wts. X10         Ther Act      Wrist Maze  x5   Dice with clothespin   Whole jar with reverse green clothespin    Towel scrunches  x   Sponges whole bin  lvl 3 gripper   WB theraball  x10        Modalities     MHP  X 5 min             Assessment:   Patient tolerated session well. Session focused on ROM and strengthening  to improve deficits and functional performance with daily activities. Patient tolerated all TE/TA with no complaints. Patient progressing well towards goals. Patient benefiting from skilled hand therapy OT to reduce deficits to improve independence with daily activities.            Plan:   Focus on HEP, A/AA/PROM, TE, TA, strengthening, manual therapy, modalities PRN to improve ability to complete daily activites with ease.  POC 2024 - 2024

## 2024-07-15 ENCOUNTER — OFFICE VISIT (OUTPATIENT)
Dept: OCCUPATIONAL THERAPY | Facility: CLINIC | Age: 59
End: 2024-07-15
Payer: COMMERCIAL

## 2024-07-15 DIAGNOSIS — Z98.890 HX OF HAND SURGERY: Primary | ICD-10-CM

## 2024-07-15 PROCEDURE — 97110 THERAPEUTIC EXERCISES: CPT

## 2024-07-15 NOTE — PROGRESS NOTES
Daily Note     Today's date: 7/15/2024  Patient name: Colette Sweeney  : 1965  MRN: [id number]  Referring provider: Eva Tellez MD  Dx:   Encounter Diagnosis     ICD-10-CM    1. Hx of hand surgery  Z98.890                    Subjective: Patient states no functional changes this date.          Objective: See treatment diary below         Precautions: Wrist spanning plate removal on 2024, 5 weeks post op as of 2024        Auth Tracker- 50 visits PCY - 15 have already been used   Auth Status Total   Visits  Expiration date Co-Insurance   pending                                  Visit Tracker  Date IE  6/17 6/20 6/24 6/27 7/1 7/8    7/11 7/15  RE                                                                        PMHx:   has a past medical history of Allergic rhinitis, Asthma, Fibroids, Physiological ovarian cysts, and Sleep apnea.       Manuals HEP 7/15/2024                       Ther Ex     Education on HEP and dx x x5min   Wrist tenodesis/RD/UD x x10   Scar massage/cupping x X 8 min    Green sponge squeeze x X10    Flexbar N's/U's  Red x10    Digiflex  Green x10   Prayer stretch   5 x10 sec   Supination/pronations  Hammer 2 wts. X10         Ther Act      Wrist Maze  x5   Dice with clothespin   Whole jar with reverse green clothespin    Towel scrunches  x   Sponges whole bin  lvl 3 gripper   WB theraball  x10        Modalities     MHP  X 5 min             Assessment:   Patient tolerated session well. Session focused on ROM and strengthening  to improve deficits and functional performance with daily activities. Patient tolerated all TE/TA with no complaints. Patient progressing well towards goals. Patient benefiting from skilled hand therapy OT to reduce deficits to improve independence with daily activities.            Plan:   Focus on HEP, A/AA/PROM, TE, TA, strengthening, manual therapy, modalities PRN to improve ability to complete daily activites with ease.  POC 2024 - 2024

## 2024-07-18 ENCOUNTER — OFFICE VISIT (OUTPATIENT)
Dept: OCCUPATIONAL THERAPY | Facility: CLINIC | Age: 59
End: 2024-07-18
Payer: COMMERCIAL

## 2024-07-18 DIAGNOSIS — Z98.890 HX OF HAND SURGERY: Primary | ICD-10-CM

## 2024-07-18 PROCEDURE — 97110 THERAPEUTIC EXERCISES: CPT

## 2024-07-18 PROCEDURE — 97530 THERAPEUTIC ACTIVITIES: CPT

## 2024-07-18 NOTE — PROGRESS NOTES
Daily Note     Today's date: 2024  Patient name: Colette Sweeney  : 1965  MRN: [id number]  Referring provider: Eva Tellez MD  Dx:   Encounter Diagnosis     ICD-10-CM    1. Hx of hand surgery  Z98.890           Start Time: 1710  Stop Time: 1755  Total time in clinic (min): 45 minutes  Subjective: Patient states no functional changes this date.          Objective: See treatment diary below         Precautions: Wrist spanning plate removal on 2024, 5 weeks post op as of 2024        Auth Tracker- 50 visits PCY - 15 have already been used   Auth Status Total   Visits  Expiration date Co-Insurance   pending                                  Visit Tracker  Date IE  6/17 6/20 6/24 6/27 7/1 7/8    7/11 7/15 7/18 RE                                                                        PMHx:   has a past medical history of Allergic rhinitis, Asthma, Fibroids, Physiological ovarian cysts, and Sleep apnea.       Manuals HEP 2024                       Ther Ex     Education on HEP and dx x x5min   Wrist tenodesis/RD/UD x x10   Scar massage/cupping x X 8 min    Green sponge squeeze x X10    Flexbar N's/U's  Red x10    Digiflex  Green x10   Prayer stretch   5 x10 sec   Supination/pronations  Hammer 2 wts. X10         Ther Act      Wrist Maze  x5   Dice with clothespin   Whole jar with reverse green clothespin    Towel scrunches  x   Sponges whole bin  lvl 3 gripper   WB theraball  x10        Modalities     MHP  X 5 min             Assessment:   Patient tolerated session well. Session focused on ROM and strengthening  to improve deficits and functional performance with daily activities. Patient tolerated all TE/TA with no complaints. Patient progressing well towards goals. Patient benefiting from skilled hand therapy OT to reduce deficits to improve independence with daily activities.            Plan:   Focus on HEP, A/AA/PROM, TE, TA, strengthening, manual therapy, modalities PRN to improve  ability to complete daily activites with ease.  POC 6/17/2024 - 8/12/2024

## 2024-07-27 NOTE — PROGRESS NOTES
EGD performed this AM.  Per  Hold Xarelto x 1 week, continue Protonix PO.  Allergic to Omeprazole per allergy list but tolerating IV Protonix without incident. Pt tolerated breakfast.  Will continue POC.   [facility] PRACTICE PRE-OPERATIVE EVALUATION  [facility]    NAME: Colette Sweeney  AGE: 58 y.o. SEX: female  : 1965     DATE: 2024    [facility] Pre-Operative Evaluation      Chief Complaint: Pre-operative Evaluation     Surgery: right distal radial fracture with plate insertion  Anticipated Date of Surgery: 2024  Surgeon: Dr. Tellez      History of Present Illness:     HPI  Patient is here for pre op clearance.  Denies any chest pain, sob, headache and dizziness.  Patient stated that [facility].    Anesthesia:  IV sedation and nerve block  Bleeding Risk: no recent abnormal bleeding, no remote history of abnormal bleeding, and no use of Ca-channel blockers  Current Anti-platelet/anticoagulation medication:  none    Assessment of Cardiac Risk:  Denies unstable or severe angina or MI in the last 6 weeks or history of stent placement in the last year   Denies decompensated heart failure (e.g. New onset heart failure, NYHA functional class IV heart failure, or worsening existing heart failure)  Denies significant arrhythmias such as high grade AV block, symptomatic ventricular arrhythmia, newly recognized ventricular tachycardia, supraventricular tachycardia with resting heart rate >100, or symptomatic bradycardia  Denies severe heart valve disease including aortic stenosis or symptomatic mitral stenosis     Exercise Capacity:  Able to walk 4 blocks without symptoms?: Yes  Able to walk 2 flights without symptoms?: Yes    Prior Anesthesia Reactions: No     Personal history of venous thromboembolic disease? No           Review of Systems:     Review of Systems   HENT: Negative.     Respiratory: Negative.     Cardiovascular: Negative.    Gastrointestinal: Negative.    Musculoskeletal:  Positive for arthralgias.   Neurological: Negative.        Current Problem List:     Patient Active Problem List    Diagnosis   • Internal derangement of left knee   • Moderate persistent asthma without complication   • Sleep apnea   • Hydrosalpinx   • Ovarian cyst, complex   • Diverticulosis of colon   • Family history of colon cancer   • Uterine myoma   • Hiatal hernia   • Screening breast examination   • Morbid obesity (HCC)   • Breast cancer screening by mammogram   • Personal history of colonic polyps   • Abnormal finding on EKG   • Preoperative clearance   • Herpes gingivostomatitis   • Herpes simplex vulvovaginitis   • Chronic pain of right knee   • S/P total knee replacement not using cement, right       Allergies:     No Known Allergies    Current Medications:       Current Outpatient Medications:   •  acetaminophen (TYLENOL) 325 mg tablet, Take 3 tablets (975 mg total) by mouth every 8 (eight) hours, Disp: , Rfl: 0  •  albuterol (2.5 mg/3 mL) 0.083 % nebulizer solution, Take 1 vial (2.5 mg total) by nebulization every 6 (six) hours as needed for wheezing or shortness of breath, Disp: 100 mL, Rfl: 0  •  Calcium Carb-Cholecalciferol 600-500 MG-UNIT CAPS, Take by mouth, Disp: , Rfl:   •  Fluticasone-Salmeterol (Advair Diskus) 500-50 mcg/dose inhaler, Inhale 1 puff 2 (two) times a day Rinse mouth after use., Disp: 180 blister, Rfl: 1  •  multivitamin (THERAGRAN) TABS, Take 1 tablet by mouth daily, Disp: , Rfl:   •  Respiratory Therapy Supplies (NEBULIZER/TUBING/MOUTHPIECE) KIT, by Does not apply route 4 (four) times a day, Disp: 1 each, Rfl: 0  •  amoxicillin (AMOXIL) 500 MG tablet, Take 4 tablets (2,000 mg total) by mouth 60 minutes pre-procedure (Patient not taking: Reported on 2/23/2024), Disp: 4 tablet, Rfl: 2  •  benzonatate (TESSALON) 200 MG capsule, Take 1 capsule (200 mg total) by mouth 3 (three) times a day as needed for cough (Patient not taking: Reported on 2/9/2024), Disp: 20 capsule, Rfl: 0  •  meclizine (ANTIVERT) 25 mg tablet, Take 1 tablet (25 mg total) by mouth 3 (three) times a day as needed for  dizziness (Patient not taking: Reported on 2/23/2024), Disp: 30 tablet, Rfl: 0  •  oxyCODONE-acetaminophen (Percocet) 5-325 mg per tablet, Take 1 tablet by mouth every 6 (six) hours as needed for severe pain for up to 3 days Max Daily Amount: 4 tablets (Patient not taking: Reported on 2/23/2024), Disp: 12 tablet, Rfl: 0  •  valACYclovir (VALTREX) 1,000 mg tablet, 2 tabs at earliest onset of cold sore, repeat once in 12 hours. Take 500mg bid for 3 days for genital herpes flare up., Disp: 40 tablet, Rfl: 5    Past Medical History:       Past Medical History:   Diagnosis Date   • Allergic rhinitis    • Asthma    • Fibroids    • Physiological ovarian cysts    • Sleep apnea     denatl device worn        Past Surgical History:   Procedure Laterality Date   • COLONOSCOPY  2016    dr garcia   • KNEE ARTHROSCOPY Left 2018    meniscectomy x2, first done in 2015   • MYOMECTOMY     • CO ARTHRP KNE CONDYLE&PLATU MEDIAL&LAT COMPARTMENTS Right 3/6/2023    Procedure: ARTHROPLASTY KNEE TOTAL W ROBOT - RIGHT - PRESS FIT - SAME DAY;  Surgeon: Trey Fountain DO;  Location: [facility] OR;  Service: Orthopedics   • CO ARTHRS KNE SURG W/MENISCECTOMY MED/LAT W/SHVG Right 1/6/2022    Procedure: KNEE ARTHROSCOPY MENISCECTOMY MEDIAL;  Surgeon: Sachin Grove MD;  Location: [facility] OR;  Service: Orthopedics   • CO COLONOSCOPY FLX DX W/COLLJ SPEC WHEN PFRMD N/A 9/13/2018    Procedure: COLONOSCOPY;  Surgeon: Sergo Martinez MD;  Location: [facility];  Service: Gastroenterology   • WRIST SURGERY Right         Family History   Problem Relation Age of Onset   • Osteoporosis Mother    • Thyroid disease Mother    • Colon cancer Mother 67   • Colon polyps Mother    • Cancer Mother         colon   • Alzheimer's disease Father    • Hypertension Father    • Dementia Father    • No Known Problems Daughter    • No Known Problems Daughter    • Ovarian cancer Maternal Grandmother    • Heart disease Brother    • No Known Problems Maternal Aunt    •  "No Known Problems Maternal Aunt    • No Known Problems Paternal Aunt    • No Known Problems Paternal Aunt    • No Known Problems Paternal Aunt    • No Known Problems Paternal Aunt    • No Known Problems Paternal Aunt    • No Known Problems Paternal Aunt    • No Known Problems Paternal Aunt    • No Known Problems Paternal Aunt         Social History     Socioeconomic History   • Marital status: /Civil Union     Spouse name: Not on file   • Number of children: Not on file   • Years of education: Not on file   • Highest education level: Not on file   Occupational History   • Not on file   Tobacco Use   • Smoking status: Never     Passive exposure: Never   • Smokeless tobacco: Never   Vaping Use   • Vaping status: Never Used   Substance and Sexual Activity   • Alcohol use: Yes     Comment: social - wine 2 x month   • Drug use: Never   • Sexual activity: Yes     Partners: Male     Birth control/protection: Condom Male   Other Topics Concern   • Not on file   Social History Narrative   • Not on file     Social Determinants of Health     Financial Resource Strain: Not on file   Food Insecurity: Not on file   Transportation Needs: Not on file   Physical Activity: Not on file   Stress: Not on file   Social Connections: Not on file   Intimate Partner Violence: Not on file   Housing Stability: Not on file        Physical Exam:     /80   Pulse 81   Temp 98.7 °F (37.1 °C)   Resp 16   Ht 5' 4\" (1.626 m)   Wt 98.9 kg (218 lb)   LMP 04/28/2021   BMI 37.42 kg/m²     Physical Exam  Vitals reviewed.   Constitutional:       Appearance: Normal appearance. She is well-developed.   HENT:      Head: Normocephalic and atraumatic.      Nose: Nose normal.      Mouth/Throat:      Pharynx: No pharyngeal swelling, oropharyngeal exudate or posterior oropharyngeal erythema.   Eyes:      Conjunctiva/sclera: Conjunctivae normal.   Cardiovascular:      Rate and Rhythm: Normal rate and regular rhythm.      Pulses: Normal pulses.    "   Heart sounds: Normal heart sounds.   Pulmonary:      Effort: Pulmonary effort is normal.      Breath sounds: Normal breath sounds.   Abdominal:      General: Bowel sounds are normal.      Palpations: Abdomen is soft.      Tenderness: There is no abdominal tenderness.   Skin:     General: Skin is warm and dry.      Findings: No rash.   Neurological:      Mental Status: She is alert and oriented to person, place, and time.   Psychiatric:         Mood and Affect: Mood normal.         Behavior: Behavior normal.         Thought Content: Thought content normal.         Judgment: Judgment normal.               Assessment & Recommendations:     Problem List Items Addressed This Visit        Respiratory    Moderate persistent asthma without complication    Sleep apnea   Other Visit Diagnoses     Pre-op examination    -  Primary    Other closed fracture of distal end of radius, unspecified laterality, initial encounter              Pre-Op Evaluation Assessment  58 y.o. female with planned surgery: right distal radial fracture with plate insertion.    Known risk factors for perioperative complications: None  Asthma and sleep apnea .        Current medications which may produce withdrawal symptoms if withheld perioperatively: none.    Pre-Op Evaluation Plan  1. Further preoperative workup as follows:   - None; no further preoperative work-up is required    2. Medication Management/Recommendations:   - Patient has been instructed to avoid herbs or non-directed vitamins the week prior to surgery to ensure no drug interactions with perioperative surgical and anesthetic medications.  - Patient has been instructed to avoid aspirin containing medications or non-steroidal anti-inflammatory drugs for the week preceding surgery.      TANIYA Risk:  GFR:        For PCP:  If GFR>60, Hold ACE/ARB/Diuretic on the day of surgery, and NSAIDS 10 days before.    If GFR<45, Consider PRE and POST op Nephrology Consult.    If 46 <GFR> 59 : Has  Patient had TANIYA in last 6 Months? no   If YES: Preop Nephrology consult   If No:  Post Op Nephrology consult.     Clearance  Patient is medically CLEARED for surgery at this time. Has h/o Asthma and sleep apnea and respiratory status needs to be monitored closely     Antoinette Agarwal [facility]  [address] [address]  Phone#  [phone]  Fax#  [phone]

## 2024-07-30 ENCOUNTER — OFFICE VISIT (OUTPATIENT)
Dept: OCCUPATIONAL THERAPY | Facility: CLINIC | Age: 59
End: 2024-07-30
Payer: COMMERCIAL

## 2024-07-30 DIAGNOSIS — Z98.890 HX OF HAND SURGERY: Primary | ICD-10-CM

## 2024-07-30 PROCEDURE — 97110 THERAPEUTIC EXERCISES: CPT

## 2024-07-30 PROCEDURE — 97530 THERAPEUTIC ACTIVITIES: CPT

## 2024-07-30 NOTE — PROGRESS NOTES
OT Evaluation     Today's date: 2024  Patient name: Colette Sweeney  : 1965  MRN: [id number]  Referring provider: Eva Tellez MD  Dx:   Encounter Diagnosis     ICD-10-CM    1. Hx of hand surgery  Z98.890                      Assessment  Impairments: activity intolerance, impaired physical strength, lacks appropriate home exercise program, pain with function, safety issue and weight-bearing intolerance  Symptom irritability: moderate    Assessment details: Patient re-evaluation done this this date with noted improvement in wrist AROM and  strength in LUE. Patient strength also assessed this date with noted deficits in LUE compared to RUE at this time. Patient would benefit from continued skilled OT to maximize ROM and strength at this time to improve functional use with ADLs.          Patient is a 59 y.o. female who presents for OT IE and treatment for L spanning plate hardware removal. Patient reports she went for a walk and slipped on black ice, she went to the ED and Xrays (+) for fx on 2024 and had surgery on 2024. Patient had a spanning plate in her L wrist and was seen for occupational therapy services and now is returning for an IE post having hardware removal sx. Patient referred by Dr. Tellez to initiate treatment including hand therapy.      Understanding of Dx/Px/POC: good     Prognosis: excellent    Goals  Short Term Goals by 2 - 4 weeks:    Establish HEP to increase performance with daily activities. MET    Patient will increase L  strength by at least 10 lbs to assist in completing ADLs. MET    Patient will increase wrist AROM of LUE by at least 10 degrees to complete ADLs. Met    Update STG:  Patient will increase  strength by at least 10 lbs to aid in performing household chores such as cleaning the house.        Long Term Goals by discharge:    Establish final home exercise program to enhance maximal functional level with ADLs.    Achieve functional strength  of JOHN for full return to high level ADLs.       Plan  Patient would benefit from: skilled occupational therapy, OT eval, custom splinting and orthotics  Planned modality interventions: electrical stimulation/Dutch stimulation, ultrasound, TENS, high voltage pulsed current: pain management and thermotherapy: hydrocollator packs    Planned therapy interventions: IASTM, joint mobilization, kinesiology taping, manual therapy, ADL retraining, activity modification, ADL training, balance/weight bearing training, behavior modification, coordination, compression, fine motor coordination training, flexibility, functional ROM exercises, graded exercise, graded activity, graded motor, home exercise program, IADL retraining, therapeutic training, therapeutic exercise, therapeutic activities, stretching, strengthening, sensory integrative techniques, self care, patient/caregiver education, orthotic management and training, orthotic fitting/training, neuromuscular re-education and nerve gliding    Frequency: 2x week  Duration in weeks: 8  Plan of Care beginning date: 7/30/2024  Plan of Care expiration date: 9/8/2024  Treatment plan discussed with: patient  Plan details: Focus on HEP, A/AA/PROM, TE, TA, strengthening, manual therapy, modalities PRN to improve ability to complete daily activites with ease.  POC 7/30/2024 - 9/8/2024    Subjective Evaluation    History of Present Illness  Date of surgery: 5/31/2024  Mechanism of injury: surgery  Mechanism of injury: Patient is a 59 y.o. female who presents for OT IE and treatment for L spanning plate hardware removal. Patient reports she went for a walk and slipped on black ice, she went to the ED and Xrays (+) for fx on 2/22/2024 and had surgery on 2/29/2024. Patient had a spanning plate in her L wrist and was seen for occupational therapy services and now is returning for an IE post having hardware removal sx. Patient referred by Dr. Tellez to initiate treatment including hand  "therapy.      Quality of life: good    Patient Goals  Patient goals for therapy: increased strength, independence with ADLs/IADLs, decreased pain, increased motion and return to sport/leisure activities    Pain  Current pain ratin  At best pain ratin  At worst pain ratin  Quality: sharp and discomfort  Relieving factors: heat  Exacerbated by: \"Can lift a grocery bag but if it's too heavy it's a problem\"  Progression: improved    Social Support  Lives with: spouse (1 cat)    Employment status: working  Hand dominance: right    Treatments  Current treatment: occupational therapy    Objective     Observations     Left Wrist/Hand   Positive for incision.     Additional Observation Details  One incision on dorsal aspect of forearm, second incision on dorsal aspect of L hand     Active Range of Motion     Left Wrist   Wrist flexion: 48 degrees   Wrist extension: 64 degrees   Radial deviation: 16 degrees   Ulnar deviation: 18 degrees     Right Wrist   Wrist flexion: 68 degrees   Wrist extension: 66 degrees   Radial deviation: 17 degrees   Ulnar deviation: 30 degrees     Left Thumb   Kapandji score: 9 degrees      Right Thumb   Kapandji score: 9 degrees    Strength/Myotome Testing     Left Wrist/Hand      (2nd hand position)     Trial 1: 22    Thumb Strength  Key/Lateral Pinch     Trial 1: 6  Palmar/Three-Point Pinch     Trial 1: 5    Right Wrist/Hand      (2nd hand position)     Trial 1: 40    Thumb Strength   Key/Lateral Pinch     Trial 1: 13  Palmar/Three-Point Pinch     Trial 1: 10             Precautions: Wrist spanning plate removal on 2024, 5 weeks post op as of 2024      Auth Tracker- 50 visits PCY - 15 have already been used   Auth Status Total   Visits  Expiration date Co-Insurance   pending                                  Visit Tracker  Date IE   7/8    7/11 7/15 7/18 7/30  RE                                                                        PMHx:   has a " past medical history of Allergic rhinitis, Asthma, Fibroids, Physiological ovarian cysts, and Sleep apnea.       Manuals HEP 7/30/2024                       Ther Ex     Education on HEP and dx x x5min   Wrist tenodesis/RD/UD x x10   Scar massage/cupping x X 8 min    Green sponge squeeze x X10    Flexbar N's/U's  Red x10    Digiflex  Green x10   Prayer stretch   5 x10 sec   Supination/pronations  Hammer 2 wts. X10    Measurements  X   Wrist PRE's  X10 3 ways #2             Ther Act      Wrist Maze  x5   Dice with clothespin   Whole jar with reverse green clothespin    Towel scrunches  x   Sponges whole bin  lvl 3 gripper   WB theraball  x10                       Modalities     MHP  X 5 min             Assessment:   Patient re-evaluation done this this date with noted improvement in wrist AROM and  strength in LUE. Patient strength also assessed this date with noted deficits in LUE compared to RUE at this time. Patient would benefit from continued skilled OT to maximize ROM and strength at this time to improve functional use with ADLs. Patient tolerated session well. Session focused on ROM and strengthening  to improve deficits and functional performance with daily activities. Patient tolerated all TE/TA with no complaints. Patient progressing well towards goals. Patient benefiting from skilled hand therapy OT to reduce deficits to improve independence with daily activities.          Plan:   Focus on HEP, A/AA/PROM, TE, TA, strengthening, manual therapy, modalities PRN to improve ability to complete daily activites with ease.  POC 7/30/2024 - 9/8/2024.

## 2024-07-30 NOTE — LETTER
2024    Eva Tellez MD  [address] [place] [address], [address] 1  [facility] [address]    Patient: Colette Sweeney   YOB: 1965   Date of Visit: 2024     Encounter Diagnosis     ICD-10-CM    1. Hx of hand surgery  Z98.890           Dear Dr. Tellez:    Thank you for your recent referral of Colette Sweeney. Please review the attached evaluation summary from Colette's recent visit.     Please verify that you agree with the plan of care by signing the attached order.     If you have any questions or concerns, please do not hesitate to call.     I sincerely appreciate the opportunity to share in the care of one of your patients and hope to have another opportunity to work with you in the near future.     Sincerely,    Ellie James, OT      Referring Provider:     I certify that I have read the below Plan of Care and certify the need for these services furnished under this plan of treatment while under my care.                    Eva Tellez MD  [address] [place] [address], Floor 1  [facility] [address]  Via Fax: [phone]        OT Evaluation     Today's date: 2024  Patient name: Colette Sweeney  : 1965  MRN: [id number]  Referring provider: Eva Tellez MD  Dx:   Encounter Diagnosis     ICD-10-CM    1. Hx of hand surgery  Z98.890                      Assessment  Impairments: activity intolerance, impaired physical strength, lacks appropriate home exercise program, pain with function, safety issue and weight-bearing intolerance  Symptom irritability: moderate    Assessment details: Patient re-evaluation done this this date with noted improvement in wrist AROM and  strength in LUE. Patient strength also assessed this date with noted deficits in LUE compared to RUE at this time. Patient would benefit from continued skilled OT to maximize ROM and strength at this time to improve functional use with ADLs.          Patient is a 59 y.o. female who presents for OT IE and treatment  for L spanning plate hardware removal. Patient reports she went for a walk and slipped on black ice, she went to the ED and Xrays (+) for fx on 2/22/2024 and had surgery on 2/29/2024. Patient had a spanning plate in her L wrist and was seen for occupational therapy services and now is returning for an IE post having hardware removal sx. Patient referred by Dr. Tellez to initiate treatment including hand therapy.      Understanding of Dx/Px/POC: good     Prognosis: excellent    Goals  Short Term Goals by 2 - 4 weeks:    Establish HEP to increase performance with daily activities. MET    Patient will increase L  strength by at least 10 lbs to assist in completing ADLs. MET    Patient will increase wrist AROM of LUE by at least 10 degrees to complete ADLs. Met    Update STG:  Patient will increase  strength by at least 10 lbs to aid in performing household chores such as cleaning the house.        Long Term Goals by discharge:    Establish final home exercise program to enhance maximal functional level with ADLs.    Achieve functional strength of LUE for full return to high level ADLs.       Plan  Patient would benefit from: skilled occupational therapy, OT eval, custom splinting and orthotics  Planned modality interventions: electrical stimulation/Cymraes stimulation, ultrasound, TENS, high voltage pulsed current: pain management and thermotherapy: hydrocollator packs    Planned therapy interventions: IASTM, joint mobilization, kinesiology taping, manual therapy, ADL retraining, activity modification, ADL training, balance/weight bearing training, behavior modification, coordination, compression, fine motor coordination training, flexibility, functional ROM exercises, graded exercise, graded activity, graded motor, home exercise program, IADL retraining, therapeutic training, therapeutic exercise, therapeutic activities, stretching, strengthening, sensory integrative techniques, self care, patient/caregiver  "education, orthotic management and training, orthotic fitting/training, neuromuscular re-education and nerve gliding    Frequency: 2x week  Duration in weeks: 8  Plan of Care beginning date: 2024  Plan of Care expiration date: 2024  Treatment plan discussed with: patient  Plan details: Focus on HEP, A/AA/PROM, TE, TA, strengthening, manual therapy, modalities PRN to improve ability to complete daily activites with ease.  POC 2024 - 2024    Subjective Evaluation    History of Present Illness  Date of surgery: 2024  Mechanism of injury: surgery  Mechanism of injury: Patient is a 59 y.o. female who presents for OT IE and treatment for L spanning plate hardware removal. Patient reports she went for a walk and slipped on black ice, she went to the ED and Xrays (+) for fx on 2024 and had surgery on 2024. Patient had a spanning plate in her L wrist and was seen for occupational therapy services and now is returning for an IE post having hardware removal sx. Patient referred by Dr. Tellez to initiate treatment including hand therapy.      Quality of life: good    Patient Goals  Patient goals for therapy: increased strength, independence with ADLs/IADLs, decreased pain, increased motion and return to sport/leisure activities    Pain  Current pain ratin  At best pain ratin  At worst pain ratin  Quality: sharp and discomfort  Relieving factors: heat  Exacerbated by: \"Can lift a grocery bag but if it's too heavy it's a problem\"  Progression: improved    Social Support  Lives with: spouse (1 cat)    Employment status: working  Hand dominance: right    Treatments  Current treatment: occupational therapy    Objective     Observations     Left Wrist/Hand   Positive for incision.     Additional Observation Details  One incision on dorsal aspect of forearm, second incision on dorsal aspect of L hand     Active Range of Motion     Left Wrist   Wrist flexion: 48 degrees   Wrist extension: 64 " degrees   Radial deviation: 16 degrees   Ulnar deviation: 18 degrees     Right Wrist   Wrist flexion: 68 degrees   Wrist extension: 66 degrees   Radial deviation: 17 degrees   Ulnar deviation: 30 degrees     Left Thumb   Kapandji score: 9 degrees      Right Thumb   Kapandji score: 9 degrees    Strength/Myotome Testing     Left Wrist/Hand      (2nd hand position)     Trial 1: 22    Thumb Strength  Key/Lateral Pinch     Trial 1: 6  Palmar/Three-Point Pinch     Trial 1: 5    Right Wrist/Hand      (2nd hand position)     Trial 1: 40    Thumb Strength   Key/Lateral Pinch     Trial 1: 13  Palmar/Three-Point Pinch     Trial 1: 10             Precautions: Wrist spanning plate removal on 5/31/2024, 5 weeks post op as of 7/5/2024      Auth Tracker- 50 visits PCY - 15 have already been used   Auth Status Total   Visits  Expiration date Co-Insurance   pending                                  Visit Tracker  Date IE  6/17 6/20 6/24 6/27 7/1 7/8    7/11 7/15 7/18 7/30  RE                                                                        PMHx:   has a past medical history of Allergic rhinitis, Asthma, Fibroids, Physiological ovarian cysts, and Sleep apnea.       Manuals HEP 7/30/2024                       Ther Ex     Education on HEP and dx x x5min   Wrist tenodesis/RD/UD x x10   Scar massage/cupping x X 8 min    Green sponge squeeze x X10    Flexbar N's/U's  Red x10    Digiflex  Green x10   Prayer stretch   5 x10 sec   Supination/pronations  Hammer 2 wts. X10    Measurements  X   Wrist PRE's  X10 3 ways #2             Ther Act      Wrist Maze  x5   Dice with clothespin   Whole jar with reverse green clothespin    Towel scrunches  x   Sponges whole bin  lvl 3 gripper   WB theraball  x10                       Modalities     MHP  X 5 min             Assessment:   Patient re-evaluation done this this date with noted improvement in wrist AROM and  strength in LUE. Patient strength also assessed this date with noted  deficits in LUE compared to RUE at this time. Patient would benefit from continued skilled OT to maximize ROM and strength at this time to improve functional use with ADLs. Patient tolerated session well. Session focused on ROM and strengthening  to improve deficits and functional performance with daily activities. Patient tolerated all TE/TA with no complaints. Patient progressing well towards goals. Patient benefiting from skilled hand therapy OT to reduce deficits to improve independence with daily activities.          Plan:   Focus on HEP, A/AA/PROM, TE, TA, strengthening, manual therapy, modalities PRN to improve ability to complete daily activites with ease.  POC 7/30/2024 - 9/8/2024.

## 2024-08-01 ENCOUNTER — OFFICE VISIT (OUTPATIENT)
Dept: OCCUPATIONAL THERAPY | Facility: CLINIC | Age: 59
End: 2024-08-01
Payer: COMMERCIAL

## 2024-08-01 DIAGNOSIS — Z98.890 HX OF HAND SURGERY: Primary | ICD-10-CM

## 2024-08-01 PROCEDURE — 97530 THERAPEUTIC ACTIVITIES: CPT

## 2024-08-01 PROCEDURE — 97110 THERAPEUTIC EXERCISES: CPT

## 2024-08-01 NOTE — PROGRESS NOTES
Daily Note     Today's date: 2024  Patient name: Colette Sweeney  : 1965  MRN: [id number]  Referring provider: Eva Tellez MD  Dx:   Encounter Diagnosis     ICD-10-CM    1. Hx of hand surgery  Z98.890                      Subjective: Patient reports she had follow up with MD today and was overall pleased with her progress.      Objective: See treatment diary below       Precautions: Wrist spanning plate removal on 2024, 9 weeks post op as of 2024      Auth Tracker- 50 visits PCY - 15 have already been used   Auth Status Total   Visits  Expiration date Co-Insurance   pending                                  Visit Tracker  Date IE  6/17 6/20 6/24 6/27 7/1 7/8    7/11 7/15 7/18 7/30  RE                                                                        PMHx:   has a past medical history of Allergic rhinitis, Asthma, Fibroids, Physiological ovarian cysts, and Sleep apnea.       Manuals HEP 2024                       Ther Ex     Education on HEP and dx x x5min   Scar massage/cupping x X 8 min    Green sponge squeeze x X10    Flexbar N's/U's  Green x10    Digiflex  Green x10   Prayer stretch   5 x10 sec   Pronator  1# x10     Measurements     Wrist PRE's  X10 3 ways #2   Wrist flexor and extensor stretch   5 x 10 sec black web                       Ther Act      Wrist Maze  x5   Dice with clothespin   Whole jar with reverse green clothespin    Towel scrunches  x   Sponges whole bin  lvl 3 gripper   WB theraball  x10                       Modalities     MHP  X 5 min             Assessment:   Patient tolerated session well able to perform all increases in resistive activities and exercises. Session focused on ROM and strengthening to improve deficits and functional performance with daily activities. Patient tolerated all TE/TA with no complaints. Patient progressing well towards goals. Patient benefiting from skilled hand therapy OT to reduce deficits to improve independence with  daily activities.          Plan:   Focus on HEP, A/AA/PROM, TE, TA, strengthening, manual therapy, modalities PRN to improve ability to complete daily activites with ease.  POC 7/30/2024 - 9/8/2024.

## 2024-08-06 ENCOUNTER — OFFICE VISIT (OUTPATIENT)
Dept: OCCUPATIONAL THERAPY | Facility: CLINIC | Age: 59
End: 2024-08-06
Payer: COMMERCIAL

## 2024-08-06 DIAGNOSIS — Z98.890 HX OF HAND SURGERY: Primary | ICD-10-CM

## 2024-08-06 PROCEDURE — 97110 THERAPEUTIC EXERCISES: CPT

## 2024-08-06 PROCEDURE — 97530 THERAPEUTIC ACTIVITIES: CPT

## 2024-08-06 NOTE — PROGRESS NOTES
"Daily Note     Today's date: 2024  Patient name: Colette Sweeney  : 1965  MRN: [id number]  Referring provider: Eva Tellez MD  Dx:   Encounter Diagnosis     ICD-10-CM    1. Hx of hand surgery  Z98.890                    Subjective: Patient reports she has been feeling \"daggers/sharp pain\" on the top of the incision on the dorsum of her palm.         Objective: See treatment diary below       Precautions: Wrist spanning plate removal on 2024, 9 weeks post op as of 2024      Auth Tracker- 50 visits PCY - 15 have already been used   Auth Status Total   Visits  Expiration date Co-Insurance   pending                                  Visit Tracker  Date IE  6/17 6/20 6/24 6/27 7/1 7/8    7/11 7/15 7/18 7/30  RE                                                                       PMHx:   has a past medical history of Allergic rhinitis, Asthma, Fibroids, Physiological ovarian cysts, and Sleep apnea.       Manuals HEP 2024                       Ther Ex     Education on HEP and dx x x5min   Scar massage/cupping x X 8 min    Flexbar N's/U's  Green x10    Prayer stretch   5 x10 sec   Pronator  1# x10     Measurements     Bicep curls/hammer curls  4# x10    Wrist flexor and extensor stretch   5 x 10 sec black web                       Ther Act      Wrist Maze  x5   Pushups on plinth table  X10    Towel scrunches  x   Sponges whole bin  lvl 3 gripper                       Modalities     MHP  X 5 min             Assessment:   Patient tolerated session well able to perform all increases in resistive activities and exercises. Session focused on ROM and strengthening to improve deficits and functional performance with daily activities. Patient tolerated all TE/TA with no complaints. Patient progressing well towards goals. Patient benefiting from skilled hand therapy OT to reduce deficits to improve independence with daily activities.          Plan:   Focus on HEP, A/AA/PROM, TE, TA, " strengthening, manual therapy, modalities PRN to improve ability to complete daily activites with ease.  POC 7/30/2024 - 9/8/2024.

## 2024-08-08 ENCOUNTER — OFFICE VISIT (OUTPATIENT)
Dept: OCCUPATIONAL THERAPY | Facility: CLINIC | Age: 59
End: 2024-08-08
Payer: COMMERCIAL

## 2024-08-08 DIAGNOSIS — Z98.890 HX OF HAND SURGERY: Primary | ICD-10-CM

## 2024-08-08 PROCEDURE — 97110 THERAPEUTIC EXERCISES: CPT

## 2024-08-08 PROCEDURE — 97530 THERAPEUTIC ACTIVITIES: CPT

## 2024-08-08 NOTE — PROGRESS NOTES
"Daily Note     Today's date: 2024  Patient name: Colette Sweeney  : 1965  MRN: [id number]  Referring provider: Eva Tellez MD  Dx:   Encounter Diagnosis     ICD-10-CM    1. Hx of hand surgery  Z98.890                      Subjective: Patient reports she was feeling sore after previous therapy session, also states decreases \"dagger\" feeling on the dorsum of  her hand but feels like pins/needles sensation now only when she touches it.        Objective: See treatment diary below       Precautions: Wrist spanning plate removal on 2024, 10 weeks post op as of 2024      Auth Tracker- 50 visits PCY - 15 have already been used   Auth Status Total   Visits  Expiration date Co-Insurance   pending                                  Visit Tracker  Date IE  6/17 6/20 6/24 6/27 7/1 7/8    7/11 7/15 7/18 7/30  RE                                                                   PMHx:   has a past medical history of Allergic rhinitis, Asthma, Fibroids, Physiological ovarian cysts, and Sleep apnea.       Manuals HEP 2024                       Ther Ex     Education on HEP and dx x x5min   Scar massage/gentle PROM  X 8 min    Flexbar N's/U's  Green x10    Prayer stretch   5 x10 sec   Pronator  1# x10     Measurements     Bicep curls/hammer curls  4# x10    Wrist flexor and extensor stretch   5 x 10 sec black web                       Ther Act      Wrist Maze  x5   Pushups on plinth table  X10    Towel scrunches  x   Sponges whole bin  lvl 3 gripper                       Modalities     MHP  X 5 min             Assessment:   Patient tolerated session well able to perform all increases in resistive activities and exercises. Session focused on ROM and strengthening to improve deficits and functional performance with daily activities. Patient tolerated all TE/TA with no complaints. Patient progressing well towards goals. Patient benefiting from skilled hand therapy OT to reduce deficits to " improve independence with daily activities.          Plan:   Focus on HEP, A/AA/PROM, TE, TA, strengthening, manual therapy, modalities PRN to improve ability to complete daily activites with ease.  POC 7/30/2024 - 9/8/2024.

## 2024-08-12 ENCOUNTER — OFFICE VISIT (OUTPATIENT)
Dept: OCCUPATIONAL THERAPY | Facility: CLINIC | Age: 59
End: 2024-08-12
Payer: COMMERCIAL

## 2024-08-12 DIAGNOSIS — Z98.890 HX OF HAND SURGERY: Primary | ICD-10-CM

## 2024-08-12 PROCEDURE — 97110 THERAPEUTIC EXERCISES: CPT

## 2024-08-12 PROCEDURE — 97530 THERAPEUTIC ACTIVITIES: CPT

## 2024-08-12 NOTE — PROGRESS NOTES
"Daily Note     Today's date: 2024  Patient name: Colette Sweeney  : 1965  MRN: [id number]  Referring provider: Eva Tellez MD  Dx:   Encounter Diagnosis     ICD-10-CM    1. Hx of hand surgery  Z98.890                  Subjective: Patient reports decreased feeling of \"stabbing/dagger\" sensation on the dorsum of the hand but feels pain if she presses on the scar, \"dycem has been helping which I've been using\".        Objective: See treatment diary below         Precautions: Wrist spanning plate removal on 2024, 10 weeks post op as of 2024      Auth Tracker- 50 visits PCY - 15 have already been used   Auth Status Total   Visits  Expiration date Co-Insurance   pending                                  Visit Tracker  Date IE  6/17 6/20 6/24 6/27 7/1 7/8    7/11 7/15 7/18 7/30  RE                                                                  PMHx:   has a past medical history of Allergic rhinitis, Asthma, Fibroids, Physiological ovarian cysts, and Sleep apnea.       Manuals HEP 2024                       Ther Ex     Education on HEP and dx x x5min   Scar massage/gentle PROM  X 8 min    Flexbar N's/U's  Green x10    Prayer stretch   5 x10 sec   Pronator  1# x10     Measurements     Bicep curls/hammer curls  4# x10    Wrist flexor and extensor stretch   5 x 10 sec black web                       Ther Act      Wrist Maze  x5   Pushups on plinth table  X10    Towel scrunches  x   Sponges whole bin  lvl 3 gripper   Power Web squeeze and rotate  X10                   Modalities     MHP  X 5 min             Assessment:   Patient tolerated session well able to perform all in resistive activities and exercises. Session focused on ROM and strengthening to improve deficits and functional performance with daily activities. Patient tolerated all TE/TA with no complaints. Patient progressing well towards goals. Patient benefiting from skilled hand therapy OT to reduce deficits to " improve independence with daily activities.          Plan:   Focus on HEP, A/AA/PROM, TE, TA, strengthening, manual therapy, modalities PRN to improve ability to complete daily activites with ease.  POC 7/30/2024 - 9/8/2024.

## 2024-08-15 ENCOUNTER — OFFICE VISIT (OUTPATIENT)
Dept: OCCUPATIONAL THERAPY | Facility: CLINIC | Age: 59
End: 2024-08-15
Payer: COMMERCIAL

## 2024-08-15 DIAGNOSIS — Z98.890 HX OF HAND SURGERY: Primary | ICD-10-CM

## 2024-08-15 PROCEDURE — 97530 THERAPEUTIC ACTIVITIES: CPT

## 2024-08-15 NOTE — PROGRESS NOTES
Daily Note     Today's date: 8/15/2024  Patient name: Colette Sweeney  : 1965  MRN: [id number]  Referring provider: Eva Tellez MD  Dx:   Encounter Diagnosis     ICD-10-CM    1. Hx of hand surgery  Z98.890                    Subjective: Patient repots mild soreness due to cleaning the shower yesterday.          Objective: See treatment diary below         Precautions: Wrist spanning plate removal on 2024, 10 weeks post op as of 2024      Auth Tracker- 50 visits PCY - 15 have already been used   Auth Status Total   Visits  Expiration date Co-Insurance   pending                                  Visit Tracker  Date IE  6/17 6/20 6/24 6/27 7/1 7/8    7/11 7/15 7/18 7/30  RE 8/1 8/6    8/8 8/12 8/15                                                                PMHx:   has a past medical history of Allergic rhinitis, Asthma, Fibroids, Physiological ovarian cysts, and Sleep apnea.       Manuals HEP 8/15/2024                       Ther Ex     Education on HEP and dx x x5min   Scar massage/gentle PROM  X 8 min    Flexbar N's/U's  Green x10    Prayer stretch   5 x10 sec   Pronator  1# x10     Measurements     Bicep curls/hammer curls  4# x10    Wrist flexor and extensor stretch   5 x 10 sec black web                       Ther Act      Wrist Maze  x5   Pushups on plinth table  X10    Towel scrunches  x   Sponges whole bin  lvl 3 gripper   Power Web squeeze and rotate  X10                   Modalities     MHP  X 5 min             Assessment:   Patient tolerated session well able to perform all in resistive activities and exercises. Session focused on ROM and strengthening to improve deficits and functional performance with daily activities. Patient tolerated all TE/TA with no complaints. Patient progressing well towards goals. Patient benefiting from skilled hand therapy OT to reduce deficits to improve independence with daily activities.          Plan:   Focus on HEP, A/AA/PROM, TE, TA,  strengthening, manual therapy, modalities PRN to improve ability to complete daily activites with ease.  POC 7/30/2024 - 9/8/2024.

## 2024-08-19 ENCOUNTER — OFFICE VISIT (OUTPATIENT)
Dept: OCCUPATIONAL THERAPY | Facility: CLINIC | Age: 59
End: 2024-08-19
Payer: COMMERCIAL

## 2024-08-19 DIAGNOSIS — Z98.890 HX OF HAND SURGERY: Primary | ICD-10-CM

## 2024-08-19 PROCEDURE — 97530 THERAPEUTIC ACTIVITIES: CPT

## 2024-08-19 PROCEDURE — 97110 THERAPEUTIC EXERCISES: CPT

## 2024-08-19 NOTE — PROGRESS NOTES
Daily Note     Today's date: 2024  Patient name: Colette Sweeney  : 1965  MRN: [id number]  Referring provider: Eva Tellez MD  Dx:   Encounter Diagnosis     ICD-10-CM    1. Hx of hand surgery  Z98.890                  Subjective: Patient reports she was able to  a grocery bag that had milk and cans in it to her waist and up the steps with minimal discomfort.         Objective: See treatment diary below         Precautions: Wrist spanning plate removal on 2024, 10 weeks post op as of 2024      Auth Tracker- 50 visits PCY - 15 have already been used   Auth Status Total   Visits  Expiration date Co-Insurance   pending                                  Visit Tracker  Date IE  6/17 6/20 6/24 6/27 7/1 7/8    7/11 7/15 7/18 7/30  RE 8/1 8/6    8/8 8/12 8/15 8/19       RE                                                        PMHx:   has a past medical history of Allergic rhinitis, Asthma, Fibroids, Physiological ovarian cysts, and Sleep apnea.       Manuals HEP 2024                       Ther Ex     Education on HEP and dx x x5min   Scar massage/gentle PROM  X 8 min    Flexbar N's/U's/flex/ext  Green x10    Prayer stretch   5 x10 sec   Pronator  1# x10     Measurements     Bicep curls/hammer curls  5# x10    Wrist flexor and extensor stretch   5 x 10 sec black web                       Ther Act      Wrist Maze  x5   Pushups on plinth table  X10    Towel scrunches  x   Sponges whole bin  lvl 3 gripper   Power Web squeeze and rotate  X10 green web   Wrist extension on red ball   5 x 10 sec             Modalities     MHP  X 5 min             Assessment:   Patient tolerated session well able to perform all in resistive activities and exercises. Session focused on ROM and strengthening to improve deficits and functional performance with daily activities. Patient tolerated all TE/TA with no complaints. Patient progressing well towards goals. Patient benefiting from skilled hand therapy OT  to reduce deficits to improve independence with daily activities.          Plan:   Focus on HEP, A/AA/PROM, TE, TA, strengthening, manual therapy, modalities PRN to improve ability to complete daily activites with ease.  POC 7/30/2024 - 9/8/2024.

## 2024-08-22 ENCOUNTER — APPOINTMENT (OUTPATIENT)
Dept: OCCUPATIONAL THERAPY | Facility: CLINIC | Age: 59
End: 2024-08-22
Payer: COMMERCIAL

## 2024-08-26 ENCOUNTER — OFFICE VISIT (OUTPATIENT)
Dept: OCCUPATIONAL THERAPY | Facility: CLINIC | Age: 59
End: 2024-08-26
Payer: COMMERCIAL

## 2024-08-26 DIAGNOSIS — Z98.890 HX OF HAND SURGERY: Primary | ICD-10-CM

## 2024-08-26 PROCEDURE — 97530 THERAPEUTIC ACTIVITIES: CPT

## 2024-08-26 PROCEDURE — 97110 THERAPEUTIC EXERCISES: CPT

## 2024-08-26 NOTE — PROGRESS NOTES
"Daily Note     Today's date: 2024  Patient name: Colette Sweeney  : 1965  MRN: [id number]  Referring provider: Eva Tellez MD  Dx:   Encounter Diagnosis     ICD-10-CM    1. Hx of hand surgery  Z98.890                    Subjective: Patient reports she was able to attend a work out class and was able to do wall push ups, \"I'm feeling sore from head to toe today but was able to do the class\".         Objective: See treatment diary below         Precautions: Wrist spanning plate removal on 2024, 12 weeks post op as of 2024      Auth Tracker- 50 visits PCY - 15 have already been used   Auth Status Total   Visits  Expiration date Co-Insurance   pending                                  Visit Tracker  Date IE  6/17 6/20 6/24 6/27 7/1 7/8    7/11 7/15 7/18 7/30  RE 8/1 8/6    8/8 8/12 8/15 8/19 8/26      RE                                                        PMHx:   has a past medical history of Allergic rhinitis, Asthma, Fibroids, Physiological ovarian cysts, and Sleep apnea.       Manuals HEP 2024                       Ther Ex     Education on HEP and dx x x5min   Scar massage/gentle PROM  X 8 min    Flexbar N's/U's/flex/ext  Green x10    Prayer stretch   5 x10 sec   Pronator  1# x10     Measurements     Bicep curls/hammer curls  5# x10    Wrist flexor and extensor stretch   5 x 10 sec black web                       Ther Act      Pushups on plinth table  X10    Towel scrunches  x   Sponges whole bin  lvl 3 gripper   Power Web squeeze and rotate  X10 green web   Wrist extension on red ball   5 x 10 sec             Modalities     MHP  X 5 min             Assessment:   Patient tolerated session well able to perform all in resistive activities and exercises. Session focused on ROM and strengthening to improve deficits and functional performance with daily activities. Patient tolerated all TE/TA with no complaints. Patient progressing well towards goals. Patient benefiting from skilled " hand therapy OT to reduce deficits to improve independence with daily activities.          Plan:   Focus on HEP, A/AA/PROM, TE, TA, strengthening, manual therapy, modalities PRN to improve ability to complete daily activites with ease.  POC 7/30/2024 - 9/8/2024.

## 2024-08-29 ENCOUNTER — OFFICE VISIT (OUTPATIENT)
Dept: FAMILY MEDICINE CLINIC | Facility: CLINIC | Age: 59
End: 2024-08-29
Payer: COMMERCIAL

## 2024-08-29 ENCOUNTER — APPOINTMENT (OUTPATIENT)
Dept: OCCUPATIONAL THERAPY | Facility: CLINIC | Age: 59
End: 2024-08-29
Payer: COMMERCIAL

## 2024-08-29 ENCOUNTER — TELEPHONE (OUTPATIENT)
Age: 59
End: 2024-08-29

## 2024-08-29 VITALS
SYSTOLIC BLOOD PRESSURE: 130 MMHG | WEIGHT: 225 LBS | DIASTOLIC BLOOD PRESSURE: 96 MMHG | BODY MASS INDEX: 38.41 KG/M2 | HEART RATE: 80 BPM | RESPIRATION RATE: 20 BRPM | TEMPERATURE: 97 F | HEIGHT: 64 IN

## 2024-08-29 DIAGNOSIS — R42 LIGHT HEADEDNESS: ICD-10-CM

## 2024-08-29 DIAGNOSIS — Z13.6 SCREENING FOR CARDIOVASCULAR CONDITION: ICD-10-CM

## 2024-08-29 DIAGNOSIS — Z13.29 SCREENING FOR THYROID DISORDER: ICD-10-CM

## 2024-08-29 DIAGNOSIS — R03.0 ELEVATED BLOOD PRESSURE READING: Primary | ICD-10-CM

## 2024-08-29 PROCEDURE — 99214 OFFICE O/P EST MOD 30 MIN: CPT | Performed by: NURSE PRACTITIONER

## 2024-08-29 PROCEDURE — 93000 ELECTROCARDIOGRAM COMPLETE: CPT | Performed by: NURSE PRACTITIONER

## 2024-08-29 NOTE — PROGRESS NOTES
Ambulatory Visit  Name: Colette Sweeney      : 1965      MRN: [id number]  Encounter Provider: ARTEM Barkley  Encounter Date: 2024   Encounter department: [facility]      BP moderately elevated, EKG with no changes compared to prior.  Will check labs and also a 2D echo and stress test.  She will return in 2 weeks for blood pressure check or sooner as needed.    Assessment & Plan   1. Elevated blood pressure reading  -     POCT ECG  -     Echo complete w/ contrast if indicated; Future; Expected date: 2024  -     Stress test only, exercise; Future; Expected date: 2024  2. Light headedness  -     Echo complete w/ contrast if indicated; Future; Expected date: 2024  -     Stress test only, exercise; Future; Expected date: 2024  3. Screening for cardiovascular condition  -     Comprehensive metabolic panel; Future  -     CBC and differential; Future  -     Lipid panel; Future  4. Screening for thyroid disorder  -     TSH, 3rd generation with Free T4 reflex; Future     History of Present Illness     Here today for BP check.  States that she has been having headaches, prompting her to check her blood pressure.  It has been fluctuating up to the 160s over 100s, but at times is also within the normal range.  She occasionally feels lightheaded.  She is not having any chest pains, shortness of breath.  No cardiac history or history of hypertension.  No recent medication changes or changes in diet        Review of Systems   Constitutional: Negative.    Respiratory:  Negative for chest tightness and shortness of breath.    Cardiovascular:  Negative for chest pain and palpitations.   Neurological:  Positive for light-headedness and headaches.     Current Outpatient Medications on File Prior to Visit   Medication Sig Dispense Refill   • acetaminophen (TYLENOL) 325 mg tablet Take 3 tablets (975 mg total) by mouth every 8 (eight) hours  0   • albuterol (2.5 mg/3 mL)  "0.083 % nebulizer solution Take 1 vial (2.5 mg total) by nebulization every 6 (six) hours as needed for wheezing or shortness of breath 100 mL 0   • Calcium Carb-Cholecalciferol 600-500 MG-UNIT CAPS Take by mouth in the morning     • Fluticasone-Salmeterol (Advair Diskus) 500-50 mcg/dose inhaler Inhale 1 puff 2 (two) times a day Rinse mouth after use. 180 blister 1   • multivitamin (THERAGRAN) TABS Take 1 tablet by mouth daily     • Respiratory Therapy Supplies (NEBULIZER/TUBING/MOUTHPIECE) KIT by Does not apply route 4 (four) times a day 1 each 0   • valACYclovir (VALTREX) 1,000 mg tablet 2 tabs at earliest onset of cold sore, repeat once in 12 hours. Take 500mg bid for 3 days for genital herpes flare up. 40 tablet 5     No current facility-administered medications on file prior to visit.      Social History     Tobacco Use   • Smoking status: Never     Passive exposure: Never   • Smokeless tobacco: Never   Vaping Use   • Vaping status: Never Used   Substance and Sexual Activity   • Alcohol use: Yes     Comment: social - wine 2 x month   • Drug use: Never   • Sexual activity: Yes     Partners: Male     Birth control/protection: Condom Male     Objective     /96   Pulse 80   Temp (!) 97 °F (36.1 °C)   Resp 20   Ht 5' 4\" (1.626 m)   Wt 102 kg (225 lb)   LMP 04/28/2021   BMI 38.62 kg/m²     Physical Exam  Vitals and nursing note reviewed.   Constitutional:       General: She is not in acute distress.     Appearance: Normal appearance.   HENT:      Head: Normocephalic and atraumatic.   Eyes:      Conjunctiva/sclera: Conjunctivae normal.   Neck:      Vascular: No carotid bruit.   Cardiovascular:      Rate and Rhythm: Normal rate and regular rhythm.      Pulses: Normal pulses.      Heart sounds: Normal heart sounds. No murmur heard.  Pulmonary:      Effort: Pulmonary effort is normal.      Breath sounds: Normal breath sounds.   Skin:     General: Skin is warm and dry.   Neurological:      Mental Status: She " is alert.   Psychiatric:         Mood and Affect: Mood normal.         Behavior: Behavior normal.       Administrative Statements

## 2024-09-03 ENCOUNTER — OFFICE VISIT (OUTPATIENT)
Dept: OCCUPATIONAL THERAPY | Facility: CLINIC | Age: 59
End: 2024-09-03
Payer: COMMERCIAL

## 2024-09-03 DIAGNOSIS — Z98.890 HX OF HAND SURGERY: Primary | ICD-10-CM

## 2024-09-03 PROCEDURE — 97110 THERAPEUTIC EXERCISES: CPT

## 2024-09-03 NOTE — PROGRESS NOTES
OT Re-Evaluation     Today's date: 9/3/2024  Patient name: Colette Sweeney  : 1965  MRN: [id number]  Referring provider: Eva Tellez MD  Dx:   Encounter Diagnosis     ICD-10-CM    1. Hx of hand surgery  Z98.890                      Assessment  Impairments: activity intolerance, impaired physical strength, lacks appropriate home exercise program and weight-bearing intolerance  Symptom irritability: low    Assessment details: Patient re-evaluation done this this date with noted improvement in wrist AROM and  strength in LUE. Patient strength also assessed this date with noted deficits in LUE compared to RUE at this time. Patient would benefit from continued skilled OT to maximize ROM and strength at this time to improve functional use with ADLs.          Patient is a 59 y.o. female who presents for OT IE and treatment for L spanning plate hardware removal. Patient reports she went for a walk and slipped on black ice, she went to the ED and Xrays (+) for fx on 2024 and had surgery on 2024. Patient had a spanning plate in her L wrist and was seen for occupational therapy services and now is returning for an IE post having hardware removal sx. Patient referred by Dr. Tellez to initiate treatment including hand therapy.      Understanding of Dx/Px/POC: good     Prognosis: excellent    Goals  Short Term Goals by 2 - 4 weeks:    Establish HEP to increase performance with daily activities. MET    Patient will increase L  strength by at least 10 lbs to assist in completing ADLs. MET    Patient will increase wrist AROM of LUE by at least 10 degrees to complete ADLs. Met    Update STG:  Patient will increase  strength by at least 10 lbs to aid in performing household chores such as cleaning the house. (Progressing, +8 lbs since previous RE)        Long Term Goals by discharge:    Establish final home exercise program to enhance maximal functional level with ADLs.    Achieve functional  strength of LUE for full return to high level ADLs.       Plan  Patient would benefit from: skilled occupational therapy, OT eval, custom splinting and orthotics  Planned modality interventions: electrical stimulation/French stimulation, ultrasound, TENS, high voltage pulsed current: pain management and thermotherapy: hydrocollator packs    Planned therapy interventions: IASTM, joint mobilization, kinesiology taping, manual therapy, ADL retraining, activity modification, ADL training, balance/weight bearing training, behavior modification, coordination, compression, fine motor coordination training, flexibility, functional ROM exercises, graded exercise, graded activity, graded motor, home exercise program, IADL retraining, therapeutic training, therapeutic exercise, therapeutic activities, stretching, strengthening, sensory integrative techniques, self care, patient/caregiver education, orthotic management and training, orthotic fitting/training, neuromuscular re-education and nerve gliding    Frequency: 2x week  Duration in weeks: 8  Plan of Care beginning date: 9/3/2024  Plan of Care expiration date: 10/29/2024  Treatment plan discussed with: patient  Plan details: Focus on HEP, A/AA/PROM, TE, TA, strengthening, manual therapy, modalities PRN to improve ability to complete daily activites with ease.  POC 9/3/2024 - 10/29/2024    Subjective Evaluation    History of Present Illness  Date of surgery: 5/31/2024  Mechanism of injury: surgery  Mechanism of injury: Patient is a 59 y.o. female who presents for OT IE and treatment for L spanning plate hardware removal. Patient reports she went for a walk and slipped on black ice, she went to the ED and Xrays (+) for fx on 2/22/2024 and had surgery on 2/29/2024. Patient had a spanning plate in her L wrist and was seen for occupational therapy services and now is returning for an IE post having hardware removal sx. Patient referred by Dr. Tellez to initiate treatment  "including hand therapy.      Quality of life: good    Patient Goals  Patient goals for therapy: increased strength, independence with ADLs/IADLs, decreased pain, increased motion and return to sport/leisure activities    Pain  Current pain ratin  At best pain ratin  At worst pain ratin  Quality: sharp and discomfort  Relieving factors: heat  Exacerbated by: \"Can lift a grocery bag but if it's too heavy it's a problem\"  Progression: improved    Social Support  Lives with: spouse (1 cat)    Employment status: working  Hand dominance: right    Treatments  Current treatment: occupational therapy    Objective     Observations     Left Wrist/Hand   Positive for incision.     Additional Observation Details  One incision on dorsal aspect of forearm, second incision on dorsal aspect of L hand     Active Range of Motion     Left Wrist   Wrist flexion: 57 degrees   Wrist extension: 64 degrees   Radial deviation: 16 degrees   Ulnar deviation: 18 degrees     Right Wrist   Wrist flexion: 70 degrees   Wrist extension: 70 degrees   Radial deviation: 18 degrees   Ulnar deviation: 30 degrees     Left Thumb   Kapandji score: 9 degrees      Right Thumb   Kapandji score: 9 degrees    Strength/Myotome Testing     Left Wrist/Hand      (2nd hand position)     Trial 1: 30    Thumb Strength  Key/Lateral Pinch     Trial 1: 10  Palmar/Three-Point Pinch     Trial 1: 6    Right Wrist/Hand      (2nd hand position)     Trial 1: 50    Thumb Strength   Key/Lateral Pinch     Trial 1: 15  Palmar/Three-Point Pinch     Trial 1: 12               Precautions: Wrist spanning plate removal on 2024, 12 weeks post op as of 2024      Auth Tracker- 50 visits PCY - 15 have already been used   Auth Status Total   Visits  Expiration date Co-Insurance   pending                                  Visit Tracker  Date IE   7/8    7/11 7/15 7/18 7/30  RE 8/    8/8 8/12 8/15 8/19 8/26 9/3  RE     RE                 "                                        PMHx:   has a past medical history of Allergic rhinitis, Asthma, Fibroids, Physiological ovarian cysts, and Sleep apnea.       Manuals HEP 9/3/2024                       Ther Ex     Education on HEP and dx x x5min   Scar massage/gentle PROM  X 8 min    Flexbar N's/U's/flex/ext  Green x10    Prayer stretch   5 x10 sec   Pronator  1# x10     Measurements  x   Bicep curls/hammer curls  6# x10    Wrist flexor and extensor stretch   5 x 10 sec black web                       Ther Act      Pushups on plinth table  X10    Towel scrunches  x   Sponges whole bin  lvl 3 gripper   Power Web squeeze and rotate  X10 green web   Wrist extension on red ball   5 x 10 sec             Modalities     MHP  X 5 min             Assessment:   Patient re-evaluation done this this date with noted improvement in wrist AROM. Patient strength also assessed this date with noted deficits in LUE compared to RUE at this time. Patient would benefit from continued skilled OT to maximize ROM and strength at this time to improve functional use with ADLs. Patient tolerated session well able to perform all in resistive activities and exercises. Session focused on ROM and strengthening to improve deficits and functional performance with daily activities. Patient tolerated all TE/TA with no complaints. Patient progressing well towards goals.       Plan:   Focus on HEP, A/AA/PROM, TE, TA, strengthening, manual therapy, modalities PRN to improve ability to complete daily activites with ease.  POC 9/3/2024 - 10/29/2024

## 2024-09-03 NOTE — LETTER
September 3, 2024    Eva Tellez MD  [address] [place] [address], [address]  [facility] [address]    Patient: Colette Sweeney   YOB: 1965   Date of Visit: 9/3/2024     Encounter Diagnosis     ICD-10-CM    1. Hx of hand surgery  Z98.890           Dear Dr. Tellez:    Thank you for your recent referral of Colette Sweeney. Please review the attached evaluation summary from Colette's recent visit.     Please verify that you agree with the plan of care by signing the attached order.     If you have any questions or concerns, please do not hesitate to call.     I sincerely appreciate the opportunity to share in the care of one of your patients and hope to have another opportunity to work with you in the near future.     Sincerely,    Ellie James, OT      Referring Provider:     I certify that I have read the below Plan of Care and certify the need for these services furnished under this plan of treatment while under my care.                    Eva Tellez MD  [address] [place] [address], [address]  [facility] [address]  Via Fax: [phone]        OT Re-Evaluation     Today's date: 9/3/2024  Patient name: Colette Sweeney  : 1965  MRN: [id number]  Referring provider: Eva Tellez MD  Dx:   Encounter Diagnosis     ICD-10-CM    1. Hx of hand surgery  Z98.890                      Assessment  Impairments: activity intolerance, impaired physical strength, lacks appropriate home exercise program and weight-bearing intolerance  Symptom irritability: low    Assessment details: Patient re-evaluation done this this date with noted improvement in wrist AROM and  strength in LUE. Patient strength also assessed this date with noted deficits in LUE compared to RUE at this time. Patient would benefit from continued skilled OT to maximize ROM and strength at this time to improve functional use with ADLs.          Patient is a 59 y.o. female who presents for OT IE and treatment for L spanning plate hardware  removal. Patient reports she went for a walk and slipped on black ice, she went to the ED and Xrays (+) for fx on 2/22/2024 and had surgery on 2/29/2024. Patient had a spanning plate in her L wrist and was seen for occupational therapy services and now is returning for an IE post having hardware removal sx. Patient referred by Dr. Tellez to initiate treatment including hand therapy.      Understanding of Dx/Px/POC: good     Prognosis: excellent    Goals  Short Term Goals by 2 - 4 weeks:    Establish HEP to increase performance with daily activities. MET    Patient will increase L  strength by at least 10 lbs to assist in completing ADLs. MET    Patient will increase wrist AROM of LUE by at least 10 degrees to complete ADLs. Met    Update STG:  Patient will increase  strength by at least 10 lbs to aid in performing household chores such as cleaning the house. (Progressing, +8 lbs since previous RE)        Long Term Goals by discharge:    Establish final home exercise program to enhance maximal functional level with ADLs.    Achieve functional strength of LUE for full return to high level ADLs.       Plan  Patient would benefit from: skilled occupational therapy, OT eval, custom splinting and orthotics  Planned modality interventions: electrical stimulation/Greenlandic stimulation, ultrasound, TENS, high voltage pulsed current: pain management and thermotherapy: hydrocollator packs    Planned therapy interventions: IASTM, joint mobilization, kinesiology taping, manual therapy, ADL retraining, activity modification, ADL training, balance/weight bearing training, behavior modification, coordination, compression, fine motor coordination training, flexibility, functional ROM exercises, graded exercise, graded activity, graded motor, home exercise program, IADL retraining, therapeutic training, therapeutic exercise, therapeutic activities, stretching, strengthening, sensory integrative techniques, self care,  "patient/caregiver education, orthotic management and training, orthotic fitting/training, neuromuscular re-education and nerve gliding    Frequency: 2x week  Duration in weeks: 8  Plan of Care beginning date: 9/3/2024  Plan of Care expiration date: 10/29/2024  Treatment plan discussed with: patient  Plan details: Focus on HEP, A/AA/PROM, TE, TA, strengthening, manual therapy, modalities PRN to improve ability to complete daily activites with ease.  POC 9/3/2024 - 10/29/2024    Subjective Evaluation    History of Present Illness  Date of surgery: 2024  Mechanism of injury: surgery  Mechanism of injury: Patient is a 59 y.o. female who presents for OT IE and treatment for L spanning plate hardware removal. Patient reports she went for a walk and slipped on black ice, she went to the ED and Xrays (+) for fx on 2024 and had surgery on 2024. Patient had a spanning plate in her L wrist and was seen for occupational therapy services and now is returning for an IE post having hardware removal sx. Patient referred by Dr. Tellez to initiate treatment including hand therapy.      Quality of life: good    Patient Goals  Patient goals for therapy: increased strength, independence with ADLs/IADLs, decreased pain, increased motion and return to sport/leisure activities    Pain  Current pain ratin  At best pain ratin  At worst pain ratin  Quality: sharp and discomfort  Relieving factors: heat  Exacerbated by: \"Can lift a grocery bag but if it's too heavy it's a problem\"  Progression: improved    Social Support  Lives with: spouse (1 cat)    Employment status: working  Hand dominance: right    Treatments  Current treatment: occupational therapy    Objective     Observations     Left Wrist/Hand   Positive for incision.     Additional Observation Details  One incision on dorsal aspect of forearm, second incision on dorsal aspect of L hand     Active Range of Motion     Left Wrist   Wrist flexion: 57 degrees "   Wrist extension: 64 degrees   Radial deviation: 16 degrees   Ulnar deviation: 18 degrees     Right Wrist   Wrist flexion: 70 degrees   Wrist extension: 70 degrees   Radial deviation: 18 degrees   Ulnar deviation: 30 degrees     Left Thumb   Kapandji score: 9 degrees      Right Thumb   Kapandji score: 9 degrees    Strength/Myotome Testing     Left Wrist/Hand      (2nd hand position)     Trial 1: 30    Thumb Strength  Key/Lateral Pinch     Trial 1: 10  Palmar/Three-Point Pinch     Trial 1: 6    Right Wrist/Hand      (2nd hand position)     Trial 1: 50    Thumb Strength   Key/Lateral Pinch     Trial 1: 15  Palmar/Three-Point Pinch     Trial 1: 12               Precautions: Wrist spanning plate removal on 5/31/2024, 12 weeks post op as of 8/23/2024      Auth Tracker- 50 visits PCY - 15 have already been used   Auth Status Total   Visits  Expiration date Co-Insurance   pending                                  Visit Tracker  Date IE  6/17 6/20 6/24 6/27 7/1 7/8    7/11 7/15 7/18 7/30  RE 8/1 8/6    8/8 8/12 8/15 8/19 8/26 9/3  RE     RE                                                        PMHx:   has a past medical history of Allergic rhinitis, Asthma, Fibroids, Physiological ovarian cysts, and Sleep apnea.       Manuals HEP 9/3/2024                       Ther Ex     Education on HEP and dx x x5min   Scar massage/gentle PROM  X 8 min    Flexbar N's/U's/flex/ext  Green x10    Prayer stretch   5 x10 sec   Pronator  1# x10     Measurements  x   Bicep curls/hammer curls  6# x10    Wrist flexor and extensor stretch   5 x 10 sec black web                       Ther Act      Pushups on plinth table  X10    Towel scrunches  x   Sponges whole bin  lvl 3 gripper   Power Web squeeze and rotate  X10 green web   Wrist extension on red ball   5 x 10 sec             Modalities     MHP  X 5 min             Assessment:   Patient re-evaluation done this this date with noted improvement in wrist AROM. Patient strength also  assessed this date with noted deficits in LUE compared to RUE at this time. Patient would benefit from continued skilled OT to maximize ROM and strength at this time to improve functional use with ADLs. Patient tolerated session well able to perform all in resistive activities and exercises. Session focused on ROM and strengthening to improve deficits and functional performance with daily activities. Patient tolerated all TE/TA with no complaints. Patient progressing well towards goals.       Plan:   Focus on HEP, A/AA/PROM, TE, TA, strengthening, manual therapy, modalities PRN to improve ability to complete daily activites with ease.  POC 9/3/2024 - 10/29/2024

## 2024-09-09 ENCOUNTER — OFFICE VISIT (OUTPATIENT)
Dept: OCCUPATIONAL THERAPY | Facility: CLINIC | Age: 59
End: 2024-09-09
Payer: COMMERCIAL

## 2024-09-09 DIAGNOSIS — Z98.890 HX OF HAND SURGERY: Primary | ICD-10-CM

## 2024-09-09 PROCEDURE — 97110 THERAPEUTIC EXERCISES: CPT

## 2024-09-09 PROCEDURE — 97530 THERAPEUTIC ACTIVITIES: CPT

## 2024-09-09 NOTE — PROGRESS NOTES
Daily Note     Today's date: 2024  Patient name: Colette Sweeney  : 1965  MRN: [id number]  Referring provider: Eva Tellez MD  Dx:   Encounter Diagnosis     ICD-10-CM    1. Hx of hand surgery  Z98.890                      Subjective: Patient reports she was able to perform gym exercises including lifting 15-45 lbs.      Objective: See treatment diary below         Precautions: Wrist spanning plate removal on 2024, 12 weeks post op as of 2024      Auth Tracker- 50 visits PCY - 15 have already been used   Auth Status Total   Visits  Expiration date Co-Insurance   pending                                  Visit Tracker  Date IE  6/17 6/20 6/24 6/27 7/1 7/8    7/11 7/15 7/18 7/30  RE 8/1 8/6    8/8 8/12 8/15 8/19 8/26 9/3  RE      D/C                                                         PMHx:   has a past medical history of Allergic rhinitis, Asthma, Fibroids, Physiological ovarian cysts, and Sleep apnea.       Manuals HEP 2024                       Ther Ex     Education on HEP and dx x x5min   Scar massage/gentle PROM  X 8 min    Flexbar N's/U's/flex/ext  Green x10    Prayer stretch   5 x10 sec   Pronator  1# x10     Measurements     Bicep curls/hammer curls  6# x10    Wrist flexor and extensor stretch   5 x 10 sec black web                       Ther Act      Pushups on plinth table  X10    Towel scrunches  x   Sponges whole bin  lvl 4 gripper   Power Web squeeze and rotate  X10 green web                  Modalities     MHP  X 5 min             Assessment:   Patient tolerated session well able to perform all in resistive activities and exercises. Session focused on ROM and strengthening to improve deficits and functional performance with daily activities. Patient tolerated all TE/TA with no complaints. Patient being D/C at this time due to meeting all short term goals.      Plan:   D/C to HEP at this time.

## 2024-09-16 ENCOUNTER — APPOINTMENT (OUTPATIENT)
Dept: OCCUPATIONAL THERAPY | Facility: CLINIC | Age: 59
End: 2024-09-16
Payer: COMMERCIAL

## 2024-09-25 ENCOUNTER — HOSPITAL ENCOUNTER (OUTPATIENT)
Dept: NON INVASIVE DIAGNOSTICS | Facility: HOSPITAL | Age: 59
Discharge: HOME | End: 2024-09-25
Payer: COMMERCIAL

## 2024-09-25 ENCOUNTER — APPOINTMENT (OUTPATIENT)
Dept: LAB | Facility: HOSPITAL | Age: 59
End: 2024-09-25
Payer: COMMERCIAL

## 2024-09-25 VITALS
BODY MASS INDEX: 38.41 KG/M2 | HEIGHT: 64 IN | HEART RATE: 80 BPM | SYSTOLIC BLOOD PRESSURE: 130 MMHG | DIASTOLIC BLOOD PRESSURE: 96 MMHG | WEIGHT: 225 LBS

## 2024-09-25 DIAGNOSIS — Z13.6 SCREENING FOR CARDIOVASCULAR CONDITION: ICD-10-CM

## 2024-09-25 DIAGNOSIS — R42 LIGHT HEADEDNESS: ICD-10-CM

## 2024-09-25 DIAGNOSIS — Z13.29 SCREENING FOR THYROID DISORDER: ICD-10-CM

## 2024-09-25 DIAGNOSIS — R03.0 ELEVATED BLOOD PRESSURE READING: ICD-10-CM

## 2024-09-25 LAB
ALBUMIN SERPL BCG-MCNC: 4.3 G/DL (ref 3.5–5)
ALP SERPL-CCNC: 69 U/L (ref 34–104)
ALT SERPL W P-5'-P-CCNC: 13 U/L (ref 7–52)
ANION GAP SERPL CALCULATED.3IONS-SCNC: 6 MMOL/L (ref 4–13)
AST SERPL W P-5'-P-CCNC: 12 U/L (ref 13–39)
BASOPHILS # BLD AUTO: 0.03 THOUSANDS/ΜL (ref 0–0.1)
BASOPHILS NFR BLD AUTO: 1 % (ref 0–1)
BILIRUB SERPL-MCNC: 0.49 MG/DL (ref 0.2–1)
BUN SERPL-MCNC: 14 MG/DL (ref 5–25)
CALCIUM SERPL-MCNC: 9.2 MG/DL (ref 8.4–10.2)
CHEST PAIN STATEMENT: NORMAL
CHLORIDE SERPL-SCNC: 99 MMOL/L (ref 96–108)
CHOLEST SERPL-MCNC: 201 MG/DL
CO2 SERPL-SCNC: 28 MMOL/L (ref 21–32)
CREAT SERPL-MCNC: 0.75 MG/DL (ref 0.6–1.3)
EOSINOPHIL # BLD AUTO: 0.01 THOUSAND/ΜL (ref 0–0.61)
EOSINOPHIL NFR BLD AUTO: 0 % (ref 0–6)
ERYTHROCYTE [DISTWIDTH] IN BLOOD BY AUTOMATED COUNT: 12.4 % (ref 11.6–15.1)
GFR SERPL CREATININE-BSD FRML MDRD: 88 ML/MIN/1.73SQ M
GLUCOSE P FAST SERPL-MCNC: 87 MG/DL (ref 65–99)
HCT VFR BLD AUTO: 41.5 % (ref 34.8–46.1)
HDLC SERPL-MCNC: 57 MG/DL
HGB BLD-MCNC: 13.5 G/DL (ref 11.5–15.4)
IMM GRANULOCYTES # BLD AUTO: 0.02 THOUSAND/UL (ref 0–0.2)
IMM GRANULOCYTES NFR BLD AUTO: 0 % (ref 0–2)
LDLC SERPL CALC-MCNC: 112 MG/DL (ref 0–100)
LYMPHOCYTES # BLD AUTO: 1.68 THOUSANDS/ΜL (ref 0.6–4.47)
LYMPHOCYTES NFR BLD AUTO: 33 % (ref 14–44)
MAX DIASTOLIC BP: 100 MMHG
MAX PREDICTED HEART RATE: 162 BPM
MCH RBC QN AUTO: 29 PG (ref 26.8–34.3)
MCHC RBC AUTO-ENTMCNC: 32.5 G/DL (ref 31.4–37.4)
MCV RBC AUTO: 89 FL (ref 82–98)
MONOCYTES # BLD AUTO: 0.33 THOUSAND/ΜL (ref 0.17–1.22)
MONOCYTES NFR BLD AUTO: 6 % (ref 4–12)
NEUTROPHILS # BLD AUTO: 3.1 THOUSANDS/ΜL (ref 1.85–7.62)
NEUTS SEG NFR BLD AUTO: 60 % (ref 43–75)
NONHDLC SERPL-MCNC: 144 MG/DL
NRBC BLD AUTO-RTO: 0 /100 WBCS
PLATELET # BLD AUTO: 287 THOUSANDS/UL (ref 149–390)
PMV BLD AUTO: 9 FL (ref 8.9–12.7)
POTASSIUM SERPL-SCNC: 4.1 MMOL/L (ref 3.5–5.3)
PROT SERPL-MCNC: 7.6 G/DL (ref 6.4–8.4)
PROTOCOL NAME: NORMAL
RBC # BLD AUTO: 4.65 MILLION/UL (ref 3.81–5.12)
REASON FOR TERMINATION: NORMAL
SODIUM SERPL-SCNC: 133 MMOL/L (ref 135–147)
STRESS POST EXERCISE DUR MIN: 6 MIN
STRESS POST EXERCISE DUR SEC: 0 SEC
STRESS POST PEAK HR: 173 BPM
STRESS POST PEAK SYSTOLIC BP: 210 MMHG
TARGET HR FORMULA: NORMAL
TEST INDICATION: NORMAL
TRIGL SERPL-MCNC: 160 MG/DL
TSH SERPL DL<=0.05 MIU/L-ACNC: 1.49 UIU/ML (ref 0.45–4.5)
WBC # BLD AUTO: 5.17 THOUSAND/UL (ref 4.31–10.16)

## 2024-09-25 PROCEDURE — 80061 LIPID PANEL: CPT

## 2024-09-25 PROCEDURE — 93306 TTE W/DOPPLER COMPLETE: CPT

## 2024-09-25 PROCEDURE — 93306 TTE W/DOPPLER COMPLETE: CPT | Performed by: INTERNAL MEDICINE

## 2024-09-25 PROCEDURE — 36415 COLL VENOUS BLD VENIPUNCTURE: CPT

## 2024-09-25 PROCEDURE — 93016 CV STRESS TEST SUPVJ ONLY: CPT | Performed by: INTERNAL MEDICINE

## 2024-09-25 PROCEDURE — 93018 CV STRESS TEST I&R ONLY: CPT | Performed by: INTERNAL MEDICINE

## 2024-09-25 PROCEDURE — 84443 ASSAY THYROID STIM HORMONE: CPT

## 2024-09-25 PROCEDURE — 93017 CV STRESS TEST TRACING ONLY: CPT

## 2024-09-25 PROCEDURE — 85025 COMPLETE CBC W/AUTO DIFF WBC: CPT

## 2024-09-25 PROCEDURE — 80053 COMPREHEN METABOLIC PANEL: CPT

## 2024-09-26 ENCOUNTER — TELEPHONE (OUTPATIENT)
Dept: FAMILY MEDICINE CLINIC | Facility: CLINIC | Age: 59
End: 2024-09-26

## 2024-09-26 DIAGNOSIS — I25.3 ATRIAL SEPTAL ANEURYSM: Primary | ICD-10-CM

## 2024-09-26 DIAGNOSIS — I10 PRIMARY HYPERTENSION: ICD-10-CM

## 2024-09-26 LAB
AORTIC ROOT: 3.3 CM
APICAL FOUR CHAMBER EJECTION FRACTION: 58 %
ASCENDING AORTA: 3.7 CM
AV LVOT PEAK GRADIENT: 4 MMHG
AV PEAK GRADIENT: 7 MMHG
BSA FOR ECHO PROCEDURE: 2.06 M2
DOP CALC LVOT AREA: 2.83 CM2
DOP CALC LVOT DIAMETER: 1.9 CM
E WAVE DECELERATION TIME: 189 MS
E/A RATIO: 0.83
FRACTIONAL SHORTENING: 34 (ref 28–44)
INTERVENTRICULAR SEPTUM IN DIASTOLE (PARASTERNAL SHORT AXIS VIEW): 1.1 CM
INTERVENTRICULAR SEPTUM: 1.1 CM (ref 0.6–1.1)
LAAS-AP2: 22.1 CM2
LAAS-AP4: 15.3 CM2
LEFT ATRIUM SIZE: 3.9 CM
LEFT ATRIUM VOLUME (MOD BIPLANE): 52 ML
LEFT ATRIUM VOLUME INDEX (MOD BIPLANE): 25.2 ML/M2
LEFT INTERNAL DIMENSION IN SYSTOLE: 3.1 CM (ref 2.1–4)
LEFT VENTRICULAR INTERNAL DIMENSION IN DIASTOLE: 4.7 CM (ref 3.5–6)
LEFT VENTRICULAR POSTERIOR WALL IN END DIASTOLE: 1 CM
LEFT VENTRICULAR STROKE VOLUME: 62 ML
LVSV (TEICH): 62 ML
MAX HR PERCENT: 106 %
MAX HR: 173 BPM
MV E'TISSUE VEL-SEP: 9 CM/S
MV PEAK A VEL: 0.86 M/S
MV PEAK E VEL: 71 CM/S
MV STENOSIS PRESSURE HALF TIME: 55 MS
MV VALVE AREA P 1/2 METHOD: 4
RATE PRESSURE PRODUCT: NORMAL
RIGHT ATRIUM AREA SYSTOLE A4C: 14 CM2
RIGHT VENTRICLE ID DIMENSION: 3.4 CM
SL CV LEFT ATRIUM LENGTH A2C: 5.8 CM
SL CV LV EF: 60
SL CV PED ECHO LEFT VENTRICLE DIASTOLIC VOLUME (MOD BIPLANE) 2D: 100 ML
SL CV PED ECHO LEFT VENTRICLE SYSTOLIC VOLUME (MOD BIPLANE) 2D: 38 ML
SL CV STRESS RECOVERY BP: NORMAL MMHG
SL CV STRESS RECOVERY HR: 108 BPM
SL CV STRESS RECOVERY O2 SAT: 96 %
SL CV STRESS STAGE REACHED: 2
STRESS ANGINA INDEX: 0
STRESS BASELINE BP: NORMAL MMHG
STRESS BASELINE HR: 90 BPM
STRESS O2 SAT REST: 97 %
STRESS PEAK HR: 171 BPM
STRESS POST ESTIMATED WORKLOAD: 7 METS
STRESS POST EXERCISE DUR MIN: 6 MIN
STRESS POST O2 SAT PEAK: 95 %
STRESS POST PEAK BP: 210 MMHG
TRICUSPID ANNULAR PLANE SYSTOLIC EXCURSION: 2 CM

## 2024-09-26 RX ORDER — LOSARTAN POTASSIUM 50 MG/1
50 TABLET ORAL DAILY
Qty: 30 TABLET | Refills: 2 | Status: SHIPPED | OUTPATIENT
Start: 2024-09-26

## 2024-09-26 NOTE — TELEPHONE ENCOUNTER
Spoke w/ pt regarding echo results.  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  Her BP readings are still labile. Will start on Losartan and referral for cardiology provided. ARTEM Barkley

## 2024-10-09 ENCOUNTER — APPOINTMENT (OUTPATIENT)
Dept: RADIOLOGY | Facility: CLINIC | Age: 59
End: 2024-10-09
Payer: COMMERCIAL

## 2024-10-09 ENCOUNTER — OFFICE VISIT (OUTPATIENT)
Dept: OBGYN CLINIC | Facility: CLINIC | Age: 59
End: 2024-10-09
Payer: COMMERCIAL

## 2024-10-09 ENCOUNTER — OFFICE VISIT (OUTPATIENT)
Dept: FAMILY MEDICINE CLINIC | Facility: CLINIC | Age: 59
End: 2024-10-09
Payer: COMMERCIAL

## 2024-10-09 VITALS
OXYGEN SATURATION: 96 % | TEMPERATURE: 97.3 F | RESPIRATION RATE: 18 BRPM | DIASTOLIC BLOOD PRESSURE: 84 MMHG | WEIGHT: 225 LBS | HEART RATE: 72 BPM | BODY MASS INDEX: 38.41 KG/M2 | SYSTOLIC BLOOD PRESSURE: 130 MMHG | HEIGHT: 64 IN

## 2024-10-09 VITALS
HEIGHT: 64 IN | BODY MASS INDEX: 38.68 KG/M2 | SYSTOLIC BLOOD PRESSURE: 128 MMHG | DIASTOLIC BLOOD PRESSURE: 84 MMHG | HEART RATE: 83 BPM | WEIGHT: 226.6 LBS

## 2024-10-09 DIAGNOSIS — I10 ESSENTIAL HYPERTENSION: Primary | ICD-10-CM

## 2024-10-09 DIAGNOSIS — G89.29 CHRONIC PAIN OF LEFT KNEE: ICD-10-CM

## 2024-10-09 DIAGNOSIS — M17.12 PRIMARY OSTEOARTHRITIS OF LEFT KNEE: Primary | ICD-10-CM

## 2024-10-09 DIAGNOSIS — M25.562 CHRONIC PAIN OF LEFT KNEE: ICD-10-CM

## 2024-10-09 DIAGNOSIS — M21.062 ACQUIRED VALGUS DEFORMITY KNEE, LEFT: ICD-10-CM

## 2024-10-09 DIAGNOSIS — Z01.818 PRE-OP EXAM: ICD-10-CM

## 2024-10-09 DIAGNOSIS — Z86.79 HISTORY OF HYPERTENSION: ICD-10-CM

## 2024-10-09 DIAGNOSIS — J45.40 MODERATE PERSISTENT ASTHMA WITHOUT COMPLICATION: ICD-10-CM

## 2024-10-09 PROCEDURE — 73560 X-RAY EXAM OF KNEE 1 OR 2: CPT

## 2024-10-09 PROCEDURE — 99215 OFFICE O/P EST HI 40 MIN: CPT | Performed by: ORTHOPAEDIC SURGERY

## 2024-10-09 PROCEDURE — 73562 X-RAY EXAM OF KNEE 3: CPT

## 2024-10-09 PROCEDURE — 99214 OFFICE O/P EST MOD 30 MIN: CPT | Performed by: NURSE PRACTITIONER

## 2024-10-09 RX ORDER — ZINC SULFATE 50(220)MG
220 CAPSULE ORAL DAILY
Qty: 30 CAPSULE | Refills: 0 | Status: SHIPPED | OUTPATIENT
Start: 2024-10-09 | End: 2024-11-08

## 2024-10-09 RX ORDER — CHLORTHALIDONE 25 MG/1
25 TABLET ORAL DAILY
Qty: 30 TABLET | Refills: 1 | Status: SHIPPED | OUTPATIENT
Start: 2024-10-09

## 2024-10-09 RX ORDER — FERROUS SULFATE 324(65)MG
324 TABLET, DELAYED RELEASE (ENTERIC COATED) ORAL
Qty: 30 TABLET | Refills: 0 | Status: SHIPPED | OUTPATIENT
Start: 2024-10-09 | End: 2024-11-08

## 2024-10-09 RX ORDER — ASCORBIC ACID 500 MG
500 TABLET ORAL 2 TIMES DAILY
Qty: 60 TABLET | Refills: 0 | Status: SHIPPED | OUTPATIENT
Start: 2024-10-09 | End: 2024-11-08

## 2024-10-09 RX ORDER — FOLIC ACID 1 MG/1
1 TABLET ORAL DAILY
Qty: 30 TABLET | Refills: 0 | Status: SHIPPED | OUTPATIENT
Start: 2024-10-09 | End: 2024-11-08

## 2024-10-09 NOTE — PATIENT INSTRUCTIONS
Patient Education     Chlorthalidone (klor THAL i done)   Brand Names: US Thalitone   Brand Names: Chantal APO-Chlorthalidone; JAMP-Chlorthalidone   What is this drug used for?   It is used to treat high blood pressure.  It is used to get rid of extra fluid.  It may be given to you for other reasons. Talk with the doctor.  What do I need to tell my doctor BEFORE I take this drug?   If you are allergic to this drug; any part of this drug; or any other drugs, foods, or substances. Tell your doctor about the allergy and what signs you had.  If you have a sulfa allergy.  If you are not able to pass urine.  If you are breast-feeding. Do not breast-feed while you take this drug.  This is not a list of all drugs or health problems that interact with this drug.  Tell your doctor and pharmacist about all of your drugs (prescription or OTC, natural products, vitamins) and health problems. You must check to make sure that it is safe for you to take this drug with all of your drugs and health problems. Do not start, stop, or change the dose of any drug without checking with your doctor.  What are some things I need to know or do while I take this drug?   Tell all of your health care providers that you take this drug. This includes your doctors, nurses, pharmacists, and dentists.  Avoid driving and doing other tasks or actions that call for you to be alert until you see how this drug affects you.  To lower the chance of feeling dizzy or passing out, rise slowly if you have been sitting or lying down. Be careful going up and down stairs.  Check your blood pressure as you have been told.  This drug may cause high cholesterol and triglyceride levels. Talk with the doctor.  Have blood work checked as you have been told by the doctor. Talk with the doctor.  This drug may affect certain lab tests. Tell all of your health care providers and lab workers that you take this drug.  Talk with your doctor before you use alcohol, marijuana or  other forms of cannabis, or prescription or OTC drugs that may slow your actions.  If you have high blood sugar (diabetes), you will need to watch your blood sugar closely. Tell your doctor if you get signs of high blood sugar like confusion, feeling sleepy, unusual thirst or hunger, passing urine more often, flushing, fast breathing, or breath that smells like fruit.  If you are taking this drug and have high blood pressure, talk with your doctor before using OTC products that may raise blood pressure. These include cough or cold drugs, diet pills, stimulants, non-steroidal anti-inflammatory drugs (NSAIDs) like ibuprofen or naproxen, and some natural products or aids.  This drug is a strong fluid-lowering drug (diuretic). Sometimes too much water and electrolytes (like potassium) in the blood may be lost. This can lead to severe health problems. Your doctor will follow you closely to change the dose to match your body's needs.  Watch for gout attacks.  If you are on a low-salt or salt-free diet, talk with your doctor.  This drug may make you sunburn more easily. Use care if you will be in the sun. Tell your doctor if you sunburn easily while taking this drug.  Tell your doctor if you are pregnant or plan on getting pregnant. You will need to talk about the benefits and risks of using this drug while you are pregnant.  What are some side effects that I need to call my doctor about right away?   WARNING/CAUTION: Even though it may be rare, some people may have very bad and sometimes deadly side effects when taking a drug. Tell your doctor or get medical help right away if you have any of the following signs or symptoms that may be related to a very bad side effect:  Signs of an allergic reaction, like rash; hives; itching; red, swollen, blistered, or peeling skin with or without fever; wheezing; tightness in the chest or throat; trouble breathing, swallowing, or talking; unusual hoarseness; or swelling of the mouth,  face, lips, tongue, or throat.  Signs of fluid and electrolyte problems like mood changes, confusion, muscle pain or weakness, fast or abnormal heartbeat, severe dizziness or passing out, increased thirst, seizures, feeling very tired or weak, decreased appetite, unable to pass urine or change in the amount of urine produced, dry mouth, dry eyes, or severe upset stomach or throwing up.  Signs of kidney problems like unable to pass urine, change in how much urine is passed, blood in the urine, or a big weight gain.  Signs of a pancreas problem (pancreatitis) like very bad stomach pain, very bad back pain, or very bad upset stomach or throwing up.  A burning, numbness, or tingling feeling that is not normal.  Not able to get or keep an erection.  Restlessness.  Yellow skin or eyes.  Change in eyesight.  Fever, chills, or sore throat; any unexplained bruising or bleeding; or feeling very tired or weak.  What are some other side effects of this drug?   All drugs may cause side effects. However, many people have no side effects or only have minor side effects. Call your doctor or get medical help if any of these side effects or any other side effects bother you or do not go away:  Feeling dizzy, tired, or weak.  Headache.  Constipation, diarrhea, upset stomach, throwing up, or decreased appetite.  Stomach cramps.  These are not all of the side effects that may occur. If you have questions about side effects, call your doctor. Call your doctor for medical advice about side effects.  You may report side effects to your national health agency.  You may report side effects to the FDA at 1-132.145.4070. You may also report side effects at https://www.fda.gov/medwatch.  How is this drug best taken?   Use this drug as ordered by your doctor. Read all information given to you. Follow all instructions closely.  Take this drug with food.  This drug may cause you to pass urine more often. To keep from having sleep problems, try not  to take too close to bedtime.  Keep taking this drug as you have been told by your doctor or other health care provider, even if you feel well.  What do I do if I miss a dose?   Take a missed dose as soon as you think about it.  If it is close to the time for your next dose, skip the missed dose and go back to your normal time.  Do not take 2 doses at the same time or extra doses.  How do I store and/or throw out this drug?   Store at room temperature in a dry place. Do not store in a bathroom.  Keep all drugs in a safe place. Keep all drugs out of the reach of children and pets.  Throw away unused or  drugs. Do not flush down a toilet or pour down a drain unless you are told to do so. Check with your pharmacist if you have questions about the best way to throw out drugs. There may be drug take-back programs in your area.  General drug facts   If your symptoms or health problems do not get better or if they become worse, call your doctor.  Do not share your drugs with others and do not take anyone else's drugs.  Some drugs may have another patient information leaflet. If you have any questions about this drug, please talk with your doctor, nurse, pharmacist, or other health care provider.  Some drugs may have another patient information leaflet. Check with your pharmacist. If you have any questions about this drug, please talk with your doctor, nurse, pharmacist, or other health care provider.  If you think there has been an overdose, call your poison control center or get medical care right away. Be ready to tell or show what was taken, how much, and when it happened.  Consumer Information Use and Disclaimer   This generalized information is a limited summary of diagnosis, treatment, and/or medication information. It is not meant to be comprehensive and should be used as a tool to help the user understand and/or assess potential diagnostic and treatment options. It does NOT include all information about  conditions, treatments, medications, side effects, or risks that may apply to a specific patient. It is not intended to be medical advice or a substitute for the medical advice, diagnosis, or treatment of a health care provider based on the health care provider's examination and assessment of a patient's specific and unique circumstances. Patients must speak with a health care provider for complete information about their health, medical questions, and treatment options, including any risks or benefits regarding use of medications. This information does not endorse any treatments or medications as safe, effective, or approved for treating a specific patient. [employer] and its affiliates disclaim any warranty or liability relating to this information or the use thereof. The use of this information is governed by the Terms of Use, available at https://www.[employer].com/en/know/clinical-effectiveness-terms.  Last Reviewed Date   2021-07-13  Copyright   © 2024 [employer] and its affiliates and/or licensors. All rights reserved.

## 2024-10-09 NOTE — ASSESSMENT & PLAN NOTE
Will switch to chlorthalidone and discussed risks of hyponatremia, hypokalemia and also decrease kidney function and will check BMP in 3 weeks

## 2024-10-09 NOTE — PROGRESS NOTES
"Assessment/Plan:    1. Essential hypertension  Assessment & Plan:  Will switch to chlorthalidone and discussed risks of hyponatremia, hypokalemia and also decrease kidney function and will check BMP in 3 weeks  Orders:  -     chlorthalidone 25 mg tablet; Take 1 tablet (25 mg total) by mouth daily  -     Basic metabolic panel; Future; Expected date: 10/30/2024  2. Moderate persistent asthma without complication  Assessment & Plan:  On advair            Future Appointments   Date Time Provider Department Center   10/9/2024  3:15 PM Trey Fountain DO [facility]   11/11/2024  4:45 PM Luis Carolina DO [facility]   11/14/2024  8:20 AM Nicole Unger MD [facility]           Subjective:      Patient ID: Colette Sweeney is a 59 y.o. female.    Chief Complaint   Patient presents with    Follow-up     BP check  [id number]     BP Readings from Last 3 Encounters:   10/09/24 130/84   09/25/24 130/96   08/29/24 130/96        Vitals:  /84   Pulse 72   Temp (!) 97.3 °F (36.3 °C)   Resp 18   Ht 5' 4\" (1.626 m)   Wt 102 kg (225 lb)   LMP 04/28/2021   SpO2 96%   BMI 38.62 kg/m²     HPI  Patient is here to follow up on BP. Stated that taking losartan from 2 weeks and stated that feeling very lightheaded and does not feel comfortable driving. Denies chest pain and sob.  Scheduled to see cardiologist for atrial septal aneurysm            PHQ-2/9 Depression Screening    Little interest or pleasure in doing things: 0 - not at all  Feeling down, depressed, or hopeless: 0 - not at all  PHQ-2 Score: 0  PHQ-2 Interpretation: Negative depression screen             The following portions of the patient's history were reviewed and updated as appropriate: allergies, current medications, past family history, past medical history, past social history, past surgical history and problem list.      Review of Systems   HENT: Negative.     Respiratory: Negative.     Cardiovascular: Negative.  "   Neurological:  Positive for light-headedness. Negative for headaches.         Objective:    Social History     Tobacco Use   Smoking Status Never    Passive exposure: Never   Smokeless Tobacco Never       Allergies: No Known Allergies      Current Outpatient Medications   Medication Sig Dispense Refill    acetaminophen (TYLENOL) 325 mg tablet Take 3 tablets (975 mg total) by mouth every 8 (eight) hours  0    albuterol (2.5 mg/3 mL) 0.083 % nebulizer solution Take 1 vial (2.5 mg total) by nebulization every 6 (six) hours as needed for wheezing or shortness of breath 100 mL 0    Calcium Carb-Cholecalciferol 600-500 MG-UNIT CAPS Take by mouth in the morning      chlorthalidone 25 mg tablet Take 1 tablet (25 mg total) by mouth daily 30 tablet 1    Fluticasone-Salmeterol (Advair Diskus) 500-50 mcg/dose inhaler Inhale 1 puff 2 (two) times a day Rinse mouth after use. 180 blister 1    multivitamin (THERAGRAN) TABS Take 1 tablet by mouth daily      Respiratory Therapy Supplies (NEBULIZER/TUBING/MOUTHPIECE) KIT by Does not apply route 4 (four) times a day 1 each 0    valACYclovir (VALTREX) 1,000 mg tablet 2 tabs at earliest onset of cold sore, repeat once in 12 hours. Take 500mg bid for 3 days for genital herpes flare up. 40 tablet 5     No current facility-administered medications for this visit.          Physical Exam  Constitutional:       Appearance: Normal appearance.   HENT:      Head: Normocephalic and atraumatic.      Nose: Nose normal.   Eyes:      Conjunctiva/sclera: Conjunctivae normal.   Cardiovascular:      Rate and Rhythm: Normal rate and regular rhythm.      Pulses: Normal pulses.      Heart sounds: Normal heart sounds.   Pulmonary:      Effort: Pulmonary effort is normal.      Breath sounds: Normal breath sounds.   Skin:     General: Skin is warm and dry.      Findings: No rash.   Neurological:      Mental Status: She is alert and oriented to person, place, and time.   Psychiatric:         Mood and Affect:  Mood normal.         Behavior: Behavior normal.         Thought Content: Thought content normal.         Judgment: Judgment normal.                     ARTEM Maciel

## 2024-10-09 NOTE — PROGRESS NOTES
Assessment/Plan:  1. Primary osteoarthritis of left knee  Case request operating room: ARTHROPLASTY KNEE TOTAL W ROBOT - LEFT - PRESS FIT - SAME DAY    Comprehensive metabolic panel    Hemoglobin A1C W/EAG Estimation    CBC and differential    If Symptomatic, order: UA w Reflex to Microscopic w Reflex to Culture    Protime-INR    APTT    MRSA culture    Ambulatory referral to Cardiology    Ambulatory referral to Family Practice    Ambulatory referral to Physical Therapy    EKG 12 lead    Case request operating room: ARTHROPLASTY KNEE TOTAL W ROBOT - LEFT - PRESS FIT - SAME DAY    ascorbic acid (VITAMIN C) 500 MG tablet    ferrous sulfate 324 (65 Fe) mg    folic acid (FOLVITE) 1 mg tablet    Cholecalciferol (VITAMIN D3) 1,000 units tablet    zinc sulfate (ZINCATE) 220 mg capsule      2. Chronic pain of left knee  XR knee 3 vw left non injury    XR knee 1 or 2 vw right    Case request operating room: ARTHROPLASTY KNEE TOTAL W ROBOT - LEFT - PRESS FIT - SAME DAY    Comprehensive metabolic panel    Hemoglobin A1C W/EAG Estimation    CBC and differential    If Symptomatic, order: UA w Reflex to Microscopic w Reflex to Culture    Protime-INR    APTT    MRSA culture    Ambulatory referral to Cardiology    Ambulatory referral to Family Practice    Ambulatory referral to Physical Therapy    EKG 12 lead    Case request operating room: ARTHROPLASTY KNEE TOTAL W ROBOT - LEFT - PRESS FIT - SAME DAY      3. History of hypertension  Ambulatory referral to Cardiology    Ambulatory referral to Family Practice      4. Pre-op exam  Ambulatory referral to Cardiology    Ambulatory referral to Family Practice    Ambulatory referral to Physical Therapy      5. Acquired valgus deformity knee, left          Scribe Attestation      I,:  Jose J Romero am acting as a scribe while in the presence of the attending physician.:       I,:  Trey Fountain, DO personally performed the services described in this documentation    as scribed in my  presence.:           Colette is a pleasant 59-year-old female who returns today for follow-up evaluation for her left knee.  After reviewing her updated imaging and performing a thorough history and physical exam I explained that she remains symptomatic of her end-stage underlying osteoarthritis which presents in a valgus pattern.  She continues to experience persistent pain despite activity modification, maintaining an appropriate BMI, over-the-counter medications, exercise program, and intra-articular corticosteroid and viscosupplementation injections.  Based on the progression of her underlying disease and her persistent pain, explained that she is a good candidate for robotic assisted left total knee arthroplasty. The pre alysia and postoperative expectations were discussed here in the office today. The risks and benefits of undergoing robotic assisted left total knee arthroplasty were discussed at length and consents were signed and placed in the chart.  Please see risk discussion below.  She denies history of DVT/PE, MRSA infection, malignancy, diabetes, GI bleeding or peptic ulcer.  She is not a smoker and is not using turmeric.  She understands she requires preoperative clearance from her primary care physician and cardiologist.  She will meet with my surgery scheduler today to pick a date for her procedure and make preoperative arrangements.  We will place her on our same-day discharge pathway and utilize press-fit implants once again.  She will participate in outpatient physical therapy postoperatively.  All of her questions and concerns were addressed today.  We will plan to see her back at time of surgery.    Subjective: Follow-up evaluation for left knee    Patient ID: Colette Sweeney is a 59 y.o. female who returns today for follow-up evaluation for her left knee.  She concluded a viscosupplementation injection series on 10/11/2023.  At today's visit, she reports that this provided no relief for  her.  She complains of worsening activity related left knee pain.  She describes aching pain about the anteromedial and anterolateral aspect of her knee that is worse with all activity.  She feels limited with prolonged weightbearing and activities of daily living.  She is very pleased with the outcome of her right total knee arthroplasty and is interested in pursuing the surgery on her left knee.  She denies any new injury or trauma.    Review of Systems   Constitutional:  Positive for activity change. Negative for chills, fever and unexpected weight change.   HENT:  Negative for hearing loss, nosebleeds and sore throat.    Eyes:  Negative for pain, redness and visual disturbance.   Respiratory:  Negative for cough, shortness of breath and wheezing.    Cardiovascular:  Negative for chest pain, palpitations and leg swelling.   Gastrointestinal:  Negative for abdominal pain, nausea and vomiting.   Endocrine: Negative for polydipsia and polyuria.   Genitourinary:  Negative for dysuria and hematuria.   Musculoskeletal:  Positive for arthralgias and myalgias. Negative for joint swelling.        See HPI   Skin:  Negative for rash and wound.   Neurological:  Negative for dizziness, numbness and headaches.   Psychiatric/Behavioral:  Negative for decreased concentration and suicidal ideas. The patient is not nervous/anxious.          Past Medical History:   Diagnosis Date    Allergic rhinitis     Asthma     Fibroids     Physiological ovarian cysts     Sleep apnea     denatl device worn       Past Surgical History:   Procedure Laterality Date    COLONOSCOPY  2016    dr garcia    KNEE ARTHROSCOPY Left 2018    meniscectomy x2, first done in 2015    MYOMECTOMY      PA ARTHRP KNE CONDYLE&PLATU MEDIAL&LAT COMPARTMENTS Right 3/6/2023    Procedure: ARTHROPLASTY KNEE TOTAL W ROBOT - RIGHT - PRESS FIT - SAME DAY;  Surgeon: Trey Fountain DO;  Location: [facility];  Service: Orthopedics    PA ARTHRS KNE SURG W/MENISCECTOMY MED/LAT  W/SHVG Right 1/6/2022    Procedure: KNEE ARTHROSCOPY MENISCECTOMY MEDIAL;  Surgeon: Sachin Grove MD;  Location: [facility];  Service: Orthopedics    [place] COLONOSCOPY FLX DX W/COLLJ SPEC WHEN PFRMD N/A 9/13/2018    Procedure: COLONOSCOPY;  Surgeon: Sergo Martinez MD;  Location: [facility];  Service: Gastroenterology    WRIST SURGERY Right        Family History   Problem Relation Age of Onset    Osteoporosis Mother     Thyroid disease Mother     Colon cancer Mother 67    Colon polyps Mother     Cancer Mother         colon    Alzheimer's disease Father     Hypertension Father     Dementia Father     No Known Problems Daughter     No Known Problems Daughter     Ovarian cancer Maternal Grandmother     Heart disease Brother     No Known Problems Maternal Aunt     No Known Problems Maternal Aunt     No Known Problems Paternal Aunt     No Known Problems Paternal Aunt     No Known Problems Paternal Aunt     No Known Problems Paternal Aunt     No Known Problems Paternal Aunt     No Known Problems Paternal Aunt     No Known Problems Paternal Aunt     No Known Problems Paternal Aunt        Social History     Occupational History    Not on file   Tobacco Use    Smoking status: Never     Passive exposure: Never    Smokeless tobacco: Never   Vaping Use    Vaping status: Never Used   Substance and Sexual Activity    Alcohol use: Yes     Comment: social - wine 2 x month    Drug use: Never    Sexual activity: Yes     Partners: Male     Birth control/protection: Condom Male         Current Outpatient Medications:     acetaminophen (TYLENOL) 325 mg tablet, Take 3 tablets (975 mg total) by mouth every 8 (eight) hours, Disp: , Rfl: 0    albuterol (2.5 mg/3 mL) 0.083 % nebulizer solution, Take 1 vial (2.5 mg total) by nebulization every 6 (six) hours as needed for wheezing or shortness of breath, Disp: 100 mL, Rfl: 0    ascorbic acid (VITAMIN C) 500 MG tablet, Take 1 tablet (500 mg total) by mouth 2 (two) times a day, Disp:  60 tablet, Rfl: 0    Calcium Carb-Cholecalciferol 600-500 MG-UNIT CAPS, Take by mouth in the morning, Disp: , Rfl:     chlorthalidone 25 mg tablet, Take 1 tablet (25 mg total) by mouth daily, Disp: 30 tablet, Rfl: 1    Cholecalciferol (VITAMIN D3) 1,000 units tablet, Take 1 tablet (1,000 Units total) by mouth daily, Disp: 30 tablet, Rfl: 0    ferrous sulfate 324 (65 Fe) mg, Take 1 tablet (324 mg total) by mouth daily before breakfast, Disp: 30 tablet, Rfl: 0    Fluticasone-Salmeterol (Advair Diskus) 500-50 mcg/dose inhaler, Inhale 1 puff 2 (two) times a day Rinse mouth after use., Disp: 180 blister, Rfl: 1    folic acid (FOLVITE) 1 mg tablet, Take 1 tablet (1 mg total) by mouth daily, Disp: 30 tablet, Rfl: 0    multivitamin (THERAGRAN) TABS, Take 1 tablet by mouth daily, Disp: , Rfl:     Respiratory Therapy Supplies (NEBULIZER/TUBING/MOUTHPIECE) KIT, by Does not apply route 4 (four) times a day, Disp: 1 each, Rfl: 0    valACYclovir (VALTREX) 1,000 mg tablet, 2 tabs at earliest onset of cold sore, repeat once in 12 hours. Take 500mg bid for 3 days for genital herpes flare up., Disp: 40 tablet, Rfl: 5    zinc sulfate (ZINCATE) 220 mg capsule, Take 1 capsule (220 mg total) by mouth daily, Disp: 30 capsule, Rfl: 0    No Known Allergies    Objective:  Vitals:    10/09/24 1524   BP: 128/84   Pulse: 83       Body mass index is 38.9 kg/m².    Left Knee Exam     Range of Motion   The patient has normal left knee ROM.  Extension:  0   Flexion:  120     Tests   Varus: negative Valgus: negative  Drawer:  Anterior - negative         Other   Erythema: absent  Scars: absent  Sensation: normal  Pulse: present  Swelling: none  Effusion: no effusion present    Comments:  Stable   Neurovascular enacted             Observations   Left Knee   Negative for effusion.       Physical Exam  Vitals and nursing note reviewed.   Constitutional:       Appearance: Normal appearance. She is well-developed.   HENT:      Head: Normocephalic and  atraumatic.      Right Ear: External ear normal.      Left Ear: External ear normal.   Eyes:      General: No scleral icterus.     Extraocular Movements: Extraocular movements intact.      Conjunctiva/sclera: Conjunctivae normal.   Cardiovascular:      Rate and Rhythm: Normal rate.   Pulmonary:      Effort: Pulmonary effort is normal. No respiratory distress.   Musculoskeletal:      Cervical back: Normal range of motion and neck supple.      Left knee: No effusion.      Comments: See Ortho exam   Skin:     General: Skin is warm and dry.   Neurological:      General: No focal deficit present.      Mental Status: She is alert and oriented to person, place, and time.   Psychiatric:         Behavior: Behavior normal.         I have personally reviewed pertinent films in PACS.    X-ray of the left knee obtained on 10/9/2024 reviewed demonstrating end-stage degenerative change in a valgus pattern with bone-on-bone contact laterally.  There is tricompartmental sclerosis and osteophytosis.  There is no acute fracture, dislocation, lytic or blastic lesion.    The patient was counseled in detail regarding the diagnosis, the treatment options available, the prognosis of each treatment option, the potential risks and complications.  These are, but are not limited to; deep vein thrombosis, pulmonary embolism, neurologic and vascular injury, infection, instability, leg length discrepancy, dislocation, hematoma, reflex sympathetic dystrophy, loss of range of motion, ankylosis of the knee, fracture, screw or prosthetic perforation, chronic pain, acute pain, chronic leg pain and edema, loosening, death, heart attack, and stroke.  The patient's questions were answered in detail.  The patient demonstrates understanding of these risks and wishes to proceed with surgery.    This document was created using speech voice recognition software.   Grammatical errors, random word insertions, pronoun errors, and incomplete sentences are an  occasional consequence of this system due to software limitations, ambient noise, and hardware issues.   Any formal questions or concerns about content, text, or information contained within the body of this dictation should be directly addressed to the provider for clarification.

## 2024-10-25 ENCOUNTER — TELEPHONE (OUTPATIENT)
Dept: OBGYN CLINIC | Facility: CLINIC | Age: 59
End: 2024-10-25

## 2024-10-25 NOTE — TELEPHONE ENCOUNTER
Sw  pt and informed that paperwork was faxed and completed. Pt wanted copy sent through [employer]

## 2024-11-06 ENCOUNTER — APPOINTMENT (OUTPATIENT)
Dept: LAB | Facility: HOSPITAL | Age: 59
End: 2024-11-06
Payer: COMMERCIAL

## 2024-11-06 DIAGNOSIS — M17.12 PRIMARY OSTEOARTHRITIS OF LEFT KNEE: Primary | ICD-10-CM

## 2024-11-06 DIAGNOSIS — I10 ESSENTIAL HYPERTENSION: ICD-10-CM

## 2024-11-06 DIAGNOSIS — G89.29 CHRONIC PAIN OF LEFT KNEE: ICD-10-CM

## 2024-11-06 DIAGNOSIS — M25.562 CHRONIC PAIN OF LEFT KNEE: ICD-10-CM

## 2024-11-06 LAB
ANION GAP SERPL CALCULATED.3IONS-SCNC: 5 MMOL/L (ref 4–13)
BUN SERPL-MCNC: 21 MG/DL (ref 5–25)
CALCIUM SERPL-MCNC: 9 MG/DL (ref 8.4–10.2)
CHLORIDE SERPL-SCNC: 102 MMOL/L (ref 96–108)
CO2 SERPL-SCNC: 28 MMOL/L (ref 21–32)
CREAT SERPL-MCNC: 0.83 MG/DL (ref 0.6–1.3)
GFR SERPL CREATININE-BSD FRML MDRD: 77 ML/MIN/1.73SQ M
GLUCOSE P FAST SERPL-MCNC: 96 MG/DL (ref 65–99)
POTASSIUM SERPL-SCNC: 3.8 MMOL/L (ref 3.5–5.3)
SODIUM SERPL-SCNC: 135 MMOL/L (ref 135–147)

## 2024-11-06 PROCEDURE — 36415 COLL VENOUS BLD VENIPUNCTURE: CPT

## 2024-11-06 PROCEDURE — 80048 BASIC METABOLIC PNL TOTAL CA: CPT

## 2024-11-09 RX ORDER — ASCORBATE CALCIUM 500 MG
TABLET ORAL
COMMUNITY
Start: 2024-10-09

## 2024-11-11 ENCOUNTER — OFFICE VISIT (OUTPATIENT)
Dept: FAMILY MEDICINE CLINIC | Facility: CLINIC | Age: 59
End: 2024-11-11
Payer: COMMERCIAL

## 2024-11-11 VITALS
RESPIRATION RATE: 18 BRPM | BODY MASS INDEX: 38.58 KG/M2 | WEIGHT: 226 LBS | HEIGHT: 64 IN | TEMPERATURE: 99 F | SYSTOLIC BLOOD PRESSURE: 112 MMHG | HEART RATE: 72 BPM | DIASTOLIC BLOOD PRESSURE: 82 MMHG

## 2024-11-11 DIAGNOSIS — Z12.31 BREAST CANCER SCREENING BY MAMMOGRAM: ICD-10-CM

## 2024-11-11 DIAGNOSIS — M17.12 PRIMARY OSTEOARTHRITIS OF LEFT KNEE: ICD-10-CM

## 2024-11-11 DIAGNOSIS — I10 ESSENTIAL HYPERTENSION: Primary | ICD-10-CM

## 2024-11-11 DIAGNOSIS — J45.40 MODERATE PERSISTENT ASTHMA WITHOUT COMPLICATION: ICD-10-CM

## 2024-11-11 PROCEDURE — 99214 OFFICE O/P EST MOD 30 MIN: CPT | Performed by: FAMILY MEDICINE

## 2024-11-11 RX ORDER — CHLORTHALIDONE 25 MG/1
25 TABLET ORAL DAILY
Qty: 90 TABLET | Refills: 1 | Status: SHIPPED | OUTPATIENT
Start: 2024-11-11

## 2024-11-11 NOTE — PROGRESS NOTES
Assessment/Plan:    No problem-specific Assessment & Plan notes found for this encounter.    Htn stable on chlorthalidone but after surgical recovery, we could consider retrying losartan 25mg qd in place of diuretic to see if better tolerated w/o nocturia.    Bmp ok while on chlorthalidone as reviewed    Asthma stable on ics/laba    Left knee DJD  Planning surgery  Preop clearance pending next month     Diagnoses and all orders for this visit:    Essential hypertension  -     chlorthalidone 25 mg tablet; Take 1 tablet (25 mg total) by mouth daily    Breast cancer screening by mammogram  -     Mammo screening bilateral w 3d and cad; Future    Moderate persistent asthma without complication    Primary osteoarthritis of left knee    Other orders  -     Calcium Ascorbate (VITAMIN C) 500 mg tablet        Return for Next scheduled follow up.    Subjective:      Patient ID: Colette Sweeney is a 59 y.o. female.    Chief Complaint   Patient presents with    Follow-up     Blood pressure follow up  Malorie Ulloa MA       HPI  Tolerating chlorthalidone 25mg  Takes daily in am  Labs ok after medication  Does urinate more but tolerable  Limits salt  Nocturia 2-3x/night since starting diuretic but not before  Was dizzy on losartan 50mg qd shortly after starting it    The following portions of the patient's history were reviewed and updated as appropriate: allergies, current medications, past family history, past medical history, past social history, past surgical history and problem list.    Review of Systems   Constitutional:  Negative for fatigue.   Neurological:  Negative for dizziness.         Current Outpatient Medications   Medication Sig Dispense Refill    acetaminophen (TYLENOL) 325 mg tablet Take 3 tablets (975 mg total) by mouth every 8 (eight) hours  0    albuterol (2.5 mg/3 mL) 0.083 % nebulizer solution Take 1 vial (2.5 mg total) by nebulization every 6 (six) hours as needed for wheezing or shortness of breath 100  "mL 0    ascorbic acid (VITAMIN C) 500 MG tablet Take 1 tablet (500 mg total) by mouth 2 (two) times a day 60 tablet 0    Calcium Ascorbate (VITAMIN C) 500 mg tablet       Calcium Carb-Cholecalciferol 600-500 MG-UNIT CAPS Take by mouth in the morning      chlorthalidone 25 mg tablet Take 1 tablet (25 mg total) by mouth daily 90 tablet 1    Cholecalciferol (VITAMIN D3) 1,000 units tablet Take 1 tablet (1,000 Units total) by mouth daily 30 tablet 0    ferrous sulfate 324 (65 Fe) mg Take 1 tablet (324 mg total) by mouth daily before breakfast 30 tablet 0    Fluticasone-Salmeterol (Advair Diskus) 500-50 mcg/dose inhaler Inhale 1 puff 2 (two) times a day Rinse mouth after use. 180 blister 1    folic acid (FOLVITE) 1 mg tablet Take 1 tablet (1 mg total) by mouth daily 30 tablet 0    multivitamin (THERAGRAN) TABS Take 1 tablet by mouth daily      Respiratory Therapy Supplies (NEBULIZER/TUBING/MOUTHPIECE) KIT by Does not apply route 4 (four) times a day 1 each 0    valACYclovir (VALTREX) 1,000 mg tablet 2 tabs at earliest onset of cold sore, repeat once in 12 hours. Take 500mg bid for 3 days for genital herpes flare up. 40 tablet 5    zinc sulfate (ZINCATE) 220 mg capsule Take 1 capsule (220 mg total) by mouth daily 30 capsule 0     No current facility-administered medications for this visit.       Objective:    /82   Pulse 72   Temp 99 °F (37.2 °C)   Resp 18   Ht 5' 4\" (1.626 m)   Wt 103 kg (226 lb)   LMP 04/28/2021   BMI 38.79 kg/m²        Physical Exam  Vitals and nursing note reviewed.   Constitutional:       General: She is not in acute distress.     Appearance: She is well-developed. She is obese. She is not ill-appearing.   HENT:      Head: Normocephalic.   Eyes:      General: No scleral icterus.     Conjunctiva/sclera: Conjunctivae normal.   Cardiovascular:      Rate and Rhythm: Normal rate and regular rhythm.      Heart sounds: No murmur heard.  Pulmonary:      Effort: Pulmonary effort is normal. No " respiratory distress.      Breath sounds: No wheezing.   Abdominal:      Palpations: Abdomen is soft.   Musculoskeletal:         General: No deformity.      Cervical back: Neck supple.      Right lower leg: No edema.      Left lower leg: No edema.   Skin:     General: Skin is warm and dry.      Coloration: Skin is not jaundiced or pale.   Neurological:      Mental Status: She is alert.   Psychiatric:         Behavior: Behavior normal.         Thought Content: Thought content normal.                Luis Carolina DO

## 2024-11-12 ENCOUNTER — TELEPHONE (OUTPATIENT)
Dept: OBGYN CLINIC | Facility: HOSPITAL | Age: 59
End: 2024-11-12

## 2024-11-12 NOTE — TELEPHONE ENCOUNTER
Preoperative Elective Admission Assessment, confirmed prior 2/16/23    Pt is going for preop testing 11/29-11/30.     Living Situation:    Who does pt live with: spouse  What kind of home: single level, ranch  How do they enter the home: front  How many levels in home: 1   # of steps to enter home: 1-2  Sleeping arrangement: first/entry floor  Where is Bathroom: first floor   Where is the tub or shower: first floor, walk in shower w/ seat and grab bars and/or step in tub/shower      First Floor Setup:   Is there a bathroom: Yes  Where would pt sleep: bed     DME:  Pt has RW from prior surgery. No DME ordered. Instructed pt to bring RW on DOS, verbalizes understanding.   We discussed clearing pathways in the home and making sure there is accessibly to use the walker, for example, removing throw rugs.      Patient's Current Level of Function: Ambulates: Independently and ADLs: Independent    Post-op Caregiver:  Mom and spouse. Per pt, mom will be coming to stay with her after surgery.   Caregiver Name and phone number for Inpatient discharge needs: Ben Serrano (Spouse)  [phone] (Mobile)   Currently receive any HHC/aides/community supports: No     Post-op Transport: spouse  To/from hospital: spouse  To/from PT 2-3x/week: spouse  Uses community transport now: No     Outpatient Physical Therapy Site:  Site: [facility]   pre and post-op appts scheduled? Yes     Medication Management: self  Preferred Pharmacy for Post-op Medications: "[facility] [address] [[id number]]   Blood Management Vitamin Regimen:  Pt confirms she has preop vitamins at home and will begin prior to surgery as instructed.   Post-op anticoagulant: to be determined by surgical team postoperatively     DC Plan: Pt plans to be discharged home, discussed same day discharge    Barriers to DC identified preoperatively: none identified    BMI: 38.79    Patient Education:  Pt educated on post-op pain, early  mobilization (POD0), LOS goals, OP PT goals, and preoperative bathing. Patient educated that our goal is to appropriately discharge patient based off their post-op function while striving to maintain maximal independence. The goal is to discharge patient to home and for them to attend outpatient physical therapy.    Assigned to care team? Yes      Mupricion Order? If not, Send to Surgeon    SSI Education/Preoperative Bathing   If you have a packet of Erich Wipes, Throw Out - not using at this time    Mupirocin/Bactroban will be at pharmacy as a script from surgeon- use in Nose 2x day for 5 days preop    Preoperative Bathing is now using a CHG 8oz bottle (or two 4oz bottles,  at office or OTC Pharmacy) for 5 days before surgery.

## 2024-11-13 DIAGNOSIS — Z01.818 PRE-OP EVALUATION: Primary | ICD-10-CM

## 2024-11-13 RX ORDER — MUPIROCIN 20 MG/G
OINTMENT TOPICAL
Qty: 15 G | Refills: 0 | Status: SHIPPED | OUTPATIENT
Start: 2024-11-13

## 2024-11-13 NOTE — PROGRESS NOTES
Consultation - Cardiology Office  [facility].    Colette Sweeney 59 y.o. female MRN: [id number]  : 1965  Unit/Bed#:  Encounter: [id number]      ASSESSMENT:  Perioperative cardiac risk assessment for orthopedic surgery/left TKA under spinal anesthesia on 2020    Primary osteoarthritis of left knee  Chronic left knee pain    S/p right TKA     Obesity, BMI 38.79    Interatrial septal aneurysm    History of PFO by bubble study about 10-15 years ago according to the patient    Essential hypertension  BP today is 130/90 with a heart rate of 91/mean  On chlorthalidone 25 mg daily    Stress test, 2024:  Negative for ischemia    TTE, 2024:  EF 60%  Moderate size hypermobile interatrial septal aneurysm.  Cannot exclude associated small PFO  Trace to mild MR, trace TR and DE      RECOMMENDATIONS:  Patient has intermediate perioperative cardiac risk for above planned orthopedic surgery.  There is no cardiac contraindication to the surgery.  Would recommend aggressive DVT prophylaxis perioperatively in view of patient's history of PFO        Thank you for your consultation.  If you have any question please call me at [phone]      Primary Care Physician Requesting Consult: Luis Carolina DO      Reason for Consult / Principal Problem: Perioperative cardiac risk assessment        HPI :     Colette Sweeney is a 59 y.o. year old female who was referred by primary care doctor for Perative cardiac risk assessment prior to undergoing orthopedic surgery/left TKA.  Patient has primary osteoarthritis of left knee with chronic left knee pain.  She had right TKA already.  She also has hypertension which is decently controlled with medications.  She also gives a history of a small PFO that was diagnosed with a bubble study about 10-15 years ago.  Her stress test on 2024 showed no ischemia.  Her echocardiogram at the same time showed EF of 60%.      Review of Systems    Musculoskeletal:  Positive for arthralgias and gait problem.   All other systems reviewed and are negative.      Historical Information   Past Medical History:   Diagnosis Date    Allergic rhinitis     Asthma     Fibroids     Physiological ovarian cysts     Sleep apnea     denatl device worn     Past Surgical History:   Procedure Laterality Date    COLONOSCOPY  2016    dr garcia    KNEE ARTHROSCOPY Left 2018    meniscectomy x2, first done in 2015    MYOMECTOMY      OK ARTHRP KNE CONDYLE&PLATU MEDIAL&LAT COMPARTMENTS Right 3/6/2023    Procedure: ARTHROPLASTY KNEE TOTAL W ROBOT - RIGHT - PRESS FIT - SAME DAY;  Surgeon: Trey Fountain DO;  Location: [facility];  Service: Orthopedics    OK ARTHRS KNE SURG W/MENISCECTOMY MED/LAT W/SHVG Right 1/6/2022    Procedure: KNEE ARTHROSCOPY MENISCECTOMY MEDIAL;  Surgeon: Sachin Grove MD;  Location: [facility];  Service: Orthopedics    OK COLONOSCOPY FLX DX W/COLLJ SPEC WHEN PFRMD N/A 9/13/2018    Procedure: COLONOSCOPY;  Surgeon: Sergo Martinez MD;  Location: [facility];  Service: Gastroenterology    WRIST SURGERY Right      Social History     Substance and Sexual Activity   Alcohol Use Yes    Comment: social - wine 2 x month     Social History     Substance and Sexual Activity   Drug Use Never     Social History     Tobacco Use   Smoking Status Never    Passive exposure: Never   Smokeless Tobacco Never     Family History:   Family History   Problem Relation Age of Onset    Osteoporosis Mother     Thyroid disease Mother     Colon cancer Mother 67    Colon polyps Mother     Cancer Mother         colon    Alzheimer's disease Father     Hypertension Father     Dementia Father     No Known Problems Daughter     No Known Problems Daughter     Ovarian cancer Maternal Grandmother     Heart disease Brother     No Known Problems Maternal Aunt     No Known Problems Maternal Aunt     No Known Problems Paternal Aunt     No Known Problems Paternal Aunt     No Known Problems  Paternal Aunt     No Known Problems Paternal Aunt     No Known Problems Paternal Aunt     No Known Problems Paternal Aunt     No Known Problems Paternal Aunt     No Known Problems Paternal Aunt        Meds/Allergies     No Known Allergies    Current Outpatient Medications:     acetaminophen (TYLENOL) 325 mg tablet, Take 3 tablets (975 mg total) by mouth every 8 (eight) hours, Disp: , Rfl: 0    albuterol (2.5 mg/3 mL) 0.083 % nebulizer solution, Take 1 vial (2.5 mg total) by nebulization every 6 (six) hours as needed for wheezing or shortness of breath, Disp: 100 mL, Rfl: 0    Calcium Ascorbate (VITAMIN C) 500 mg tablet, , Disp: , Rfl:     Calcium Carb-Cholecalciferol 600-500 MG-UNIT CAPS, Take by mouth in the morning, Disp: , Rfl:     chlorthalidone 25 mg tablet, Take 1 tablet (25 mg total) by mouth daily, Disp: 90 tablet, Rfl: 1    multivitamin (THERAGRAN) TABS, Take 1 tablet by mouth daily, Disp: , Rfl:     mupirocin (BACTROBAN) 2 % ointment, Apply into the inside of the left and right nostril twice daily for 5 days before surgery, including the morning of surgery, Disp: 15 g, Rfl: 0    Respiratory Therapy Supplies (NEBULIZER/TUBING/MOUTHPIECE) KIT, by Does not apply route 4 (four) times a day, Disp: 1 each, Rfl: 0    ascorbic acid (VITAMIN C) 500 MG tablet, Take 1 tablet (500 mg total) by mouth 2 (two) times a day, Disp: 60 tablet, Rfl: 0    Cholecalciferol (VITAMIN D3) 1,000 units tablet, Take 1 tablet (1,000 Units total) by mouth daily, Disp: 30 tablet, Rfl: 0    ferrous sulfate 324 (65 Fe) mg, Take 1 tablet (324 mg total) by mouth daily before breakfast, Disp: 30 tablet, Rfl: 0    Fluticasone-Salmeterol (Advair Diskus) 500-50 mcg/dose inhaler, Inhale 1 puff 2 (two) times a day Rinse mouth after use., Disp: 180 blister, Rfl: 1    folic acid (FOLVITE) 1 mg tablet, Take 1 tablet (1 mg total) by mouth daily, Disp: 30 tablet, Rfl: 0    valACYclovir (VALTREX) 1,000 mg tablet, 2 tabs at earliest onset of cold sore,  "repeat once in 12 hours. Take 500mg bid for 3 days for genital herpes flare up., Disp: 40 tablet, Rfl: 5    zinc sulfate (ZINCATE) 220 mg capsule, Take 1 capsule (220 mg total) by mouth daily, Disp: 30 capsule, Rfl: 0    Vitals: Blood pressure 130/90, pulse 91, height 5' 4\" (1.626 m), weight 101 kg (222 lb), last menstrual period 04/28/2021, SpO2 97%, not currently breastfeeding.    Body mass index is 38.11 kg/m².  Vitals:    11/14/24 0811   Weight: 101 kg (222 lb)     BP Readings from Last 3 Encounters:   11/14/24 130/90   11/11/24 112/82   10/09/24 128/84       Physical Exam  PHYSICAL EXAMINATION:  Neurologic:  Alert & oriented x 3, no new focal deficits, Not in any acute distress,  Constitutional: Obese  Eyes:  Pupil equal and reacting to light, conjunctiva normal, No JVP, No LNP   HENT:  Atraumatic, oropharynx moist, Neck- normal range of motion, no tenderness,  Neck supple   Respiratory:  Bilateral air entry, mostly clear to auscultation  Cardiovascular: S1-S2 regular with a I/VI systolic murmur   GI:  Soft, nondistended, normal bowel sounds, nontender, no hepatosplenomegaly appreciated.  Musculoskeletal: no tenderness, no deformities.   Skin:  Well hydrated, no rash   Lymphatic:  No lymphadenopathy noted   Extremities:  No edema and distal pulses are present    Diagnostic Studies Review Cardio:      EKG: Normal sinus rhythm, heart rate 91/min, LVH    Cardiac testing:   Stress test, 9/25/2024:  Negative for ischemia    TTE, 9/25/2024:  EF 60%  Moderate size hypermobile interatrial septal aneurysm.  Cannot exclude associated small PFO  Trace to mild MR, trace TR and PA          Imaging:  Chest X-Ray:   No Chest XR results available for this patient.    CT-scan of the chest:     No CTA results available for this patient.  Lab Review   Lab Results   Component Value Date    WBC 5.17 09/25/2024    HGB 13.5 09/25/2024    HCT 41.5 09/25/2024    MCV 89 09/25/2024    RDW 12.4 09/25/2024     09/25/2024 " "    BMP:  Lab Results   Component Value Date    SODIUM 135 11/06/2024    K 3.8 11/06/2024     11/06/2024    CO2 28 11/06/2024    BUN 21 11/06/2024    CREATININE 0.83 11/06/2024    GLUC 101 07/23/2019    GLUF 96 11/06/2024    CALCIUM 9.0 11/06/2024    EGFR 77 11/06/2024    MG 2.4 07/22/2019     LFT:  Lab Results   Component Value Date    AST 12 (L) 09/25/2024    ALT 13 09/25/2024    ALKPHOS 69 09/25/2024    TP 7.6 09/25/2024    ALB 4.3 09/25/2024      Lab Results   Component Value Date    WWC1EQZTCPFE 1.491 09/25/2024     No components found for: \"TSH3\"  Lab Results   Component Value Date    HGBA1C 5.2 02/11/2023     Lipid Profile:   Lab Results   Component Value Date    CHOLESTEROL 201 (H) 09/25/2024    HDL 57 09/25/2024    LDLCALC 112 (H) 09/25/2024    TRIG 160 (H) 09/25/2024     Lab Results   Component Value Date    CHOLESTEROL 201 (H) 09/25/2024     No results found for: \"CKTOTAL\", \"CKMB\", \"CKMBINDEX\", \"TROPONINI\"  No results found for: \"NTBNP\"   Recent Results (from the past 4 weeks)   Basic metabolic panel    Collection Time: 11/06/24  6:20 AM   Result Value Ref Range    Sodium 135 135 - 147 mmol/L    Potassium 3.8 3.5 - 5.3 mmol/L    Chloride 102 96 - 108 mmol/L    CO2 28 21 - 32 mmol/L    ANION GAP 5 4 - 13 mmol/L    BUN 21 5 - 25 mg/dL    Creatinine 0.83 0.60 - 1.30 mg/dL    Glucose, Fasting 96 65 - 99 mg/dL    Calcium 9.0 8.4 - 10.2 mg/dL    eGFR 77 ml/min/1.73sq m           Dr. Nicole Unger MD, [facility]      \"This note has been constructed using a voice recognition system.Therefore there may be syntax, spelling, and/or grammatical errors. Please call if you have any questions. \"  "

## 2024-11-14 ENCOUNTER — OFFICE VISIT (OUTPATIENT)
Dept: CARDIOLOGY CLINIC | Facility: CLINIC | Age: 59
End: 2024-11-14
Payer: COMMERCIAL

## 2024-11-14 VITALS
SYSTOLIC BLOOD PRESSURE: 130 MMHG | HEART RATE: 91 BPM | OXYGEN SATURATION: 97 % | HEIGHT: 64 IN | BODY MASS INDEX: 37.9 KG/M2 | DIASTOLIC BLOOD PRESSURE: 90 MMHG | WEIGHT: 222 LBS

## 2024-11-14 DIAGNOSIS — M25.562 CHRONIC PAIN OF LEFT KNEE: ICD-10-CM

## 2024-11-14 DIAGNOSIS — Z01.818 PRE-OP EXAM: ICD-10-CM

## 2024-11-14 DIAGNOSIS — G89.29 CHRONIC PAIN OF LEFT KNEE: ICD-10-CM

## 2024-11-14 DIAGNOSIS — I25.3 ATRIAL SEPTAL ANEURYSM: ICD-10-CM

## 2024-11-14 DIAGNOSIS — I10 ESSENTIAL HYPERTENSION: Primary | ICD-10-CM

## 2024-11-14 DIAGNOSIS — M17.12 PRIMARY OSTEOARTHRITIS OF LEFT KNEE: ICD-10-CM

## 2024-11-14 DIAGNOSIS — Z86.79 HISTORY OF HYPERTENSION: ICD-10-CM

## 2024-11-14 PROCEDURE — 99203 OFFICE O/P NEW LOW 30 MIN: CPT | Performed by: INTERNAL MEDICINE

## 2024-11-14 PROCEDURE — 93000 ELECTROCARDIOGRAM COMPLETE: CPT | Performed by: INTERNAL MEDICINE

## 2024-11-26 ENCOUNTER — APPOINTMENT (OUTPATIENT)
Dept: PREADMISSION TESTING | Facility: HOSPITAL | Age: 59
End: 2024-11-26
Payer: COMMERCIAL

## 2024-11-26 ENCOUNTER — APPOINTMENT (OUTPATIENT)
Dept: LAB | Facility: HOSPITAL | Age: 59
End: 2024-11-26
Attending: ORTHOPAEDIC SURGERY
Payer: COMMERCIAL

## 2024-11-26 DIAGNOSIS — Z01.818 PRE-OP EVALUATION: ICD-10-CM

## 2024-11-26 LAB
ALBUMIN SERPL BCG-MCNC: 4.3 G/DL (ref 3.5–5)
ALP SERPL-CCNC: 67 U/L (ref 34–104)
ALT SERPL W P-5'-P-CCNC: 21 U/L (ref 7–52)
ANION GAP SERPL CALCULATED.3IONS-SCNC: 6 MMOL/L (ref 4–13)
APTT PPP: 27 SECONDS (ref 23–34)
AST SERPL W P-5'-P-CCNC: 12 U/L (ref 13–39)
BACTERIA UR QL AUTO: ABNORMAL /HPF
BASOPHILS # BLD AUTO: 0.03 THOUSANDS/ΜL (ref 0–0.1)
BASOPHILS NFR BLD AUTO: 1 % (ref 0–1)
BILIRUB SERPL-MCNC: 0.34 MG/DL (ref 0.2–1)
BILIRUB UR QL STRIP: NEGATIVE
BUN SERPL-MCNC: 25 MG/DL (ref 5–25)
CALCIUM SERPL-MCNC: 9.2 MG/DL (ref 8.4–10.2)
CHLORIDE SERPL-SCNC: 103 MMOL/L (ref 96–108)
CLARITY UR: CLEAR
CO2 SERPL-SCNC: 27 MMOL/L (ref 21–32)
COLOR UR: YELLOW
CREAT SERPL-MCNC: 0.76 MG/DL (ref 0.6–1.3)
EOSINOPHIL # BLD AUTO: 0.01 THOUSAND/ΜL (ref 0–0.61)
EOSINOPHIL NFR BLD AUTO: 0 % (ref 0–6)
ERYTHROCYTE [DISTWIDTH] IN BLOOD BY AUTOMATED COUNT: 12.8 % (ref 11.6–15.1)
EST. AVERAGE GLUCOSE BLD GHB EST-MCNC: 117 MG/DL
GFR SERPL CREATININE-BSD FRML MDRD: 86 ML/MIN/1.73SQ M
GLUCOSE P FAST SERPL-MCNC: 109 MG/DL (ref 65–99)
GLUCOSE UR STRIP-MCNC: NEGATIVE MG/DL
HBA1C MFR BLD: 5.7 %
HCT VFR BLD AUTO: 39.5 % (ref 34.8–46.1)
HGB BLD-MCNC: 13 G/DL (ref 11.5–15.4)
HGB UR QL STRIP.AUTO: NEGATIVE
IMM GRANULOCYTES # BLD AUTO: 0.02 THOUSAND/UL (ref 0–0.2)
IMM GRANULOCYTES NFR BLD AUTO: 0 % (ref 0–2)
INR PPP: 0.94 (ref 0.85–1.19)
KETONES UR STRIP-MCNC: NEGATIVE MG/DL
LEUKOCYTE ESTERASE UR QL STRIP: ABNORMAL
LYMPHOCYTES # BLD AUTO: 1.71 THOUSANDS/ΜL (ref 0.6–4.47)
LYMPHOCYTES NFR BLD AUTO: 31 % (ref 14–44)
MCH RBC QN AUTO: 29 PG (ref 26.8–34.3)
MCHC RBC AUTO-ENTMCNC: 32.9 G/DL (ref 31.4–37.4)
MCV RBC AUTO: 88 FL (ref 82–98)
MONOCYTES # BLD AUTO: 0.35 THOUSAND/ΜL (ref 0.17–1.22)
MONOCYTES NFR BLD AUTO: 6 % (ref 4–12)
MUCOUS THREADS UR QL AUTO: ABNORMAL
NEUTROPHILS # BLD AUTO: 3.33 THOUSANDS/ΜL (ref 1.85–7.62)
NEUTS SEG NFR BLD AUTO: 62 % (ref 43–75)
NITRITE UR QL STRIP: NEGATIVE
NON-SQ EPI CELLS URNS QL MICRO: ABNORMAL /HPF
NRBC BLD AUTO-RTO: 0 /100 WBCS
PH UR STRIP.AUTO: 6.5 [PH]
PLATELET # BLD AUTO: 307 THOUSANDS/UL (ref 149–390)
PMV BLD AUTO: 8.8 FL (ref 8.9–12.7)
POTASSIUM SERPL-SCNC: 3.7 MMOL/L (ref 3.5–5.3)
PROT SERPL-MCNC: 7.6 G/DL (ref 6.4–8.4)
PROT UR STRIP-MCNC: NEGATIVE MG/DL
PROTHROMBIN TIME: 13 SECONDS (ref 12.3–15)
RBC # BLD AUTO: 4.48 MILLION/UL (ref 3.81–5.12)
RBC #/AREA URNS AUTO: ABNORMAL /HPF
SODIUM SERPL-SCNC: 136 MMOL/L (ref 135–147)
SP GR UR STRIP.AUTO: 1.02 (ref 1–1.03)
UROBILINOGEN UR STRIP-ACNC: <2 MG/DL
WBC # BLD AUTO: 5.45 THOUSAND/UL (ref 4.31–10.16)
WBC #/AREA URNS AUTO: ABNORMAL /HPF

## 2024-11-26 PROCEDURE — 85730 THROMBOPLASTIN TIME PARTIAL: CPT

## 2024-11-26 PROCEDURE — 80053 COMPREHEN METABOLIC PANEL: CPT

## 2024-11-26 PROCEDURE — 87081 CULTURE SCREEN ONLY: CPT

## 2024-11-26 PROCEDURE — 85025 COMPLETE CBC W/AUTO DIFF WBC: CPT

## 2024-11-26 PROCEDURE — 85610 PROTHROMBIN TIME: CPT

## 2024-11-26 PROCEDURE — 83036 HEMOGLOBIN GLYCOSYLATED A1C: CPT

## 2024-11-26 NOTE — PRE-PROCEDURE INSTRUCTIONS
Pre-Surgery Instructions:   Medication Instructions    acetaminophen (TYLENOL) 325 mg tablet Uses PRN- OK to take day of surgery    albuterol (2.5 mg/3 mL) 0.083 % nebulizer solution Uses PRN- OK to take day of surgery    ascorbic acid (VITAMIN C) 500 MG tablet Hold day of surgery.    Calcium Carb-Cholecalciferol 600-500 MG-UNIT CAPS Stop taking 7 days prior to surgery.    chlorthalidone 25 mg tablet Take day of surgery.    Cholecalciferol (VITAMIN D3) 1,000 units tablet Hold day of surgery.    ferrous sulfate 324 (65 Fe) mg Hold day of surgery.    Fluticasone-Salmeterol (Advair Diskus) 500-50 mcg/dose inhaler Take day of surgery.    folic acid (FOLVITE) 1 mg tablet Hold day of surgery.    multivitamin (THERAGRAN) TABS Stop taking 7 days prior to surgery.    mupirocin (BACTROBAN) 2 % ointment Instructions provided by MD    valACYclovir (VALTREX) 1,000 mg tablet Hold day of surgery.    zinc sulfate (ZINCATE) 220 mg capsule Hold day of surgery.    Medication instructions for day surgery reviewed. Please use only a sip of water to take your instructed medications. Avoid all over the counter vitamins, supplements and NSAIDS for one week prior to surgery per anesthesia guidelines. Tylenol is ok to take as needed.     You will receive a call one business day prior to surgery with an arrival time and hospital directions. If your surgery is scheduled on a Monday, the hospital will be calling you on the Friday prior to your surgery. If you have not heard from anyone by 8pm, please call the hospital supervisor through the hospital  at [phone]. ([place] [phone] or [place] [phone]).    Do not eat or drink anything after midnight the night before your surgery, including candy, mints, lifesavers, or chewing gum. Do not drink alcohol 24hrs before your surgery. Try not to smoke at least 24hrs before your surgery.       Follow the pre surgery showering instructions as listed in the “My Surgical Experience  Booklet” or otherwise provided by your surgeon's office. Do not use a blade to shave the surgical area 1 week before surgery. It is okay to use a clean electric clippers up to 24 hours before surgery. Do not apply any lotions, creams, including makeup, cologne, deodorant, or perfumes after showering on the day of your surgery. Do not use dry shampoo, hair spray, hair gel, or any type of hair products.     No contact lenses, eye make-up, or artificial eyelashes. Remove nail polish, including gel polish, and any artificial, gel, or acrylic nails if possible. Remove all jewelry including rings and body piercing jewelry.     Wear causal clothing that is easy to take on and off. Consider your type of surgery.    Keep any valuables, jewelry, piercings at home. Please bring any specially ordered equipment (sling, braces) if indicated.    Arrange for a responsible person to drive you to and from the hospital on the day of your surgery. Please confirm the visitor policy for the day of your procedure when you receive your phone call with an arrival time.     Call the surgeon's office with any new illnesses, exposures, or additional questions prior to surgery.    Please reference your “My Surgical Experience Booklet” for additional information to prepare for your upcoming surgery.

## 2024-11-27 LAB
BACTERIA UR CULT: ABNORMAL
MRSA NOSE QL CULT: NORMAL

## 2024-12-02 ENCOUNTER — CONSULT (OUTPATIENT)
Dept: FAMILY MEDICINE CLINIC | Facility: CLINIC | Age: 59
End: 2024-12-02
Payer: COMMERCIAL

## 2024-12-02 VITALS
TEMPERATURE: 99.1 F | RESPIRATION RATE: 16 BRPM | HEART RATE: 103 BPM | WEIGHT: 223 LBS | HEIGHT: 64 IN | BODY MASS INDEX: 38.07 KG/M2 | SYSTOLIC BLOOD PRESSURE: 128 MMHG | DIASTOLIC BLOOD PRESSURE: 88 MMHG

## 2024-12-02 DIAGNOSIS — J45.40 MODERATE PERSISTENT ASTHMA WITHOUT COMPLICATION: ICD-10-CM

## 2024-12-02 DIAGNOSIS — Z01.818 PRE-OP EXAM: ICD-10-CM

## 2024-12-02 DIAGNOSIS — M17.12 PRIMARY OSTEOARTHRITIS OF LEFT KNEE: ICD-10-CM

## 2024-12-02 DIAGNOSIS — G89.29 CHRONIC PAIN OF LEFT KNEE: ICD-10-CM

## 2024-12-02 DIAGNOSIS — Z86.79 HISTORY OF HYPERTENSION: ICD-10-CM

## 2024-12-02 DIAGNOSIS — Z01.818 PREOPERATIVE CLEARANCE: Primary | ICD-10-CM

## 2024-12-02 DIAGNOSIS — M25.562 CHRONIC PAIN OF LEFT KNEE: ICD-10-CM

## 2024-12-02 PROCEDURE — 99213 OFFICE O/P EST LOW 20 MIN: CPT | Performed by: FAMILY MEDICINE

## 2024-12-02 NOTE — H&P (VIEW-ONLY)
Assessment/Plan:    No problem-specific Assessment & Plan notes found for this encounter.    Cleared for surgery  Also had cardiac clearance    Htn stable    Bmi 38 aware    Asthma stable    Labs reviewed  Prediabetes with fbs 109 and A1c 5.7  Diet/exercise advised once recovered       Diagnoses and all orders for this visit:    Preoperative clearance    Primary osteoarthritis of left knee  -     Ambulatory referral to Family Practice    Chronic pain of left knee  -     Ambulatory referral to Family Practice    History of hypertension  -     Ambulatory referral to Family Practice    Pre-op exam  -     Ambulatory referral to Family Practice    Moderate persistent asthma without complication        Return if symptoms worsen or fail to improve.    Subjective:      Patient ID: Colette Sweeney is a 59 y.o. female.    Chief Complaint   Patient presents with    Pre-op Exam    Knee Pain     Sas/cma       HPI    Planned procedure:  left TKR  Surgeon:  Dr Fountain  Date:  12/17/24  Anesthesia type:  spinal    Prior major surgeries:  right TKR, right forearm ORIF, left wrist fx  Problems with anesthesia in past:  n    Can walk 4 blocks or 2 flights of stairs:  y except for mechanical pain  History of excessive bleeding:  n  History of excessive clotting:  n  Personal history of DVT or Pe:  n    Taking NSAIDS:  n or turmeric  Takings vitamins D or E or A or fish oil supplements:  just preop    Usual am medications:  chlorthalidone in am    The following portions of the patient's history were reviewed and updated as appropriate: allergies, current medications, past family history, past medical history, past social history, past surgical history and problem list.    Review of Systems   Constitutional:  Negative for fever.   Respiratory:  Negative for cough and shortness of breath.          Current Outpatient Medications   Medication Sig Dispense Refill    acetaminophen (TYLENOL) 325 mg tablet Take 3 tablets (975 mg total) by  "mouth every 8 (eight) hours  0    albuterol (2.5 mg/3 mL) 0.083 % nebulizer solution Take 1 vial (2.5 mg total) by nebulization every 6 (six) hours as needed for wheezing or shortness of breath 100 mL 0    ascorbic acid (VITAMIN C) 500 MG tablet Take 1 tablet (500 mg total) by mouth 2 (two) times a day 60 tablet 0    Calcium Carb-Cholecalciferol 600-500 MG-UNIT CAPS Take by mouth in the morning      chlorthalidone 25 mg tablet Take 1 tablet (25 mg total) by mouth daily 90 tablet 1    Cholecalciferol (VITAMIN D3) 1,000 units tablet Take 1 tablet (1,000 Units total) by mouth daily 30 tablet 0    ferrous sulfate 324 (65 Fe) mg Take 1 tablet (324 mg total) by mouth daily before breakfast 30 tablet 0    Fluticasone-Salmeterol (Advair Diskus) 500-50 mcg/dose inhaler Inhale 1 puff 2 (two) times a day Rinse mouth after use. 180 blister 1    folic acid (FOLVITE) 1 mg tablet Take 1 tablet (1 mg total) by mouth daily 30 tablet 0    multivitamin (THERAGRAN) TABS Take 1 tablet by mouth daily      mupirocin (BACTROBAN) 2 % ointment Apply into the inside of the left and right nostril twice daily for 5 days before surgery, including the morning of surgery 15 g 0    Respiratory Therapy Supplies (NEBULIZER/TUBING/MOUTHPIECE) KIT by Does not apply route 4 (four) times a day 1 each 0    zinc sulfate (ZINCATE) 220 mg capsule Take 1 capsule (220 mg total) by mouth daily 30 capsule 0    valACYclovir (VALTREX) 1,000 mg tablet 2 tabs at earliest onset of cold sore, repeat once in 12 hours. Take 500mg bid for 3 days for genital herpes flare up. 40 tablet 5     No current facility-administered medications for this visit.       Objective:    /88   Pulse 103   Temp 99.1 °F (37.3 °C)   Resp 16   Ht 5' 4\" (1.626 m)   Wt 101 kg (223 lb)   LMP 04/28/2021   BMI 38.28 kg/m²        Physical Exam  Constitutional:       General: She is not in acute distress.     Appearance: She is well-developed. She is obese. She is not ill-appearing. "   HENT:      Head: Normocephalic.      Right Ear: Tympanic membrane normal.      Left Ear: Tympanic membrane normal.   Eyes:      General: No scleral icterus.        Right eye: No discharge.         Left eye: No discharge.      Conjunctiva/sclera: Conjunctivae normal.   Cardiovascular:      Rate and Rhythm: Normal rate and regular rhythm.   Pulmonary:      Effort: Pulmonary effort is normal. No respiratory distress.      Breath sounds: No wheezing or rales.   Abdominal:      Palpations: Abdomen is soft.      Tenderness: There is no abdominal tenderness.   Musculoskeletal:         General: No deformity.      Cervical back: Neck supple.      Right lower leg: No edema.      Left lower leg: No edema.   Lymphadenopathy:      Cervical: No cervical adenopathy.   Skin:     General: Skin is warm and dry.      Coloration: Skin is not jaundiced or pale.   Neurological:      Mental Status: She is alert.      Motor: No weakness.      Gait: Gait abnormal.      Deep Tendon Reflexes: Reflexes are normal and symmetric.   Psychiatric:         Mood and Affect: Mood normal.         Judgment: Judgment normal.           Luis Carolina DO

## 2024-12-12 NOTE — DISCHARGE INSTR - AVS FIRST PAGE
Total Knee Replacement     WHAT YOU SHOULD KNOW:   Total knee replacement is surgery to replace a knee joint damaged by wear, injury, or disease. It is also called a total knee arthroplasty. The knee joint is where your femur (thigh bone) and tibia (large lower leg bone or shin bone) meet. A small bone called the patella (kneecap) protects your knee joint.            AFTER YOU LEAVE:     Medicines:   Pain medicine:  You may be given a prescription medicine to decrease pain. Do not wait until the pain is severe before you take this medicine. We recommend that you take Tylenol 975mg every 8 hours for baseline pain control. Oxycodone can be used as needed, but no more than 1-2 tablets every 6 hours.    NSAIDs:  These medicines decrease swelling, pain, and fever. NSAIDs are available without a doctor's order. Ask your primary healthcare provider which medicine is right for you. Ask how much to take and when to take it. Take as directed. NSAIDs can cause stomach bleeding and kidney problems if not taken correctly.  You will be given Celebrex 100 mg to take twice a day for the first 2 weeks after surgery.    Stool softeners  make it easier for you to have a bowel movement. You may need this medicine to treat or prevent constipation.  You will be given Colace 100mg to take twice a day    Anticoagulants  are a type of blood thinner medicine that helps prevent clots. Clots can cause strokes, heart attacks, and death. These medicines may cause you to bleed or bruise more easily.  You will be given Lovenox 40 mg to inject under the skin daily for 6 weeks after surgery.    Watch for bleeding from your gums or nose. Watch for blood in your urine and bowel movements. Use a soft washcloth and a soft toothbrush. If you shave, use an electric razor. Avoid activities that can cause bruising or bleeding.    Take your medicine as directed.  Call your healthcare provider if you think your medicine is not helping or if you have side  effects. Tell him if you are allergic to any medicine. Keep a list of the medicines, vitamins, and herbs you take. Include the amounts, and when and why you take them. Bring the list or the pill bottles to follow-up visits. Carry your medicine list with you in case of an emergency.    Follow up with your orthopedist as directed:  You may need to return to have your wound checked and stitches, staples, or drain removed. Write down your questions so you remember to ask them during your visits.  Please make an appointment to see Dr. Fountain 2 weeks postoperatively.    Physical therapy:  A physical therapist teaches you exercises to help improve movement and strength, and to decrease pain. You will begin outpatient physical therapy later this week as planned.     Self-care:   Wound Care:  Please leave the Mepilex dressing in place for 7 days after surgery. After 7 days, you may remove the dressing and leave the incision open to air.  If the incision is uncomfortable under clothing after the Mepilex is removed, you may cover it with a a dry sterile dressing. Once the Mepilex dressing has been removed, please keep the incision dry for another week until your follow up appointment.    Use ice:  Ice helps decrease swelling and pain. Ice may also help prevent tissue damage. Use an ice pack or put crushed ice in a plastic bag. Cover it with a towel, and place it on your knee for 15 to 20 minutes every hour as directed.    Use a cane, walker, or crutches as directed:  These devices will help decrease your risk of falling. Your therapist will guide you about transitioning from a walker to cane to no assistive device.    Wear pressure stockings:  These are long, tight stockings that put pressure on your legs to promote blood flow and prevent clots. You may remove the stocking on your non-operative leg when you get home. The stocking on your operative leg should remain on for 2-3 days. Afterwards, you may put the stocking on or  off as needed for swelling.             Contact your orthopedist if:  You have a fever over 101.0°F.    You have trouble moving or bending your joint.    You have increased pain and swelling in your joint, even after you take pain medicine.    You have back pain or lower leg pain when you bend your foot upwards.    You have questions or concerns about your condition or care.    Blood soaks through/saturates your bandage.    Your incision comes apart.    Your incision is red, swollen, or draining pus.    You cannot walk or move your joint, or the limb is numb.    Your leg feels warm, tender, and painful. It may look swollen and red.     Seek immediate care or call 911 if:   You feel like you are going to faint.    You have a seizure or feel confused.     You feel lightheaded, short of breath, and have chest pain.    You have chest pain when you take a deep breath or cough. You may cough up blood.    © 2014 [employer]. Information is for End User's use only and may not be sold, redistributed or otherwise used for commercial purposes. All illustrations and images included in CareNotes® are the copyrighted property of [employer] or [employer].  The above information is an  only. It is not intended as medical advice for individual conditions or treatments. Talk to your doctor, nurse or pharmacist before following any medical regimen to see if it is safe and effective for you.

## 2024-12-13 ENCOUNTER — ANESTHESIA EVENT (OUTPATIENT)
Dept: PERIOP | Facility: HOSPITAL | Age: 59
End: 2024-12-13
Payer: COMMERCIAL

## 2024-12-17 ENCOUNTER — APPOINTMENT (OUTPATIENT)
Dept: RADIOLOGY | Facility: HOSPITAL | Age: 59
End: 2024-12-17
Payer: COMMERCIAL

## 2024-12-17 ENCOUNTER — HOSPITAL ENCOUNTER (OUTPATIENT)
Facility: HOSPITAL | Age: 59
Setting detail: OUTPATIENT SURGERY
Discharge: HOME | End: 2024-12-17
Attending: ORTHOPAEDIC SURGERY | Admitting: ORTHOPAEDIC SURGERY
Payer: COMMERCIAL

## 2024-12-17 ENCOUNTER — ANESTHESIA (OUTPATIENT)
Dept: PERIOP | Facility: HOSPITAL | Age: 59
End: 2024-12-17
Payer: COMMERCIAL

## 2024-12-17 VITALS
TEMPERATURE: 97.3 F | OXYGEN SATURATION: 100 % | HEIGHT: 64 IN | WEIGHT: 221.34 LBS | BODY MASS INDEX: 37.79 KG/M2 | SYSTOLIC BLOOD PRESSURE: 153 MMHG | DIASTOLIC BLOOD PRESSURE: 97 MMHG | RESPIRATION RATE: 16 BRPM | HEART RATE: 65 BPM

## 2024-12-17 DIAGNOSIS — Z96.652 S/P TOTAL KNEE REPLACEMENT NOT USING CEMENT, LEFT: Primary | ICD-10-CM

## 2024-12-17 PROBLEM — M23.92 INTERNAL DERANGEMENT OF LEFT KNEE: Status: RESOLVED | Noted: 2018-01-15 | Resolved: 2024-12-17

## 2024-12-17 PROBLEM — M25.561 CHRONIC PAIN OF RIGHT KNEE: Status: RESOLVED | Noted: 2023-02-25 | Resolved: 2024-12-17

## 2024-12-17 PROBLEM — G89.29 CHRONIC PAIN OF RIGHT KNEE: Status: RESOLVED | Noted: 2023-02-25 | Resolved: 2024-12-17

## 2024-12-17 PROBLEM — M17.12 PRIMARY OSTEOARTHRITIS OF LEFT KNEE: Status: RESOLVED | Noted: 2024-11-11 | Resolved: 2024-12-17

## 2024-12-17 LAB
GLUCOSE SERPL-MCNC: 111 MG/DL (ref 65–140)
GLUCOSE SERPL-MCNC: 99 MG/DL (ref 65–140)

## 2024-12-17 PROCEDURE — 97530 THERAPEUTIC ACTIVITIES: CPT

## 2024-12-17 PROCEDURE — C9290 INJ, BUPIVACAINE LIPOSOME: HCPCS | Performed by: ANESTHESIOLOGY

## 2024-12-17 PROCEDURE — 27447 TOTAL KNEE ARTHROPLASTY: CPT | Performed by: ORTHOPAEDIC SURGERY

## 2024-12-17 PROCEDURE — C1776 JOINT DEVICE (IMPLANTABLE): HCPCS | Performed by: ORTHOPAEDIC SURGERY

## 2024-12-17 PROCEDURE — S2900 ROBOTIC SURGICAL SYSTEM: HCPCS | Performed by: ORTHOPAEDIC SURGERY

## 2024-12-17 PROCEDURE — 73560 X-RAY EXAM OF KNEE 1 OR 2: CPT

## 2024-12-17 PROCEDURE — 97110 THERAPEUTIC EXERCISES: CPT

## 2024-12-17 PROCEDURE — C1713 ANCHOR/SCREW BN/BN,TIS/BN: HCPCS | Performed by: ORTHOPAEDIC SURGERY

## 2024-12-17 PROCEDURE — 97163 PT EVAL HIGH COMPLEX 45 MIN: CPT

## 2024-12-17 PROCEDURE — 97167 OT EVAL HIGH COMPLEX 60 MIN: CPT

## 2024-12-17 PROCEDURE — 82948 REAGENT STRIP/BLOOD GLUCOSE: CPT

## 2024-12-17 DEVICE — ATTUNE KNEE SYSTEM FEMORAL POROCOAT CRUCIATE RETAINING SIZE 4 LEFT CEMENTLESS
Type: IMPLANTABLE DEVICE | Site: KNEE | Status: FUNCTIONAL
Brand: ATTUNE

## 2024-12-17 DEVICE — ATTUNE PATELLA MEDIALIZED DOME 32MM CEMENTED AOX
Type: IMPLANTABLE DEVICE | Site: KNEE | Status: FUNCTIONAL
Brand: ATTUNE

## 2024-12-17 DEVICE — ATTUNE KNEE SYSTEM TIBIAL INSERT FIXED BEARING MEDIAL STABILIZED LEFT AOX 4, 7MM
Type: IMPLANTABLE DEVICE | Site: KNEE | Status: FUNCTIONAL
Brand: ATTUNE

## 2024-12-17 DEVICE — ATTUNE KNEE SYSTEM TIBIAL BASE AFFIXIUM FIXED BEARING SIZE 4
Type: IMPLANTABLE DEVICE | Site: KNEE | Status: FUNCTIONAL
Brand: ATTUNE AFFIXIUM

## 2024-12-17 DEVICE — PALACOS® R IS A FAST-CURING, RADIOPAQUE, POLY(METHYL METHACRYLATE)-BASED BONE CEMENT.PALACOS ® R CONTAINS THE X-RAY CONTRAST MEDIUM ZIRCONIUM DIOXIDE. TO IMPROVE VISIBILITY IN THE SURGICAL FIELD PALACOS ® R HAS BEEN COLOURED WITH CHLOROPHYLL (E141). THE BONE CEMENT IS PREPARED DIRECTLY BEFORE USE BY MIXING A POLYMER POWDER COMPONENT WITH A LIQUID MONOMER COMPONENT. A DUCTILE DOUGH FORMS WHICH CURES WITHIN A FEW MINUTES.
Type: IMPLANTABLE DEVICE | Site: KNEE | Status: FUNCTIONAL
Brand: PALACOS®

## 2024-12-17 RX ORDER — GABAPENTIN 300 MG/1
300 CAPSULE ORAL ONCE
Status: COMPLETED | OUTPATIENT
Start: 2024-12-17 | End: 2024-12-17

## 2024-12-17 RX ORDER — BUPIVACAINE HYDROCHLORIDE 2.5 MG/ML
INJECTION, SOLUTION EPIDURAL; INFILTRATION; INTRACAUDAL AS NEEDED
Status: DISCONTINUED | OUTPATIENT
Start: 2024-12-17 | End: 2024-12-17 | Stop reason: HOSPADM

## 2024-12-17 RX ORDER — SODIUM CHLORIDE, SODIUM LACTATE, POTASSIUM CHLORIDE, CALCIUM CHLORIDE 600; 310; 30; 20 MG/100ML; MG/100ML; MG/100ML; MG/100ML
INJECTION, SOLUTION INTRAVENOUS CONTINUOUS PRN
Status: DISCONTINUED | OUTPATIENT
Start: 2024-12-17 | End: 2024-12-17

## 2024-12-17 RX ORDER — FENTANYL CITRATE/PF 50 MCG/ML
50 SYRINGE (ML) INJECTION
Status: DISCONTINUED | OUTPATIENT
Start: 2024-12-17 | End: 2024-12-17 | Stop reason: HOSPADM

## 2024-12-17 RX ORDER — BUPIVACAINE HYDROCHLORIDE 7.5 MG/ML
INJECTION, SOLUTION INTRASPINAL AS NEEDED
Status: DISCONTINUED | OUTPATIENT
Start: 2024-12-17 | End: 2024-12-17

## 2024-12-17 RX ORDER — SODIUM CHLORIDE, SODIUM LACTATE, POTASSIUM CHLORIDE, CALCIUM CHLORIDE 600; 310; 30; 20 MG/100ML; MG/100ML; MG/100ML; MG/100ML
75 INJECTION, SOLUTION INTRAVENOUS CONTINUOUS
Status: DISCONTINUED | OUTPATIENT
Start: 2024-12-17 | End: 2024-12-18 | Stop reason: HOSPADM

## 2024-12-17 RX ORDER — BUPIVACAINE HYDROCHLORIDE 5 MG/ML
INJECTION, SOLUTION EPIDURAL; INTRACAUDAL
Status: DISCONTINUED | OUTPATIENT
Start: 2024-12-17 | End: 2024-12-17

## 2024-12-17 RX ORDER — ALBUTEROL SULFATE 0.83 MG/ML
2.5 SOLUTION RESPIRATORY (INHALATION) EVERY 6 HOURS PRN
Status: CANCELLED | OUTPATIENT
Start: 2024-12-17

## 2024-12-17 RX ORDER — HYDROMORPHONE HCL/PF 1 MG/ML
0.5 SYRINGE (ML) INJECTION
Status: DISCONTINUED | OUTPATIENT
Start: 2024-12-17 | End: 2024-12-17 | Stop reason: HOSPADM

## 2024-12-17 RX ORDER — CEFAZOLIN SODIUM 2 G/50ML
2000 SOLUTION INTRAVENOUS ONCE
Status: COMPLETED | OUTPATIENT
Start: 2024-12-17 | End: 2024-12-17

## 2024-12-17 RX ORDER — ONDANSETRON 2 MG/ML
4 INJECTION INTRAMUSCULAR; INTRAVENOUS EVERY 6 HOURS PRN
Status: DISCONTINUED | OUTPATIENT
Start: 2024-12-17 | End: 2024-12-18 | Stop reason: HOSPADM

## 2024-12-17 RX ORDER — MAGNESIUM HYDROXIDE/ALUMINUM HYDROXICE/SIMETHICONE 120; 1200; 1200 MG/30ML; MG/30ML; MG/30ML
30 SUSPENSION ORAL EVERY 6 HOURS PRN
Status: DISCONTINUED | OUTPATIENT
Start: 2024-12-17 | End: 2024-12-18 | Stop reason: HOSPADM

## 2024-12-17 RX ORDER — OXYCODONE HYDROCHLORIDE 5 MG/1
10 TABLET ORAL EVERY 4 HOURS PRN
Status: DISCONTINUED | OUTPATIENT
Start: 2024-12-17 | End: 2024-12-18 | Stop reason: HOSPADM

## 2024-12-17 RX ORDER — PANTOPRAZOLE SODIUM 40 MG/1
40 TABLET, DELAYED RELEASE ORAL
Status: DISCONTINUED | OUTPATIENT
Start: 2024-12-17 | End: 2024-12-18 | Stop reason: HOSPADM

## 2024-12-17 RX ORDER — SODIUM CHLORIDE, SODIUM LACTATE, POTASSIUM CHLORIDE, CALCIUM CHLORIDE 600; 310; 30; 20 MG/100ML; MG/100ML; MG/100ML; MG/100ML
100 INJECTION, SOLUTION INTRAVENOUS CONTINUOUS
Status: CANCELLED | OUTPATIENT
Start: 2024-12-17

## 2024-12-17 RX ORDER — ENOXAPARIN SODIUM 100 MG/ML
40 INJECTION SUBCUTANEOUS DAILY
Qty: 16.8 ML | Refills: 0 | Status: SHIPPED | OUTPATIENT
Start: 2024-12-17 | End: 2025-01-28

## 2024-12-17 RX ORDER — ONDANSETRON 2 MG/ML
INJECTION INTRAMUSCULAR; INTRAVENOUS AS NEEDED
Status: DISCONTINUED | OUTPATIENT
Start: 2024-12-17 | End: 2024-12-17

## 2024-12-17 RX ORDER — LANOLIN ALCOHOL/MO/W.PET/CERES
1 CREAM (GRAM) TOPICAL
Status: CANCELLED | OUTPATIENT
Start: 2024-12-18

## 2024-12-17 RX ORDER — ACETAMINOPHEN 325 MG/1
975 TABLET ORAL ONCE
Status: COMPLETED | OUTPATIENT
Start: 2024-12-17 | End: 2024-12-17

## 2024-12-17 RX ORDER — DOCUSATE SODIUM 100 MG/1
100 CAPSULE, LIQUID FILLED ORAL 2 TIMES DAILY
Status: DISCONTINUED | OUTPATIENT
Start: 2024-12-17 | End: 2024-12-18 | Stop reason: HOSPADM

## 2024-12-17 RX ORDER — OXYCODONE HYDROCHLORIDE 5 MG/1
TABLET ORAL
Qty: 40 TABLET | Refills: 0 | Status: SHIPPED | OUTPATIENT
Start: 2024-12-17

## 2024-12-17 RX ORDER — SODIUM CHLORIDE, SODIUM LACTATE, POTASSIUM CHLORIDE, CALCIUM CHLORIDE 600; 310; 30; 20 MG/100ML; MG/100ML; MG/100ML; MG/100ML
125 INJECTION, SOLUTION INTRAVENOUS CONTINUOUS
Status: DISCONTINUED | OUTPATIENT
Start: 2024-12-17 | End: 2024-12-17

## 2024-12-17 RX ORDER — PROPOFOL 10 MG/ML
INJECTION, EMULSION INTRAVENOUS CONTINUOUS PRN
Status: DISCONTINUED | OUTPATIENT
Start: 2024-12-17 | End: 2024-12-17

## 2024-12-17 RX ORDER — MIDAZOLAM HYDROCHLORIDE 2 MG/2ML
INJECTION, SOLUTION INTRAMUSCULAR; INTRAVENOUS AS NEEDED
Status: DISCONTINUED | OUTPATIENT
Start: 2024-12-17 | End: 2024-12-17

## 2024-12-17 RX ORDER — MAGNESIUM HYDROXIDE 1200 MG/15ML
LIQUID ORAL AS NEEDED
Status: DISCONTINUED | OUTPATIENT
Start: 2024-12-17 | End: 2024-12-17 | Stop reason: HOSPADM

## 2024-12-17 RX ORDER — METOCLOPRAMIDE HYDROCHLORIDE 5 MG/ML
INJECTION INTRAMUSCULAR; INTRAVENOUS AS NEEDED
Status: DISCONTINUED | OUTPATIENT
Start: 2024-12-17 | End: 2024-12-17

## 2024-12-17 RX ORDER — CEFAZOLIN SODIUM 2 G/50ML
2000 SOLUTION INTRAVENOUS EVERY 6 HOURS
Status: COMPLETED | OUTPATIENT
Start: 2024-12-17 | End: 2024-12-17

## 2024-12-17 RX ORDER — GABAPENTIN 100 MG/1
200 CAPSULE ORAL 2 TIMES DAILY
Status: DISCONTINUED | OUTPATIENT
Start: 2024-12-17 | End: 2024-12-18 | Stop reason: HOSPADM

## 2024-12-17 RX ORDER — ACETAMINOPHEN 325 MG/1
975 TABLET ORAL EVERY 8 HOURS
Start: 2024-12-17

## 2024-12-17 RX ORDER — CHLORHEXIDINE GLUCONATE ORAL RINSE 1.2 MG/ML
15 SOLUTION DENTAL ONCE
Status: COMPLETED | OUTPATIENT
Start: 2024-12-17 | End: 2024-12-17

## 2024-12-17 RX ORDER — LIDOCAINE HYDROCHLORIDE 10 MG/ML
INJECTION, SOLUTION EPIDURAL; INFILTRATION; INTRACAUDAL; PERINEURAL AS NEEDED
Status: DISCONTINUED | OUTPATIENT
Start: 2024-12-17 | End: 2024-12-17

## 2024-12-17 RX ORDER — OXYCODONE HYDROCHLORIDE 5 MG/1
5 TABLET ORAL EVERY 4 HOURS PRN
Status: DISCONTINUED | OUTPATIENT
Start: 2024-12-17 | End: 2024-12-18 | Stop reason: HOSPADM

## 2024-12-17 RX ORDER — ENOXAPARIN SODIUM 100 MG/ML
40 INJECTION SUBCUTANEOUS DAILY
Status: CANCELLED | OUTPATIENT
Start: 2024-12-17

## 2024-12-17 RX ORDER — CELECOXIB 100 MG/1
100 CAPSULE ORAL 2 TIMES DAILY
Qty: 28 CAPSULE | Refills: 0 | Status: SHIPPED | OUTPATIENT
Start: 2024-12-17

## 2024-12-17 RX ORDER — OXYCODONE HCL 10 MG/1
10 TABLET, FILM COATED, EXTENDED RELEASE ORAL ONCE
Status: COMPLETED | OUTPATIENT
Start: 2024-12-17 | End: 2024-12-17

## 2024-12-17 RX ORDER — FENTANYL CITRATE 50 UG/ML
INJECTION, SOLUTION INTRAMUSCULAR; INTRAVENOUS AS NEEDED
Status: DISCONTINUED | OUTPATIENT
Start: 2024-12-17 | End: 2024-12-17

## 2024-12-17 RX ORDER — CELECOXIB 100 MG/1
100 CAPSULE ORAL 2 TIMES DAILY
Status: CANCELLED | OUTPATIENT
Start: 2024-12-17

## 2024-12-17 RX ORDER — DOCUSATE SODIUM 100 MG/1
100 CAPSULE, LIQUID FILLED ORAL 2 TIMES DAILY
Qty: 60 CAPSULE | Refills: 0 | Status: SHIPPED | OUTPATIENT
Start: 2024-12-17

## 2024-12-17 RX ORDER — ONDANSETRON 2 MG/ML
4 INJECTION INTRAMUSCULAR; INTRAVENOUS ONCE AS NEEDED
Status: DISCONTINUED | OUTPATIENT
Start: 2024-12-17 | End: 2024-12-17 | Stop reason: HOSPADM

## 2024-12-17 RX ORDER — HYDROMORPHONE HCL/PF 1 MG/ML
0.5 SYRINGE (ML) INJECTION EVERY 2 HOUR PRN
Status: DISCONTINUED | OUTPATIENT
Start: 2024-12-17 | End: 2024-12-18 | Stop reason: HOSPADM

## 2024-12-17 RX ORDER — ACETAMINOPHEN 325 MG/1
975 TABLET ORAL EVERY 8 HOURS
Status: DISCONTINUED | OUTPATIENT
Start: 2024-12-17 | End: 2024-12-18 | Stop reason: HOSPADM

## 2024-12-17 RX ORDER — TRANEXAMIC ACID 10 MG/ML
1000 INJECTION, SOLUTION INTRAVENOUS ONCE
Status: COMPLETED | OUTPATIENT
Start: 2024-12-17 | End: 2024-12-17

## 2024-12-17 RX ADMIN — SODIUM CHLORIDE, SODIUM LACTATE, POTASSIUM CHLORIDE, AND CALCIUM CHLORIDE 125 ML/HR: .6; .31; .03; .02 INJECTION, SOLUTION INTRAVENOUS at 07:05

## 2024-12-17 RX ADMIN — FENTANYL CITRATE 50 MCG: 50 INJECTION INTRAMUSCULAR; INTRAVENOUS at 10:01

## 2024-12-17 RX ADMIN — METOCLOPRAMIDE 10 MG: 5 INJECTION, SOLUTION INTRAMUSCULAR; INTRAVENOUS at 07:35

## 2024-12-17 RX ADMIN — FENTANYL CITRATE 25 MCG: 50 INJECTION, SOLUTION INTRAMUSCULAR; INTRAVENOUS at 09:29

## 2024-12-17 RX ADMIN — FENTANYL CITRATE 25 MCG: 50 INJECTION, SOLUTION INTRAMUSCULAR; INTRAVENOUS at 09:00

## 2024-12-17 RX ADMIN — FENTANYL CITRATE 25 MCG: 50 INJECTION, SOLUTION INTRAMUSCULAR; INTRAVENOUS at 08:26

## 2024-12-17 RX ADMIN — OXYCODONE HYDROCHLORIDE 10 MG: 10 TABLET, FILM COATED, EXTENDED RELEASE ORAL at 07:08

## 2024-12-17 RX ADMIN — ONDANSETRON 4 MG: 2 INJECTION INTRAMUSCULAR; INTRAVENOUS at 07:35

## 2024-12-17 RX ADMIN — BUPIVACAINE 10 ML: 13.3 INJECTION, SUSPENSION, LIPOSOMAL INFILTRATION at 09:48

## 2024-12-17 RX ADMIN — BUPIVACAINE HYDROCHLORIDE 10 ML: 5 INJECTION, SOLUTION EPIDURAL; INTRACAUDAL; PERINEURAL at 09:48

## 2024-12-17 RX ADMIN — ACETAMINOPHEN 975 MG: 325 TABLET ORAL at 07:04

## 2024-12-17 RX ADMIN — FENTANYL CITRATE 25 MCG: 50 INJECTION, SOLUTION INTRAMUSCULAR; INTRAVENOUS at 08:01

## 2024-12-17 RX ADMIN — TRANEXAMIC ACID 1000 MG: 10 INJECTION, SOLUTION INTRAVENOUS at 07:39

## 2024-12-17 RX ADMIN — CHLORHEXIDINE GLUCONATE 15 ML: 1.2 RINSE ORAL at 07:03

## 2024-12-17 RX ADMIN — CEFAZOLIN SODIUM 2000 MG: 2 SOLUTION INTRAVENOUS at 07:35

## 2024-12-17 RX ADMIN — MIDAZOLAM 2 MG: 1 INJECTION INTRAMUSCULAR; INTRAVENOUS at 07:35

## 2024-12-17 RX ADMIN — GABAPENTIN 300 MG: 300 CAPSULE ORAL at 07:03

## 2024-12-17 RX ADMIN — PROPOFOL 50 MCG/KG/MIN: 10 INJECTION, EMULSION INTRAVENOUS at 07:45

## 2024-12-17 RX ADMIN — OXYCODONE HYDROCHLORIDE 10 MG: 5 TABLET ORAL at 11:52

## 2024-12-17 RX ADMIN — LIDOCAINE HYDROCHLORIDE 50 MG: 10 INJECTION, SOLUTION EPIDURAL; INFILTRATION; INTRACAUDAL; PERINEURAL at 07:45

## 2024-12-17 RX ADMIN — SODIUM CHLORIDE, SODIUM LACTATE, POTASSIUM CHLORIDE, AND CALCIUM CHLORIDE: .6; .31; .03; .02 INJECTION, SOLUTION INTRAVENOUS at 07:35

## 2024-12-17 RX ADMIN — CEFAZOLIN SODIUM 2000 MG: 2 SOLUTION INTRAVENOUS at 13:41

## 2024-12-17 RX ADMIN — BUPIVACAINE HYDROCHLORIDE IN DEXTROSE 1.4 ML: 7.5 INJECTION, SOLUTION SUBARACHNOID at 07:41

## 2024-12-17 RX ADMIN — ACETAMINOPHEN 975 MG: 325 TABLET ORAL at 11:51

## 2024-12-17 RX ADMIN — MIDAZOLAM 2 MG: 1 INJECTION INTRAMUSCULAR; INTRAVENOUS at 07:44

## 2024-12-17 NOTE — PLAN OF CARE
Problem: PHYSICAL THERAPY ADULT  Goal: Performs mobility at highest level of function for planned discharge setting.  See evaluation for individualized goals.  Description: Treatment/Interventions: Functional transfer training, LE strengthening/ROM, Therapeutic exercise, Endurance training, Bed mobility, Gait training, Spoke to nursing          See flowsheet documentation for full assessment, interventions and recommendations.  Note: Prognosis: Good  Problem List: Decreased strength, Decreased range of motion, Decreased endurance, Impaired balance, Decreased mobility, Pain, Decreased skin integrity, Decreased safety awareness  Assessment: Patient seen for Physical Therapy evaluation. Patient admitted with S/P total knee replacement not using cement, left.  Comorbidities affecting patient's physical performance include: history of R TKA 3/2023. arthritis, asthma, cancer, cardiac disease, hypertension, and osteoarthritis.  Personal factors affecting patient at time of initial evaluation include: lives in 1 story house, ambulating with assistive device, stairs to enter home, inability to navigate community distances, inability to navigate level surfaces without external assistance, and inability to perform IADLS . Prior to admission, patient was independent with functional mobility without assistive device, independent with ADLS, independent with IADLS, living with spouse in a 1 level home with 3 steps to enter, ambulating household distance, ambulating community distances, and works full time.  Please find objective findings from Physical Therapy assessment regarding body systems outlined above with impairments and limitations including weakness, decreased ROM, impaired balance, decreased endurance, gait deviations, pain, decreased activity tolerance, decreased functional mobility tolerance, decreased safety awareness, fall risk, and decreased skin integrity.  The Barthel Index was used as a functional outcome tool  presenting with a score of Barthel Index Score: 45 today indicating marked limitations of functional mobility and ADLS.  Patient's clinical presentation is currently unstable/unpredictable as seen in patient's presentation of changing level of pain, increased fall risk, new onset of impairment of functional mobility, decreased endurance, and new onset of weakness. Pt would benefit from continued Physical Therapy treatment to address deficits as defined above and maximize level of functional mobility. As demonstrated by objective findings, the assigned level of complexity for this evaluation is high.The patient's -[facility] Basic Mobility Inpatient Short Form Raw Score is 17. A Raw score of greater than 16 suggests the patient may benefit from discharge to home. Please also refer to the recommendation of the Physical Therapist for safe discharge planning.        Rehab Resource Intensity Level, PT: III (Minimum Resource Intensity)    See flowsheet documentation for full assessment.

## 2024-12-17 NOTE — PLAN OF CARE
Problem: OCCUPATIONAL THERAPY ADULT  Goal: Performs self-care activities at highest level of function for planned discharge setting.  See evaluation for individualized goals.  Description: Treatment Interventions: ADL retraining, Functional transfer training, Endurance training, Patient/family training, Equipment evaluation/education, Continued evaluation, Activityengagement, Energy conservation          See flowsheet documentation for full assessment, interventions and recommendations.   Note: Limitation: Decreased ADL status, Decreased endurance, Decreased self-care trans, Decreased high-level ADLs (anticipated L LE ROM / strength deficits, pain, impaired standing tolerance, balance)     Assessment: Pt is a 58yo female s/p L TKA on 12/17/24 w/ Dr. Fountain; anticipated same day discharge. Pt is WBAT L LE. Significant PMH Impacting her occupational performance includes osteoarthritis L knee, HTN, R TKA, L wrist fracture s/p operative fixation (02/29/24), ORIF R forearm. Personal and environmental factors supporting performance includes independent at baseline, first floor set- up, supportive family, access to AD/ DME; barriers include pain. Pt reports I w/ ADL/ IADL prior to admission at home w/ her  in 1 SH w/ few FELIX. Upon eval, pt drowsy, on acute O2, but responsive w/ increased activity engagement. Pt required mod I grooming seated w/ + time after set- up, S UBD, min A LBD, min A sit to stand, min A using RW for short distance functional mobility. 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 Pt is completing ADLs below baseline level of I and would benefit from OT in acute care to max I w/ ADLs, achieve highest level of function. No post acute OT rehab needs when medically stable for discharge from acute care. From an OT perspective pt is appropriate to DC home using RW w/ OPPT pend improved activity tolerance, ability to manage stairs. Will continue to follow     Rehab Resource  Intensity Level, OT: No post-acute rehabilitation needs (DC home using RW w/ OPPT pend improved activity tolerance, ability to manage stairs w/ PT)

## 2024-12-17 NOTE — OP NOTE
OPERATIVE REPORT  PATIENT NAME: Colette Sweeney  : 1965  MRN: [id number]  Pt Location:  [facility] OR ROOM 01    Surgery Date: 2024    Surgeons and Role:     * Trey Fountain,  - Primary     * Ben Ayala PA-C - Assisting     * Cristine Brumfield MD - Resident- Assisting      * Barney Romero,  ATC/OTC - 2nd Assist    Preop Diagnosis:  Primary osteoarthritis of left knee [M17.12]  Chronic pain of left knee [M25.562, G89.29]    Post-Op Diagnosis Codes:     * Primary osteoarthritis of left knee [M17.12]     * Chronic pain of left knee [M25.562, G89.29]    Procedure(s):  Left - ARTHROPLASTY KNEE TOTAL W ROBOT - PRESS FIT    Specimens:  * No specimens in log *    Estimated Blood Loss:   20 mL    Drains: None    Anesthesia Type:   Spinal with sedation, postoperative adductor canal block with Exparel    Antibiotics: Ancef 2 g    Intravenous fluids: 800 cc    Urine output: No Mcconnell    Tourniquet time: 53 minutes at 250 mmHg    Implant Name Type Inv. Item Serial No.  Lot No. LRB No. Used Action   CEMENT BN 40 GM PALACOS RADIOPAQUE W/O ANTIBIOTIC - [id number]  CEMENT BN 40 GM PALACOS RADIOPAQUE W/O ANTIBIOTIC  [employer] [id number] Left 1 Implanted   COMPONENT PATELLAR 32MM MEDIAL DOME ATTUNE - [id number]  COMPONENT PATELLAR 32MM MEDIAL DOME ATTUNE  DEPUY [id number] Left 1 Implanted   COMPONENT FEM SZ 4 LT  CR ATTUNE - [id number]  COMPONENT FEM SZ 4 LT  CR ATTUNE  DEPUY [id number] Left 1 Implanted   INSERT TIB SZ 4 7MM LT FX BRNG MED STAB ATTUNE - [id number]  INSERT TIB SZ 4 7MM LT FX BRNG MED STAB ATTUNE  DEPUY [id number] Left 1 Implanted   BASEPLATE TIBIAL SZ 4 FX BRNG  ATTUNE - [id number]  BASEPLATE TIBIAL SZ 4 FX BRNG  ATTUNE  DEPUY [id number] Left 1 Implanted     Operative Indications:  Primary osteoarthritis of left knee [M17.12]  Chronic pain of left knee [M25.562, G89.29]  Patient is a very pleasant 51-year-old female known to me for treatment of her activity related left knee pain.   The patient has attempted multiple forms of conservative measure treatment and all have failed to provide long-lasting relief of her discomfort.  With failed conservative treatment, persistent activity related pain, and end-stage osteoarthritis of the left knee I recommended she undergo robotic assisted left total knee arthroplasty.  She was agreeable this.  Consents were signed and placed in the chart.  Patient was optimized by her medical subspecialist in preparation for the procedure.  Patient ultimately underwent robotic assisted left total knee arthroplasty on 12/17/2024.    Operative Findings:  Intraoperatively the patient was found to have range of motion from 4 degrees of flexion to 115 degrees of flexion with a 3 degree valgus alignment of the left lower extremity.  There is grade 4 degenerative change in all 3 compartments of the knee.  Robotic assisted left total knee arthroplasty was successfully performed without complication.  The final construct had excellent range of motion from 0 to 125 degrees with excellent stability at 0 degrees 30 degrees and 90 degrees.  The patella tracked midline and flat.  The limb was restored to neutral mechanical alignment.  After closure of the arthrotomy range of motion, stability, patellar tracking were unchanged.  Patient tolerated procedure well.  There were no complications.    Complications:   None    Knee Approach: Medial Parapatellar    Chronic Narcotic Use:  No    Procedure and Technique:  The patient was identified and marked in the preoperative holding area. Final questions were addressed. Consents were visualized for correct laterality and the intended procedure. Patient was taken back to the operating room where they were transferred to the operating room table and administered spinal anesthesia by the anesthesia staff. Tourniquet was then applied to the left thigh. The right lower extremity was draped over the foot break in the bed after the split leg  attachment was removed, and the popliteal fossa was well padded and the leg was secured in the flexed position off of the operating table.  The left lower extremity was prepped and draped in the standard sterile fashion with the addition of the knee positioner.  The patient was administered the  2 grams of IV Ancef. Tranexamic acid was administered by the anesthesia staff.  A final timeout was performed and all members of the operating room staff were in agreement of the intended procedure and the correct laterality was confirmed.  An anterior knee incision was marked over the anterior left knee and carried over the medial portion of the patella down medial to the tibial tubercle.  The leg was exsanguinated and the tourniquet was inflated to 250 mmHg.  An incision was made over the anterior knee using a scalpel, and sharp dissection was carried deep through Yessy's fascia creating full thickness flaps.  The extensor mechanism was identified.  A standard medial parapatellar arthrotomy was performed using a scalpel, and upon doing so benign-appearing yellow intra-articular fluid was expressed.  The anterior horn of the medial and lateral meniscus was removed. 50% of the patellar fat pad was removed for proper exposure. The distal arthrotomy was used to initiate a medial release around the proximal tibia at the joint line to the mid coronal plane.  On inspection there was diffuse grade 4 degenerative change in the medial compartment, lateral compartment, and patellofemoral compartment. Preparations were made for robotic assisted total knee arthroplasty.  The periarticular osteophytes were removed from around the distal femur, proximal tibia, and intercondylar notch.  A partial synovectomy was performed.  The remnants of the ACL and the PCL were removed with electrocautery.  The visualized portions of the medial and lateral meniscus were removed.  The distal femur and proximal tibial arrays were placed without  complication.  The checkpoints were created the distal femur proximal tibia.  The hip center was captured.  Anatomic registration was carried out in sequence.  Range of motion was evaluated and the knee was in 3 degrees of valgus, and the range of motion was from 4-115 degrees of flexion.  The Proadjust graph was created.  Through manipulating the position of the femoral and tibial implants a well-balanced Proadjust graph was created and this was excepted as the intraoperative plan.  The robot was brought into the surgical field.  The retractors were placed around the distal femur proximal tibia.  Robotic assisted resection was performed of the distal femur in sequence without damage to the periarticular tissues.  The tibia was subluxed anteriorly and the proximal tibia was resected without complication.  All bony fragments were removed and resection appeared to be complete.  The knee was brought out into the full extension in the posterior compartment was evaluated.  The remnants of the medial and lateral meniscus were removed with electrocautery.  Based on the intraoperative plan a size 4 femoral notch guide was placed and the trochlea was prepared.  The size 4 left CR trial was placed upon the distal femur and a size 7 polyethylene medial stabilized insert was placed into the articulation.  The knee was cycled through a range of motion and is range of motion was evaluated.  The overall alignment of the limb was restored to neutral mechanical alignment, and range of motion was from 0-125 degrees of flexion where she was limited by body habitus.  This construct had excellent stability at 0 degrees, 30 degrees, 90 degrees.  This was deemed to be the final construct.  The arrays were removed from the distal femur proximal tibia without complication.  The lug holes in the femur were drilled.  The trials were removed and the tibia was subluxed anteriorly.  The tibia was sized and was found that a size 4 base plate  would be appropriate.  This was aligned with the medial 1/3 of the proximal tibial tubercle and pinned into place.  The tibia was then reamed, broached, and finished in sequence.  The base plate was removed.    Attention was then turned to the patella. The osteo synovial reflection was revealed using a Bovie. The patella height measured 19 millimeters prior to resection.  The cutting guide floated a few millimeters above the crest of the patella to avoid over resection minimizing bone stock.  The patella was resected evenly.. The patella was sized and found to be a 32 mm patellar button.  The 32 mm patellar button was then medialized over the patella and the lug holes were drilled. A trial patella button was placed upon the patella and the pre osteotomy patellar height was restored.  A lateral facetectomy of the patella was performed. The soft tissues of the posterior capsule were cauterized using Bovie to ensure hemostasis. The posterior capsule and medial and lateral periarticular soft tissues were injected with a periarticular analgesic cocktail.     The knee was again irrigated in preparation for implantation.  The tibia was subluxed and all retractors were in placed.  The final size 4 fixed-bearing press-fit tibial component was impacted upon the proximal tibia down to its final position where it had excellent bony apposition.  The final size 7 mm medial stabilized AOX CR polyethylene insert was locked into the tibial baseplate.  The femur was elevated and the final size 4 left press-fit CR femoral component was impacted upon the distal femur down to its final position where it had excellent bony apposition.  1 bag of Palacos cement without gentamicin was mixed on the back table and the final size 32 medialized patella button was cemented upon the patella.  As the cement cured the remainder of the periarticular pain cocktail was injected into the soft tissues around the knee.  Irrigation then followed with  normal saline followed by Biasurge irrigation.  After the cement had cured the joint was inspected for any residual loose bodies of cement and these were removed. The tourniquet was released after 53 minutes at 250 mmHg.     The tracking and stability of the final components were assessed. The patella tracked midline without tilt, and the knee was stable in both flexion and extension, coronal stability was unchanged, and the knee demonstrated range of motion from 0-125°.  Hemostasis was achieved using electrocautery.       Closure began using a #1 barbed bidirectional suture for the arthrotomy.  #2 Ethibond suture was utilized to oversew portions of the arthrotomy proximally.  After closure of the arthrotomy range of motion to gravity was tested and was found to be 0-125° of flexion. Quarter percent Marcaine plain was injected in the subcutaneous soft tissues.  The deep subcutaneous tissues were reapproximated with undyed 0 Vicryl, the superficial subcutaneous tissues were reapproximated with undyed 2-0 Vicryl, the skin edges reapproximated with a running 3-0 bidirectional barbed Monocryl suture, and the skin edges were sealed with Exofin.  After the Exofin had dried a silver impregnated Mepilex dressing was placed over the incision.  The drapes were removed and LORENA stockings were placed on the bilateral lower extremities. Patient was then awakened from anesthesia without difficulty, and transferred to PACU in stable condition.      I was present for all critical portions of the procedure.    Patient Disposition:  PACU             SIGNATURE: Trey Fountain DO  DATE: December 17, 2024  TIME: 4:12 PM

## 2024-12-17 NOTE — OCCUPATIONAL THERAPY NOTE
Occupational Therapy Evaluation     Patient Name: Colette Sweeney  Today's Date: 12/17/2024  Problem List  Principal Problem:    S/P total knee replacement not using cement, left    Past Medical History  Past Medical History:   Diagnosis Date    Allergic rhinitis     Asthma     Fibroids     Hypertension     Physiological ovarian cysts     Sleep apnea     dental device worn     Past Surgical History  Past Surgical History:   Procedure Laterality Date    COLONOSCOPY  2016    dr garcia    KNEE ARTHROSCOPY Left 2018    meniscectomy x2, first done in 2015    MYOMECTOMY      MI ARTHRP KNE CONDYLE&PLATU MEDIAL&LAT COMPARTMENTS Right 03/06/2023    Procedure: ARTHROPLASTY KNEE TOTAL W ROBOT - RIGHT - PRESS FIT - SAME DAY;  Surgeon: Trey Fountain DO;  Location: [facility] OR;  Service: Orthopedics    MI ARTHRS KNE SURG W/MENISCECTOMY MED/LAT W/SHVG Right 01/06/2022    Procedure: KNEE ARTHROSCOPY MENISCECTOMY MEDIAL;  Surgeon: Sachin Grove MD;  Location: [facility] OR;  Service: Orthopedics    MI COLONOSCOPY FLX DX W/COLLJ SPEC WHEN PFRMD N/A 09/13/2018    Procedure: COLONOSCOPY;  Surgeon: Sergo Martinez MD;  Location: [facility] GI LAB;  Service: Gastroenterology    WRIST SURGERY Right 07/2024 12/17/24 1411   OT Last Visit   OT Visit Date 12/17/24  (Tuesday)   Note Type   Note type Evaluation  (Eval 5567-5845)   Additional Comments Identified pt by full name and birthdate   Pain Assessment   Pain Assessment Tool 0-10   Pain Score 3   Pain Location/Orientation Orientation: Left;Location: Knee   Effect of Pain on Daily Activities limits pace, activity tolerance during ADLs   Patient's Stated Pain Goal No pain   Hospital Pain Intervention(s) Cold applied;Repositioned;Ambulation/increased activity;Emotional support;Elevated  (RN medicated prior to eval)   Restrictions/Precautions   Weight Bearing Precautions Per Order Yes   LLE Weight Bearing Per Order WBAT  (s/p L TKA w/ anticipated same day discharge)   Braces  "or Orthoses Other (Comment)  (B thigh high compression stockings)   Other Precautions WBS;Fall Risk;Pain;O2  (drowsy)   Home Living   Type of Home House   Home Layout One level;Performs ADLs on one level;Able to live on main level with bedroom/bathroom;Stairs to enter without rails  (3 FELIX)   Bathroom Shower/Tub Walk-in shower   Bathroom Toilet Raised  (chair height/ comfort)   Bathroom Equipment Shower chair   Bathroom Accessibility Accessible  (pt reports handicap accessible bathrooms)   Home Equipment Walker;Cane   Additional Comments Pt reports living w/ her  in 1 SH   Prior Function   Level of [place] Independent with ADLs;Independent with functional mobility;Independent with IADLS   Lives With Spouse   Receives Help From Family  (hx OPPT s/p R TKA)   IADLs Independent with driving;Independent with meal prep;Independent with medication management   Falls in the last 6 months 0   Vocational Full time employment   Comments Pt reports I w/ ADL/ IADL at baseline   Lifestyle   Autonomy Pt reports I w/ ADL/ IADL at baseline w/ out use of AD   Reciprocal Relationships Supportive  present   Service to Others Pt reports working from home for [employer]   Intrinsic Gratification Pt reports enjoying reading   General   Additional Pertinent History Pt is s/p L TKA on 12/17/24 w/ Dr. Fountain; anticipated same day discharge   Family/Caregiver Present Yes   Additional General Comments s/p R TKA 3-6/23   Subjective   Subjective \"I feel so high\"   ADL   Where Assessed Edge of bed  (vs bedside recliner chair)   Eating Assistance 6  Modified independent   Eating Deficit Setup   Grooming Assistance 6  Modified Independent   Grooming Deficit Setup;Increased time to complete  (seated due to decreased activity tolerance, standing tolerance and drowsy)   UB Bathing Assistance Unable to assess   LB Bathing Assistance Unable to assess   UB Dressing Assistance 5  Supervision/Setup   UB Dressing Deficit " "Setup;Supervision/safety;Increased time to complete  (seated at EOB)   LB Dressing Assistance 4  Minimal Assistance   LB Dressing Deficit Setup;Requires assistive device for steadying;Steadying;Increased time to complete;Supervision/safety;Verbal cueing  (seated at EOB to thread LE into underwear / pants w/ + time, VC's)   Toileting Assistance  Unable to assess  (denied need to void \"my butt is still numb\" Attempted to void using commode w/ PTLizeth prior to therapist arrival)   Additional Comments On acute O2 upon therapist arrival sats 97%. on room air sats 92-97% on room air   Bed Mobility   Supine to Sit Unable to assess   Sit to Supine Unable to assess   Additional Comments Pt seated OOB in bedside recliner chair upon therapist arrival and post eval w/ needs met, call bell in reach   Transfers   Sit to Stand 4  Minimal assistance   Additional items Assist x 1;Increased time required;Verbal cues;Bedrails;Armrests  (VC's for hand placement)   Stand to Sit 4  Minimal assistance   Additional items Assist x 1;Armrests;Increased time required;Verbal cues   Toilet transfer   (completed w/ Lizeth BAIG prior to therapist arrival. Declined need to void)   Car transfer   (Pt and spouse verbalized understanding car transfer tech)   Additional Comments Pt performed sit <> stand 2X w/ min A   Functional Mobility   Functional Mobility 4  Minimal assistance   Additional Comments short distances   Additional items Rolling walker   Balance   Static Sitting Fair +   Dynamic Sitting Fair   Static Standing Fair -   Ambulatory Fair -   Activity Tolerance   Activity Tolerance Patient limited by fatigue;Patient limited by pain;Other (Comment)  (drowsy)   Medical Staff Made Aware Lizeth kunz PT   Nurse Made Aware spoke w/ Charisma HANDLEY Assessment   RUE Assessment   (observed during ADLs)   RUE Strength   RUE Overall Strength Within Functional Limits - able to perform ADL tasks with strength   LUE Assessment   LUE Assessment " "  (observed during ADLs; hs recent L wrist fracture s/p operative fixation, OPOT)   LUE Strength   LUE Overall Strength Within Functional Limits - able to perform ADL tasks with strength   Hand Function   Gross Motor Coordination Functional   Cognition   Overall Cognitive Status Impaired  (drowsy, sleepy \"I feel high\")   Arousal/Participation Responsive;Arousable   Attention Attends with cues to redirect   Orientation Level Oriented X4   Memory   (appears WFL; drowsy upon eval)   Following Commands Follows multistep commands with increased time or repetition   Comments Sleeping upon arrival; drowsy. Able to follow directions / communicate wants / needs w/ + time   Assessment   Limitation Decreased ADL status;Decreased endurance;Decreased self-care trans;Decreased high-level ADLs  (anticipated L LE ROM / strength deficits, pain, impaired standing tolerance, balance)   Assessment Pt is a 58yo female s/p L TKA on 12/17/24 w/ Dr. Fountain; anticipated same day discharge. Pt is WBAT L LE. Significant PMH Impacting her occupational performance includes osteoarthritis L knee, HTN, R TKA, L wrist fracture s/p operative fixation (02/29/24), ORIF R forearm. Personal and environmental factors supporting performance includes independent at baseline, first floor set- up, supportive family, access to AD/ DME; barriers include pain. Pt reports I w/ ADL/ IADL prior to admission at home w/ her  in 1 SH w/ few FELIX. Upon eval, pt drowsy, on acute O2, but responsive w/ increased activity engagement. Pt required mod I grooming seated w/ + time after set- up, S UBD, min A LBD, min A sit to stand, min A using RW for short distance functional mobility. 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 Pt is completing ADLs below baseline level of I and would benefit from OT in acute care to max I w/ ADLs, achieve highest level of function. No post acute OT rehab needs when medically stable for discharge from acute " care. From an OT perspective pt is appropriate to DC home using RW w/ OPPT pend improved activity tolerance, ability to manage stairs. Will continue to follow   Goals   Patient Goals Pt stated that she would like to feel better.   LTG Time Frame 7-10   Long Term Goal #1 see below   Plan   Treatment Interventions ADL retraining;Functional transfer training;Endurance training;Patient/family training;Equipment evaluation/education;Continued evaluation;Activityengagement;Energy conservation   Goal Expiration Date 12/24/24   OT Treatment Day 0  (Tuesday 12/17/24)   OT Frequency 3-5x/wk   Discharge Recommendation   Rehab Resource Intensity Level, OT No post-acute rehabilitation needs  (DC home using RW w/ OPPT pend improved activity tolerance, ability to manage stairs w/ PT)   AM-PAC Daily Activity Inpatient   Lower Body Dressing 3   Bathing 3   Toileting 3   Upper Body Dressing 4   Grooming 4   Eating 4   Daily Activity Raw Score 21   Daily Activity Standardized Score (Calc for Raw Score >=11) 44.27   AM-PAC Applied Cognition Inpatient   Following a Speech/Presentation 3   Understanding Ordinary Conversation 4   Taking Medications 4   Remembering Where Things Are Placed or Put Away 4   Remembering List of 4-5 Errands 4   Taking Care of Complicated Tasks 3   Applied Cognition Raw Score 22   Applied Cognition Standardized Score 47.83   Barthel Index   Feeding 10   Bathing 0   Grooming Score 5   Dressing Score 5   Bladder Score 10   Bowels Score 10   Toilet Use Score 5   Transfers (Bed/Chair) Score 10   Mobility (Level Surface) Score 0   Stairs Score 0   Barthel Index Score 55   End of Consult   Education Provided Yes;Family or social support of family present for education by provider   Patient Position at End of Consult Bedside chair;All needs within reach   Nurse Communication Nurse aware of consult   Licensure   [place] License Number  Priscilla Sosa OTR/L [id number]         The patient's raw score on the AM-PAC  Daily Activity Inpatient Short Form is 21. A raw score of greater than or equal to 19 suggests the patient may benefit from discharge to home. Please refer to the recommendation of the Occupational Therapist for safe discharge planning.       Pt goals to be met by 12/24/24 to max I w/ ADLs, improve engagement to return home to baseline level of I includes:    -Pt will complete bed mobility supine <> sit independently in preparation for ADLs    -Pt will complete UBD independently    -Pt will complete LBD w/ mod I for + time    -Pt will complete functional transfers to bed, chair, and toilet using LRAD, DME as needed w/ mod I    -Pt will consistently engage in functional mobility using LRAD household distances w/ mod I for + time    -Pt will demonstrate improved functional standing tolerance for at least 15 minutes using LRAD as needed w/ at least fair + balance while engage in grooming independently in stance at sink    -Pt will demonstrate improved AMPAC score to at least 23 to max I w/ ADLs      Priscilla Sosa, OTR/L  [id number]  [id number]

## 2024-12-17 NOTE — ANESTHESIA PROCEDURE NOTES
Peripheral Block    Patient location during procedure: holding area  Start time: 12/17/2024 9:48 AM  Reason for block: at surgeon's request and post-op pain management  Staffing  Performed by: Janina Menchaca MD  Authorized by: Janina Menchaca MD    Preanesthetic Checklist  Completed: patient identified, IV checked, site marked, risks and benefits discussed, surgical consent, monitors and equipment checked, pre-op evaluation and timeout performed  Peripheral Block  Prep: ChloraPrep  Patient monitoring: frequent blood pressure checks, continuous pulse oximetry and heart rate  Block type: Adductor Canal  Laterality: left  Injection technique: single-shot  Procedures: ultrasound guided, Ultrasound guidance required for the procedure to increase accuracy and safety of medication placement and decrease risk of complications.  Ultrasound permanent image saved  bupivacaine (PF) (MARCAINE) 0.5 % injection 20 mL - Perineural   10 mL - 12/17/2024 9:48:00 AM  bupivacaine liposomal (EXPAREL) 1.3 % injection 20 mL - Perineural   10 mL - 12/17/2024 9:48:00 AM  Needle  Needle type: Stimuplex   Needle gauge: 20 G  Needle length: 4 in  Needle localization: anatomical landmarks and ultrasound guidance  Needle insertion depth: 7 cm  Assessment  Injection assessment: incremental injection, frequent aspiration, injected with ease, negative aspiration, negative for heart rate change, no paresthesia on injection, no symptoms of intraneural/intravenous injection and needle tip visualized at all times  Paresthesia pain: none  Post-procedure:  site cleaned  patient tolerated the procedure well with no immediate complications

## 2024-12-17 NOTE — ANESTHESIA PREPROCEDURE EVALUATION
Procedure:  ARTHROPLASTY KNEE TOTAL W ROBOT - LEFT - PRESS FIT - SAME DAY (Left: Knee)    Relevant Problems   ANESTHESIA (within normal limits)      CARDIO   (+) Essential hypertension      ENDO (within normal limits)      GI/HEPATIC   (+) Hiatal hernia      MUSCULOSKELETAL   (+) Hiatal hernia   (+) Primary osteoarthritis of left knee      PULMONARY   (+) Moderate persistent asthma without complication   (+) Sleep apnea      BMI 36    Physical Exam    Airway    Mallampati score: II  TM Distance: >3 FB  Neck ROM: full     Dental   No notable dental hx     Cardiovascular  Rhythm: regular, Rate: normal    Pulmonary   Breath sounds clear to auscultation    Other Findings  post-pubertal.      Anesthesia Plan  ASA Score- 2     Anesthesia Type- spinal with ASA Monitors.         Additional Monitors:     Airway Plan:     Comment: Post-op adductor canal block with exparel.       Plan Factors-Exercise tolerance (METS): >4 METS.    Chart reviewed. EKG reviewed.  Existing labs reviewed. Patient summary reviewed.    Patient is not a current smoker.              Induction- intravenous.    Postoperative Plan-         Informed Consent- Anesthetic plan and risks discussed with patient.  I personally reviewed this patient with the CRNA. Discussed and agreed on the Anesthesia Plan with the CRNA..

## 2024-12-17 NOTE — ANESTHESIA POSTPROCEDURE EVALUATION
Post-Op Assessment Note    CV Status:  Stable  Pain Score: 0    Pain management: adequate       Mental Status:  Alert and awake   Hydration Status:  Euvolemic and stable   PONV Controlled:  Controlled   Airway Patency:  Patent     Post Op Vitals Reviewed: Yes    No anethesia notable event occurred.    Staff: Anesthesiologist           Last Filed PACU Vitals:  Vitals Value Taken Time   Temp 97.3 °F (36.3 °C) 12/17/24 0935   Pulse 85 12/17/24 0935   /75 12/17/24 0935   Resp 16 12/17/24 0935   SpO2 94 % 12/17/24 0935       Modified [place]:  Activity: 2 (12/17/2024  9:35 AM)  Respiration: 2 (12/17/2024  9:35 AM)  Circulation: 2 (12/17/2024  9:35 AM)  Consciousness: 1 (12/17/2024  9:35 AM)  Oxygen Saturation: 1 (12/17/2024  9:35 AM)  Modified [place] Score: 8 (12/17/2024  9:35 AM)

## 2024-12-17 NOTE — PERIOPERATIVE NURSING NOTE
Up to bathroom with assist of walker, gait steady, voided without difficulty. Still c/o feeling very tired, pain is acceptable at #3.

## 2024-12-17 NOTE — INTERVAL H&P NOTE
H&P reviewed. After examining the patient I find no changes in the patients condition since the H&P had been written.  In addition the patient was seen and examined at bedside today.  The patient states that her activity related pain continues to be significant and severe in the left knee, radiating diffusely throughout it.  Please see today's exam below.  Patient denies any recent fever, chills, nausea, vomiting, headache, chest pain, trouble breathing.  The patient has been seen and cleared by her medical subspecialist in preparation for the procedure.  The patient will be a good candidate for Lovenox postoperatively for DVT prophylaxis.  The patient be a good candidate for outpatient physical therapy following her discharge from the hospital today.  All questions addressed this morning.  Proceed the OR for robotic assisted left total knee arthroplasty.    Vitals:    12/17/24 0636   BP: 143/91   Pulse: 76   Resp: 18   Temp: 98.1 °F (36.7 °C)   SpO2: 92%     Left Knee Exam      Range of Motion   The patient has normal left knee ROM.  Extension:  0   Flexion:  120      Tests   Varus: negative Valgus: negative  Drawer:  Anterior - negative          Other   Erythema: absent  Scars: absent  Sensation: normal  Pulse: present  Swelling: none  Effusion: no effusion present     Comments:  Stable   Neurovascular enacted     Trey Fountain D.O.  Division of Adult Reconstruction  Department of Orthopaedic Surgery  [facility]

## 2024-12-17 NOTE — PHYSICAL THERAPY NOTE
PHYSICAL THERAPY EVALUATION/TREATMENT       12/17/24 1314   PT Last Visit   PT Visit Date 12/17/24   Note Type   Note type Evaluation   Pain Assessment   Pain Assessment Tool 0-10   Pain Score 3   Pain Location/Orientation Orientation: Left;Location: Knee   Pain Onset/Description Onset: Ongoing;Frequency: Constant/Continuous;Descriptor: Sore   Effect of Pain on Daily Activities limits rest/mobility   Patient's Stated Pain Goal No pain   Hospital Pain Intervention(s) Repositioned;Ambulation/increased activity;Cold applied   Multiple Pain Sites No   Restrictions/Precautions   Weight Bearing Precautions Per Order Yes   LLE Weight Bearing Per Order WBAT   Other Precautions Fall Risk;Pain   Home Living   Type of Home House   Home Layout One level;Performs ADLs on one level;Able to live on main level with bedroom/bathroom;Stairs to enter without rails  (3 FELIX no rails)   Bathroom Toilet Standard   Home Equipment Walker;Cane   Prior Function   Level of [place] Independent with ADLs;Independent with functional mobility;Independent with IADLS   Lives With Spouse   Receives Help From Family   IADLs Independent with driving;Independent with meal prep;Independent with medication management   Falls in the last 6 months 0   Vocational Full time employment  (desk job)   General   Additional Pertinent History Pt is a 59 year-old female who presents to the hospital on 12/17/24 now seen POD #0 s/p L TKA. Pt had R TKA 3/2023.   Family/Caregiver Present Yes  ( at bedside throughout session)   Cognition   Overall Cognitive Status WFL   Arousal/Participation Cooperative   Orientation Level Oriented X4   Following Commands Follows multistep commands with increased time or repetition   Subjective   Subjective Pt agreeable to PT session this afternoon. Pt reports feeling groggy/lightheaded after getting pain medication   RLE Assessment   RLE Assessment WFL   LLE Assessment   LLE Assessment   (Hip/ankle WFL ; Knee 2-/5  limited by pain ; L knee ROM 0-87 degrees with ERP flexion)   Light Touch   RLE Light Touch Grossly intact   LLE Light Touch Grossly intact   Bed Mobility   Supine to Sit 5  Supervision   Additional items Assist x 1;Verbal cues;Increased time required;HOB elevated;Bedrails   Sit to Supine Unable to assess   Additional Comments transferred to bedside chair   Transfers   Sit to Stand 4  Minimal assistance  (progressing to supervision from chair with armrests)   Additional items Assist x 1;Verbal cues   Stand to Sit 5  Supervision   Additional items Assist x 1;Verbal cues;Armrests   Toilet transfer 4  Minimal assistance  (progressing to supervision to stand)   Additional items Assist x 1;Verbal cues;Commode;Armrests   Ambulation/Elevation   Gait pattern Foward flexed;Short stride;Step to;Step through pattern  (decreased gait speed, step to progressing to step through gait pattern with decreased step length)   Gait Assistance 4  Minimal assist   Additional items Assist x 1;Verbal cues   Assistive Device Rolling walker   Distance 10 feet x 2   Stair Management Assistance Not tested   Balance   Static Sitting Fair   Dynamic Sitting Fair -   Static Standing Fair -   Dynamic Standing Fair -   Ambulatory Fair -  (RW)   Activity Tolerance   Activity Tolerance Patient tolerated treatment well;Patient limited by fatigue   Medical Staff Made Aware spoke with OT Priscilla   Nurse Made Aware yes ENDER Zafar   Assessment   Prognosis Good   Problem List Decreased strength;Decreased range of motion;Decreased endurance;Impaired balance;Decreased mobility;Pain;Decreased skin integrity;Decreased safety awareness   Assessment Patient seen for Physical Therapy evaluation. Patient admitted with S/P total knee replacement not using cement, left.  Comorbidities affecting patient's physical performance include: history of R TKA 3/2023. arthritis, asthma, cancer, cardiac disease, hypertension, and osteoarthritis.  Personal factors affecting patient  at time of initial evaluation include: lives in 1 story house, ambulating with assistive device, stairs to enter home, inability to navigate community distances, inability to navigate level surfaces without external assistance, and inability to perform IADLS . Prior to admission, patient was independent with functional mobility without assistive device, independent with ADLS, independent with IADLS, living with spouse in a 1 level home with 3 steps to enter, ambulating household distance, ambulating community distances, and works full time.  Please find objective findings from Physical Therapy assessment regarding body systems outlined above with impairments and limitations including weakness, decreased ROM, impaired balance, decreased endurance, gait deviations, pain, decreased activity tolerance, decreased functional mobility tolerance, decreased safety awareness, fall risk, and decreased skin integrity.  The Barthel Index was used as a functional outcome tool presenting with a score of Barthel Index Score: 45 today indicating marked limitations of functional mobility and ADLS.  Patient's clinical presentation is currently unstable/unpredictable as seen in patient's presentation of changing level of pain, increased fall risk, new onset of impairment of functional mobility, decreased endurance, and new onset of weakness. Pt would benefit from continued Physical Therapy treatment to address deficits as defined above and maximize level of functional mobility. As demonstrated by objective findings, the assigned level of complexity for this evaluation is high.The patient's -[facility] Basic Mobility Inpatient Short Form Raw Score is 17. A Raw score of greater than 16 suggests the patient may benefit from discharge to home. Please also refer to the recommendation of the Physical Therapist for safe discharge planning.   Goals   Patient Goals to decrease L knee pain   STG Expiration Date 12/24/24   Short Term Goal #1 Pt will  be I with HEP ; Pt will perform bed mobility/transfers IND ; Pt will ambulate x 250 feet Mod I with RW ; Pt will negotiate x 3 steps with supervision + cane to enter/exit home ; LLE strength will improve by 1/2 grade, L knee ROM 0-95 degrees to improve tolerance to functional mobility ; AMPAC score will improve >20/24 to demonstrate improved functional independence   LTG Expiration Date 12/31/24   Long Term Goal #1 Pt will ambulate x 500 feet Mod I with least restrictive device ; Pt will negotiate x 3 steps Mod I + cane to enter/exit home ; LLE strength will improve by 1 grade, L knee ROM 0-100 degrees to improve tolerance to functional mobility ; AMPAC score will improve >22/24 to demonstrate improved functional independence   Plan   Treatment/Interventions Functional transfer training;LE strengthening/ROM;Therapeutic exercise;Endurance training;Bed mobility;Gait training;Spoke to nursing   PT Frequency Twice a day   Discharge Recommendation   Rehab Resource Intensity Level, PT III (Minimum Resource Intensity)   AM-PAC Basic Mobility Inpatient   Turning in Flat Bed Without Bedrails 3   Lying on Back to Sitting on Edge of Flat Bed Without Bedrails 3   Moving Bed to Chair 3   Standing Up From Chair Using Arms 3   Walk in Room 3   Climb 3-5 Stairs With Railing 2   Basic Mobility Inpatient Raw Score 17   Basic Mobility Standardized Score 39.67   [facility] Highest Level Of Mobility   -[facility] Goal 5: Stand one or more mins   -HL Achieved 6: Walk 10 steps or more   Barthel Index   Feeding 10   Bathing 0   Grooming Score 0   Dressing Score 5   Bladder Score 5   Bowels Score 10   Toilet Use Score 5   Transfers (Bed/Chair) Score 10   Mobility (Level Surface) Score 0   Stairs Score 0   Barthel Index Score 45   Additional Treatment Session   Start Time 1304   End Time 1314   Treatment Assessment S: Pt agreeable to PT session this afternoon. O/A: Pt able to perform exercise as mentioned below with intermittent  assist/tactile cues to improve form/ROM. HEP provided - to review during follow-up session. P: pt will benefit from skilled PT to address deficits to maxmize IND for safe d/c   Exercises   TKR Supine;Sitting;Left  (Ankle pumps, quad/glute sets, heel slides, LAQ x 5-10 reps L)   End of Consult   Patient Position at End of Consult Bedside chair;All needs within reach   Licensure   [place] License Number  Lizeth Mami [id number]

## 2024-12-17 NOTE — PERIOPERATIVE NURSING NOTE
Ate 100% of lunch, c/o #6 pain in left knee, medicated as ordered, resting quietly in bed,  at the bedside.

## 2024-12-17 NOTE — ANESTHESIA PROCEDURE NOTES
Spinal Block    Patient location during procedure: OR  Start time: 12/17/2024 7:41 AM  Reason for block: procedure for pain and at surgeon's request  Staffing  Performed by: Janina Menchaca MD  Authorized by: Janina Menchaca MD    Preanesthetic Checklist  Completed: patient identified, IV checked, site marked, risks and benefits discussed, surgical consent, monitors and equipment checked, pre-op evaluation and timeout performed  Spinal Block  Patient position: sitting  Prep: ChloraPrep and site prepped and draped  Patient monitoring: frequent blood pressure checks, continuous pulse ox and heart rate  Approach: midline  Location: L3-4  Needle  Needle type: Pencan   Needle gauge: 24 G  Needle length: 4 in  Assessment  Sensory level: T8.  Injection Assessment:  negative aspiration for heme, no paresthesia on injection and positive aspiration for clear CSF.  Post-procedure:  site cleaned

## 2024-12-17 NOTE — PHYSICAL THERAPY NOTE
"   PT TREATMENT       12/17/24 1534   PT Last Visit   PT Visit Date 12/17/24   Note Type   Note Type Treatment   Pain Assessment   Pain Assessment Tool 0-10   Pain Score 3   Pain Location/Orientation Orientation: Left;Location: Knee   Pain Onset/Description Onset: Ongoing;Descriptor: Sore   Effect of Pain on Daily Activities limits mobility/activity tolerance   Patient's Stated Pain Goal No pain   Hospital Pain Intervention(s) Repositioned;Ambulation/increased activity;Cold applied   Restrictions/Precautions   Weight Bearing Precautions Per Order Yes   LLE Weight Bearing Per Order WBAT   Other Precautions Fall Risk;Pain   General   Chart Reviewed Yes   Family/Caregiver Present Yes  ( at bedside throughout session)   Cognition   Arousal/Participation Cooperative   Orientation Level Oriented X4   Following Commands Follows multistep commands with increased time or repetition   Subjective   Subjective \"I still feel sleepy\"   Bed Mobility   Supine to Sit Unable to assess   Sit to Supine Unable to assess   Transfers   Sit to Stand 5  Supervision   Additional items Assist x 1;Verbal cues;Increased time required;Armrests   Stand to Sit 5  Supervision   Additional items Assist x 1;Verbal cues;Increased time required;Armrests   Ambulation/Elevation   Gait pattern Foward flexed;Short stride;Step through pattern  (decreased gait speed)   Gait Assistance 4  Minimal assist  (progressing to supervision)   Additional items Assist x 1;Verbal cues   Assistive Device Rolling walker   Distance 15 feet + 120 feet   Stair Management Assistance 4  Minimal assist  (progressing to close supervision)   Additional items Assist x 1;Verbal cues   Stair Management Technique One rail L;Step to pattern;With cane  (cane + rail/HHA on L ; descended with bilat rail (pt reports she plans to descend with RW))   Number of Stairs 4   Balance   Static Sitting Fair +   Dynamic Sitting Fair   Static Standing Fair   Dynamic Standing Fair - "   Ambulatory Fair -  (RW)   Activity Tolerance   Activity Tolerance Patient tolerated treatment well;Patient limited by fatigue   Nurse Made Aware yes RN Violet   Exercises   TKR   (HEP reviewed ; pt verbalized understanding)   Assessment   Prognosis Good   Problem List Decreased strength;Decreased range of motion;Decreased endurance;Impaired balance;Decreased mobility;Pain;Decreased skin integrity;Decreased safety awareness   Assessment Pt seen for PT session this afternoon. Able to progress to ambulate x increased distance with decreased level of assist using RW. Able to ambulate with step through pattern with good L knee control. Able to negotiate x 4 steps with rail/cane vs. bilat rail assist to simulate RW ; pt reports feeling confident with stairs + reviewed with  who verbalized understanding. HEP provided and reviewed at bedside. Pt cleared from PT perspective for d/c home with family assist + plan to start OP PT later this week.  The patient's AM-PAC Basic Mobility Inpatient Short Form Raw Score is 18. A Raw score of greater than 16 suggests the patient may benefit from discharge to home. Please also refer to the recommendation of the Physical Therapist for safe discharge planning.     Goals   Patient Goals to decrease L knee pain   Plan   Treatment/Interventions Functional transfer training;LE strengthening/ROM;Therapeutic exercise;Endurance training;Bed mobility;Gait training;Spoke to nursing   Progress Progressing toward goals   PT Frequency Twice a day   Discharge Recommendation   Rehab Resource Intensity Level, PT III (Minimum Resource Intensity)   AM-PAC Basic Mobility Inpatient   Turning in Flat Bed Without Bedrails 3   Lying on Back to Sitting on Edge of Flat Bed Without Bedrails 3   Moving Bed to Chair 3   Standing Up From Chair Using Arms 3   Walk in Room 3   Climb 3-5 Stairs With Railing 3   Basic Mobility Inpatient Raw Score 18   Basic Mobility Standardized Score 41.05   [facility]  Highest Level Of Mobility   JH-HLM Goal 6: Walk 10 steps or more   JH-HLM Achieved 7: Walk 25 feet or more   End of Consult   Patient Position at End of Consult Bedside chair;All needs within reach   Licensure   [place] License Number  Lizeth Ortegaans [id number]

## 2024-12-18 ENCOUNTER — TELEPHONE (OUTPATIENT)
Dept: OBGYN CLINIC | Facility: HOSPITAL | Age: 59
End: 2024-12-18

## 2024-12-18 NOTE — TELEPHONE ENCOUNTER
"Patient contacted for a postoperative follow up assessment. Patient reports doing \"okay\" but reports 7-8/10 and reports \"little swollen.\" SHe took Oxycodone at 630am.   Patient denies increase in swelling and dressing is clean, dry and intact. Patient is icing the site we discussed postoperative swelling, continuing to ICE areas of pain/swelling, especially after increased activity over the next few weeks.  She has OP PT on 12/20.     We reviewed patients AVS medication list. Patient is taking Tylenol 1000mg every 8 hours, Celebrex 100mg BID, Oxycodone 5mg (630am this morning), Lovenox daily (WAS ONLY GIVEN 1 SYRINGE, NEEDS 6 WEEKS WORTH PER AVS), and Colace 100mg BID. Patient has not yet had a BM but is passing gas.      Will call pharmacy at 9am to follow up.     Patient denies nausea, vomiting, abdominal pain, chest pain, shortness of breath, fever, dizziness and calf pain. Patient does not have any other questions or concerns at this time. Pt was encouraged to call with any questions, concerns or issues.    "

## 2024-12-18 NOTE — TELEPHONE ENCOUNTER
Spoke to pharmacy, discussed patient only got 1 syringe of Lovenox, and needs daily per AVS and script for 6 weeks.    They admitted mistake, I let patient know to go back to pharmacy today to pickup and use daily.

## 2024-12-20 ENCOUNTER — EVALUATION (OUTPATIENT)
Dept: PHYSICAL THERAPY | Facility: CLINIC | Age: 59
End: 2024-12-20
Payer: COMMERCIAL

## 2024-12-20 DIAGNOSIS — G89.29 CHRONIC PAIN OF LEFT KNEE: ICD-10-CM

## 2024-12-20 DIAGNOSIS — M25.562 CHRONIC PAIN OF LEFT KNEE: ICD-10-CM

## 2024-12-20 DIAGNOSIS — M17.12 PRIMARY OSTEOARTHRITIS OF LEFT KNEE: Primary | ICD-10-CM

## 2024-12-20 PROCEDURE — 97161 PT EVAL LOW COMPLEX 20 MIN: CPT

## 2024-12-20 NOTE — PROGRESS NOTES
PT Evaluation   Today's date: 2024  Patient name: Colette Sweeney  : 1965  MRN: [id number]  Referring provider: Trey Fountain DO  Dx:   Encounter Diagnosis     ICD-10-CM    1. Primary osteoarthritis of left knee  M17.12 Ambulatory referral to Physical Therapy      2. Chronic pain of left knee  M25.562 Ambulatory referral to Physical Therapy    G89.29             Assessment  Assessment details: Patient is a 59 y.o. Female who presents to skilled outpatient PT s/p L total knee replacement performed on 24 . Referring diagnoses include primary osteoarthritis of left knee, chronic pain of left knee.    Primary movement impairment diagnosis of mobility and strength deficits, gait disturbance resulting in pathoanatomical symptoms of L knee pain. The aforementioned impairments have limited the patient's ability to stand and walk long periods of time, stair navigate, sleep through the night without increased pain. No further referral is necessary at this time based upon examination results.    Patient education performed during today's session included: Signs and symptoms of infection (including redness, warmth, drainage, swelling), Importance of keeping incision dry, per MD order, Home safety checklist, which was signed by both parties and uploaded into patient's chart, and Topics of stair negotiation, ambulation with use of appropriate assistive device, and car transfers also reviewed    Impairments: Abnormal gait, Abnormal or restricted ROM, Impaired balance, Impaired physical strength, Lacks appropriate HEP, and Pain with function  Understanding of Dx/Px/POC: Excellent  Prognosis: Excellent    Patient verbalized understanding of POC.    Please contact me if you have any questions or recommendations. Thank you for the referral and the opportunity to share in Colette Sweeney's care.        Plan  Plan details:     Patient would  benefit from: PT Eval and Skilled PT  Planned modality interventions: Biofeedback, Cryotherapy, TENS, Thermotherapy: Hydrocollator Packs, and Traction  Planned therapy interventions: Abdominal trunk stabilization, ADL training, Balance, Balance/WB training, Body mechanics training, Coordination, Dry Needling, Functional ROM exercises, Gait training, HEP, Joint mobilization, Manual therapy, Stephen taping, Neuromuscular re-education, Patient education, Postural training, Strengthening, Stretching, Therapeutic activities, Therapeutic exercises, Therapeutic training, Transfer training, Activity modification, and Work reintegration  Frequency: 2x/wk  Duration in weeks: 12  Plan of Care beginning date: 12/20/24  Plan of Care expiration date: 12 weeks - 3/14/2025  Treatment plan discussed with: Patient       Goals  Short Term Goals (4 weeks):    - Patient will improve time on TUG by 2.9 seconds from 20.81 seconds to 14 seconds to facilitate improved safety in all ambulation  - Patient will be independent in basic HEP 2-3 weeks  - Patient will demonstrate >100 degrees of knee ROM for reciprocal stair negotiation  - Patient will have 0/10 pain at rest  - Patient will demonstrate >1/3 improvement in MMT grade as applicable    Long Term Goals (8 weeks):  - Patient will be independent in a comprehensive home exercise program  - Patient FOTO score will improve by 10 points  - Patient will ambulation with AD PRN  - Patient will independently ambulate >1000 feet (community ambulation)  - Patient will self-report >75% improvement in function  - Patient functional goal: Patient will return the gym.       Subjective    History of Present Illness  - Mechanism of injury: Patient reports s/p L TKA 12/17/24. She is ambulating with a RW at this time. She is walking around her house every hour. Patient is on disability at this time.   - Primary AD: RW  - Assist level at home:    - Prior level of function: independent  - Goals:  "\"Get back to the gym.\"       Pain  - Current pain ratin/10  - At best pain ratin/10  - At worst pain rating: 10/10  - Location: L knee   - Alleviating factors: ice,rest, oxy    Social Support  - Steps to enter house: 2  - Stairs in house: 0   - Bedroom/bathroom set up: first floor  - DME available: RW, cane  - [place]  - Lives with:        Objective       LE MMT    L Hip Flexion: 3+/5  R Hip Flexion: 5/5   L Hip Extension: 3+/5 R Hip Extension: 5/5   L Hip Abduction: 3+/5 R Hip Abduction: 5/5   L Hip Adduction: 3+/5 R Hip Adduction: 5/5   L Knee Extension: 3/5 R Knee Extension: 5/5   L Knee Flexion: 3/5 R Knee Flexion: 5/5   L Ankle DF: 5/5 R Ankle DF: 5/5   L Ankle PF: 5/5  R Ankle PF: 5/5     LE ROM    L knee flexion: 100 degrees R knee flexion: 125 degrees   L knee extension: -10 degrees R knee extension: 0 degrees       Sensation  - Light touch: intact     Skin Check  - Incision covered with post-operative dressing without redness or adverse reaction noted    Wells Criteria  - major surgery recently in past 4 weeks and entire leg swollen   - Referral out for possible DVT: YES/NO: no    Knee Comments  - Gait Assessment: decreased gait speed, decreased step length, lacks full L TKE  - TU.81 seconds with RW  - Stair Negotiation: non-reciprocal                 Insurance Eval/ Re-eval POC expires Auth Status Total visits  Start date  Expiration date Misc   [employer] 12/20 3/14     $0                 Precautions: S/P L TKA 24  Past Medical History:   Diagnosis Date    Allergic rhinitis     Asthma     Fibroids     Hypertension     Physiological ovarian cysts     Sleep apnea     dental device worn         Date 2024        Visit Number IE FOTO    FOTO   Auth         ROM         Knee Flexion 100 deg        Knee Extension -10 deg                 TUG 20.81 sec                 Manual         Patellar mobs         STM                  Neuro Re-ed         Quad set  3sx20        Glute set       "                              TherEx         Knee/Hip PROM Performed         Active w/u         Ankle pumps Rev        SLR 2x5 AAROM        Hip abduction         LAQ 2x5        Heel slides X10 peanut        SAQ 2x5                                                      TherAct         Patient education 10' POC and HEP        Stair negotiation         Car transfer                           Gait Training         With AD         No AD                  Modalities         CP

## 2024-12-23 ENCOUNTER — HOSPITAL ENCOUNTER (OUTPATIENT)
Dept: RADIOLOGY | Facility: HOSPITAL | Age: 59
Discharge: HOME | End: 2024-12-23
Payer: COMMERCIAL

## 2024-12-23 ENCOUNTER — TELEPHONE (OUTPATIENT)
Dept: OBGYN CLINIC | Facility: CLINIC | Age: 59
End: 2024-12-23

## 2024-12-23 ENCOUNTER — OFFICE VISIT (OUTPATIENT)
Dept: PHYSICAL THERAPY | Facility: CLINIC | Age: 59
End: 2024-12-23
Payer: COMMERCIAL

## 2024-12-23 DIAGNOSIS — Z96.652 S/P TOTAL KNEE REPLACEMENT NOT USING CEMENT, LEFT: ICD-10-CM

## 2024-12-23 DIAGNOSIS — Z96.652 S/P TOTAL KNEE REPLACEMENT NOT USING CEMENT, LEFT: Primary | ICD-10-CM

## 2024-12-23 DIAGNOSIS — M25.562 CHRONIC PAIN OF LEFT KNEE: ICD-10-CM

## 2024-12-23 DIAGNOSIS — G89.29 CHRONIC PAIN OF LEFT KNEE: ICD-10-CM

## 2024-12-23 DIAGNOSIS — M17.12 PRIMARY OSTEOARTHRITIS OF LEFT KNEE: Primary | ICD-10-CM

## 2024-12-23 PROCEDURE — 93971 EXTREMITY STUDY: CPT

## 2024-12-23 PROCEDURE — 97110 THERAPEUTIC EXERCISES: CPT

## 2024-12-23 PROCEDURE — 93971 EXTREMITY STUDY: CPT | Performed by: SURGERY

## 2024-12-23 NOTE — PROGRESS NOTES
"Daily Note     Today's date: 2024  Patient name: Colette Sweeney  : 1965  MRN: [id number]  Referring provider: Trey Fountain DO  Dx:   Encounter Diagnosis     ICD-10-CM    1. Primary osteoarthritis of left knee  M17.12       2. Chronic pain of left knee  M25.562     G89.29           Start Time: 930  Stop Time: 1015  Total time in clinic (min): 45 minutes    Subjective: Patient had a rough weekend and notes increased pain of her L knee today. She is taking her oxy faithfully but it \"doesn't do anything for the pain.\"       Objective: See treatment diary below      Assessment: Tolerated treatment well. Patient demonstrates improvements in her ROM during today's session, but continues to have increased pain when in closed chain. During open chain activities, patient notes intense L calf pain during quad sets, noting this pain at 10/10. She described this as calf tightness and tenderness to palpation. Positive Wai's sign noted during assessment. She continues to note increased pain. Contacted physician's office in regards to increased L calf pain. Scheduled patient for stat doppler this afternoon. Also advised patient to contact PCP in regards to continued nose bleeds since surgical intervention. Patient verbalized understanding. Patient would benefit from continued PT      Plan: Continue per plan of care.          Insurance Eval/ Re-eval POC expires Auth Status Total visits  Start date  Expiration date Misc   [employer] 12/20 3/14     $0                 Precautions: S/P L TKA 24  Past Medical History:   Diagnosis Date    Allergic rhinitis     Asthma     Fibroids     Hypertension     Physiological ovarian cysts     Sleep apnea     dental device worn         Date 2024       Visit Number IE FOTO    FOTO   Auth         ROM         Knee Flexion 100 deg 111 deg        Knee Extension -10 deg -5 deg                 TUG 20.81 sec                 Manual         Patellar mobs         STM       "            Neuro Re-ed         Quad set  3sx20 3sx20       Glute set                                    TherEx         Knee/Hip PROM Performed         Active w/u  5' L3 NS       Ankle pumps Rev HR 2x10        SLR 2x5 AAROM 2x5 AAROM       Hip abduction         LAQ 2x5 2x5       Heel slides X10 peanut 2x10 peanut       SAQ 2x5  2x5       Step flexion stretch   10x10s       Mini squat  2x10                                   TherAct         Patient education 10' POC and HEP        Stair negotiation         Car transfer                           Gait Training         With AD         No AD                  Modalities         CP

## 2024-12-26 ENCOUNTER — OFFICE VISIT (OUTPATIENT)
Dept: PHYSICAL THERAPY | Facility: CLINIC | Age: 59
End: 2024-12-26
Payer: COMMERCIAL

## 2024-12-26 DIAGNOSIS — M25.562 CHRONIC PAIN OF LEFT KNEE: ICD-10-CM

## 2024-12-26 DIAGNOSIS — M17.12 PRIMARY OSTEOARTHRITIS OF LEFT KNEE: Primary | ICD-10-CM

## 2024-12-26 DIAGNOSIS — G89.29 CHRONIC PAIN OF LEFT KNEE: ICD-10-CM

## 2024-12-26 PROCEDURE — 97112 NEUROMUSCULAR REEDUCATION: CPT

## 2024-12-26 PROCEDURE — 97110 THERAPEUTIC EXERCISES: CPT

## 2024-12-26 NOTE — PROGRESS NOTES
Daily Note     Today's date: 2024  Patient name: Colette Sweeney  : 1965  MRN: [id number]  Referring provider: Trey Fountain DO  Dx:   Encounter Diagnosis     ICD-10-CM    1. Primary osteoarthritis of left knee  M17.12       2. Chronic pain of left knee  M25.562     G89.29             Start Time: 1015  Stop Time: 1100  Total time in clinic (min): 45 minutes    Subjective: Patient notes increased L calf pain today. She had a hard couple days this week due to not being home and walking around at her mom's house. She had a doppler regarding her calf pain, with negative results.       Objective: See treatment diary below      Assessment: Tolerated treatment well. Bandage removal performed today. Please see clinical image above. Patient does not demonstrate any signs of drainage or infection at this time. Instructed patient on importance of keeping incision dry until she has follow up with surgeon at 2 week follow up. Patient verbalized understanding. Patient demonstrates improvements in her L knee extension ROM during today's visit. Added gastroc STM due to complaints of increased pain. Will continue to progress as tolerated. Patient would benefit from continued PT      Plan: Continue per plan of care.          Insurance Eval/ Re-eval POC expires Auth Status Total visits  Start date  Expiration date Misc   [employer] 12/20 3/14     $0                 Precautions: S/P L TKA 24  Past Medical History:   Diagnosis Date    Allergic rhinitis     Asthma     Fibroids     Hypertension     Physiological ovarian cysts     Sleep apnea     dental device worn         Date 2024      Visit Number IE FOTO 3   FOTO   Auth         ROM         Knee Flexion 100 deg 111 deg  110 deg       Knee Extension -10 deg -5 deg  0 deg                TUG 20.81 sec                 Manual         Patellar mobs         STM   L gastroc x5'               Neuro Re-ed         Quad set  3sx20 3sx20 3sx20      Glute  set                                    TherEx         Knee/Hip PROM Performed         Active w/u  5' L3 NS 5' L3 NS      Ankle pumps Rev HR 2x10        SLR 2x5 AAROM 2x5 AAROM       Hip abduction         LAQ 2x5 2x5 2x10       Heel slides X10 peanut 2x10 peanut 2x10 peanut       SAQ 2x5  2x5 2x5      Step flexion stretch   10x10s 10x10s      Mini squat  2x10  2x10       Gastroc stretch    10x10s                         TherAct         Patient education 10' POC and HEP        Stair negotiation         Car transfer                           Gait Training         With AD         No AD                  Modalities         CP

## 2024-12-30 ENCOUNTER — OFFICE VISIT (OUTPATIENT)
Dept: PHYSICAL THERAPY | Facility: CLINIC | Age: 59
End: 2024-12-30
Payer: COMMERCIAL

## 2024-12-30 DIAGNOSIS — M25.562 CHRONIC PAIN OF LEFT KNEE: ICD-10-CM

## 2024-12-30 DIAGNOSIS — G89.29 CHRONIC PAIN OF LEFT KNEE: ICD-10-CM

## 2024-12-30 DIAGNOSIS — M17.12 PRIMARY OSTEOARTHRITIS OF LEFT KNEE: Primary | ICD-10-CM

## 2024-12-30 PROCEDURE — 97110 THERAPEUTIC EXERCISES: CPT

## 2024-12-30 NOTE — PROGRESS NOTES
Daily Note     Today's date: 2024  Patient name: Colette Sweeney  : 1965  MRN: [id number]  Referring provider: Trey Fountain DO  Dx:   Encounter Diagnosis     ICD-10-CM    1. Primary osteoarthritis of left knee  M17.12       2. Chronic pain of left knee  M25.562     G89.29               Start Time: 1145  Stop Time: 1230  Total time in clinic (min): 45 minutes    Subjective: Patient reports 7/10 pain today. She has pulling of her calf today that is worse than her knee. She is having a hard time sleeping due to increased pain. She slept 2 hours last night. Reports compliance with her oxy.       Objective: See treatment diary below      Assessment: Tolerated treatment well. Patient demonstrates improvements in her L knee flexion ROM during today's session. She notes increased pain at available end range extension today. Lateral L knee pain diminished when performing fib head mobs. Will continue to challenge as appropriate. Transition to cane today, as patient demonstrates good strength and ROM. Patient would benefit from continued PT      Plan: Continue per plan of care.          Insurance Eval/ Re-eval POC expires Auth Status Total visits  Start date  Expiration date Misc   [employer] 12/20 3/14     $0                 Precautions: S/P L TKA 24  Past Medical History:   Diagnosis Date    Allergic rhinitis     Asthma     Fibroids     Hypertension     Physiological ovarian cysts     Sleep apnea     dental device worn         Date 2024     Visit Number IE FOTO 3 4  FOTO   Auth         ROM         Knee Flexion 100 deg 111 deg  110 deg  115 deg      Knee Extension -10 deg -5 deg  0 deg  -5 deg               TUG 20.81 sec                 Manual         Patellar mobs         STM   L gastroc x5'               Neuro Re-ed         Quad set  3sx20 3sx20 3sx20 3sx20     Glute set                                    TherEx         Knee/Hip PROM Performed    Performed x5'     Active  w/u  5' L3 NS 5' L3 NS 9'      Ankle pumps Rev HR 2x10   HR 21x0      SLR 2x5 AAROM 2x5 AAROM  2x5      Hip abduction         LAQ 2x5 2x5 2x10  2x10     Heel slides X10 peanut 2x10 peanut 2x10 peanut  2x10 peanut      SAQ 2x5  2x5 2x5      Step flexion stretch   10x10s 10x10s 10x10s     Mini squat  2x10  2x10  2x10     Gastroc stretch    10x10s                         TherAct         Patient education 10' POC and HEP        Stair negotiation         Car transfer                           Gait Training         With AD         No AD                  Modalities         CP

## 2025-01-02 ENCOUNTER — OFFICE VISIT (OUTPATIENT)
Dept: PHYSICAL THERAPY | Facility: CLINIC | Age: 60
End: 2025-01-02
Payer: COMMERCIAL

## 2025-01-02 ENCOUNTER — OFFICE VISIT (OUTPATIENT)
Dept: OBGYN CLINIC | Facility: CLINIC | Age: 60
End: 2025-01-02

## 2025-01-02 ENCOUNTER — APPOINTMENT (OUTPATIENT)
Dept: RADIOLOGY | Facility: CLINIC | Age: 60
End: 2025-01-02
Payer: COMMERCIAL

## 2025-01-02 VITALS — WEIGHT: 220.8 LBS | BODY MASS INDEX: 37.69 KG/M2 | HEIGHT: 64 IN

## 2025-01-02 DIAGNOSIS — G89.29 CHRONIC PAIN OF LEFT KNEE: ICD-10-CM

## 2025-01-02 DIAGNOSIS — Z47.1 AFTERCARE FOLLOWING LEFT KNEE JOINT REPLACEMENT SURGERY: ICD-10-CM

## 2025-01-02 DIAGNOSIS — Z96.652 S/P TOTAL KNEE REPLACEMENT NOT USING CEMENT, LEFT: ICD-10-CM

## 2025-01-02 DIAGNOSIS — M17.12 PRIMARY OSTEOARTHRITIS OF LEFT KNEE: Primary | ICD-10-CM

## 2025-01-02 DIAGNOSIS — Z96.652 S/P TOTAL KNEE REPLACEMENT NOT USING CEMENT, LEFT: Primary | ICD-10-CM

## 2025-01-02 DIAGNOSIS — Z96.652 AFTERCARE FOLLOWING LEFT KNEE JOINT REPLACEMENT SURGERY: ICD-10-CM

## 2025-01-02 DIAGNOSIS — M25.562 CHRONIC PAIN OF LEFT KNEE: ICD-10-CM

## 2025-01-02 PROCEDURE — 73562 X-RAY EXAM OF KNEE 3: CPT

## 2025-01-02 PROCEDURE — 99024 POSTOP FOLLOW-UP VISIT: CPT | Performed by: ORTHOPAEDIC SURGERY

## 2025-01-02 PROCEDURE — 97110 THERAPEUTIC EXERCISES: CPT

## 2025-01-02 RX ORDER — OXYCODONE HYDROCHLORIDE 5 MG/1
5 TABLET ORAL EVERY 4 HOURS PRN
Qty: 40 TABLET | Refills: 0 | Status: SHIPPED | OUTPATIENT
Start: 2025-01-02

## 2025-01-02 NOTE — PROGRESS NOTES
"Daily Note     Today's date: 2025  Patient name: Colette Sweeney  : 1965  MRN: [id number]  Referring provider: Trey Fountain DO  Dx:   Encounter Diagnosis     ICD-10-CM    1. Primary osteoarthritis of left knee  M17.12       2. Chronic pain of left knee  M25.562     G89.29                Subjective: Patient reports 7/10 pain today. She has pulling of her calf and lateral knee today. Only slept for an hour last night .   Objective: See treatment diary below    Assessment: Tolerated treatment well. Patient demonstrates improvements in her L knee flexion ROM during today's session. Just came from Dr Fountain who said \"keep up the good work\". She notes increased pain at available end range extension today. Some pain in calf with therapist slight over pressure with LLE on peanut.  Will continue to challenge as appropriate. Doing well with  cane today, as patient demonstrates good strength and ROM. Patient would benefit from continued PT      Plan: Continue per plan of care.        Insurance Eval/ Re-eval POC expires Auth Status Total visits  Start date  Expiration date Misc   [employer] 12/20 3/14     $0                 Precautions: S/P L TKA 24  Past Medical History:   Diagnosis Date    Allergic rhinitis     Asthma     Fibroids     Hypertension     Physiological ovarian cysts     Sleep apnea     dental device worn         Date 2024    Visit Number IE FOTO 3 4 5 FOTO   Auth         ROM         Knee Flexion 100 deg 111 deg  110 deg  115 deg  113 deg    Knee Extension -10 deg -5 deg  0 deg  -5 deg  - 5 deg             TUG 20.81 sec                 Manual         Patellar mobs         STM   L gastroc x5'               Neuro Re-ed         Quad set  3sx20 3sx20 3sx20 3sx20                       TherEx         Knee/Hip PROM Performed    Performed x5'     Active w/u  5' L3 NS 5' L3 NS 9'  NS    Ankle pumps Rev HR 2x10   HR 21x0  HR 2*10    SLR 2x5 AAROM 2x5 AAROM  2x5  2*5  "   LAQ 2x5 2x5 2x10  2x10 2*10    Heel slides X10 peanut 2x10 peanut 2x10 peanut  2x10 peanut  2*10 peanut    SAQ 2x5  2x5 2x5      Step flexion stretch   10x10s 10x10s 10x10s 10x 10s    Mini squat  2x10  2x10  2x10 2*10    Gastroc stretch    10x10s                         TherAct         Patient education 10' POC and HEP        Stair negotiation         Car transfer                           Gait Training         With AD         No AD

## 2025-01-02 NOTE — PROGRESS NOTES
Assessment/Plan:  1. S/P total knee replacement not using cement, left  XR knee 3 vw left non injury    oxyCODONE (Roxicodone) 5 immediate release tablet      2. Aftercare following left knee joint replacement surgery          Scribe Attestation      I,:  Jesusita Sen am acting as a scribe while in the presence of the attending physician.:       I,:  Trey Fountain, DO personally performed the services described in this documentation    as scribed in my presence.:           Colette is a pleasant 59 y.o. female who presents 2 weeks status post left total knee arthroplasty. She is doing well postoperatively, despite her experiencing more pain than when she had her right total knee arthroplasty. She should continue her efforts at physical therapy. I have refilled her oxycodone prescription today. She should supplement the oxycodone with tylenol before bed. She should continue her Lovenox as prescribed for DVT prophylaxis. She will follow-up in 4 weeks for re-evaluation of the left knee.    Subjective: 2 weeks status post left total knee arthroplasty    Patient ID: Colette Sweeney is a 59 y.o. female who presents 2 weeks status post left total knee arthroplasty. She reports a constant feeling of tightness both medially and laterally on the knee. She also has tightness of the calf. She began physical therapy on 12/20/2024, which she feels is going well. She has finished her oxycodone prescription. She has been taking Tylenol every 6 hours without relief. She finished the course of Celebrex. She has been compliant with Lovenox once daily for DVT prophylaxis.    Review of Systems   Constitutional:  Positive for activity change. Negative for chills and fever.   HENT:  Negative for ear pain and sore throat.    Eyes:  Negative for pain and visual disturbance.   Respiratory:  Negative for cough and shortness of breath.    Cardiovascular:  Negative for chest pain and palpitations.   Gastrointestinal:  Negative for abdominal  pain and vomiting.   Genitourinary:  Negative for dysuria and hematuria.   Musculoskeletal:  Positive for arthralgias and myalgias. Negative for back pain.   Skin:  Negative for color change and rash.   Neurological:  Negative for seizures and syncope.   All other systems reviewed and are negative.        Past Medical History:   Diagnosis Date    Allergic rhinitis     Asthma     Fibroids     Hypertension     Physiological ovarian cysts     Sleep apnea     dental device worn       Past Surgical History:   Procedure Laterality Date    COLONOSCOPY  2016    dr garcia    KNEE ARTHROSCOPY Left 2018    meniscectomy x2, first done in 2015    MYOMECTOMY      AL ARTHRP KNE CONDYLE&PLATU MEDIAL&LAT COMPARTMENTS Right 03/06/2023    Procedure: ARTHROPLASTY KNEE TOTAL W ROBOT - RIGHT - PRESS FIT - SAME DAY;  Surgeon: Trey Fountain DO;  Location: [facility];  Service: Orthopedics    AL ARTHRP KNE CONDYLE&PLATU MEDIAL&LAT COMPARTMENTS Left 12/17/2024    Procedure: ARTHROPLASTY KNEE TOTAL W ROBOT - LEFT - PRESS FIT - SAME DAY;  Surgeon: Trey Fountain DO;  Location: [facility];  Service: Orthopedics    AL ARTHRS KNE SURG W/MENISCECTOMY MED/LAT W/SHVG Right 01/06/2022    Procedure: KNEE ARTHROSCOPY MENISCECTOMY MEDIAL;  Surgeon: Sachin Grove MD;  Location: [facility];  Service: Orthopedics    AL COLONOSCOPY FLX DX W/COLLJ SPEC WHEN PFRMD N/A 09/13/2018    Procedure: COLONOSCOPY;  Surgeon: Sergo Martinez MD;  Location: [facility];  Service: Gastroenterology    WRIST SURGERY Right 07/2024       Family History   Problem Relation Age of Onset    Osteoporosis Mother     Thyroid disease Mother     Colon cancer Mother 67    Colon polyps Mother     Cancer Mother         colon    Alzheimer's disease Father     Hypertension Father     Dementia Father     No Known Problems Daughter     No Known Problems Daughter     Ovarian cancer Maternal Grandmother     Heart disease Brother     No Known Problems Maternal Aunt     No Known  Problems Maternal Aunt     No Known Problems Paternal Aunt     No Known Problems Paternal Aunt     No Known Problems Paternal Aunt     No Known Problems Paternal Aunt     No Known Problems Paternal Aunt     No Known Problems Paternal Aunt     No Known Problems Paternal Aunt     No Known Problems Paternal Aunt        Social History     Occupational History    Not on file   Tobacco Use    Smoking status: Never     Passive exposure: Never    Smokeless tobacco: Never   Vaping Use    Vaping status: Never Used   Substance and Sexual Activity    Alcohol use: Yes     Comment: social - wine 2 x month    Drug use: Never    Sexual activity: Yes     Partners: Male     Birth control/protection: Condom Male         Current Outpatient Medications:     acetaminophen (TYLENOL) 325 mg tablet, Take 3 tablets (975 mg total) by mouth every 8 (eight) hours, Disp: , Rfl:     albuterol (2.5 mg/3 mL) 0.083 % nebulizer solution, Take 1 vial (2.5 mg total) by nebulization every 6 (six) hours as needed for wheezing or shortness of breath, Disp: 100 mL, Rfl: 0    Calcium Carb-Cholecalciferol 600-500 MG-UNIT CAPS, Take by mouth in the morning, Disp: , Rfl:     celecoxib (CeleBREX) 100 mg capsule, Take 1 capsule (100 mg total) by mouth 2 (two) times a day, Disp: 28 capsule, Rfl: 0    chlorthalidone 25 mg tablet, Take 1 tablet (25 mg total) by mouth daily, Disp: 90 tablet, Rfl: 1    docusate sodium (COLACE) 100 mg capsule, Take 1 capsule (100 mg total) by mouth 2 (two) times a day, Disp: 60 capsule, Rfl: 0    enoxaparin (Lovenox) 40 mg/0.4 mL, Inject 0.4 mL (40 mg total) under the skin in the morning, Disp: 16.8 mL, Rfl: 0    multivitamin (THERAGRAN) TABS, Take 1 tablet by mouth daily, Disp: , Rfl:     oxyCODONE (Roxicodone) 5 immediate release tablet, Take 1 tablet (5 mg total) by mouth every 4 (four) hours as needed for moderate pain or severe pain Max Daily Amount: 30 mg, Disp: 40 tablet, Rfl: 0    Respiratory Therapy Supplies  (NEBULIZER/TUBING/MOUTHPIECE) KIT, by Does not apply route 4 (four) times a day, Disp: 1 each, Rfl: 0    Fluticasone-Salmeterol (Advair Diskus) 500-50 mcg/dose inhaler, Inhale 1 puff 2 (two) times a day Rinse mouth after use., Disp: 180 blister, Rfl: 1    valACYclovir (VALTREX) 1,000 mg tablet, 2 tabs at earliest onset of cold sore, repeat once in 12 hours. Take 500mg bid for 3 days for genital herpes flare up., Disp: 40 tablet, Rfl: 5    No Known Allergies    Objective:  There were no vitals filed for this visit.    Body mass index is 37.9 kg/m².    Left Knee Exam     Tenderness   The patient is experiencing tenderness in the lateral retinaculum and medial retinaculum.    Range of Motion   Extension:  0   Flexion:  110     Tests   Varus: negative Valgus: negative    Other   Erythema: absent  Scars: present (well-healed anterior surgical scar)  Sensation: normal  Pulse: present  Swelling: mild  Effusion: no effusion present    Comments:  No effusion, erythema, or warmth  Stable at 0, 30, 90  Patella tracks midline and flat  Ecchymosis showing signs of resolution anterirorly          Observations   Left Knee   Negative for effusion.       Physical Exam  Vitals and nursing note reviewed.   Constitutional:       Appearance: Normal appearance.   HENT:      Head: Normocephalic and atraumatic.      Right Ear: External ear normal.      Left Ear: External ear normal.      Nose: Nose normal.   Eyes:      General: No scleral icterus.     Extraocular Movements: Extraocular movements intact.      Conjunctiva/sclera: Conjunctivae normal.   Cardiovascular:      Rate and Rhythm: Normal rate.   Pulmonary:      Effort: Pulmonary effort is normal. No respiratory distress.   Musculoskeletal:         General: Swelling and tenderness present.      Cervical back: Normal range of motion and neck supple.      Left knee: No effusion.      Left lower leg: Edema present.      Comments: See ortho exam   Skin:     General: Skin is warm and dry.    Neurological:      Mental Status: She is alert and oriented to person, place, and time.   Psychiatric:         Mood and Affect: Mood normal.         Behavior: Behavior normal.         I have personally reviewed pertinent films in PACS.  X-rays of the left knee obtained in the office today demonstrate a well-positioned, well-aligned left total knee prosthesis. No evidence of fracture or failure.    This document was created using speech voice recognition software.   Grammatical errors, random word insertions, pronoun errors, and incomplete sentences are an occasional consequence of this system due to software limitations, ambient noise, and hardware issues.   Any formal questions or concerns about content, text, or information contained within the body of this dictation should be directly addressed to the provider for clarification.

## 2025-01-06 ENCOUNTER — OFFICE VISIT (OUTPATIENT)
Dept: PHYSICAL THERAPY | Facility: CLINIC | Age: 60
End: 2025-01-06
Payer: COMMERCIAL

## 2025-01-06 DIAGNOSIS — M25.562 CHRONIC PAIN OF LEFT KNEE: ICD-10-CM

## 2025-01-06 DIAGNOSIS — G89.29 CHRONIC PAIN OF LEFT KNEE: ICD-10-CM

## 2025-01-06 DIAGNOSIS — M17.12 PRIMARY OSTEOARTHRITIS OF LEFT KNEE: Primary | ICD-10-CM

## 2025-01-06 PROCEDURE — 97112 NEUROMUSCULAR REEDUCATION: CPT

## 2025-01-06 PROCEDURE — 97110 THERAPEUTIC EXERCISES: CPT

## 2025-01-06 NOTE — PROGRESS NOTES
Daily Note     Today's date: 2025  Patient name: Colette Sweeney  : 1965  MRN: [id number]  Referring provider: Trey Fountain DO  Dx:   Encounter Diagnosis     ICD-10-CM    1. Primary osteoarthritis of left knee  M17.12       2. Chronic pain of left knee  M25.562     G89.29         Start Time: 09  Stop Time: 1010  Total time in clinic (min): 50 minutes      Subjective: Patient thinks she overdid it this weekend and had increased pain of her L knee as a result. She went grocery shopping by herself. Today, she reports her pain at 7/10.       Objective: See treatment diary below    Assessment: Tolerated treatment well. Patient is progressing well at this time despite complaints of tightness. Discussed with patient importance of elevating, icing, and performing ankle pumps to help improve the swelling and the tightness. Verbalized understanding. Will continue to progress as tolerated. Patient would benefit from continued PT      Plan: Continue per plan of care.        Insurance Eval/ Re-eval POC expires Auth Status Total visits  Start date  Expiration date Misc   [employer] 12/20 3/14     $0                 Precautions: S/P L TKA 24  Past Medical History:   Diagnosis Date    Allergic rhinitis     Asthma     Fibroids     Hypertension     Physiological ovarian cysts     Sleep apnea     dental device worn         Date 2024   Visit Number IE FOTO 3 4 5 FOTO   Auth         ROM         Knee Flexion 100 deg 111 deg  110 deg  115 deg  113 deg 118 deg   Knee Extension -10 deg -5 deg  0 deg  -5 deg  - 5 deg -5 deg             TUG 20.81 sec                 Manual         Patellar mobs         STM   L gastroc x5'               Neuro Re-ed         Quad set  3sx20 3sx20 3sx20 3sx20  3sx20                     TherEx         Knee/Hip PROM Performed    Performed x5'     Active w/u  5' L3 NS 5' L3 NS 9'  NS NS 10' L3 prior to session   Ankle pumps Rev HR 2x10   HR 21x0  HR  2*10 HR 2x10    SLR 2x5 AAROM 2x5 AAROM  2x5  2*5 2x10   LAQ 2x5 2x5 2x10  2x10 2*10 2x10    Heel slides X10 peanut 2x10 peanut 2x10 peanut  2x10 peanut  2*10 peanut 2x10 peanut    SAQ 2x5  2x5 2x5   2x10    Step flexion stretch   10x10s 10x10s 10x10s 10x 10s 10x10s    Mini squat  2x10  2x10  2x10 2*10 2x10    Gastroc stretch    10x10s    Pro stretch 10x10s   Side stepping      6x10'             TherAct         Patient education 10' POC and HEP        Stair negotiation         Car transfer                           Gait Training         With AD         No AD

## 2025-01-08 ENCOUNTER — OFFICE VISIT (OUTPATIENT)
Dept: PHYSICAL THERAPY | Facility: CLINIC | Age: 60
End: 2025-01-08
Payer: COMMERCIAL

## 2025-01-08 DIAGNOSIS — M17.12 PRIMARY OSTEOARTHRITIS OF LEFT KNEE: Primary | ICD-10-CM

## 2025-01-08 DIAGNOSIS — G89.29 CHRONIC PAIN OF LEFT KNEE: ICD-10-CM

## 2025-01-08 DIAGNOSIS — M25.562 CHRONIC PAIN OF LEFT KNEE: ICD-10-CM

## 2025-01-08 PROCEDURE — 97110 THERAPEUTIC EXERCISES: CPT

## 2025-01-08 PROCEDURE — 97112 NEUROMUSCULAR REEDUCATION: CPT

## 2025-01-08 NOTE — PROGRESS NOTES
Daily Note     Today's date: 2025  Patient name: Colette Sweeney  : 1965  MRN: [id number]  Referring provider: Trey Fountain DO  Dx:   Encounter Diagnosis     ICD-10-CM    1. Primary osteoarthritis of left knee  M17.12       2. Chronic pain of left knee  M25.562     G89.29           Start Time: 1455  Stop Time: 1545  Total time in clinic (min): 50 minutes      Subjective: Patient took down the [employer] decorations because she was feeling good yesterday. She notes increased soreness today. She is ambulating with a SPC today.       Objective: See treatment diary below    Assessment: Tolerated treatment well. Patient demonstrates good ROM and overall strength during today's session. Modified quad sets today to avoid activation of her gluteus musculature. Patient notes increased fatigue of her quadriceps post tx. Will continue to progress as tolerated. Patient would benefit from continued PT      Plan: Continue per plan of care.        Insurance Eval/ Re-eval POC expires Auth Status Total visits  Start date  Expiration date Misc   [employer] 12/20 3/14     $0                 Precautions: S/P L TKA 24  Past Medical History:   Diagnosis Date    Allergic rhinitis     Asthma     Fibroids     Hypertension     Physiological ovarian cysts     Sleep apnea     dental device worn         Date 24   Visit Number 3 4 5 FOTO 7   Auth        ROM        Knee Flexion 110 deg  115 deg  113 deg 118 deg 115 deg    Knee Extension 0 deg  -5 deg  - 5 deg -5 deg  -5 deg            TUG                Manual        Patellar mobs        STM L gastroc x5'               Neuro Re-ed        Quad set  3sx20 3sx20  3sx20 3sx20 foam   TKE ball on wall     3sx20           TherEx        Knee/Hip PROM  Performed x5'      Active w/u 5' L3 NS 9'  NS NS 10' L3 prior to session NS 10' L3 prior to session   Ankle pumps  HR 21x0  HR 2*10 HR 2x10  HR 2x10    SLR  2x5  2*5 2x10 2x10   LAQ 2x10  2x10 2*10 2x10   2x10   Heel slides 2x10 peanut  2x10 peanut  2*10 peanut 2x10 peanut  2x10 peanut   SAQ 2x5   2x10  2x10   Step flexion stretch  10x10s 10x10s 10x 10s 10x10s  10x10s   Mini squat 2x10  2x10 2*10 2x10  2x10   Gastroc stretch  10x10s    Pro stretch 10x10s Pro stretch 10x10s   Side stepping    6x10'  6x10'           TherAct        Patient education        Stair negotiation        Car transfer                        Gait Training        With AD        No AD

## 2025-01-16 ENCOUNTER — OFFICE VISIT (OUTPATIENT)
Dept: PHYSICAL THERAPY | Facility: CLINIC | Age: 60
End: 2025-01-16
Payer: COMMERCIAL

## 2025-01-16 DIAGNOSIS — M25.562 CHRONIC PAIN OF LEFT KNEE: ICD-10-CM

## 2025-01-16 DIAGNOSIS — G89.29 CHRONIC PAIN OF LEFT KNEE: ICD-10-CM

## 2025-01-16 DIAGNOSIS — M17.12 PRIMARY OSTEOARTHRITIS OF LEFT KNEE: Primary | ICD-10-CM

## 2025-01-16 PROCEDURE — 97112 NEUROMUSCULAR REEDUCATION: CPT | Performed by: PHYSICAL THERAPIST

## 2025-01-16 PROCEDURE — 97110 THERAPEUTIC EXERCISES: CPT | Performed by: PHYSICAL THERAPIST

## 2025-01-16 NOTE — PROGRESS NOTES
"Daily Note     Today's date: 2025  Patient name: Colette Sweeney  : 1965  MRN: [id number]  Referring provider: Trey Fountain DO  Dx:   Encounter Diagnosis     ICD-10-CM    1. Primary osteoarthritis of left knee  M17.12       2. Chronic pain of left knee  M25.562     G89.29                          Subjective: Pt reporting mm pain top of thigh and gastroc region. Advised pt to utilize heating pad for thigh and gastroc alternating with CP. Pain 6/10 \"stabbing\" under knee cap region. She is ambulating with a SPC today. Pt is 4 weeks post op L knee replacement.       Objective: See treatment diary below    Assessment: Tolerated treatment well. Patient demonstrates good ROM and overall strength during today's session.  Emphasis of tx on neuro re-education of L quads with all therex.  Will continue to progress as tolerated with primary PT. Patient would benefit from continued PT. All questions answered,.      Plan: Continue per plan of care.  Progress as per primary PT.        Insurance Eval/ Re-eval POC expires Auth Status Total visits  Start date  Expiration date Misc   [employer] 12/20 3/14     $0                 Precautions: S/P L TKA 24  Past Medical History:   Diagnosis Date    Allergic rhinitis     Asthma     Fibroids     Hypertension     Physiological ovarian cysts     Sleep apnea     dental device worn         Date 25   Visit Number 5 FOTO 7 8   Auth       ROM       Knee Flexion 113 deg 118 deg 115 deg  117   Knee Extension - 5 deg -5 deg  -5 deg  5          TUG              Manual       Patellar mobs       STM    Stick rolling to L quads, pt supine x 6 min, pt education re: stick rolling to decrease mm tightness          Neuro Re-ed       Quad set   3sx20 3sx20 foam X20 hold 5 sec   TKE ball on wall   3sx20 X20 hold 5 sec          TherEx       Knee/Hip PROM       Active w/u NS NS 10' L3 prior to session NS 10' L3 prior to session NS x10 min L3    Ankle pumps HR 2*10 HR " 2x10  HR 2x10  HR 2x10   SLR 2*5 2x10 2x10 2x10   LAQ 2*10 2x10  2x10 2x10   Heel slides 2*10 peanut 2x10 peanut  2x10 peanut 2x10 peanut   SAQ  2x10  2x10 2x10    Step flexion stretch  10x 10s 10x10s  10x10s 10x10 sec   Mini squat 2*10 2x10  2x10 2x10   Gastroc stretch   Pro stretch 10x10s Pro stretch 10x10s Prostretch 10x 10sec   Side stepping  6x10'  6x10' 6x10'          TherAct       Patient education       Stair negotiation       Car transfer                     Gait Training       With AD       No AD

## 2025-01-21 ENCOUNTER — OFFICE VISIT (OUTPATIENT)
Dept: PHYSICAL THERAPY | Facility: CLINIC | Age: 60
End: 2025-01-21
Payer: COMMERCIAL

## 2025-01-21 DIAGNOSIS — M17.12 PRIMARY OSTEOARTHRITIS OF LEFT KNEE: Primary | ICD-10-CM

## 2025-01-21 DIAGNOSIS — G89.29 CHRONIC PAIN OF LEFT KNEE: ICD-10-CM

## 2025-01-21 DIAGNOSIS — M25.562 CHRONIC PAIN OF LEFT KNEE: ICD-10-CM

## 2025-01-21 PROCEDURE — 97110 THERAPEUTIC EXERCISES: CPT

## 2025-01-21 PROCEDURE — 97112 NEUROMUSCULAR REEDUCATION: CPT

## 2025-01-21 NOTE — PROGRESS NOTES
Daily Note     Today's date: 2025  Patient name: Colette Sweeney  : 1965  MRN: [id number]  Referring provider: Trey Fountain DO  Dx:   Encounter Diagnosis     ICD-10-CM    1. Primary osteoarthritis of left knee  M17.12       2. Chronic pain of left knee  M25.562     G89.29               Start Time: 0930  Stop Time: 1015  Total time in clinic (min): 45 minutes      Subjective: Patient had increased pain of her L knee after she was standing and cooking for 30 minutes in her kitchen. She had a sharp pain of her L anterior shin when she was sitting at home as well.       Objective: See treatment diary below    Assessment: Tolerated treatment well. Added tibial rotation mobilizations today with improvements in her L knee flexion ROM. Discussed with patient importance of mechanism of knee. Will assess for pain next visit on anterior shin. Will continue to progress as tolerated. Patient would benefit from continued PT.       Plan: Continue per plan of care.         Insurance Eval/ Re-eval POC expires Auth Status Total visits  Start date  Expiration date Misc   [employer] 12/20 3/14     $0                 Precautions: S/P L TKA 24  Past Medical History:   Diagnosis Date    Allergic rhinitis     Asthma     Fibroids     Hypertension     Physiological ovarian cysts     Sleep apnea     dental device worn         Date 25   Visit Number 5 FOTO [phone]   Auth        ROM        Knee Flexion 113 deg 118 deg 115 deg  117 120   Knee Extension - 5 deg -5 deg  -5 deg  5 5           TUG                Manual        Patellar mobs     Tibial ER with post prox fib head mobs 3x30s grade II-III   STM    Stick rolling to L quads, pt supine x 6 min, pt education re: stick rolling to decrease mm tightness            Neuro Re-ed        Quad set   3sx20 3sx20 foam X20 hold 5 sec X20 hold 5 sec   TKE ball on wall   3sx20 X20 hold 5 sec            TherEx        Knee/Hip PROM        Active w/u NS NS  "10' L3 prior to session NS 10' L3 prior to session NS x10 min L3  NS x10 min L3   Ankle pumps HR 2*10 HR 2x10  HR 2x10  HR 2x10 HR 2x10   SLR 2*5 2x10 2x10 2x10    LAQ 2*10 2x10  2x10 2x10 2x10   Heel slides 2*10 peanut 2x10 peanut  2x10 peanut 2x10 peanut    SAQ  2x10  2x10 2x10     Step flexion stretch  10x 10s 10x10s  10x10s 10x10 sec 10x10 sec   Mini squat 2*10 2x10  2x10 2x10 2x10   Gastroc stretch   Pro stretch 10x10s Pro stretch 10x10s Prostretch 10x 10sec Prostretch 10x 10sec   Side stepping  6x10'  6x10' 6x10' 6x10'   Step ups      21x0 8\"   Step downs     2x10 6\"   Leg press     2x10 65#   TherAct        Patient education        Stair negotiation        Car transfer                        Gait Training        With AD        No AD                     "

## 2025-01-23 ENCOUNTER — EVALUATION (OUTPATIENT)
Dept: PHYSICAL THERAPY | Facility: CLINIC | Age: 60
End: 2025-01-23
Payer: COMMERCIAL

## 2025-01-23 DIAGNOSIS — G89.29 CHRONIC PAIN OF LEFT KNEE: ICD-10-CM

## 2025-01-23 DIAGNOSIS — M17.12 PRIMARY OSTEOARTHRITIS OF LEFT KNEE: Primary | ICD-10-CM

## 2025-01-23 DIAGNOSIS — M25.562 CHRONIC PAIN OF LEFT KNEE: ICD-10-CM

## 2025-01-23 PROCEDURE — 97110 THERAPEUTIC EXERCISES: CPT

## 2025-01-23 PROCEDURE — 97112 NEUROMUSCULAR REEDUCATION: CPT

## 2025-01-23 NOTE — PROGRESS NOTES
PT Re-Evaluation   Today's date: 2025  Patient name: Colette Sweeney  : 1965  MRN: [id number]  Referring provider: Trey Fountain DO  Dx:   Encounter Diagnosis     ICD-10-CM    1. Primary osteoarthritis of left knee  M17.12       2. Chronic pain of left knee  M25.562     G89.29               Assessment  Assessment details: Patient is a 59 y.o. Female who presents to skilled outpatient PT s/p L total knee replacement performed on 24 . Referring diagnoses include primary osteoarthritis of left knee, chronic pain of left knee. Patient demonstrates improvements in her L LE ROM, strength, and overall function since starting PT intervention. She continues to struggle with endurance based activities without an AD due to strength and mobility deficits. Stair navigation reciprocally continues to pose as a challenge due to poor eccentric control. Patient would continue to benefit from skilled PT intervention to maximize strength, ROM, and overall functional mobility for eventual return to work and independence within the community.       Patient education performed during today's session included: Signs and symptoms of infection (including redness, warmth, drainage, swelling), Importance of keeping incision dry, per MD order, Home safety checklist, which was signed by both parties and uploaded into patient's chart, and Topics of stair negotiation, ambulation with use of appropriate assistive device, and car transfers also reviewed    Impairments: Abnormal gait, Abnormal or restricted ROM, Impaired balance, Impaired physical strength, Lacks appropriate HEP, and Pain with function  Understanding of Dx/Px/POC: Excellent  Prognosis: Excellent    Patient verbalized understanding of POC.    Please contact me if you have any questions or recommendations. Thank you for the referral and the opportunity to share in Colette Sweeney's  care.        Plan  Plan details:     Patient would benefit from: PT Eval and Skilled PT  Planned modality interventions: Biofeedback, Cryotherapy, TENS, Thermotherapy: Hydrocollator Packs, and Traction  Planned therapy interventions: Abdominal trunk stabilization, ADL training, Balance, Balance/WB training, Body mechanics training, Coordination, Dry Needling, Functional ROM exercises, Gait training, HEP, Joint mobilization, Manual therapy, Stephen taping, Neuromuscular re-education, Patient education, Postural training, Strengthening, Stretching, Therapeutic activities, Therapeutic exercises, Therapeutic training, Transfer training, Activity modification, and Work reintegration  Frequency: 2x/wk  Duration in weeks: 12  Plan of Care beginning date: 12/20/24  Plan of Care expiration date: 12 weeks - 4/17/2025  Treatment plan discussed with: Patient       Goals  Short Term Goals (4 weeks):  MET as of 1/23/25  - Patient will improve time on TUG by 2.9 seconds from 20.81 seconds to 14 seconds to facilitate improved safety in all ambulation  - Patient will be independent in basic HEP 2-3 weeks  - Patient will demonstrate >100 degrees of knee ROM for reciprocal stair negotiation  - Patient will have 0/10 pain at rest  - Patient will demonstrate >1/3 improvement in MMT grade as applicable    Long Term Goals (8 weeks): Progressing towards as of 1/23/25  - Patient will be independent in a comprehensive home exercise program  - Patient FOTO score will improve by 10 points  - Patient will ambulation with AD PRN  - Patient will independently ambulate >1000 feet (community ambulation)  - Patient will self-report >75% improvement in function  - Patient functional goal: Patient will return the gym.       Subjective    History of Present Illness  - Mechanism of injury: Patient reports s/p L TKA 12/17/24. She is ambulating with a RW at this time. She is walking around her house every hour. Patient is on disability at this time.   -  "Primary AD: RW    - Updated subjective as of 25: Patient is ambulating with a SPC at this time with minimal pain when she is standing and walking. She has trouble with stairs. She has yet to return to work.     - Assist level at home:    - Prior level of function: independent  - Goals: \"Get back to the gym.\"       Pain  - Current pain rating: 3/10  - At best pain ratin/10  - At worst pain rating: 10/10  - Location: L knee   - Alleviating factors: ice,rest, oxy    Social Support  - Steps to enter house: 2  - Stairs in house: 0   - Bedroom/bathroom set up: first floor  - DME available: RW, cane  - [place]  - Lives with:        Objective       LE MMT    L Hip Flexion: 4/5  R Hip Flexion: 5/5   L Hip Extension: 4/5 R Hip Extension: 5/5   L Hip Abduction: 4/5 R Hip Abduction: 5/5   L Hip Adduction: 4/5 R Hip Adduction: 5/5   L Knee Extension: 3+/5 R Knee Extension: 5/5   L Knee Flexion: 3+/5 R Knee Flexion: 5/5   L Ankle DF: 5/5 R Ankle DF: 5/5   L Ankle PF: 5/5  R Ankle PF: 5/5     LE ROM    L knee flexion: 118 degrees R knee flexion: 125 degrees   L knee extension: -5 degrees R knee extension: 0 degrees       Sensation  - Light touch: intact     Skin Check  - (-) redness, warmth, drainage      Knee Comments  - Gait Assessment: decreased gait speed, decreased step length, lacks full L TKE  - TU.81 seconds with RW  -TUG 25: 9.95 seconds with SPC  - Stair Negotiation: non-reciprocal                 Insurance Eval/ Re-eval POC expires Auth Status Total visits  Start date  Expiration date Misc   [employer] 12/20 3/14     $0                 Precautions: S/P L TKA 24  Past Medical History:   Diagnosis Date    Allergic rhinitis     Asthma     Fibroids     Hypertension     Physiological ovarian cysts     Sleep apnea     dental device worn         Date 25   Visit Number 5 FOTO [phone]   Auth         ROM         Knee Flexion 113 deg 118 deg 115 deg " " 117 120 118   Knee Extension - 5 deg -5 deg  -5 deg  5 5 5            TUG                  Manual         Patellar mobs     Tibial ER with post prox fib head mobs 3x30s grade II-III Tibial ER with post prox fib head mobs 3x30s grade II-III   STM    Stick rolling to L quads, pt supine x 6 min, pt education re: stick rolling to decrease mm tightness              Neuro Re-ed         Quad set   3sx20 3sx20 foam X20 hold 5 sec X20 hold 5 sec 5sx20   TKE ball on wall   3sx20 X20 hold 5 sec              TherEx         Knee/Hip PROM         Active w/u NS NS 10' L3 prior to session NS 10' L3 prior to session NS x10 min L3  NS x10 min L3 NS x10 min L3   Ankle pumps HR 2*10 HR 2x10  HR 2x10  HR 2x10 HR 2x10 HR 2x10    SLR 2*5 2x10 2x10 2x10     LAQ 2*10 2x10  2x10 2x10 2x10 2x10    Heel slides 2*10 peanut 2x10 peanut  2x10 peanut 2x10 peanut     SAQ  2x10  2x10 2x10      Step flexion stretch  10x 10s 10x10s  10x10s 10x10 sec 10x10 sec 10x10s   Mini squat 2*10 2x10  2x10 2x10 2x10 2x10    Gastroc stretch   Pro stretch 10x10s Pro stretch 10x10s Prostretch 10x 10sec Prostretch 10x 10sec Prostretch 10x 10sec   Side stepping  6x10'  6x10' 6x10' 6x10' 6x10'   Step ups      21x0 8\" 2x10 6\"    Step downs     2x10 6\" 2x10 6\"   Leg press     2x10 65# 2x10 85#   TherAct         Patient education         Stair negotiation         Car transfer                           Gait Training         With AD         No AD                          "

## 2025-01-28 ENCOUNTER — OFFICE VISIT (OUTPATIENT)
Dept: PHYSICAL THERAPY | Facility: CLINIC | Age: 60
End: 2025-01-28
Payer: COMMERCIAL

## 2025-01-28 DIAGNOSIS — G89.29 CHRONIC PAIN OF LEFT KNEE: ICD-10-CM

## 2025-01-28 DIAGNOSIS — M17.12 PRIMARY OSTEOARTHRITIS OF LEFT KNEE: Primary | ICD-10-CM

## 2025-01-28 DIAGNOSIS — M25.562 CHRONIC PAIN OF LEFT KNEE: ICD-10-CM

## 2025-01-28 PROCEDURE — 97112 NEUROMUSCULAR REEDUCATION: CPT

## 2025-01-28 PROCEDURE — 97110 THERAPEUTIC EXERCISES: CPT

## 2025-01-28 NOTE — PROGRESS NOTES
Daily Note     Today's date: 2025  Patient name: Colette Sweeney  : 1965  MRN: [id number]  Referring provider: Trey Fountain DO  Dx:   Encounter Diagnosis     ICD-10-CM    1. Primary osteoarthritis of left knee  M17.12       2. Chronic pain of left knee  M25.562     G89.29           Start Time: 09  Stop Time: 1015  Total time in clinic (min): 45 minutes    Subjective: Patient thinks she overdid it this weekend and is rather sore. She went grocery shopping this weekend and then vacuumed her entire house.       Objective: See treatment diary below      Assessment: Tolerated treatment well. Patient notes increased pain and soreness of her L knee during today's session, especially with closed chain activities. Held on several open chain activities as well due to increased soreness. Discussed graded exposure to activities of daily living. Verbalized understanding. Will continue to progress as tolerated. Patient would benefit from continued PT      Plan: Continue per plan of care.        Insurance Eval/ Re-eval POC expires Auth Status Total visits  Start date  Expiration date Misc   [employer] 12/20 3/14     $0                 Precautions: S/P L TKA 24  Past Medical History:   Diagnosis Date    Allergic rhinitis     Asthma     Fibroids     Hypertension     Physiological ovarian cysts     Sleep apnea     dental device worn         Date 25   Visit Number 5 FOTO 7 [phone]   Auth         ROM         Knee Flexion 113 deg 118 deg 115 deg  117 120 115 eg    Knee Extension - 5 deg -5 deg  -5 deg  5 5 5 deg             TUG                  Manual         Patellar mobs     Tibial ER with post prox fib head mobs 3x30s grade II-III Tibial ER with post prox fib head mobs 3x30s grade II-III   STM    Stick rolling to L quads, pt supine x 6 min, pt education re: stick rolling to decrease mm tightness              Neuro Re-ed         Quad set   3sx20 3sx20 foam X20 hold 5 sec  "X20 hold 5 sec X20 hold 5 sec   TKE ball on wall   3sx20 X20 hold 5 sec              TherEx         Knee/Hip PROM         Active w/u NS NS 10' L3 prior to session NS 10' L3 prior to session NS x10 min L3  NS x10 min L3 NS x10 min L3   Ankle pumps HR 2*10 HR 2x10  HR 2x10  HR 2x10 HR 2x10 HR 2x10   SLR 2*5 2x10 2x10 2x10  2x5   LAQ 2*10 2x10  2x10 2x10 2x10 2x10   Heel slides 2*10 peanut 2x10 peanut  2x10 peanut 2x10 peanut     SAQ  2x10  2x10 2x10      Step flexion stretch  10x 10s 10x10s  10x10s 10x10 sec 10x10 sec 10x10 sec   Mini squat 2*10 2x10  2x10 2x10 2x10 2x10   Gastroc stretch   Pro stretch 10x10s Pro stretch 10x10s Prostretch 10x 10sec Prostretch 10x 10sec Prostretch 10x 10sec   Side stepping  6x10'  6x10' 6x10' 6x10' 6x10'   Step ups      21x0 8\"    Step downs     2x10 6\"    Leg press     2x10 65#    TherAct         Patient education         Stair negotiation         Car transfer                           Gait Training         With AD         No AD                         "

## 2025-01-30 ENCOUNTER — OFFICE VISIT (OUTPATIENT)
Dept: OBGYN CLINIC | Facility: CLINIC | Age: 60
End: 2025-01-30

## 2025-01-30 ENCOUNTER — OFFICE VISIT (OUTPATIENT)
Dept: PHYSICAL THERAPY | Facility: CLINIC | Age: 60
End: 2025-01-30
Payer: COMMERCIAL

## 2025-01-30 VITALS — BODY MASS INDEX: 37.22 KG/M2 | HEIGHT: 64 IN | WEIGHT: 218 LBS

## 2025-01-30 DIAGNOSIS — Z96.652 S/P TOTAL KNEE REPLACEMENT NOT USING CEMENT, LEFT: Primary | ICD-10-CM

## 2025-01-30 DIAGNOSIS — G89.29 CHRONIC PAIN OF LEFT KNEE: Primary | ICD-10-CM

## 2025-01-30 DIAGNOSIS — M25.562 CHRONIC PAIN OF LEFT KNEE: Primary | ICD-10-CM

## 2025-01-30 DIAGNOSIS — Z96.652 AFTERCARE FOLLOWING LEFT KNEE JOINT REPLACEMENT SURGERY: ICD-10-CM

## 2025-01-30 DIAGNOSIS — M17.12 PRIMARY OSTEOARTHRITIS OF LEFT KNEE: ICD-10-CM

## 2025-01-30 DIAGNOSIS — Z47.1 AFTERCARE FOLLOWING LEFT KNEE JOINT REPLACEMENT SURGERY: ICD-10-CM

## 2025-01-30 PROCEDURE — 97110 THERAPEUTIC EXERCISES: CPT

## 2025-01-30 PROCEDURE — 97112 NEUROMUSCULAR REEDUCATION: CPT

## 2025-01-30 PROCEDURE — 99024 POSTOP FOLLOW-UP VISIT: CPT | Performed by: ORTHOPAEDIC SURGERY

## 2025-01-30 NOTE — PROGRESS NOTES
Assessment/Plan:  1. S/P total knee replacement not using cement, left        2. Aftercare following left knee joint replacement surgery          Scribe Attestation      I,:  Jose J Romero am acting as a scribe while in the presence of the attending physician.:       I,:  Trey Fountain, DO personally performed the services described in this documentation    as scribed in my presence.:           Colette is a pleasant 59-year-old female who returns today for follow-up evaluation 6 weeks status post left total knee arthroplasty.  I am pleased with her clinical presentation today in the office.  She should continue with her efforts at physical therapy with discharge to home exercise program as appropriate.  She no longer requires DVT prophylaxis.  She may continue with a gradual return to activity to her tolerance.  I would like to see her back in 6 weeks for her 3-month postoperative appointment with new x-ray on arrival.  All of her questions and concerns were addressed today.    Subjective: Follow-up evaluation 6 weeks status post left total knee arthroplasty    Patient ID: Colette Sweeney is a 59 y.o. female who returns today for follow-up evaluation 6-week status post left total knee arthroplasty. She reports she has been making progress since her last visit. Her pain has improved and she has not needed her narcotic pain medication. She does experience some aching medially and laterally. She is using a cane for ambulatory assistance and has been participating in physical therapy twice per week. She denies any new injury or trauma.     Review of Systems   Constitutional:  Positive for activity change. Negative for chills, fever and unexpected weight change.   HENT:  Negative for hearing loss, nosebleeds and sore throat.    Eyes:  Negative for pain, redness and visual disturbance.   Respiratory:  Negative for cough, shortness of breath and wheezing.    Cardiovascular:  Negative for chest pain, palpitations  and leg swelling.   Gastrointestinal:  Negative for abdominal pain, nausea and vomiting.   Endocrine: Negative for polydipsia and polyuria.   Genitourinary:  Negative for dysuria and hematuria.   Musculoskeletal:  Positive for joint swelling and myalgias. Negative for arthralgias.        See HPI   Skin:  Negative for rash and wound.   Neurological:  Negative for dizziness, numbness and headaches.   Psychiatric/Behavioral:  Negative for decreased concentration and suicidal ideas. The patient is not nervous/anxious.          Past Medical History:   Diagnosis Date    Allergic rhinitis     Asthma     Fibroids     Hypertension     Physiological ovarian cysts     Sleep apnea     dental device worn       Past Surgical History:   Procedure Laterality Date    COLONOSCOPY  2016    dr garcia    KNEE ARTHROSCOPY Left 2018    meniscectomy x2, first done in 2015    MYOMECTOMY      VT ARTHRP KNE CONDYLE&PLATU MEDIAL&LAT COMPARTMENTS Right 03/06/2023    Procedure: ARTHROPLASTY KNEE TOTAL W ROBOT - RIGHT - PRESS FIT - SAME DAY;  Surgeon: Trey Fountain DO;  Location: [facility];  Service: Orthopedics    VT ARTHRP KNE CONDYLE&PLATU MEDIAL&LAT COMPARTMENTS Left 12/17/2024    Procedure: ARTHROPLASTY KNEE TOTAL W ROBOT - LEFT - PRESS FIT - SAME DAY;  Surgeon: Trey Fountain DO;  Location: [facility];  Service: Orthopedics    VT ARTHRS KNE SURG W/MENISCECTOMY MED/LAT W/SHVG Right 01/06/2022    Procedure: KNEE ARTHROSCOPY MENISCECTOMY MEDIAL;  Surgeon: Sachin Grove MD;  Location: [facility];  Service: Orthopedics    VT COLONOSCOPY FLX DX W/COLLJ SPEC WHEN PFRMD N/A 09/13/2018    Procedure: COLONOSCOPY;  Surgeon: Sergo Martinez MD;  Location: [facility];  Service: Gastroenterology    WRIST SURGERY Right 07/2024       Family History   Problem Relation Age of Onset    Osteoporosis Mother     Thyroid disease Mother     Colon cancer Mother 67    Colon polyps Mother     Cancer Mother         colon    Alzheimer's disease Father      Hypertension Father     Dementia Father     No Known Problems Daughter     No Known Problems Daughter     Ovarian cancer Maternal Grandmother     Heart disease Brother     No Known Problems Maternal Aunt     No Known Problems Maternal Aunt     No Known Problems Paternal Aunt     No Known Problems Paternal Aunt     No Known Problems Paternal Aunt     No Known Problems Paternal Aunt     No Known Problems Paternal Aunt     No Known Problems Paternal Aunt     No Known Problems Paternal Aunt     No Known Problems Paternal Aunt        Social History     Occupational History    Not on file   Tobacco Use    Smoking status: Never     Passive exposure: Never    Smokeless tobacco: Never   Vaping Use    Vaping status: Never Used   Substance and Sexual Activity    Alcohol use: Yes     Comment: social - wine 2 x month    Drug use: Never    Sexual activity: Yes     Partners: Male     Birth control/protection: Condom Male         Current Outpatient Medications:     acetaminophen (TYLENOL) 325 mg tablet, Take 3 tablets (975 mg total) by mouth every 8 (eight) hours, Disp: , Rfl:     albuterol (2.5 mg/3 mL) 0.083 % nebulizer solution, Take 1 vial (2.5 mg total) by nebulization every 6 (six) hours as needed for wheezing or shortness of breath, Disp: 100 mL, Rfl: 0    Calcium Carb-Cholecalciferol 600-500 MG-UNIT CAPS, Take by mouth in the morning, Disp: , Rfl:     celecoxib (CeleBREX) 100 mg capsule, Take 1 capsule (100 mg total) by mouth 2 (two) times a day, Disp: 28 capsule, Rfl: 0    chlorthalidone 25 mg tablet, Take 1 tablet (25 mg total) by mouth daily, Disp: 90 tablet, Rfl: 1    docusate sodium (COLACE) 100 mg capsule, Take 1 capsule (100 mg total) by mouth 2 (two) times a day, Disp: 60 capsule, Rfl: 0    multivitamin (THERAGRAN) TABS, Take 1 tablet by mouth daily, Disp: , Rfl:     oxyCODONE (Roxicodone) 5 immediate release tablet, Take 1 tablet (5 mg total) by mouth every 4 (four) hours as needed for moderate pain or severe  pain Max Daily Amount: 30 mg, Disp: 40 tablet, Rfl: 0    Respiratory Therapy Supplies (NEBULIZER/TUBING/MOUTHPIECE) KIT, by Does not apply route 4 (four) times a day, Disp: 1 each, Rfl: 0    enoxaparin (Lovenox) 40 mg/0.4 mL, Inject 0.4 mL (40 mg total) under the skin in the morning, Disp: 16.8 mL, Rfl: 0    Fluticasone-Salmeterol (Advair Diskus) 500-50 mcg/dose inhaler, Inhale 1 puff 2 (two) times a day Rinse mouth after use., Disp: 180 blister, Rfl: 1    valACYclovir (VALTREX) 1,000 mg tablet, 2 tabs at earliest onset of cold sore, repeat once in 12 hours. Take 500mg bid for 3 days for genital herpes flare up., Disp: 40 tablet, Rfl: 5    No Known Allergies    Objective:  There were no vitals filed for this visit.    Body mass index is 37.42 kg/m².    Left Knee Exam     Tenderness   The patient is experiencing tenderness in the lateral retinaculum and medial retinaculum.    Range of Motion   Extension:  0   Flexion:  120     Tests   Varus: negative Valgus: negative    Other   Erythema: absent  Scars: present (well-healed anterior surgical scar)  Sensation: normal  Pulse: present  Swelling: mild  Effusion: no effusion present    Comments:  Well healed anterior surgical scar  No effusion, erythema, or warmth  Stable at 0, 30, 90  Patella tracks midline and flat  Ecchymosis showing signs of resolution anterirorly          Observations   Left Knee   Negative for effusion.       Physical Exam  Vitals and nursing note reviewed.   Constitutional:       Appearance: Normal appearance. She is well-developed.   HENT:      Head: Normocephalic and atraumatic.      Right Ear: External ear normal.      Left Ear: External ear normal.   Eyes:      General: No scleral icterus.     Extraocular Movements: Extraocular movements intact.      Conjunctiva/sclera: Conjunctivae normal.   Cardiovascular:      Rate and Rhythm: Normal rate.   Pulmonary:      Effort: Pulmonary effort is normal. No respiratory distress.   Musculoskeletal:       Cervical back: Normal range of motion and neck supple.      Left knee: No effusion.      Comments: See Ortho exam   Skin:     General: Skin is warm and dry.   Neurological:      General: No focal deficit present.      Mental Status: She is alert and oriented to person, place, and time.   Psychiatric:         Behavior: Behavior normal.         This document was created using speech voice recognition software.   Grammatical errors, random word insertions, pronoun errors, and incomplete sentences are an occasional consequence of this system due to software limitations, ambient noise, and hardware issues.   Any formal questions or concerns about content, text, or information contained within the body of this dictation should be directly addressed to the provider for clarification.

## 2025-01-30 NOTE — PROGRESS NOTES
Daily Note     Today's date: 2025  Patient name: Colette Sweeney  : 1965  MRN: [id number]  Referring provider: Trey Fountain DO  Dx:   Encounter Diagnosis     ICD-10-CM    1. Chronic pain of left knee  M25.562     G89.29       2. Primary osteoarthritis of left knee  M17.12             Subjective: Patient just came from Dr. Fountain's office who noted he is pleased with progress so far, will return to work end of February.       Objective: See treatment diary below      Assessment: Tolerated treatment well. Continued progression of strengthening with intro to more compliant surfaces today which she struggled with initially requiring 1 UE assist. She had discomfort with lunges but improved following mobilizations of the fib head posteriorly. Will continue to progress as tolerated and per primary PT. Patient would benefit from continued PT to continue with return to activity demands.       Plan: Continue per plan of care.        Insurance Eval/ Re-eval POC expires Auth Status Total visits  Start date  Expiration date Misc   [employer] 12/20 3/14     $0                 Precautions: S/P L TKA 24  Past Medical History:   Diagnosis Date    Allergic rhinitis     Asthma     Fibroids     Hypertension     Physiological ovarian cysts     Sleep apnea     dental device worn         Date 25   Visit Number 5 FOTO 7 8 9 10 11   Auth          ROM          Knee Flexion 113 deg 118 deg 115 deg  117 120 115 eg  118    Knee Extension - 5 deg -5 deg  -5 deg  5 5 5 deg               TUG                 Kines tape for swelling x2   Manual          Patellar mobs     Tibial ER with post prox fib head mobs 3x30s grade II-III Tibial ER with post prox fib head mobs 3x30s grade II-III    STM    Stick rolling to L quads, pt supine x 6 min, pt education re: stick rolling to decrease mm tightness                Neuro Re-ed          Quad set   3sx20 3sx20 foam X20 hold 5 sec X20 hold 5  "sec X20 hold 5 sec    TKE ball on wall   3sx20 X20 hold 5 sec             SLB EC LLE 3x20\"          Hip ext sliders x10 each    TherEx          Knee/Hip PROM          Active w/u NS NS 10' L3 prior to session NS 10' L3 prior to session NS x10 min L3  NS x10 min L3 NS x10 min L3 NS x10 min L3   Ankle pumps HR 2*10 HR 2x10  HR 2x10  HR 2x10 HR 2x10 HR 2x10    SLR 2*5 2x10 2x10 2x10  2x5    LAQ 2*10 2x10  2x10 2x10 2x10 2x10    Heel slides 2*10 peanut 2x10 peanut  2x10 peanut 2x10 peanut      SAQ  2x10  2x10 2x10       Step flexion stretch  10x 10s 10x10s  10x10s 10x10 sec 10x10 sec 10x10 sec    Mini squat 2*10 2x10  2x10 2x10 2x10 2x10 TRX full depth squat 2x10           TRX fwd and rev lunges x12 each    Gastroc stretch   Pro stretch 10x10s Pro stretch 10x10s Prostretch 10x 10sec Prostretch 10x 10sec Prostretch 10x 10sec    Side stepping  6x10'  6x10' 6x10' 6x10' 6x10' RTB 3 laps turf    Skaters RTB 3 laps   Step ups      21x0 8\"  BOSU up and overs x20     BOSU step up LLE x20    Step downs     2x10 6\"     Leg press     2x10 65#     TherAct          Patient education          Stair negotiation          Car transfer                              Gait Training          With AD          No AD                           "

## 2025-02-04 ENCOUNTER — OFFICE VISIT (OUTPATIENT)
Dept: PHYSICAL THERAPY | Facility: CLINIC | Age: 60
End: 2025-02-04
Payer: COMMERCIAL

## 2025-02-04 DIAGNOSIS — G89.29 CHRONIC PAIN OF LEFT KNEE: Primary | ICD-10-CM

## 2025-02-04 DIAGNOSIS — M25.562 CHRONIC PAIN OF LEFT KNEE: Primary | ICD-10-CM

## 2025-02-04 DIAGNOSIS — M17.12 PRIMARY OSTEOARTHRITIS OF LEFT KNEE: ICD-10-CM

## 2025-02-04 PROCEDURE — 97110 THERAPEUTIC EXERCISES: CPT

## 2025-02-04 NOTE — PROGRESS NOTES
Daily Note     Today's date: 2025  Patient name: Colette Sweeney  : 1965  MRN: [id number]  Referring provider: Trey Fountain DO  Dx:   Encounter Diagnosis     ICD-10-CM    1. Chronic pain of left knee  M25.562     G89.29       2. Primary osteoarthritis of left knee  M17.12               Subjective: Patient has no new complaints today. She is no longer ambulating with her SPC.       Objective: See treatment diary below      Assessment: Tolerated treatment well. Patient was highly challenged with lunging activities during today's session. Soreness and pain noted with attempting eccentric lowering during sliders both lateral and backwards. Will continue to challenge patient as appropriate. Patient would benefit from continued PT to continue with return to activity demands.       Plan: Continue per plan of care.        Insurance Eval/ Re-eval POC expires Auth Status Total visits  Start date  Expiration date Misc   [employer] 12/20 3/14     $0                 Precautions: S/P L TKA 24  Past Medical History:   Diagnosis Date    Allergic rhinitis     Asthma     Fibroids     Hypertension     Physiological ovarian cysts     Sleep apnea     dental device worn         Date 25   Visit Number 5 FOTO 7 8 9 10 11 12   Auth           ROM           Knee Flexion 113 deg 118 deg 115 deg  117 120 115 eg  118     Knee Extension - 5 deg -5 deg  -5 deg  5 5 5 deg                 TUG                  Kines tape for swelling x2    Manual           Patellar mobs     Tibial ER with post prox fib head mobs 3x30s grade II-III Tibial ER with post prox fib head mobs 3x30s grade II-III     STM    Stick rolling to L quads, pt supine x 6 min, pt education re: stick rolling to decrease mm tightness                  Neuro Re-ed           Quad set   3sx20 3sx20 foam X20 hold 5 sec X20 hold 5 sec X20 hold 5 sec     TKE ball on wall   3sx20 X20 hold 5 sec              SLB EC LLE  "3x20\"           Hip ext sliders x10 each  Sliders lateral and backwards 2x10 B   TherEx           Knee/Hip PROM           Active w/u NS NS 10' L3 prior to session NS 10' L3 prior to session NS x10 min L3  NS x10 min L3 NS x10 min L3 NS x10 min L3 NS x10 min L3   Ankle pumps HR 2*10 HR 2x10  HR 2x10  HR 2x10 HR 2x10 HR 2x10     SLR 2*5 2x10 2x10 2x10  2x5     LAQ 2*10 2x10  2x10 2x10 2x10 2x10     Heel slides 2*10 peanut 2x10 peanut  2x10 peanut 2x10 peanut    KB squats to 12\" step 2x10 16#   SAQ  2x10  2x10 2x10     Step ups 2x10 8\" forward and lateral    Step flexion stretch  10x 10s 10x10s  10x10s 10x10 sec 10x10 sec 10x10 sec     Mini squat 2*10 2x10  2x10 2x10 2x10 2x10 TRX full depth squat 2x10  TRX full depth squat 2x10           TRX fwd and rev lunges x12 each  TRX fwd and rev lunges x12 each    Gastroc stretch   Pro stretch 10x10s Pro stretch 10x10s Prostretch 10x 10sec Prostretch 10x 10sec Prostretch 10x 10sec     Side stepping  6x10'  6x10' 6x10' 6x10' 6x10' RTB 3 laps turf    Skaters RTB 3 laps Suitcase carries 4x50' with 16# KB   Step ups      21x0 8\"  BOSU up and overs x20     BOSU step up LLE x20  Bosu lateral step ups 2x10        Step downs     2x10 6\"   2x10 6\"    Leg press     2x10 65#   2x10 95#   TherAct           Patient education           Stair negotiation           Car transfer                                 Gait Training           With AD           No AD                             "

## 2025-02-06 ENCOUNTER — OFFICE VISIT (OUTPATIENT)
Dept: PHYSICAL THERAPY | Facility: CLINIC | Age: 60
End: 2025-02-06
Payer: COMMERCIAL

## 2025-02-06 DIAGNOSIS — M17.12 PRIMARY OSTEOARTHRITIS OF LEFT KNEE: ICD-10-CM

## 2025-02-06 DIAGNOSIS — M25.562 CHRONIC PAIN OF LEFT KNEE: Primary | ICD-10-CM

## 2025-02-06 DIAGNOSIS — G89.29 CHRONIC PAIN OF LEFT KNEE: Primary | ICD-10-CM

## 2025-02-06 PROCEDURE — 97110 THERAPEUTIC EXERCISES: CPT

## 2025-02-06 PROCEDURE — 97112 NEUROMUSCULAR REEDUCATION: CPT

## 2025-02-06 NOTE — PROGRESS NOTES
"Daily Note     Today's date: 2025  Patient name: Colette Sweeney  : 1965  MRN: [id number]  Referring provider: Trey Fountain DO  Dx:   Encounter Diagnosis     ICD-10-CM    1. Chronic pain of left knee  M25.562     G89.29       2. Primary osteoarthritis of left knee  M17.12                 Subjective: Patient has some lower limb pain today, with soreness after last session.       Objective: See treatment diary below      Assessment: Tolerated treatment well. Patient was challenged with performing balancing activities during today's session. She continues to struggle with step downs due to poor eccentric control of her L LE. Will continue to challenge as appropriate. Patient would benefit from continued PT to continue with return to activity demands.       Plan: Continue per plan of care.        Insurance Eval/ Re-eval POC expires Auth Status Total visits  Start date  Expiration date Misc   [employer] 12/20 3/14     $0                 Precautions: S/P L TKA 24  Past Medical History:   Diagnosis Date    Allergic rhinitis     Asthma     Fibroids     Hypertension     Physiological ovarian cysts     Sleep apnea     dental device worn         Date 25   Visit Number 8 9 10 11 12 13   Auth         ROM         Knee Flexion 117 120 115 eg  118      Knee Extension 5 5 5 deg                TUG             Kines tape for swelling x2     Manual         Patellar mobs  Tibial ER with post prox fib head mobs 3x30s grade II-III Tibial ER with post prox fib head mobs 3x30s grade II-III      STM Stick rolling to L quads, pt supine x 6 min, pt education re: stick rolling to decrease mm tightness                 Neuro Re-ed         Quad set  X20 hold 5 sec X20 hold 5 sec X20 hold 5 sec      TKE ball on wall X20 hold 5 sec     Hip burners x15 B       SLB EC LLE 3x20\"  SLB 10x10s L only       Hip ext sliders x10 each  Sliders lateral and backwards 2x10 B    TherEx         Knee/Hip " "PROM         Active w/u NS x10 min L3  NS x10 min L3 NS x10 min L3 NS x10 min L3 NS x10 min L3 NS x10 min L3   Ankle pumps HR 2x10 HR 2x10 HR 2x10      SLR 2x10  2x5      LAQ 2x10 2x10 2x10      Heel slides 2x10 peanut    KB squats to 12\" step 2x10 16#    SAQ 2x10     Step ups 2x10 8\" forward and lateral     Step flexion stretch  10x10 sec 10x10 sec 10x10 sec   Sit to stands 18\" box 2x10    Mini squat 2x10 2x10 2x10 TRX full depth squat 2x10  TRX full depth squat 2x10  TRX full depth squat 2x10        TRX fwd and rev lunges x12 each  TRX fwd and rev lunges x12 each  TRX fwd and rev lunges x12 each    Gastroc stretch  Prostretch 10x 10sec Prostretch 10x 10sec Prostretch 10x 10sec      Side stepping 6x10' 6x10' 6x10' RTB 3 laps turf    Skaters RTB 3 laps Suitcase carries 4x50' with 16# KB Suitcase carries 4x50' with 16# KB   Step ups   21x0 8\"  BOSU up and overs x20     BOSU step up LLE x20  Bosu lateral step ups 2x10      Bosu lateral step ups 2x10     Bosu step ups with alt hip drive 2x10 B   Step downs  2x10 6\"   2x10 6\"     Leg press  2x10 65#   2x10 95# 2x10 95#   TherAct         Patient education         Stair negotiation         Car transfer                           Gait Training         With AD         No AD                         "

## 2025-02-11 ENCOUNTER — OFFICE VISIT (OUTPATIENT)
Dept: PHYSICAL THERAPY | Facility: CLINIC | Age: 60
End: 2025-02-11
Payer: COMMERCIAL

## 2025-02-11 DIAGNOSIS — M17.12 PRIMARY OSTEOARTHRITIS OF LEFT KNEE: ICD-10-CM

## 2025-02-11 DIAGNOSIS — G89.29 CHRONIC PAIN OF LEFT KNEE: Primary | ICD-10-CM

## 2025-02-11 DIAGNOSIS — M25.562 CHRONIC PAIN OF LEFT KNEE: Primary | ICD-10-CM

## 2025-02-11 PROCEDURE — 97112 NEUROMUSCULAR REEDUCATION: CPT

## 2025-02-11 PROCEDURE — 97110 THERAPEUTIC EXERCISES: CPT

## 2025-02-11 NOTE — PROGRESS NOTES
"Daily Note     Today's date: 2025  Patient name: Colette Sweeney  : 1965  MRN: [id number]  Referring provider: Trey Fountain DO  Dx:   Encounter Diagnosis     ICD-10-CM    1. Chronic pain of left knee  M25.562     G89.29       2. Primary osteoarthritis of left knee  M17.12                   Subjective: Patient has no new complaints today.       Objective: See treatment diary below      Assessment: Tolerated treatment well. Patient notes difficulties with performing suitcase carries with increased weight due to loss of balance and lack of strength of her L LE. Eccentric control is improving slowly, with ease descending stairs noted. Will continue to progress as tolerated. Patient would benefit from continued PT to continue with return to activity demands.       Plan: Continue per plan of care.        Insurance Eval/ Re-eval POC expires Auth Status Total visits  Start date  Expiration date Misc   [employer] 12/20 3/14     $0                 Precautions: S/P L TKA 24  Past Medical History:   Diagnosis Date    Allergic rhinitis     Asthma     Fibroids     Hypertension     Physiological ovarian cysts     Sleep apnea     dental device worn         Date 25   Visit Number 8 9 10 11 12 13 14   Auth          ROM          Knee Flexion 117 120 115 eg  118       Knee Extension 5 5 5 deg                  TUG              Kines tape for swelling x2      Manual          Patellar mobs  Tibial ER with post prox fib head mobs 3x30s grade II-III Tibial ER with post prox fib head mobs 3x30s grade II-III       STM Stick rolling to L quads, pt supine x 6 min, pt education re: stick rolling to decrease mm tightness                   Neuro Re-ed          Quad set  X20 hold 5 sec X20 hold 5 sec X20 hold 5 sec       TKE ball on wall X20 hold 5 sec     Hip burners x15 B        SLB EC LLE 3x20\"  SLB 10x10s L only        Hip ext sliders x10 each  Sliders lateral and backwards " "2x10 B     TherEx          Knee/Hip PROM          Active w/u NS x10 min L3  NS x10 min L3 NS x10 min L3 NS x10 min L3 NS x10 min L3 NS x10 min L3 RB 10' L3   Ankle pumps HR 2x10 HR 2x10 HR 2x10       SLR 2x10  2x5       LAQ 2x10 2x10 2x10       Heel slides 2x10 peanut    KB squats to 12\" step 2x10 16#     SAQ 2x10     Step ups 2x10 8\" forward and lateral      Step flexion stretch  10x10 sec 10x10 sec 10x10 sec   Sit to stands 18\" box 2x10     Mini squat 2x10 2x10 2x10 TRX full depth squat 2x10  TRX full depth squat 2x10  TRX full depth squat 2x10  TRX full depth squat 2x10        TRX fwd and rev lunges x12 each  TRX fwd and rev lunges x12 each  TRX fwd and rev lunges x12 each  TRX fwd and rev lunges x12 each    Gastroc stretch  Prostretch 10x 10sec Prostretch 10x 10sec Prostretch 10x 10sec    Sliders lateral and backwards x10 B    Side stepping 6x10' 6x10' 6x10' RTB 3 laps turf    Skaters RTB 3 laps Suitcase carries 4x50' with 16# KB Suitcase carries 4x50' with 16# KB Suitcase carries 4x50' with 26# KB   Step ups   21x0 8\"  BOSU up and overs x20     BOSU step up LLE x20  Bosu lateral step ups 2x10      Bosu lateral step ups 2x10     Bosu step ups with alt hip drive 2x10 B Bosu lateral step ups 2x10     Bosu step ups with alt hip drive 2x10 B   Step downs  2x10 6\"   2x10 6\"   2x10 6\"   Leg press  2x10 65#   2x10 95# 2x10 95# 2x10 95#   TherAct          Patient education          Stair negotiation          Car transfer                              Gait Training          With AD          No AD                           "

## 2025-02-12 ENCOUNTER — TELEPHONE (OUTPATIENT)
Age: 60
End: 2025-02-12

## 2025-02-12 DIAGNOSIS — Z96.652 S/P TOTAL KNEE REPLACEMENT NOT USING CEMENT, LEFT: Primary | ICD-10-CM

## 2025-02-12 RX ORDER — AMOXICILLIN 500 MG/1
2000 TABLET, FILM COATED ORAL
Qty: 4 TABLET | Refills: 2 | Status: SHIPPED | OUTPATIENT
Start: 2025-02-12 | End: 2025-05-13

## 2025-02-12 NOTE — TELEPHONE ENCOUNTER
Caller: Patient     Doctor: Dr. Fountain    Reason for call: Patient has a dental appointment in March. Please send script to her pharmacy.     Call back#: [phone]      [facility] - [address]

## 2025-02-13 ENCOUNTER — OFFICE VISIT (OUTPATIENT)
Dept: PHYSICAL THERAPY | Facility: CLINIC | Age: 60
End: 2025-02-13
Payer: COMMERCIAL

## 2025-02-13 DIAGNOSIS — M17.12 PRIMARY OSTEOARTHRITIS OF LEFT KNEE: ICD-10-CM

## 2025-02-13 DIAGNOSIS — G89.29 CHRONIC PAIN OF LEFT KNEE: Primary | ICD-10-CM

## 2025-02-13 DIAGNOSIS — M25.562 CHRONIC PAIN OF LEFT KNEE: Primary | ICD-10-CM

## 2025-02-13 PROCEDURE — 97112 NEUROMUSCULAR REEDUCATION: CPT

## 2025-02-13 PROCEDURE — 97110 THERAPEUTIC EXERCISES: CPT

## 2025-02-13 NOTE — PROGRESS NOTES
"Daily Note     Today's date: 2025  Patient name: Colette Sweeney  : 1965  MRN: [id number]  Referring provider: Trey Fountain DO  Dx:   Encounter Diagnosis     ICD-10-CM    1. Chronic pain of left knee  M25.562     G89.29       2. Primary osteoarthritis of left knee  M17.12                     Subjective: Patient has no new complaints today. She only admits to feeling sore after last session.      Objective: See treatment diary below      Assessment: Discussed POC with primary PT prior to session. Tolerated treatment well. Increased challenge with lateral step downs; pt reported proper muscle activation. Anterior knee pain noted with slider lunges; addressed with a cue for sitting back more to decrease anterior knee pressure. Pt continues to progress well; challenge pt as tolerated.  Patient would benefit from continued PT to continue with return to activity demands.       Plan: Continue per plan of care.        Insurance Eval/ Re-eval POC expires Auth Status Total visits  Start date  Expiration date Misc   [employer] 12/20 3/14     $0                 Precautions: S/P L TKA 24  Past Medical History:   Diagnosis Date    Allergic rhinitis     Asthma     Fibroids     Hypertension     Physiological ovarian cysts     Sleep apnea     dental device worn         Date 25   Visit Number 11 12 13 14 15   Auth        ROM        Knee Flexion 118        Knee Extension                TUG         Kines tape for swelling x2       Manual        Patellar mobs        STM                Neuro Re-ed        Quad set         TKE ball on wall   Hip burners x15 B  Hip burners 2x10x2\"    SLB EC LLE 3x20\"  SLB 10x10s L only      Hip ext sliders x10 each  Sliders lateral and backwards 2x10 B      TherEx        Knee/Hip PROM        Active w/u NS x10 min L3 NS x10 min L3 NS x10 min L3 RB 10' L3 NS 10'   Ankle pumps        SLR        LAQ        Heel slides  KB squats to 12\" step 2x10 16#      SAQ " " Step ups 2x10 8\" forward and lateral       Step flexion stretch    Sit to stands 18\" box 2x10      Mini squat TRX full depth squat 2x10  TRX full depth squat 2x10  TRX full depth squat 2x10  TRX full depth squat 2x10  TRX full depth squat 2x10    TRX fwd and rev lunges x12 each  TRX fwd and rev lunges x12 each  TRX fwd and rev lunges x12 each  TRX fwd and rev lunges x12 each  2x10 lateral lunges BLE   Gastroc stretch     Sliders lateral and backwards x10 B  Sliders lateral/back 2x10 ea. BLE   Side stepping RTB 3 laps turf    Skaters RTB 3 laps Suitcase carries 4x50' with 16# KB Suitcase carries 4x50' with 16# KB Suitcase carries 4x50' with 26# KB Suitcase carry #26 lb FB 4x50'   Step ups  BOSU up and overs x20     BOSU step up LLE x20  Bosu lateral step ups 2x10      Bosu lateral step ups 2x10     Bosu step ups with alt hip drive 2x10 B Bosu lateral step ups 2x10     Bosu step ups with alt hip drive 2x10 B Bosu lateral step up and over 2x10   Step downs  2x10 6\"   2x10 6\" 8\" lateral step downs 2x10    6\" anterior step downs 2x10   Leg press  2x10 95# 2x10 95# 2x10 95#    TherAct        Patient education        Stair negotiation        Car transfer                        Gait Training        With AD        No AD                       "

## 2025-02-18 ENCOUNTER — OFFICE VISIT (OUTPATIENT)
Dept: PHYSICAL THERAPY | Facility: CLINIC | Age: 60
End: 2025-02-18
Payer: COMMERCIAL

## 2025-02-18 DIAGNOSIS — M17.12 PRIMARY OSTEOARTHRITIS OF LEFT KNEE: ICD-10-CM

## 2025-02-18 DIAGNOSIS — M25.562 CHRONIC PAIN OF LEFT KNEE: Primary | ICD-10-CM

## 2025-02-18 DIAGNOSIS — G89.29 CHRONIC PAIN OF LEFT KNEE: Primary | ICD-10-CM

## 2025-02-18 PROCEDURE — 97110 THERAPEUTIC EXERCISES: CPT

## 2025-02-18 PROCEDURE — 97112 NEUROMUSCULAR REEDUCATION: CPT

## 2025-02-18 NOTE — PROGRESS NOTES
"Daily Note     Today's date: 2025  Patient name: Colette Sweeney  : 1965  MRN: [id number]  Referring provider: Trey Fountain DO  Dx:   Encounter Diagnosis     ICD-10-CM    1. Chronic pain of left knee  M25.562     G89.29       2. Primary osteoarthritis of left knee  M17.12                       Subjective: Patient is sore today. She overdid it over the weekend, cleaning her house.       Objective: See treatment diary below      Assessment: Patient is progressing exceptionally well at this time. Eccentric control is improving slightly, but continues to note increased pain during step downs. Will continue to challenge as appropriate.  Patient would benefit from continued PT to continue with return to activity demands.       Plan: Continue per plan of care.        Insurance Eval/ Re-eval POC expires Auth Status Total visits  Start date  Expiration date Misc   [employer] 12/20 3/14     $0                 Precautions: S/P L TKA 24  Past Medical History:   Diagnosis Date    Allergic rhinitis     Asthma     Fibroids     Hypertension     Physiological ovarian cysts     Sleep apnea     dental device worn         Date 25   Visit Number 11 12 13 14 15 16   Auth         ROM         Knee Flexion 118         Knee Extension                  TUG          Kines tape for swelling x2        Manual         Patellar mobs         STM                  Neuro Re-ed         Quad set          TKE ball on wall   Hip burners x15 B  Hip burners 2x10x2\"     SLB EC LLE 3x20\"  SLB 10x10s L only       Hip ext sliders x10 each  Sliders lateral and backwards 2x10 B       TherEx         Knee/Hip PROM         Active w/u NS x10 min L3 NS x10 min L3 NS x10 min L3 RB 10' L3 NS 10' NS 10'   Ankle pumps         SLR         LAQ         Heel slides  KB squats to 12\" step 2x10 16#       SAQ  Step ups 2x10 8\" forward and lateral        Step flexion stretch    Sit to stands 18\" box 2x10    KB squat to " "12\" 2x10 9#   Mini squat TRX full depth squat 2x10  TRX full depth squat 2x10  TRX full depth squat 2x10  TRX full depth squat 2x10  TRX full depth squat 2x10 TRX full depth squat 2x10    TRX fwd and rev lunges x12 each  TRX fwd and rev lunges x12 each  TRX fwd and rev lunges x12 each  TRX fwd and rev lunges x12 each  2x10 lateral lunges BLE 2x10 lateral lunges BLE   Gastroc stretch     Sliders lateral and backwards x10 B  Sliders lateral/back 2x10 ea. BLE Sliders lateral/back 2x10 ea. BLE   Side stepping RTB 3 laps turf    Skaters RTB 3 laps Suitcase carries 4x50' with 16# KB Suitcase carries 4x50' with 16# KB Suitcase carries 4x50' with 26# KB Suitcase carry #26 lb FB 4x50'    Step ups  BOSU up and overs x20     BOSU step up LLE x20  Bosu lateral step ups 2x10      Bosu lateral step ups 2x10     Bosu step ups with alt hip drive 2x10 B Bosu lateral step ups 2x10     Bosu step ups with alt hip drive 2x10 B Bosu lateral step up and over 2x10 Bosu lateral step up and over 2x10    Step ups 8\" 2x10   Step downs  2x10 6\"   2x10 6\" 8\" lateral step downs 2x10    6\" anterior step downs 2x10 8\" lateral step downs 2x10    6\" anterior step downs 2x10   Leg press  2x10 95# 2x10 95# 2x10 95#  2x10 105#   TherAct         Patient education         Stair negotiation         Car transfer                           Gait Training         With AD         No AD                         "

## 2025-02-20 ENCOUNTER — OFFICE VISIT (OUTPATIENT)
Dept: PHYSICAL THERAPY | Facility: CLINIC | Age: 60
End: 2025-02-20
Payer: COMMERCIAL

## 2025-02-20 DIAGNOSIS — G89.29 CHRONIC PAIN OF LEFT KNEE: Primary | ICD-10-CM

## 2025-02-20 DIAGNOSIS — M25.562 CHRONIC PAIN OF LEFT KNEE: Primary | ICD-10-CM

## 2025-02-20 DIAGNOSIS — M17.12 PRIMARY OSTEOARTHRITIS OF LEFT KNEE: ICD-10-CM

## 2025-02-20 PROCEDURE — 97112 NEUROMUSCULAR REEDUCATION: CPT

## 2025-02-20 PROCEDURE — 97110 THERAPEUTIC EXERCISES: CPT

## 2025-02-20 NOTE — PROGRESS NOTES
"Daily Note     Today's date: 2025  Patient name: Colette Sweeney  : 1965  MRN: [id number]  Referring provider: Trey Fountain DO  Dx:   Encounter Diagnosis     ICD-10-CM    1. Chronic pain of left knee  M25.562     G89.29       2. Primary osteoarthritis of left knee  M17.12                 Subjective: Patient is very stiff today.       Objective: See treatment diary below      Assessment: Patient had increased L lateral knee pain during today's session and asked to terminate session slightly early due to pain. Will continue to challenge as appropriate. Patient would benefit from continued PT to continue with return to activity demands.       Plan: Continue per plan of care.        Insurance Eval/ Re-eval POC expires Auth Status Total visits  Start date  Expiration date Misc   [employer] 12/20 3/14     $0                 Precautions: S/P L TKA 24  Past Medical History:   Diagnosis Date    Allergic rhinitis     Asthma     Fibroids     Hypertension     Physiological ovarian cysts     Sleep apnea     dental device worn         Date 25   Visit Number 11 12 13 14 15 16 17   Auth          ROM          Knee Flexion 118          Knee Extension                    TUG           Kines tape for swelling x2         Manual          Patellar mobs          STM                    Neuro Re-ed          Quad set           TKE ball on wall   Hip burners x15 B  Hip burners 2x10x2\"      SLB EC LLE 3x20\"  SLB 10x10s L only        Hip ext sliders x10 each  Sliders lateral and backwards 2x10 B        TherEx          Knee/Hip PROM          Active w/u NS x10 min L3 NS x10 min L3 NS x10 min L3 RB 10' L3 NS 10' NS 10' NS 10'   Ankle pumps          SLR          LAQ          Heel slides  KB squats to 12\" step 2x10 16#        SAQ  Step ups 2x10 8\" forward and lateral      SL RDL with foam 2x10 B    Step flexion stretch    Sit to stands 18\" box 2x10    KB squat to 12\" 2x10 9# KB " "squat to 12\" 2x10 16#   Mini squat TRX full depth squat 2x10  TRX full depth squat 2x10  TRX full depth squat 2x10  TRX full depth squat 2x10  TRX full depth squat 2x10 TRX full depth squat 2x10 TRX full depth squat 2x10    TRX fwd and rev lunges x12 each  TRX fwd and rev lunges x12 each  TRX fwd and rev lunges x12 each  TRX fwd and rev lunges x12 each  2x10 lateral lunges BLE 2x10 lateral lunges BLE 2x10 lateral lunges BLE   Gastroc stretch     Sliders lateral and backwards x10 B  Sliders lateral/back 2x10 ea. BLE Sliders lateral/back 2x10 ea. BLE Sliders lateral/back 2x10 ea. BLE   Side stepping RTB 3 laps turf    Skaters RTB 3 laps Suitcase carries 4x50' with 16# KB Suitcase carries 4x50' with 16# KB Suitcase carries 4x50' with 26# KB Suitcase carry #26 lb FB 4x50'     Step ups  BOSU up and overs x20     BOSU step up LLE x20  Bosu lateral step ups 2x10      Bosu lateral step ups 2x10     Bosu step ups with alt hip drive 2x10 B Bosu lateral step ups 2x10     Bosu step ups with alt hip drive 2x10 B Bosu lateral step up and over 2x10 Bosu lateral step up and over 2x10    Step ups 8\" 2x10 Bosu lateral step up and over 2x10   Step downs  2x10 6\"   2x10 6\" 8\" lateral step downs 2x10    6\" anterior step downs 2x10 8\" lateral step downs 2x10    6\" anterior step downs 2x10    Leg press  2x10 95# 2x10 95# 2x10 95#  2x10 105#    TherAct          Patient education          Stair negotiation          Car transfer                              Gait Training          With AD          No AD                           "

## 2025-02-24 ENCOUNTER — OFFICE VISIT (OUTPATIENT)
Dept: PHYSICAL THERAPY | Facility: CLINIC | Age: 60
End: 2025-02-24
Payer: COMMERCIAL

## 2025-02-24 DIAGNOSIS — M25.562 CHRONIC PAIN OF LEFT KNEE: Primary | ICD-10-CM

## 2025-02-24 DIAGNOSIS — G89.29 CHRONIC PAIN OF LEFT KNEE: Primary | ICD-10-CM

## 2025-02-24 DIAGNOSIS — M17.12 PRIMARY OSTEOARTHRITIS OF LEFT KNEE: ICD-10-CM

## 2025-02-24 PROCEDURE — 97110 THERAPEUTIC EXERCISES: CPT

## 2025-02-24 NOTE — PROGRESS NOTES
"Daily Note     Today's date: 2025  Patient name: Colette Sweeney  : 1965  MRN: [id number]  Referring provider: Trey oFuntain DO  Dx:   No diagnosis found.            Subjective: Patient is very stiff today.       Objective: See treatment diary below      Assessment: Patient had increased L lateral knee pain during today's session and asked to terminate session slightly early due to pain. Will continue to challenge as appropriate. Patient would benefit from continued PT to continue with return to activity demands.       Plan: Continue per plan of care.        Insurance Eval/ Re-eval POC expires Auth Status Total visits  Start date  Expiration date Misc   [employer] 12/20 3/14     $0                 Precautions: S/P L TKA 24  Past Medical History:   Diagnosis Date    Allergic rhinitis     Asthma     Fibroids     Hypertension     Physiological ovarian cysts     Sleep apnea     dental device worn         Date 25   Visit Number [id number] 13 14 15 16 17 18   Auth           ROM           Knee Flexion 118           Knee Extension                      TUG            Kines tape for swelling x2          Manual           Patellar mobs           STM                      Neuro Re-ed           Quad set            TKE ball on wall   Hip burners x15 B  Hip burners 2x10x2\"       SLB EC LLE 3x20\"  SLB 10x10s L only         Hip ext sliders x10 each  Sliders lateral and backwards 2x10 B         TherEx           Knee/Hip PROM           Active w/u NS x10 min L3 NS x10 min L3 NS x10 min L3 RB 10' L3 NS 10' NS 10' NS 10'    Ankle pumps           SLR           LAQ           Heel slides  KB squats to 12\" step 2x10 16#         SAQ  Step ups 2x10 8\" forward and lateral      SL RDL with foam 2x10 B     Step flexion stretch    Sit to stands 18\" box 2x10    KB squat to 12\" 2x10 9# KB squat to 12\" 2x10 16#    Mini squat TRX full depth squat 2x10  TRX full depth squat 2x10  TRX " "full depth squat 2x10  TRX full depth squat 2x10  TRX full depth squat 2x10 TRX full depth squat 2x10 TRX full depth squat 2x10     TRX fwd and rev lunges x12 each  TRX fwd and rev lunges x12 each  TRX fwd and rev lunges x12 each  TRX fwd and rev lunges x12 each  2x10 lateral lunges BLE 2x10 lateral lunges BLE 2x10 lateral lunges BLE    Gastroc stretch     Sliders lateral and backwards x10 B  Sliders lateral/back 2x10 ea. BLE Sliders lateral/back 2x10 ea. BLE Sliders lateral/back 2x10 ea. BLE    Side stepping RTB 3 laps turf    Skaters RTB 3 laps Suitcase carries 4x50' with 16# KB Suitcase carries 4x50' with 16# KB Suitcase carries 4x50' with 26# KB Suitcase carry #26 lb FB 4x50'      Step ups  BOSU up and overs x20     BOSU step up LLE x20  Bosu lateral step ups 2x10      Bosu lateral step ups 2x10     Bosu step ups with alt hip drive 2x10 B Bosu lateral step ups 2x10     Bosu step ups with alt hip drive 2x10 B Bosu lateral step up and over 2x10 Bosu lateral step up and over 2x10    Step ups 8\" 2x10 Bosu lateral step up and over 2x10    Step downs  2x10 6\"   2x10 6\" 8\" lateral step downs 2x10    6\" anterior step downs 2x10 8\" lateral step downs 2x10    6\" anterior step downs 2x10     Leg press  2x10 95# 2x10 95# 2x10 95#  2x10 105#     TherAct           Patient education           Stair negotiation           Car transfer                                 Gait Training           With AD           No AD                             "

## 2025-02-24 NOTE — PROGRESS NOTES
"Daily Note     Today's date: 2025  Patient name: Colette Sweeney  : 1965  MRN: [id number]  Referring provider: Trey Fountain DO  Dx:   Encounter Diagnosis     ICD-10-CM    1. Chronic pain of left knee  M25.562     G89.29       2. Primary osteoarthritis of left knee  M17.12                   Subjective: [place] and had no increased pain of her knee. She was sore, but did not have to take pain medication.       Objective: See treatment diary below      Assessment: Patient continues to be challenged with performing balancing activities on uneven surfaces. She is to transition to total joint class next visit due to significant improvements in her overall function. Will continue to challenge as appropriate. Patient would benefit from continued PT to continue with return to activity demands.       Plan: Continue per plan of care.        Insurance Eval/ Re-eval POC expires Auth Status Total visits  Start date  Expiration date Misc   [employer] 12/20 3/14     $0                 Precautions: S/P L TKA 24  Past Medical History:   Diagnosis Date    Allergic rhinitis     Asthma     Fibroids     Hypertension     Physiological ovarian cysts     Sleep apnea     dental device worn         Date 25   Visit Number 11 12 13 14 15 16 17 18   Auth           ROM           Knee Flexion 118           Knee Extension                      TUG            Kines tape for swelling x2          Manual           Patellar mobs           STM                      Neuro Re-ed           Quad set            TKE ball on wall   Hip burners x15 B  Hip burners 2x10x2\"       SLB EC LLE 3x20\"  SLB 10x10s L only         Hip ext sliders x10 each  Sliders lateral and backwards 2x10 B         TherEx           Knee/Hip PROM           Active w/u NS x10 min L3 NS x10 min L3 NS x10 min L3 RB 10' L3 NS 10' NS 10' NS 10' NS 10'   Ankle pumps           SLR         " "  LAQ           Heel slides  KB squats to 12\" step 2x10 16#      Walking lunges with 10# TT 4x10'    SAQ  Step ups 2x10 8\" forward and lateral      SL RDL with foam 2x10 B     Step flexion stretch    Sit to stands 18\" box 2x10    KB squat to 12\" 2x10 9# KB squat to 12\" 2x10 16# KB squat to 12\" 2x10 16#   Mini squat TRX full depth squat 2x10  TRX full depth squat 2x10  TRX full depth squat 2x10  TRX full depth squat 2x10  TRX full depth squat 2x10 TRX full depth squat 2x10 TRX full depth squat 2x10 TRX full depth squat 2x10    TRX fwd and rev lunges x12 each  TRX fwd and rev lunges x12 each  TRX fwd and rev lunges x12 each  TRX fwd and rev lunges x12 each  2x10 lateral lunges BLE 2x10 lateral lunges BLE 2x10 lateral lunges BLE 2x10 lateral lunges BLE   Gastroc stretch     Sliders lateral and backwards x10 B  Sliders lateral/back 2x10 ea. BLE Sliders lateral/back 2x10 ea. BLE Sliders lateral/back 2x10 ea. BLE Sliders lateral/back 2x10 ea. BLE   Side stepping RTB 3 laps turf    Skaters RTB 3 laps Suitcase carries 4x50' with 16# KB Suitcase carries 4x50' with 16# KB Suitcase carries 4x50' with 26# KB Suitcase carry #26 lb FB 4x50'      Step ups  BOSU up and overs x20     BOSU step up LLE x20  Bosu lateral step ups 2x10      Bosu lateral step ups 2x10     Bosu step ups with alt hip drive 2x10 B Bosu lateral step ups 2x10     Bosu step ups with alt hip drive 2x10 B Bosu lateral step up and over 2x10 Bosu lateral step up and over 2x10    Step ups 8\" 2x10 Bosu lateral step up and over 2x10 Bosu lateral step up and over 2x10   Step downs  2x10 6\"   2x10 6\" 8\" lateral step downs 2x10    6\" anterior step downs 2x10 8\" lateral step downs 2x10    6\" anterior step downs 2x10     Leg press  2x10 95# 2x10 95# 2x10 95#  2x10 105#     TherAct           Patient education           Stair negotiation           Car transfer                                 Gait Training           With AD           No AD                             "

## 2025-02-27 ENCOUNTER — OFFICE VISIT (OUTPATIENT)
Dept: PHYSICAL THERAPY | Facility: CLINIC | Age: 60
End: 2025-02-27
Payer: COMMERCIAL

## 2025-02-27 DIAGNOSIS — G89.29 CHRONIC PAIN OF LEFT KNEE: Primary | ICD-10-CM

## 2025-02-27 DIAGNOSIS — M25.562 CHRONIC PAIN OF LEFT KNEE: Primary | ICD-10-CM

## 2025-02-27 DIAGNOSIS — M17.12 PRIMARY OSTEOARTHRITIS OF LEFT KNEE: ICD-10-CM

## 2025-02-27 PROCEDURE — 97110 THERAPEUTIC EXERCISES: CPT

## 2025-02-28 ENCOUNTER — OFFICE VISIT (OUTPATIENT)
Dept: FAMILY MEDICINE CLINIC | Facility: CLINIC | Age: 60
End: 2025-02-28
Payer: COMMERCIAL

## 2025-02-28 VITALS
SYSTOLIC BLOOD PRESSURE: 106 MMHG | HEART RATE: 68 BPM | RESPIRATION RATE: 16 BRPM | DIASTOLIC BLOOD PRESSURE: 74 MMHG | BODY MASS INDEX: 37.56 KG/M2 | WEIGHT: 220 LBS | TEMPERATURE: 97.7 F | HEIGHT: 64 IN

## 2025-02-28 DIAGNOSIS — R21 RASH AND NONSPECIFIC SKIN ERUPTION: Primary | ICD-10-CM

## 2025-02-28 PROCEDURE — 99213 OFFICE O/P EST LOW 20 MIN: CPT | Performed by: FAMILY MEDICINE

## 2025-02-28 RX ORDER — METHYLPREDNISOLONE 4 MG/1
TABLET ORAL
Qty: 21 EACH | Refills: 0 | Status: SHIPPED | OUTPATIENT
Start: 2025-02-28

## 2025-02-28 NOTE — PROGRESS NOTES
"Name: Colette Sweeney      : 1965      MRN: [id number]  Encounter Provider: Bernadette Ramsey MD  Encounter Date: 2025   Encounter department: [facility]  :  Assessment & Plan  Rash and nonspecific skin eruption  Possible allergic reaction? Will cover with steroid course. If no improvement follow up with derm.   Orders:    methylPREDNISolone 4 MG tablet therapy pack; Use as directed on package    Ambulatory referral to Dermatology; Future           History of Present Illness   Rash  This is a new problem. Episode onset: 25. Location: bilateral legs. The rash is characterized by itchiness and redness. She was exposed to nothing. Pertinent negatives include no anorexia, congestion, cough, decreased physical activity, fatigue, fever, itching or joint pain.       Review of Systems   Constitutional: Negative.  Negative for fatigue and fever.   HENT: Negative.  Negative for congestion.    Eyes: Negative.    Respiratory: Negative.  Negative for cough.    Cardiovascular: Negative.    Gastrointestinal: Negative.  Negative for anorexia.   Endocrine: Negative.    Genitourinary: Negative.    Musculoskeletal: Negative.  Negative for joint pain.   Skin:  Positive for rash. Negative for itching.   Allergic/Immunologic: Negative.    Neurological: Negative.    Hematological: Negative.    Psychiatric/Behavioral: Negative.         Objective   /74   Pulse 68   Temp 97.7 °F (36.5 °C)   Resp 16   Ht 5' 4\" (1.626 m)   Wt 99.8 kg (220 lb)   LMP 2021   BMI 37.76 kg/m²      Physical Exam  Constitutional:       General: She is not in acute distress.     Appearance: Normal appearance. She is not ill-appearing, toxic-appearing or diaphoretic.   HENT:      Head: Normocephalic and atraumatic.   Eyes:      General:         Right eye: No discharge.         Left eye: No discharge.      Conjunctiva/sclera: Conjunctivae normal.   Pulmonary:      Effort: Pulmonary effort is normal.   Skin:     " Findings: Rash (erythematous raised lesions throughout lower extremities bilaterally) present.   Neurological:      Mental Status: She is alert.   Psychiatric:         Mood and Affect: Mood normal.         Behavior: Behavior normal.         Thought Content: Thought content normal.         Judgment: Judgment normal.

## 2025-03-06 ENCOUNTER — OFFICE VISIT (OUTPATIENT)
Dept: PHYSICAL THERAPY | Facility: CLINIC | Age: 60
End: 2025-03-06
Payer: COMMERCIAL

## 2025-03-06 DIAGNOSIS — M25.562 CHRONIC PAIN OF LEFT KNEE: Primary | ICD-10-CM

## 2025-03-06 DIAGNOSIS — M17.12 PRIMARY OSTEOARTHRITIS OF LEFT KNEE: ICD-10-CM

## 2025-03-06 DIAGNOSIS — G89.29 CHRONIC PAIN OF LEFT KNEE: Primary | ICD-10-CM

## 2025-03-06 PROCEDURE — 97110 THERAPEUTIC EXERCISES: CPT

## 2025-03-06 NOTE — PROGRESS NOTES
"Daily Note     Today's date: 3/6/2025  Patient name: Colette Sweeney  : 1965  MRN: [id number]  Referring provider: Trey Fountain DO  Dx:   Encounter Diagnosis     ICD-10-CM    1. Chronic pain of left knee  M25.562     G89.29       2. Primary osteoarthritis of left knee  M17.12                       Subjective: Patient's rash is gone. She is having some pain of her L knee today over her patellar tendon.       Objective: See treatment diary below    Warmup NuStep 10' prior to session   Circuit 1, 3 rounds, 30 seconds on, 30 seconds off  TRX squats   X band walk   Lateral lunges with KB  Circuit 2, 3 rounds, 30 seconds on, 30 seconds off  Wall squat iso hold  Hip burners with mini squat  Walking lunges with KB in B UE  Circuit 3, 3 rounds, 30 seconds on, 30 seconds off  Med ball slams   STS with 10# TT   Tandem walking    Assessment: Patient was highly challenged with performing wall sits today due to anterior L knee pain. Will continue to progress eccentric control as tolerated. Patient would benefit from continued PT to continue with return to activity demands.       Plan: Continue per plan of care.        Insurance Eval/ Re-eval POC expires Auth Status Total visits  Start date  Expiration date Misc   [employer] 12/20 3/14     $0                 Precautions: S/P L TKA 24  Past Medical History:   Diagnosis Date    Allergic rhinitis     Asthma     Fibroids     Hypertension     Physiological ovarian cysts     Sleep apnea     dental device worn         Date 25   Visit Number 15 16 17 18 19 ([facility])   Auth        ROM        Knee Flexion        Knee Extension                TUG                Manual        Patellar mobs        STM                Neuro Re-ed        Quad set         TKE ball on wall Hip burners 2x10x2\"                       TherEx        Knee/Hip PROM        Active w/u NS 10' NS 10' NS 10' NS 10'    Ankle pumps        SLR        LAQ        Heel slides    Walking " "lunges with 10# TT 4x10'     SAQ   SL RDL with foam 2x10 B      Step flexion stretch   KB squat to 12\" 2x10 9# KB squat to 12\" 2x10 16# KB squat to 12\" 2x10 16#    Mini squat TRX full depth squat 2x10 TRX full depth squat 2x10 TRX full depth squat 2x10 TRX full depth squat 2x10     2x10 lateral lunges BLE 2x10 lateral lunges BLE 2x10 lateral lunges BLE 2x10 lateral lunges BLE    Gastroc stretch  Sliders lateral/back 2x10 ea. BLE Sliders lateral/back 2x10 ea. BLE Sliders lateral/back 2x10 ea. BLE Sliders lateral/back 2x10 ea. BLE    Side stepping Suitcase carry #26 lb FB 4x50'       Step ups  Bosu lateral step up and over 2x10 Bosu lateral step up and over 2x10    Step ups 8\" 2x10 Bosu lateral step up and over 2x10 Bosu lateral step up and over 2x10    Step downs 8\" lateral step downs 2x10    6\" anterior step downs 2x10 8\" lateral step downs 2x10    6\" anterior step downs 2x10      Leg press  2x10 105#      TherAct        Patient education        Stair negotiation        Car transfer                        Gait Training        With AD        No AD                       "

## 2025-03-13 ENCOUNTER — APPOINTMENT (OUTPATIENT)
Dept: RADIOLOGY | Facility: CLINIC | Age: 60
End: 2025-03-13
Payer: COMMERCIAL

## 2025-03-13 ENCOUNTER — OFFICE VISIT (OUTPATIENT)
Dept: OBGYN CLINIC | Facility: CLINIC | Age: 60
End: 2025-03-13

## 2025-03-13 ENCOUNTER — OFFICE VISIT (OUTPATIENT)
Dept: PHYSICAL THERAPY | Facility: CLINIC | Age: 60
End: 2025-03-13
Payer: COMMERCIAL

## 2025-03-13 VITALS — BODY MASS INDEX: 37.73 KG/M2 | WEIGHT: 221 LBS | HEIGHT: 64 IN

## 2025-03-13 DIAGNOSIS — M25.562 CHRONIC PAIN OF LEFT KNEE: Primary | ICD-10-CM

## 2025-03-13 DIAGNOSIS — G89.29 CHRONIC PAIN OF LEFT KNEE: Primary | ICD-10-CM

## 2025-03-13 DIAGNOSIS — Z96.652 S/P TOTAL KNEE REPLACEMENT NOT USING CEMENT, LEFT: Primary | ICD-10-CM

## 2025-03-13 DIAGNOSIS — Z96.652 S/P TOTAL KNEE REPLACEMENT NOT USING CEMENT, LEFT: ICD-10-CM

## 2025-03-13 DIAGNOSIS — M17.12 PRIMARY OSTEOARTHRITIS OF LEFT KNEE: ICD-10-CM

## 2025-03-13 PROCEDURE — 73562 X-RAY EXAM OF KNEE 3: CPT

## 2025-03-13 PROCEDURE — 97110 THERAPEUTIC EXERCISES: CPT

## 2025-03-13 PROCEDURE — 99024 POSTOP FOLLOW-UP VISIT: CPT | Performed by: ORTHOPAEDIC SURGERY

## 2025-03-13 NOTE — PROGRESS NOTES
Assessment/Plan:  1. S/P total knee replacement not using cement, left  XR knee 3 vw left non injury          Scribe Attestation      I,:  Afshan Tan am acting as a scribe while in the presence of the attending physician.:       I,:  Trey Fountain, DO personally performed the services described in this documentation    as scribed in my presence.:           Colette is a pleasant 59-year-old female who returns today for follow-up evaluation 3 months status post left press-fit total knee arthroplasty performed on 12/17/2024. Overall, the patient is well at this time. Encouraged the patient to continue with physical therapy and home exercises. 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 She should call prior to dentist appointments for prophylactic antibiotics. She is to follow-up in 9 months for her 1 year evaluation and xrays.    Subjective: Follow-up evaluation 3 months status post left total knee arthroplasty    Patient ID: Colette Sweeney is a 60 y.o. female who returns today for follow-up evaluation 3 months status post left total knee arthroplasty, DOS 12/17/2024. Today, patient reports 3/10 knee pain. She reports stiffness with sit to stand. She reports calf tightness. She is attending PT once per week and supplementing with her HEP. She feels her left knee is healing at the same rate as the right knee.     Review of Systems   Constitutional:  Positive for activity change. Negative for chills, fever and unexpected weight change.   HENT:  Negative for hearing loss, nosebleeds and sore throat.    Eyes:  Negative for pain, redness and visual disturbance.   Respiratory:  Negative for cough, shortness of breath and wheezing.    Cardiovascular:  Negative for chest pain, palpitations and leg swelling.   Gastrointestinal:  Negative for abdominal pain, nausea and vomiting.   Endocrine: Negative for polydipsia and polyuria.   Genitourinary:  Negative for  dysuria and hematuria.   Musculoskeletal:  Positive for joint swelling and myalgias. Negative for arthralgias.        See HPI   Skin:  Negative for rash and wound.   Neurological:  Negative for dizziness, numbness and headaches.   Psychiatric/Behavioral:  Negative for decreased concentration and suicidal ideas. The patient is not nervous/anxious.          Past Medical History:   Diagnosis Date    Allergic rhinitis     Asthma     Fibroids     Hypertension     Physiological ovarian cysts     Sleep apnea     dental device worn       Past Surgical History:   Procedure Laterality Date    COLONOSCOPY  2016    dr garcia    KNEE ARTHROSCOPY Left 2018    meniscectomy x2, first done in 2015    MYOMECTOMY      VT ARTHRP KNE CONDYLE&PLATU MEDIAL&LAT COMPARTMENTS Right 03/06/2023    Procedure: ARTHROPLASTY KNEE TOTAL W ROBOT - RIGHT - PRESS FIT - SAME DAY;  Surgeon: Trey Fountain DO;  Location: [facility];  Service: Orthopedics    VT ARTHRP KNE CONDYLE&PLATU MEDIAL&LAT COMPARTMENTS Left 12/17/2024    Procedure: ARTHROPLASTY KNEE TOTAL W ROBOT - LEFT - PRESS FIT - SAME DAY;  Surgeon: Trey Fountain DO;  Location: [facility];  Service: Orthopedics    VT ARTHRS KNE SURG W/MENISCECTOMY MED/LAT W/SHVG Right 01/06/2022    Procedure: KNEE ARTHROSCOPY MENISCECTOMY MEDIAL;  Surgeon: Sachin Grove MD;  Location: [facility];  Service: Orthopedics    VT COLONOSCOPY FLX DX W/COLLJ SPEC WHEN PFRMD N/A 09/13/2018    Procedure: COLONOSCOPY;  Surgeon: Sergo Martinez MD;  Location: [facility];  Service: Gastroenterology    WRIST SURGERY Right 07/2024       Family History   Problem Relation Age of Onset    Osteoporosis Mother     Thyroid disease Mother     Colon cancer Mother 67    Colon polyps Mother     Cancer Mother         colon    Alzheimer's disease Father     Hypertension Father     Dementia Father     No Known Problems Daughter     No Known Problems Daughter     Ovarian cancer Maternal Grandmother     Heart disease Brother      No Known Problems Maternal Aunt     No Known Problems Maternal Aunt     No Known Problems Paternal Aunt     No Known Problems Paternal Aunt     No Known Problems Paternal Aunt     No Known Problems Paternal Aunt     No Known Problems Paternal Aunt     No Known Problems Paternal Aunt     No Known Problems Paternal Aunt     No Known Problems Paternal Aunt        Social History     Occupational History    Not on file   Tobacco Use    Smoking status: Never     Passive exposure: Never    Smokeless tobacco: Never   Vaping Use    Vaping status: Never Used   Substance and Sexual Activity    Alcohol use: Yes     Comment: social - wine 2 x month    Drug use: Never    Sexual activity: Yes     Partners: Male     Birth control/protection: Condom Male         Current Outpatient Medications:     albuterol (2.5 mg/3 mL) 0.083 % nebulizer solution, Take 1 vial (2.5 mg total) by nebulization every 6 (six) hours as needed for wheezing or shortness of breath, Disp: 100 mL, Rfl: 0    amoxicillin (AMOXIL) 500 MG tablet, Take 4 tablets (2,000 mg total) by mouth 60 minutes pre-procedure, Disp: 4 tablet, Rfl: 2    Calcium Carb-Cholecalciferol 600-500 MG-UNIT CAPS, Take by mouth in the morning, Disp: , Rfl:     chlorthalidone 25 mg tablet, Take 1 tablet (25 mg total) by mouth daily, Disp: 90 tablet, Rfl: 1    methylPREDNISolone 4 MG tablet therapy pack, Use as directed on package, Disp: 21 each, Rfl: 0    multivitamin (THERAGRAN) TABS, Take 1 tablet by mouth daily, Disp: , Rfl:     Respiratory Therapy Supplies (NEBULIZER/TUBING/MOUTHPIECE) KIT, by Does not apply route 4 (four) times a day, Disp: 1 each, Rfl: 0    Fluticasone-Salmeterol (Advair Diskus) 500-50 mcg/dose inhaler, Inhale 1 puff 2 (two) times a day Rinse mouth after use., Disp: 180 blister, Rfl: 1    valACYclovir (VALTREX) 1,000 mg tablet, 2 tabs at earliest onset of cold sore, repeat once in 12 hours. Take 500mg bid for 3 days for genital herpes flare up. (Patient not taking:  Reported on 2/28/2025), Disp: 40 tablet, Rfl: 5    No Known Allergies    Objective:  There were no vitals filed for this visit.    Body mass index is 37.93 kg/m².    Left Knee Exam     Tenderness   The patient is experiencing tenderness in the lateral retinaculum and medial retinaculum.    Range of Motion   Extension:  0   Flexion:  120     Tests   Varus: negative Valgus: negative    Other   Erythema: absent  Scars: present (well-healed anterior surgical scar)  Sensation: normal  Pulse: present  Swelling: mild  Effusion: no effusion present    Comments:  Well healed anterior surgical scar  No effusion, erythema, or warmth  Stable at 0, 30, 90  Patella tracks midline and flat  Ecchymosis showing signs of resolution anterirorly          Observations   Left Knee   Negative for effusion.       Physical Exam  Vitals and nursing note reviewed.   Constitutional:       Appearance: Normal appearance. She is well-developed.   HENT:      Head: Normocephalic and atraumatic.      Right Ear: External ear normal.      Left Ear: External ear normal.   Eyes:      General: No scleral icterus.     Extraocular Movements: Extraocular movements intact.      Conjunctiva/sclera: Conjunctivae normal.   Cardiovascular:      Rate and Rhythm: Normal rate.   Pulmonary:      Effort: Pulmonary effort is normal. No respiratory distress.   Musculoskeletal:      Cervical back: Normal range of motion and neck supple.      Left knee: No effusion.      Comments: See Ortho exam   Skin:     General: Skin is warm and dry.   Neurological:      General: No focal deficit present.      Mental Status: She is alert and oriented to person, place, and time.   Psychiatric:         Behavior: Behavior normal.       X-rays of the left knee were obtained during today's visit reviewed personally by Dr. Fountain which demonstrates stable alignment of total knee arthroplasty without evidence of hardware complication.  No new fracture or dislocation.    This document was  created using speech voice recognition software.   Grammatical errors, random word insertions, pronoun errors, and incomplete sentences are an occasional consequence of this system due to software limitations, ambient noise, and hardware issues.   Any formal questions or concerns about content, text, or information contained within the body of this dictation should be directly addressed to the provider for clarification.

## 2025-03-13 NOTE — Clinical Note
March 13, 2025     Patient: Colette Sweeney  YOB: 1965  Date of Visit: 3/13/2025      To Whom it May Concern:    Colette Sweeney is under my professional care. Colette was seen in my office on 3/13/2025. Colette {Return to school/sport/work:[id number]}.    If you have any questions or concerns, please don't hesitate to call.         Sincerely,          Trey Fountain,         CC: No Recipients

## 2025-03-27 ENCOUNTER — OFFICE VISIT (OUTPATIENT)
Dept: PHYSICAL THERAPY | Facility: CLINIC | Age: 60
End: 2025-03-27
Payer: COMMERCIAL

## 2025-03-27 DIAGNOSIS — M25.562 CHRONIC PAIN OF LEFT KNEE: Primary | ICD-10-CM

## 2025-03-27 DIAGNOSIS — G89.29 CHRONIC PAIN OF LEFT KNEE: Primary | ICD-10-CM

## 2025-03-27 DIAGNOSIS — M17.12 PRIMARY OSTEOARTHRITIS OF LEFT KNEE: ICD-10-CM

## 2025-03-27 PROCEDURE — 97110 THERAPEUTIC EXERCISES: CPT | Performed by: PHYSICAL THERAPIST

## 2025-03-27 NOTE — PROGRESS NOTES
Daily Note     Today's date: 3/27/2025  Patient name: Colette Sweeney  : 1965  MRN: [id number]  Referring provider: Trey Fountain DO  Dx:   Encounter Diagnosis     ICD-10-CM    1. Chronic pain of left knee  M25.562     G89.29       2. Primary osteoarthritis of left knee  M17.12               Subjective: Patient has no new complaints today.       Objective: See treatment diary below    Warmup NuStep 10' prior to session   Circuit 1, 3 rounds, 30 seconds on, 30 seconds off  Walking lunges with KB in B UE   Step ups on bosu with alt hip drive   Sliders lateral and backwards   Circuit 2, 3 rounds, 30 seconds on, 30 seconds off  Sit to stands with 18# KB   RDL with KB   Med ball slams   Circuit 3, 3 rounds, 30 seconds on, 30 seconds off  Tandem walking forwards and backwards   Nikos walking with foam beam in between  Lateral heel taps    Assessment: Patient tolerated all activities well today with improved depth during squat and lunge variations throughout session. Will continue to progress as tolerated. Patient would benefit from continued PT to continue with return to activity demands.        Plan: Continue per plan of care.        Insurance Eval/ Re-eval POC expires Auth Status Total visits  Start date  Expiration date Misc   [employer] 12/20 3/14     $0                 Precautions: S/P L TKA 24  Past Medical History:   Diagnosis Date    Allergic rhinitis     Asthma     Fibroids     Hypertension     Physiological ovarian cysts     Sleep apnea     dental device worn

## 2025-04-07 DIAGNOSIS — I10 ESSENTIAL HYPERTENSION: ICD-10-CM

## 2025-04-08 RX ORDER — CHLORTHALIDONE 25 MG/1
25 TABLET ORAL DAILY
Qty: 90 TABLET | Refills: 1 | Status: SHIPPED | OUTPATIENT
Start: 2025-04-08

## (undated) DEVICE — TUBING AUX CHANNEL

## (undated) DEVICE — VELYS ROBOT-ASSISTED SOLUTION ARRAY DRILL PIN 175 MM X 4 MM: Brand: VELYS

## (undated) DEVICE — BLADE SHAVER TORPEDO CRV 4MM 13MM COOLCUT

## (undated) DEVICE — ARTHROSCOPY FLOOR MAT

## (undated) DEVICE — VEST SURGEON DISPOSABLE

## (undated) DEVICE — ANTIBACTERIAL UNDYED BRAIDED (POLYGLACTIN 910), SYNTHETIC ABSORBABLE SUTURE: Brand: COATED VICRYL

## (undated) DEVICE — GLOVE SRG BIOGEL 8

## (undated) DEVICE — TRAP POLY

## (undated) DEVICE — SKIN MARKER DUAL TIP WITH RULER CAP, FLEXIBLE RULER AND LABELS: Brand: DEVON

## (undated) DEVICE — 60 ML SYRINGE,REGULAR TIP: Brand: MONOJECT

## (undated) DEVICE — GLOVE INDICATOR PI UNDERGLOVE SZ 8.5 BLUE

## (undated) DEVICE — SUT STRATAFIX SPIRAL 1-0  CT-1 30 X 30CM SXPD2B403

## (undated) DEVICE — VELYS ROBOTIC-ASSISTED SOLUTION ARRAY SET - KNEE: Brand: VELYS

## (undated) DEVICE — GLOVE EXAM NON-STRL NTRL PLUS LRG PF

## (undated) DEVICE — GLOVE SRG BIOGEL ORTHOPEDIC 8.5

## (undated) DEVICE — 3M™ IOBAN™ 2 ANTIMICROBIAL INCISE DRAPE 6650EZ: Brand: IOBAN™ 2

## (undated) DEVICE — TUBING ARTHROSCOPIC WAVE  MAIN PUMP

## (undated) DEVICE — BAG SPECIMEN BIOHAZARD 10 X 10 ADHESIVE

## (undated) DEVICE — STERILE DOUBLE BASIN SET PACK: Brand: CARDINAL HEALTH

## (undated) DEVICE — ADHESIVE SKIN HIGH VISCOSITY EXOFIN 1ML

## (undated) DEVICE — VELYS ROBOT-ASSISTED SOLUTION ARRAY DRILL PIN 100 MM X 4 MM: Brand: VELYS

## (undated) DEVICE — NEPTUNE E-SEP SMOKE EVACUATION PENCIL, COATED, 70MM BLADE, PUSH BUTTON SWITCH: Brand: NEPTUNE E-SEP

## (undated) DEVICE — CUFF TOURNIQUET 34 X 4 IN QUICK CONNECT DISP 1BLA

## (undated) DEVICE — DUAL CUT SAGITTAL BLADE

## (undated) DEVICE — ANTI-EMBOLISM STOCKINGS,KNEE LENGTH,X-LARGE,REGULAR,SIZE G-: Brand: T.E.D.

## (undated) DEVICE — BANDAGE, ESMARK LF STR 6"X9' (20/CS): Brand: CYPRESS

## (undated) DEVICE — CAPIT KNEE ATTUNE FB MEDIAL STAB AOX POR

## (undated) DEVICE — SUT VICRYL 0 CP-1 27 IN J267H

## (undated) DEVICE — SINGLE-USE BIOPSY FORCEPS: Brand: RADIAL JAW 4

## (undated) DEVICE — GLOVE SRG BIOGEL 7.5

## (undated) DEVICE — MARKER SPOT EX  BOWEL TATTOO SYRINGE

## (undated) DEVICE — BASIC DOUBLE BASIN 2-LF: Brand: MEDLINE INDUSTRIES, INC.

## (undated) DEVICE — LUBRICANT SURGILUBE TUBE 4 OZ  FLIP TOP

## (undated) DEVICE — GLOVE INDICATOR PI UNDERGLOVE SZ 8 BLUE

## (undated) DEVICE — ASTOUND SURGICAL GOWN, XXX LARGE, X-LONG: Brand: CONVERTORS

## (undated) DEVICE — GLOVE SRG BIOGEL 6.5

## (undated) DEVICE — TIBURON EXTREMITY SHEET: Brand: CONVERTORS

## (undated) DEVICE — COBAN 4 IN STERILE

## (undated) DEVICE — MEDI-VAC YANKAUER SUCTION HANDLE: Brand: CARDINAL HEALTH

## (undated) DEVICE — DRESSING MEPILEX AG BORDER POST-OP 4 X 12 IN

## (undated) DEVICE — DRESSING MEPILEX BORDER 4 X 8 IN

## (undated) DEVICE — VELYS BLADE SAW OSC 85 X 19 X 2MM

## (undated) DEVICE — FABRIC REINFORCED, SURGICAL GOWN, XL: Brand: CONVERTORS

## (undated) DEVICE — VELYS ROBOT-ASSISTED SOLUTION ARRAY DRILL PIN 125 MM X 4 MM: Brand: VELYS

## (undated) DEVICE — ORTHOPEDIC PACK: Brand: CARDINAL HEALTH

## (undated) DEVICE — 1200CC GUARDIAN II: Brand: GUARDIAN

## (undated) DEVICE — TIBURON SPLIT SHEET: Brand: CONVERTORS

## (undated) DEVICE — CAPIT KNEE ATTUNE FB CEMENTLESS

## (undated) DEVICE — DISPOSABLE EQUIPMENT COVER: Brand: LARGE TOWEL DRAPE

## (undated) DEVICE — SUT STRATAFIX SPIRAL 3-0 PGA/PCL 30 X 30 CM SXMD2B410

## (undated) DEVICE — GLOVE INDICATOR PI UNDERGLOVE SZ 7.5 BLUE

## (undated) DEVICE — LINER FOOT PAD STERILE DISPOSABLE

## (undated) DEVICE — INSTRUMENT POUCH: Brand: CONVERTORS

## (undated) DEVICE — 3M™ STERI-DRAPE™ U-DRAPE 1015: Brand: STERI-DRAPE™

## (undated) DEVICE — 3M™ IOBAN™ 2 ANTIMICROBIAL INCISE DRAPE 6651EZ: Brand: IOBAN™ 2

## (undated) DEVICE — GLOVE INDICATOR PI UNDERGLOVE SZ 6.5 BLUE

## (undated) DEVICE — DISPOSABLE BIOPSY VALVE MAJ-1555: Brand: SINGLE USE BIOPSY VALVE (STERILE)

## (undated) DEVICE — DRESSING MEPILEX AG BORDER 4 X 12 IN

## (undated) DEVICE — BRUSH CYTOLOGY 3 MM 240 CM

## (undated) DEVICE — HANDPIECE SET WITH HIGH FLOW TIP AND SUCTION TUBE: Brand: INTERPULSE

## (undated) DEVICE — SUT ETHIBOND 2 V-37 30 IN MX69G

## (undated) DEVICE — VELYS SATEL DRAPE

## (undated) DEVICE — GROUNDING PAD UNIVERSAL SLW

## (undated) DEVICE — BRUSH ENDO CLEANING DBL-HEADER

## (undated) DEVICE — BAG WOUND LAVAGE 1L BIASURGE ADV

## (undated) DEVICE — INTENDED FOR TISSUE SEPARATION, AND OTHER PROCEDURES THAT REQUIRE A SHARP SURGICAL BLADE TO PUNCTURE OR CUT.: Brand: BARD-PARKER ® CARBON RIB-BACK BLADES

## (undated) DEVICE — SOLIDIFIER FLUID WASTE CONTROL 1500ML

## (undated) DEVICE — CHLORAPREP HI-LITE 26ML ORANGE

## (undated) DEVICE — REPEL CUT REST SURGICAL GLV LNRS LG: Brand: REPEL

## (undated) DEVICE — SNARE BARBED 230CM

## (undated) DEVICE — HOOD: Brand: FLYTE, SURGICOOL

## (undated) DEVICE — SINGLE-USE POLYPECTOMY SNARE: Brand: SENSATION SHORT THROW

## (undated) DEVICE — TUBING BUBBLE CLEAR 5MM X 100 FT NS

## (undated) DEVICE — VELYS ROBOT DRAPE

## (undated) DEVICE — INTENDED FOR TISSUE SEPARATION, AND OTHER PROCEDURES THAT REQUIRE A SHARP SURGICAL BLADE TO PUNCTURE OR CUT.: Brand: BARD-PARKER SAFETY BLADES SIZE 11, STERILE

## (undated) DEVICE — EXOFIN PRECISION PEN HIGH VISCOSITY TOPICAL SKIN ADHESIVE: Brand: EXOFIN PRECISION PEN, 1G

## (undated) DEVICE — Device: Brand: OLYMPUS

## (undated) DEVICE — GAUZE SPONGES,16 PLY: Brand: CURITY

## (undated) DEVICE — DRAPE SHEET X-LG

## (undated) DEVICE — 3M™ DURAPORE™ SURGICAL TAPE 1538-3, 3 INCH X 10 YARD (7,5CM X 9,1M), 4 ROLLS/BOX: Brand: 3M™ DURAPORE™

## (undated) DEVICE — STR UTILITY DRAPE PK, 4 PK: Brand: CARDINAL HEALTH

## (undated) DEVICE — PACK ARTHROSCOPY

## (undated) DEVICE — 450 ML BOTTLE OF 0.05% CHLORHEXIDINE GLUCONATE IN 99.95% STERILE WATER FOR IRRIGATION, USP AND APPLICATOR.: Brand: IRRISEPT ANTIMICROBIAL WOUND LAVAGE

## (undated) DEVICE — FORCEP ELECSURG RADIAL JAW4 2.2 X 240CM  HOT BX

## (undated) DEVICE — 3 BONE CEMENT MIXER: Brand: MIXEVAC

## (undated) DEVICE — TRAVELKIT CONTAINS FIRST STEP KIT (200ML EP-4 KIT) AND SOILED SCOPE BAG - 1 KIT: Brand: TRAVELKIT CONTAINS FIRST STEP KIT AND SOILED SCOPE BAG

## (undated) DEVICE — OCCLUSIVE GAUZE STRIP,3% BISMUTH TRIBROMOPHENATE IN PETROLATUM BLEND: Brand: XEROFORM

## (undated) DEVICE — AIRLIFE™  ADULT CUSHION NASAL CANNULA WITH 7 FOOT (2.1 M) CRUSH-RESISTANT OXYGEN TUBING, AND U/CONNECT-IT ADAPTER: Brand: AIRLIFE™

## (undated) DEVICE — Device

## (undated) DEVICE — SUT STRATFIX SPIRAL PDS PLUS 1 CT-1/CT-1 12IN SXPP2B403

## (undated) DEVICE — VAPR PREMIERE50 ELECTRODE WITH HAND CONTROLS 3.0MM, 50 DEGREES SUCTION WITH INTEGRATED HANDPIECE: Brand: VAPR